# Patient Record
Sex: MALE | Race: WHITE | NOT HISPANIC OR LATINO | Employment: FULL TIME | URBAN - METROPOLITAN AREA
[De-identification: names, ages, dates, MRNs, and addresses within clinical notes are randomized per-mention and may not be internally consistent; named-entity substitution may affect disease eponyms.]

---

## 2018-03-21 ENCOUNTER — HOSPITAL ENCOUNTER (EMERGENCY)
Facility: HOSPITAL | Age: 38
Discharge: HOME | End: 2018-03-21
Attending: EMERGENCY MEDICINE

## 2018-03-21 VITALS
HEART RATE: 100 BPM | WEIGHT: 165 LBS | OXYGEN SATURATION: 100 % | RESPIRATION RATE: 18 BRPM | DIASTOLIC BLOOD PRESSURE: 88 MMHG | HEIGHT: 75 IN | SYSTOLIC BLOOD PRESSURE: 142 MMHG | TEMPERATURE: 98.3 F | BODY MASS INDEX: 20.51 KG/M2

## 2018-03-21 DIAGNOSIS — S61.411A LACERATION OF RIGHT PALM, INITIAL ENCOUNTER: Primary | ICD-10-CM

## 2018-03-21 PROCEDURE — 99282 EMERGENCY DEPT VISIT SF MDM: CPT

## 2018-03-21 RX ORDER — GINSENG 100 MG
1 CAPSULE ORAL ONCE
Status: COMPLETED | OUTPATIENT
Start: 2018-03-21 | End: 2018-03-21

## 2018-03-21 RX ORDER — LIDOCAINE HYDROCHLORIDE AND EPINEPHRINE 10; 10 MG/ML; UG/ML
1 INJECTION, SOLUTION INFILTRATION; PERINEURAL ONCE
Status: COMPLETED | OUTPATIENT
Start: 2018-03-21 | End: 2018-03-21

## 2018-03-21 RX ADMIN — BACITRACIN ZINC 1 SMALL APPLICATION: 500 OINTMENT TOPICAL at 22:45

## 2018-03-21 RX ADMIN — LIDOCAINE HYDROCHLORIDE,EPINEPHRINE BITARTRATE 1 ML: 10; .01 INJECTION, SOLUTION INFILTRATION; PERINEURAL at 22:45

## 2018-03-22 NOTE — DISCHARGE INSTRUCTIONS
Care For Your Stitches   WHAT YOU NEED TO KNOW:   Stitches, or sutures, are used to close cuts and wounds on the skin  Stitches need to be removed after your wound has healed  DISCHARGE INSTRUCTIONS:   Return to the emergency department if:   · Your stitches come apart  · Blood soaks through your bandages  · You suddenly cannot move your injured joint  · You have sudden numbness around your wound  · You see red streaks coming from your wound  Contact your healthcare provider if:   · You have a fever and chills  · Your wound is red, warm, swollen, or leaking pus  · There is a bad smell coming from your wound  · You have increased pain in the wound area  · You have questions or concerns about your condition or care  Care for your stitches:  Keep your stitches clean and dry  You may need to cover your stitches with a bandage for 24 to 48 hours, or as directed  Do not bump or hit the suture area, as this could open the wound  Do not trim or shorten the ends of your stitches  If they rub on your clothing, put a gauze bandage between the stitches and your clothes  Clean your wound:  Carefully wash your wound with soap and water  For mouth and lip wounds, rinse your mouth after meals and at bedtime  Ask your healthcare provider what to use to rinse your mouth  If you have a scalp wound, you may gently wash your hair every 2 days with mild shampoo  Do not use hair products, such as hair spray  Help your wound heal:   · Elevate  your wound above the level of your heart as often as you can  This will help decrease swelling and pain  Prop your wound on pillows or blankets to keep it elevated comfortably  · Limit activity  Do not stretch the skin around your wound  This will help prevent bleeding and swelling of the wound area  Follow up with your healthcare provider as directed: You may need to return to have your stitches removed   Write down your questions so you remember to ask them during your visits  © 2017 [employer] Information is for End User's use only and may not be sold, redistributed or otherwise used for commercial purposes  All illustrations and images included in CareNotes® are the copyrighted property of [employer]  or [address]  The above information is an  only  It is not intended as medical advice for individual conditions or treatments  Talk to your doctor, nurse or pharmacist before following any medical regimen to see if it is safe and effective for you

## 2018-03-22 NOTE — ED PROVIDER NOTES
History  Chief Complaint   Patient presents with    Hand Laceration     Patient was using a snowblower and it got jammed  He was using a poker to dislodge it and it kickbacked into his hand  This 65-year-old right-handed male lacerated his right palm approximately hour ago  He was attempting to clear the  blades using a wrench  The wrench was kicked back into his palm  He was wearing gloves  Patient declines x-ray  I discussed risk of retained foreign body or fracture  He again declined an x-ray  Prior to Admission Medications   Prescriptions Last Dose Informant Patient Reported? Taking?   amoxicillin (AMOXIL) 500 mg capsule   No No   Sig: Take 1 capsule (500 mg total) by mouth 3 (three) times a day  Facility-Administered Medications: None       History reviewed  No pertinent past medical history  Past Surgical History:   Procedure Laterality Date    APPENDECTOMY      DENTAL SURGERY         History reviewed  No pertinent family history  I have reviewed and agree with the history as documented  Social History   Substance Use Topics    Smoking status: Current Every Day Smoker     Packs/day: 1 00    Smokeless tobacco: Never Used    Alcohol use 1 2 oz/week     2 Cans of beer per week      Comment: 1-2 daily        Review of Systems   Constitutional: Negative  HENT: Negative  Eyes: Negative  Respiratory: Negative  Cardiovascular: Negative  Gastrointestinal: Negative  Endocrine: Negative  Genitourinary: Negative  Musculoskeletal: Negative  Skin: Negative  Allergic/Immunologic: Negative  Neurological: Negative  Hematological: Negative  Psychiatric/Behavioral: Negative  All other systems reviewed and are negative        Physical Exam  ED Triage Vitals [03/21/18 2231]   Temperature Pulse Respirations Blood Pressure SpO2   98 3 °F (36 8 °C) 100 18 142/88 100 %      Temp Source Heart Rate Source Patient Position - Orthostatic VS BP Location FiO2 (%)   Temporal Monitor Sitting Left arm --      Pain Score       8           Orthostatic Vital Signs  Vitals:    03/21/18 2231   BP: 142/88   Pulse: 100   Patient Position - Orthostatic VS: Sitting       Physical Exam   Constitutional: He is oriented to person, place, and time  He appears well-developed and well-nourished  HENT:   Head: Normocephalic and atraumatic  Right Ear: External ear normal    Left Ear: External ear normal    Mouth/Throat: Oropharynx is clear and moist    Eyes: Conjunctivae and EOM are normal  Pupils are equal, round, and reactive to light  Neck: Normal range of motion  Neck supple  No JVD present  Cardiovascular: Normal rate, regular rhythm, normal heart sounds and intact distal pulses  No murmur heard  Pulmonary/Chest: Effort normal and breath sounds normal    Abdominal: Soft  Bowel sounds are normal  He exhibits no mass  There is no rebound and no guarding  Musculoskeletal: Normal range of motion  He exhibits no edema or tenderness  Lymphadenopathy:     He has no cervical adenopathy  Neurological: He is alert and oriented to person, place, and time  He has normal reflexes  No cranial nerve deficit  Coordination normal    Skin: Skin is warm and dry  Capillary refill takes less than 2 seconds  No rash noted  4 cm laceration of right palm  Bleeding controlled  No FB  Distal 2 point discrimination, tendon function and capillary refill are normal    Psychiatric: He has a normal mood and affect  His behavior is normal    Nursing note and vitals reviewed        ED Medications  Medications   lidocaine-epinephrine (XYLOCAINE/EPINEPHRINE) 1 %-1:100,000 injection 1 mL (1 mL Infiltration Given 3/21/18 2245)   bacitracin topical ointment 1 small application (1 small application Topical Given 3/21/18 2245)       Diagnostic Studies  Results Reviewed     None                 No orders to display              Procedures  Lac Repair  Date/Time: 3/21/2018 11:16 PM  Performed by: Jacky Dey  Authorized by: Jacky Dey   Consent: Verbal consent obtained  Risks and benefits: risks, benefits and alternatives were discussed  Consent given by: patient  Patient understanding: patient states understanding of the procedure being performed  Body area: upper extremity  Location details: right hand  Laceration length: 4 cm  Tendon involvement: none  Nerve involvement: none  Vascular damage: no  Anesthesia: local infiltration    Anesthesia:  Local Anesthetic: lidocaine 1% with epinephrine    Sedation:  Patient sedated: no      Procedure Details:  Preparation: Patient was prepped and draped in the usual sterile fashion  Irrigation solution: saline  Irrigation method: jet lavage  Amount of cleaning: extensive  Debridement: none  Degree of undermining: none  Skin closure: 4-0 nylon  Number of sutures: 6  Technique: vertical mattress and simple  Approximation: close  Approximation difficulty: simple  Dressing: antibiotic ointment and gauze roll  Patient tolerance: Patient tolerated the procedure well with no immediate complications             Phone Contacts  ED Phone Contact    ED Course  ED Course                                MDM  Number of Diagnoses or Management Options  Laceration of right palm, initial encounter: new and does not require workup    CritCare Time    Disposition  Final diagnoses:   Laceration of right palm, initial encounter     Time reflects when diagnosis was documented in both MDM as applicable and the Disposition within this note     Time User Action Codes Description Comment    3/21/2018 11:09 PM Kailee Chaves Add [L73 920L] Laceration of right palm, initial encounter       ED Disposition     ED Disposition Condition Comment    Discharge  Gabiter Nyhaaurora discharge to home/self care      Condition at discharge: Stable        Follow-up Information     Follow up With Specialties Details Why Contact Info    Hernando Cancer, DO Family Medicine In 2 days For wound re-check [address]  [address] [facility]  [id number] [place] [address] [address] [facility]          Discharge Medication List as of 3/21/2018 11:12 PM      CONTINUE these medications which have NOT CHANGED    Details   amoxicillin (AMOXIL) 500 mg capsule Take 1 capsule (500 mg total) by mouth 3 (three) times a day , Starting 2/22/2016, Until Discontinued, Print           No discharge procedures on file      ED Provider  Electronically Signed by           Kvng Fuller DO  03/21/18 6762

## 2023-02-16 ENCOUNTER — APPOINTMENT (EMERGENCY)
Dept: ULTRASOUND IMAGING | Facility: HOSPITAL | Age: 43
End: 2023-02-16

## 2023-02-16 ENCOUNTER — APPOINTMENT (EMERGENCY)
Dept: CT IMAGING | Facility: HOSPITAL | Age: 43
End: 2023-02-16
Attending: EMERGENCY MEDICINE

## 2023-02-16 ENCOUNTER — HOSPITAL ENCOUNTER (INPATIENT)
Facility: HOSPITAL | Age: 43
LOS: 1 days | Discharge: HOME | End: 2023-02-17
Attending: EMERGENCY MEDICINE | Admitting: INTERNAL MEDICINE

## 2023-02-16 DIAGNOSIS — K76.9 LIVER DISEASE: ICD-10-CM

## 2023-02-16 DIAGNOSIS — R10.84 GENERALIZED ABDOMINAL PAIN: Primary | ICD-10-CM

## 2023-02-16 DIAGNOSIS — R79.89 ABNORMAL LFTS: ICD-10-CM

## 2023-02-16 DIAGNOSIS — R74.01 TRANSAMINITIS: ICD-10-CM

## 2023-02-16 DIAGNOSIS — K62.89 RECTAL MASS: ICD-10-CM

## 2023-02-16 PROBLEM — Z59.71 DOES NOT HAVE HEALTH INSURANCE: Status: ACTIVE | Noted: 2023-02-16

## 2023-02-16 PROBLEM — R93.5 ABNORMAL CT OF THE ABDOMEN: Status: ACTIVE | Noted: 2023-02-16

## 2023-02-16 PROBLEM — Z59.89 DOES NOT HAVE HEALTH INSURANCE: Status: ACTIVE | Noted: 2023-02-16

## 2023-02-16 PROBLEM — E87.1 HYPONATREMIA: Status: ACTIVE | Noted: 2023-02-16

## 2023-02-16 LAB
ALBUMIN SERPL BCP-MCNC: 3.8 G/DL (ref 3.5–5)
ALP SERPL-CCNC: 81 U/L (ref 34–104)
ALT SERPL W P-5'-P-CCNC: 66 U/L (ref 7–52)
ANION GAP SERPL CALCULATED.3IONS-SCNC: 13 MMOL/L (ref 4–13)
AST SERPL W P-5'-P-CCNC: 132 U/L (ref 13–39)
BASOPHILS # BLD AUTO: 0.07 THOUSANDS/ÂΜL (ref 0–0.1)
BASOPHILS NFR BLD AUTO: 1 % (ref 0–1)
BILIRUB SERPL-MCNC: 2.44 MG/DL (ref 0.2–1)
BUN SERPL-MCNC: 7 MG/DL (ref 5–25)
CALCIUM SERPL-MCNC: 8.3 MG/DL (ref 8.4–10.2)
CEA SERPL-MCNC: 39.2 NG/ML (ref 0–3)
CHLORIDE SERPL-SCNC: 102 MMOL/L (ref 96–108)
CO2 SERPL-SCNC: 18 MMOL/L (ref 21–32)
CREAT SERPL-MCNC: 0.79 MG/DL (ref 0.6–1.3)
EOSINOPHIL # BLD AUTO: 0.41 THOUSAND/ÂΜL (ref 0–0.61)
EOSINOPHIL NFR BLD AUTO: 6 % (ref 0–6)
ERYTHROCYTE [DISTWIDTH] IN BLOOD BY AUTOMATED COUNT: 13.2 % (ref 11.6–15.1)
GFR SERPL CREATININE-BSD FRML MDRD: 109 ML/MIN/1.73SQ M
GLUCOSE SERPL-MCNC: 101 MG/DL (ref 65–140)
HAV IGM SER QL: NORMAL
HBV CORE IGM SER QL: NORMAL
HBV SURFACE AG SER QL: NORMAL
HCT VFR BLD AUTO: 39.8 % (ref 36.5–49.3)
HCV AB SER QL: NORMAL
HGB BLD-MCNC: 13.1 G/DL (ref 12–17)
IMM GRANULOCYTES # BLD AUTO: 0.02 THOUSAND/UL (ref 0–0.2)
IMM GRANULOCYTES NFR BLD AUTO: 0 % (ref 0–2)
INR PPP: 1.32 (ref 0.84–1.19)
LIPASE SERPL-CCNC: 53 U/L (ref 11–82)
LYMPHOCYTES # BLD AUTO: 2.17 THOUSANDS/ÂΜL (ref 0.6–4.47)
LYMPHOCYTES NFR BLD AUTO: 30 % (ref 14–44)
MCH RBC QN AUTO: 30.3 PG (ref 26.8–34.3)
MCHC RBC AUTO-ENTMCNC: 32.9 G/DL (ref 31.4–37.4)
MCV RBC AUTO: 92 FL (ref 82–98)
MONOCYTES # BLD AUTO: 0.91 THOUSAND/ÂΜL (ref 0.17–1.22)
MONOCYTES NFR BLD AUTO: 12 % (ref 4–12)
NEUTROPHILS # BLD AUTO: 3.78 THOUSANDS/ÂΜL (ref 1.85–7.62)
NEUTS SEG NFR BLD AUTO: 51 % (ref 43–75)
NRBC BLD AUTO-RTO: 0 /100 WBCS
PLATELET # BLD AUTO: 179 THOUSANDS/UL (ref 149–390)
PMV BLD AUTO: 10.1 FL (ref 8.9–12.7)
POTASSIUM SERPL-SCNC: 3.5 MMOL/L (ref 3.5–5.3)
PROT SERPL-MCNC: 7.8 G/DL (ref 6.4–8.4)
PROTHROMBIN TIME: 17.2 SECONDS (ref 11.6–14.5)
RBC # BLD AUTO: 4.32 MILLION/UL (ref 3.88–5.62)
SODIUM SERPL-SCNC: 133 MMOL/L (ref 135–147)
WBC # BLD AUTO: 7.36 THOUSAND/UL (ref 4.31–10.16)

## 2023-02-16 RX ORDER — HEPARIN SODIUM 5000 [USP'U]/ML
5000 INJECTION, SOLUTION INTRAVENOUS; SUBCUTANEOUS EVERY 8 HOURS SCHEDULED
Status: DISCONTINUED | OUTPATIENT
Start: 2023-02-16 | End: 2023-02-17 | Stop reason: HOSPADM

## 2023-02-16 RX ORDER — ONDANSETRON 2 MG/ML
4 INJECTION INTRAMUSCULAR; INTRAVENOUS EVERY 6 HOURS PRN
Status: DISCONTINUED | OUTPATIENT
Start: 2023-02-16 | End: 2023-02-17 | Stop reason: HOSPADM

## 2023-02-16 RX ORDER — ONDANSETRON 2 MG/ML
4 INJECTION INTRAMUSCULAR; INTRAVENOUS ONCE
Status: DISCONTINUED | OUTPATIENT
Start: 2023-02-16 | End: 2023-02-17

## 2023-02-16 RX ORDER — SODIUM CHLORIDE 9 MG/ML
75 INJECTION, SOLUTION INTRAVENOUS CONTINUOUS
Status: DISCONTINUED | OUTPATIENT
Start: 2023-02-16 | End: 2023-02-17

## 2023-02-16 RX ADMIN — SODIUM CHLORIDE 1000 ML: 0.9 INJECTION, SOLUTION INTRAVENOUS at 07:23

## 2023-02-16 RX ADMIN — BISACODYL 10 MG: 5 TABLET, COATED ORAL at 16:58

## 2023-02-16 RX ADMIN — POLYETHYLENE GLYCOL 3350, SODIUM SULFATE ANHYDROUS, SODIUM BICARBONATE, SODIUM CHLORIDE, POTASSIUM CHLORIDE 4000 ML: 236; 22.74; 6.74; 5.86; 2.97 POWDER, FOR SOLUTION ORAL at 16:05

## 2023-02-16 RX ADMIN — SODIUM CHLORIDE 75 ML/HR: 0.9 INJECTION, SOLUTION INTRAVENOUS at 17:53

## 2023-02-16 RX ADMIN — IOHEXOL 100 ML: 350 INJECTION, SOLUTION INTRAVENOUS at 08:20

## 2023-02-16 RX ADMIN — BISACODYL 10 MG: 5 TABLET, COATED ORAL at 21:00

## 2023-02-16 RX ADMIN — HEPARIN SODIUM 5000 UNITS: 5000 INJECTION INTRAVENOUS; SUBCUTANEOUS at 17:48

## 2023-02-16 NOTE — ED PROVIDER NOTES
History  Chief Complaint   Patient presents with   • Abdominal Pain     To ED with c/o nausea, diarrhea and abd pain x 3 days  Patient states that he has had blood in stool as well for months on and off  Denies any fever or chills  Patient is a 36year old male who presents with abdominal pain  Patient reports over the last few months he has had issues with straining and sometime pain with defecation after which he notices blood on the tissue paper and sometimes in the toilet  In the last 3 days, he developed abdominal pain that he describes as generalized, constant, moderate, crampy and sharp associated with nausea and now some loose stools  None       Past Medical History:   Diagnosis Date   • Rectal mass        Past Surgical History:   Procedure Laterality Date   • APPENDECTOMY     • DENTAL SURGERY         Family History   Problem Relation Age of Onset   • Colon cancer Neg Hx    • Colon polyps Neg Hx      I have reviewed and agree with the history as documented  E-Cigarette/Vaping   • E-Cigarette Use Never User      E-Cigarette/Vaping Substances   • Nicotine No    • Flavoring No      Social History     Tobacco Use   • Smoking status: Former     Packs/day: 1 00     Types: Cigarettes   • Smokeless tobacco: Never   Vaping Use   • Vaping Use: Never used   Substance Use Topics   • Alcohol use: Yes     Alcohol/week: 2 0 standard drinks     Types: 2 Cans of beer per week     Comment: 1-2 daily   • Drug use: No       Review of Systems   Constitutional: Negative for chills and fever  Gastrointestinal: Positive for abdominal pain, blood in stool, constipation, diarrhea, nausea, rectal pain and vomiting  Negative for abdominal distention  Genitourinary: Negative for dysuria, flank pain, frequency and hematuria  Physical Exam  Physical Exam  Vitals and nursing note reviewed  Constitutional:       General: He is not in acute distress  Appearance: Normal appearance  He is well-developed   He is not ill-appearing, toxic-appearing or diaphoretic  HENT:      Head: Normocephalic and atraumatic  Mouth/Throat:      Mouth: Mucous membranes are moist    Eyes:      Conjunctiva/sclera: Conjunctivae normal       Pupils: Pupils are equal, round, and reactive to light  Cardiovascular:      Rate and Rhythm: Normal rate and regular rhythm  Pulses: Normal pulses  Pulmonary:      Effort: Pulmonary effort is normal  No respiratory distress  Breath sounds: Normal breath sounds  No stridor  No wheezing, rhonchi or rales  Chest:      Chest wall: No tenderness  Abdominal:      General: Abdomen is protuberant  Bowel sounds are normal  There is no distension  Palpations: Abdomen is soft  Tenderness: There is generalized abdominal tenderness  There is no right CVA tenderness, left CVA tenderness, guarding or rebound  Negative signs include Styles's sign, Rovsing's sign, McBurney's sign and psoas sign  Musculoskeletal:      Cervical back: Neck supple  Skin:     General: Skin is warm and dry  Neurological:      General: No focal deficit present  Mental Status: He is alert and oriented to person, place, and time  Mental status is at baseline     Psychiatric:         Mood and Affect: Mood normal          Behavior: Behavior normal          Vital Signs  ED Triage Vitals [02/16/23 0713]   Temperature Pulse Respirations Blood Pressure SpO2   98 2 °F (36 8 °C) (!) 106 20 138/82 98 %      Temp Source Heart Rate Source Patient Position - Orthostatic VS BP Location FiO2 (%)   Oral Monitor Sitting Right arm --      Pain Score       6           Vitals:    02/16/23 0830 02/16/23 0930 02/16/23 1030 02/16/23 1115   BP: 116/64 116/58 118/55 117/73   Pulse: 90 89 86 89   Patient Position - Orthostatic VS:  Sitting Sitting Lying         Visual Acuity  Visual Acuity    Flowsheet Row Most Recent Value   L Pupil Size (mm) 3   R Pupil Size (mm) 3          ED Medications  Medications   ondansetron (ZOFRAN) injection 4 mg (0 mg Intravenous Hold 2/16/23 0722)   polyethylene glycol (GOLYTELY) bowel prep 4,000 mL (has no administration in time range)   bisacodyl (DULCOLAX) EC tablet 10 mg (has no administration in time range)   sodium chloride 0 9 % bolus 1,000 mL (0 mL Intravenous Stopped 2/16/23 1010)   iohexol (OMNIPAQUE) 350 MG/ML injection (SINGLE-DOSE) 100 mL (100 mL Intravenous Given 2/16/23 0820)       Diagnostic Studies  Results Reviewed     Procedure Component Value Units Date/Time    ASUNCION Screen w/ Reflex to Titer/Pattern [[id number]]     Lab Status: No result Specimen: Blood     Antimitochondrial antibody [[id number]]     Lab Status: No result Specimen: Blood     Anti-smooth muscle antibody, IgG [[id number]]     Lab Status: No result Specimen: Blood     CEA [[id number]]     Lab Status: No result Specimen: Blood     Iron Saturation % [[id number]]     Lab Status: No result Specimen: Blood     Ferritin [[id number]]     Lab Status: No result Specimen: Blood     Protime-INR [[id number]]  (Abnormal) Collected: 02/16/23 1123    Lab Status: Final result Specimen: Blood from Arm, Right Updated: 02/16/23 1150     Protime 17 2 seconds      INR 1 32    Hepatitis panel, acute [[id number]] Collected: 02/16/23 1123    Lab Status:  In process Specimen: Blood from Arm, Right Updated: 02/16/23 1132    Comprehensive metabolic panel [[id number]]  (Abnormal) Collected: 02/16/23 0722    Lab Status: Final result Specimen: Blood from Arm, Right Updated: 02/16/23 0745     Sodium 133 mmol/L      Potassium 3 5 mmol/L      Chloride 102 mmol/L      CO2 18 mmol/L      ANION GAP 13 mmol/L      BUN 7 mg/dL      Creatinine 0 79 mg/dL      Glucose 101 mg/dL      Calcium 8 3 mg/dL       U/L      ALT 66 U/L      Alkaline Phosphatase 81 U/L      Total Protein 7 8 g/dL      Albumin 3 8 g/dL      Total Bilirubin 2 44 mg/dL      eGFR 109 ml/min/1 73sq m     Narrative:      [place] guidelines for Chronic Kidney Disease (CKD):   • Stage 1 with normal or high GFR (GFR > 90 mL/min/1 73 square meters)  •  Stage 2 Mild CKD (GFR = 60-89 mL/min/1 73 square meters)  •  Stage 3A Moderate CKD (GFR = 45-59 mL/min/1 73 square meters)  •  Stage 3B Moderate CKD (GFR = 30-44 mL/min/1 73 square meters)  •  Stage 4 Severe CKD (GFR = 15-29 mL/min/1 73 square meters)  •  Stage 5 End Stage CKD (GFR <15 mL/min/1 73 square meters)  Note: GFR calculation is accurate only with a steady state creatinine    Lipase [[id number]]  (Normal) Collected: 02/16/23 0722    Lab Status: Final result Specimen: Blood from Arm, Right Updated: 02/16/23 0745     Lipase 53 u/L     CBC and differential [[id number]] Collected: 02/16/23 0722    Lab Status: Final result Specimen: Blood from Arm, Right Updated: 02/16/23 0729     WBC 7 36 Thousand/uL      RBC 4 32 Million/uL      Hemoglobin 13 1 g/dL      Hematocrit 39 8 %      MCV 92 fL      MCH 30 3 pg      MCHC 32 9 g/dL      RDW 13 2 %      MPV 10 1 fL      Platelets 402 Thousands/uL      nRBC 0 /100 WBCs      Neutrophils Relative 51 %      Immat GRANS % 0 %      Lymphocytes Relative 30 %      Monocytes Relative 12 %      Eosinophils Relative 6 %      Basophils Relative 1 %      Neutrophils Absolute 3 78 Thousands/µL      Immature Grans Absolute 0 02 Thousand/uL      Lymphocytes Absolute 2 17 Thousands/µL      Monocytes Absolute 0 91 Thousand/µL      Eosinophils Absolute 0 41 Thousand/µL      Basophils Absolute 0 07 Thousands/µL                  US right upper quadrant   Final Result by Vin Johnson MD (02/16 1314)      1  Markedly abnormal liver appearing enlarged and demonstrating severe hepatic steatosis  Please refer to prior CT report for additional findings and recommendations   2  Distended gallbladder, without evidence of gallbladder wall thickening, significant biliary ductal dilatation or point tenderness    No gallstones are seen            Workstation performed: [id number]         CT abdomen pelvis with contrast   Final Result by Adrienne Hackett DO (02/16 1100)      Asymmetric rectal wall thickening, perirectal edema, prominent in size perirectal and low retroperitoneal lymph nodes  This is most concerning for neoplasm  Findings of hepatic cirrhosis and portal hypertension  Multiple somewhat geographic areas of decreased attenuation in the liver suspected to represent areas of focal fatty infiltration  More discrete rounded areas with contour irregularity involving    the left lobe and caudate lobe may represent regenerative or dysplastic nodules  Recommend further evaluation with MRI with and without intravenous contrast       Gallbladder is markedly distended without radiopaque calculi or findings for cholecystitis  Additional findings as above  This study demonstrates a significant  finding and was documented as such in [facility] for liaison and referring practitioner notification  Workstation performed: [id number]                    Procedures  Procedures         ED Course  ED Course as of 02/16/23 1455   Thu Feb 16, 2023   0801 AST(!): 132   0801 ALT(!): 66   0801 TOTAL BILIRUBIN(!): 2 44   1120 Discussed case with Dr Denzel wells, GI  She added an inr & acute hep panel; clear liquids and admit for colonoscopy tomorrow   [facility] on [facility] once completed  Patient accepted for admission to medicine service with GI consulted  SBIRT 22yo+    Flowsheet Row Most Recent Value   SBIRT (25 yo +)    In order to provide better care to our patients, we are screening all of our patients for alcohol and drug use  Would it be okay to ask you these screening questions? Yes Filed at: 02/16/2023 0900   Initial Alcohol Screen: US AUDIT-C     1  How often do you have a drink containing alcohol? 3 Filed at: 02/16/2023 0900   2  How many drinks containing alcohol do you have on a typical day you are drinking? 2 Filed at: 02/16/2023 0900   3a  Male UNDER 65:  How often do you have five or more drinks on one occasion? 0 Filed at: 02/16/2023 0900   3b  FEMALE Any Age, or MALE 65+: How often do you have 4 or more drinks on one occassion? 0 Filed at: 02/16/2023 0900   Audit-C Score 5 Filed at: 02/16/2023 0900   DARIEL: How many times in the past year have you    Used an illegal drug or used a prescription medication for non-medical reasons? Never Filed at: 02/16/2023 0900                    Medical Decision Making  Assessment and Plan:   Differential includes diverticulitis versus colitis versus enteritis versus cholecystitis versus GERD/gastritis  Will check labs to evaluate for kidney and liver function as well as anemia and leukocytosis, CT scan of the abdomen pelvis  Treat symptomatically  Reassess  Examine for hemorrhoids  Amount and/or Complexity of Data Reviewed  Labs: ordered  Decision-making details documented in ED Course  Radiology: ordered  Risk  Prescription drug management  Disposition  Final diagnoses:   Generalized abdominal pain   Abnormal LFTs     Time reflects when diagnosis was documented in both MDM as applicable and the Disposition within this note     Time User Action Codes Description Comment    2/16/2023 11:09 AM Lorna Massey Add [R10 84] Generalized abdominal pain     2/16/2023 11:30 AM Johanna Rasmussen Add [K62 89] Rectal mass     2/16/2023 11:32 AM Lorna Massey Add [R79 89] Abnormal LFTs       ED Disposition     ED Disposition   Admit    Condition   Stable    Date/Time   Thu Feb 16, 2023 11:21 AM    Comment   Case was discussed with hospitalist and the patient's admission status was agreed to be Admission Status: inpatient status to the service of Dr Edy Jackson   Follow-up Information    None         Patient's Medications   Discharge Prescriptions    No medications on file       No discharge procedures on file      PDMP Review     None          ED Provider  Electronically Signed by           Shiva Liang DO  02/16/23 1456

## 2023-02-16 NOTE — PROGRESS NOTES
Pt transported to MS2 via wheelchair with PCA  Sister at bedside  Belongings gathered  Report called to Joe Chaudhary RN

## 2023-02-16 NOTE — ASSESSMENT & PLAN NOTE
Lab Results   Component Value Date     (H) 02/16/2023    ALT 66 (H) 02/16/2023    TBILI 2 44 (H) 02/16/2023     · CT scan: Findings of hepatic cirrhosis and portal hypertension  Multiple somewhat geographic areas of decreased attenuation in the liver suspected to represent areas of focal fatty infiltration  More discrete rounded areas with contour irregularity involving the left lobe and caudate lobe may represent regenerative or dysplastic nodules  Gallbladder markedly distended  · RUQ US: Markedly abnormal liver appearing enlarged and demonstrating severe hepatic steatosis  Distended GB  · Seen in consultation by GI  · Plan for MRI with liver protocol after colonoscopy to further evaluate liver lesion  · Await hepatitis panel, autoimmune serologies and iron panels    · Will need eventual EGD to assess for esophageal varices

## 2023-02-16 NOTE — PLAN OF CARE
Problem: Nutrition/Hydration-ADULT  Goal: Nutrient/Hydration intake appropriate for improving, restoring or maintaining nutritional needs  Description: Monitor and assess patient's nutrition/hydration status for malnutrition  Collaborate with interdisciplinary team and initiate plan and interventions as ordered  Monitor patient's weight and dietary intake as ordered or per policy  Utilize nutrition screening tool and intervene as necessary  Determine patient's food preferences and provide high-protein, high-caloric foods as appropriate       INTERVENTIONS:  - Monitor oral intake, urinary output, labs, and treatment plans  - Assess nutrition and hydration status and recommend course of action  - Evaluate amount of meals eaten  - Allow adequate time for meals  - Recommend/ encourage appropriate diets, oral nutritional supplements, and vitamin/mineral supplements  - Order, calculate, and assess calorie counts as needed  - Assess need for intravenous fluids  - Provide specific nutrition/hydration education as appropriate  - Include patient/family/caregiver in decisions related to nutrition  Outcome: Progressing     Problem: GASTROINTESTINAL - ADULT  Goal: Maintains or returns to baseline bowel function  Description: INTERVENTIONS:  - Assess bowel function  - Encourage oral fluids to ensure adequate hydration  - Administer IV fluids if ordered to ensure adequate hydration  - Administer ordered medications as needed  - Encourage mobilization and activity  - Consider nutritional services referral to assist patient with adequate nutrition and appropriate food choices  Outcome: Progressing  Goal: Maintains adequate nutritional intake  Description: INTERVENTIONS:  - Monitor percentage of each meal consumed  - Identify factors contributing to decreased intake, treat as appropriate  - Assist with meals as needed  - Monitor I&O, weight, and lab values if indicated  - Obtain nutrition services referral as needed  Outcome: Progressing     Problem: METABOLIC, FLUID AND ELECTROLYTES - ADULT  Goal: Electrolytes maintained within normal limits  Description: INTERVENTIONS:  - Monitor labs and assess patient for signs and symptoms of electrolyte imbalances  - Administer electrolyte replacement as ordered  - Monitor response to electrolyte replacements, including repeat lab results as appropriate  - Instruct patient on fluid and nutrition as appropriate  Outcome: Progressing     Problem: PAIN - ADULT  Goal: Verbalizes/displays adequate comfort level or baseline comfort level  Description: Interventions:  - Encourage patient to monitor pain and request assistance  - Assess pain using appropriate pain scale  - Administer analgesics based on type and severity of pain and evaluate response  - Implement non-pharmacological measures as appropriate and evaluate response  - Consider cultural and social influences on pain and pain management  - Notify physician/advanced practitioner if interventions unsuccessful or patient reports new pain  Outcome: Progressing     Problem: DISCHARGE PLANNING  Goal: Discharge to home or other facility with appropriate resources  Description: INTERVENTIONS:  - Identify barriers to discharge w/patient and caregiver  - Arrange for needed discharge resources and transportation as appropriate  - Identify discharge learning needs (meds, wound care, etc )  - Arrange for interpretive services to assist at discharge as needed  - Refer to Case Management Department for coordinating discharge planning if the patient needs post-hospital services based on physician/advanced practitioner order or complex needs related to functional status, cognitive ability, or social support system  Outcome: Progressing     Problem: Knowledge Deficit  Goal: Patient/family/caregiver demonstrates understanding of disease process, treatment plan, medications, and discharge instructions  Description: Complete learning assessment and assess knowledge base   Interventions:  - Provide teaching at level of understanding  - Provide teaching via preferred learning methods  Outcome: Progressing

## 2023-02-16 NOTE — ASSESSMENT & PLAN NOTE
· CT: Asymmetric rectal wall thickening, perirectal edema, prominent in size perirectal and low retroperitoneal lymph nodes  This is most concerning for neoplasm  · Patient with 6 months history of constipation, tenesmus and hematochezia    · Planning for colonoscopy tomorrow 2/17/2023  · Plan for clear liquids today and n p o  at midnight  · Obtain CEA

## 2023-02-16 NOTE — H&P
[facility] 1980[facility] 36 [facility]  [facility] MRN: [id number]  Unit/Bed#: -[place] Encounter: [id number]  Primary Care Provider: Corrina Pfeiffer DO   Date and time admitted to hospital: 2/16/2023  7:10 AM    * Rectal wall thickening  Assessment & Plan  · CT: Asymmetric rectal wall thickening, perirectal edema, prominent in size perirectal and low retroperitoneal lymph nodes  This is most concerning for neoplasm  · Patient with 6 months history of constipation, tenesmus and hematochezia  · Planning for colonoscopy tomorrow 2/17/2023  · Plan for clear liquids today and n p o  at midnight  · Obtain CEA    Transaminitis/Hepatitic Steatosis  Assessment & Plan  Lab Results   Component Value Date     (H) 02/16/2023    ALT 66 (H) 02/16/2023    TBILI 2 44 (H) 02/16/2023     · CT scan: Findings of hepatic cirrhosis and portal hypertension  Multiple somewhat geographic areas of decreased attenuation in the liver suspected to represent areas of focal fatty infiltration  More discrete rounded areas with contour irregularity involving the left lobe and caudate lobe may represent regenerative or dysplastic nodules  Gallbladder markedly distended  · RUQ US: Markedly abnormal liver appearing enlarged and demonstrating severe hepatic steatosis  Distended GB  · Seen in consultation by GI  · Plan for MRI with liver protocol after colonoscopy to further evaluate liver lesion  · Await hepatitis panel, autoimmune serologies and iron panels  · Will need eventual EGD to assess for esophageal varices    Hyponatremia  Assessment & Plan  · Mild and likely related to mild dehydration  · 133 on admit  · Repeat in AM  · S/P 1 L NSS in ER  · Will be on bowel prep overnight    Does not have health insurance  Assessment & Plan  · Will discuss with case mgmt     VTE Pharmacologic Prophylaxis: VTE Score: 5 High Risk (Score >/= 5) - Pharmacological DVT Prophylaxis Ordered: heparin   Sequential Compression Devices Ordered  Code Status: FULL CODE  Discussion with family: Updated  (sister) at bedside  Anticipated Length of Stay: Patient will be admitted on an inpatient basis with an anticipated length of stay of greater than 2 midnights secondary to rectal wall thickening needing colonoscopy, liver lesions needing MRI  Total Time Spent on Date of Encounter in care of patient: 75 minutes This time was spent on one or more of the following: performing physical exam; counseling and coordination of care; obtaining or reviewing history; documenting in the medical record; reviewing/ordering tests, medications or procedures; communicating with other healthcare professionals and discussing with patient's family/caregivers  Chief Complaint: abdominal pain    History of Present Illness:  Gabriel Kiser is a 36 y o  male with a PMH of recurrent dental infections who presents with abdominal pain  The patient reports that approximately 11 months ago he had ongoing dental infections and he thought that his abdominal discomfort and change in his bowel habits were related to him swallowing infected fluid from his teeth  He reports that this is when he noticed some constipation, abdominal discomfort and intermittent bleeding when he would wipe  He reports that he read online that this could be related to a lack of fiber and therefore began taking fiber vitamins and changing his dietary habits  He also used hemorrhoid wipes  He reports that over the last few days his abdomen became painful  It was generalized discomfort  He therefore came to the emergency department for further evaluation at which point the CAT scan was noted to show asymmetric rectal wall thickening and perirectal edema concerning for neoplasm as well as findings of hepatic cirrhosis versus severe hepatic steatosis and portal hypertension      He reports that he was previously a heavy alcohol user however decrease his amount of alcohol approximately 7 years ago when he had his child  He reports previously he would drink up to 1 case of beer per day however now he will drink 1-2 beers per day  He previously smoked 1 pack/day for 20 years however quit approximately 3 years ago  His mother had lung cancer and his father had head and neck cancer  No family history of colon cancer  Review of Systems:  Review of Systems   Constitutional: Negative for chills, diaphoresis, fatigue, fever and unexpected weight change (no recent weight loss)  HENT: Negative for sore throat  Respiratory: Negative for cough, chest tightness and shortness of breath  Cardiovascular: Negative for chest pain, palpitations and leg swelling  Gastrointestinal: Positive for abdominal distention, abdominal pain, anal bleeding, blood in stool, constipation and nausea  Negative for diarrhea and vomiting  Genitourinary: Negative for dysuria  Musculoskeletal: Negative for myalgias  Neurological: Negative for dizziness, syncope, weakness, numbness and headaches  Psychiatric/Behavioral: Negative for confusion  All other systems reviewed and are negative  Past Medical and Surgical History:   Past Medical History:   Diagnosis Date   • Dental infection        Past Surgical History:   Procedure Laterality Date   • APPENDECTOMY     • DENTAL SURGERY         Meds/Allergies:  Prior to Admission medications    Medication Sig Start Date End Date Taking? Authorizing Provider   amoxicillin (AMOXIL) 500 mg capsule Take 1 capsule (500 mg total) by mouth 3 (three) times a day  2/22/16 2/16/23  Antonio Butler, DO     I have reviewed home medications with patient personally      Allergies: No Known Allergies    Social History:  Marital Status: Single   Occupation: contractor  Patient Pre-hospital Living Situation: Home  Patient Pre-hospital Level of Mobility: walks  Patient Pre-hospital Diet Restrictions: none  Substance Use History:   Social History     Substance and Sexual Activity   Alcohol Use Yes   • Alcohol/week: 2 0 standard drinks   • Types: 2 Cans of beer per week    Comment: 1-2 daily  previously up to 1 case/day (reduced alcohol intake 7 years ago)     Social History     Tobacco Use   Smoking Status Former   • Packs/day: 1 00   • Years: 20 00   • Pack years: 20 00   • Types: Cigarettes   • Quit date: 2020   • Years since quitting: 3 1   Smokeless Tobacco Never     Social History     Substance and Sexual Activity   Drug Use No       Family History:  Family History   Problem Relation Age of Onset   • Lung cancer Mother    • Cancer Father         head and neck cancer   • Colon cancer Neg Hx    • Colon polyps Neg Hx        Physical Exam:     Vitals:   Blood Pressure: 117/73 (02/16/23 1115)  Pulse: 89 (02/16/23 1115)  Temperature: 98 2 °F (36 8 °C) (02/16/23 0713)  Temp Source: Oral (02/16/23 0713)  Respirations: 18 (02/16/23 1115)  Weight - Scale: 100 kg (220 lb 7 4 oz) (02/16/23 0713)  SpO2: 97 % (02/16/23 1115)    Physical Exam  Vitals and nursing note reviewed  Constitutional:       General: He is not in acute distress  Appearance: Normal appearance  He is obese  He is not ill-appearing or diaphoretic  HENT:      Head: Normocephalic and atraumatic  Mouth/Throat:      Mouth: Mucous membranes are dry  Cardiovascular:      Rate and Rhythm: Normal rate and regular rhythm  Pulmonary:      Effort: Pulmonary effort is normal       Breath sounds: Normal breath sounds  No stridor  No wheezing, rhonchi or rales  Abdominal:      General: Bowel sounds are normal  There is distension  Palpations: Abdomen is soft  There is no mass  Tenderness: There is no abdominal tenderness  There is no guarding  Musculoskeletal:      Right lower leg: No edema  Left lower leg: No edema  Skin:     General: Skin is warm and dry  Neurological:      Mental Status: He is alert and oriented to person, place, and time     Psychiatric:         Mood and Affect: Mood normal          Behavior: Behavior normal           Additional Data:     Lab Results:  Results from last 7 days   Lab Units 02/16/23  0722   WBC Thousand/uL 7 36   HEMOGLOBIN g/dL 13 1   HEMATOCRIT % 39 8   PLATELETS Thousands/uL 179   NEUTROS PCT % 51   LYMPHS PCT % 30   MONOS PCT % 12   EOS PCT % 6     Results from last 7 days   Lab Units 02/16/23  0722   SODIUM mmol/L 133*   POTASSIUM mmol/L 3 5   CHLORIDE mmol/L 102   CO2 mmol/L 18*   BUN mg/dL 7   CREATININE mg/dL 0 79   ANION GAP mmol/L 13   CALCIUM mg/dL 8 3*   ALBUMIN g/dL 3 8   TOTAL BILIRUBIN mg/dL 2 44*   ALK PHOS U/L 81   ALT U/L 66*   AST U/L 132*   GLUCOSE RANDOM mg/dL 101     Results from last 7 days   Lab Units 02/16/23  1123   INR  1 32*                   Lines/Drains:  Invasive Devices     Peripheral Intravenous Line  Duration           Peripheral IV 02/16/23 Right Antecubital <1 day                    Imaging: Reviewed radiology reports from this admission including: abdominal/pelvic CT and ultrasound(s)  US right upper quadrant   Final Result by Cody Desai MD (02/16 1314)      1  Markedly abnormal liver appearing enlarged and demonstrating severe hepatic steatosis  Please refer to prior CT report for additional findings and recommendations   2  Distended gallbladder, without evidence of gallbladder wall thickening, significant biliary ductal dilatation or point tenderness  No gallstones are seen            Workstation performed: [id number]         CT abdomen pelvis with contrast   Final Result by Cathie Maya DO (02/16 1100)      Asymmetric rectal wall thickening, perirectal edema, prominent in size perirectal and low retroperitoneal lymph nodes  This is most concerning for neoplasm  Findings of hepatic cirrhosis and portal hypertension  Multiple somewhat geographic areas of decreased attenuation in the liver suspected to represent areas of focal fatty infiltration    More discrete rounded areas with contour irregularity involving    the left lobe and caudate lobe may represent regenerative or dysplastic nodules  Recommend further evaluation with MRI with and without intravenous contrast       Gallbladder is markedly distended without radiopaque calculi or findings for cholecystitis  Additional findings as above  This study demonstrates a significant  finding and was documented as such in [facility] for liaison and referring practitioner notification  Workstation performed: [id number]             EKG and Other Studies Reviewed on Admission:   · EKG: No EKG obtained  ** Please Note: This note has been constructed using a voice recognition system   **

## 2023-02-16 NOTE — CASE MANAGEMENT
Case Management Assessment & Discharge Planning Note    Patient name Vicki Salguero  Location ED 06/ED 06 MRN [id number]  : 1980 Date 2023       Current Admission Date: 2023  Current Admission Diagnosis:Rectal wall thickening   Patient Active Problem List    Diagnosis Date Noted   • Rectal wall thickening 2023   • Transaminitis/Hepatitic Steatosis 2023   • Hyponatremia 2023      LOS (days): 0  Geometric Mean LOS (GMLOS) (days):   Days to GMLOS:     OBJECTIVE:              Current admission status: Inpatient       Preferred Pharmacy:   [address] [place] [address] [address][id number] [facility] [address] [place] [address],[address] [place] [address],[address] [place] [address] [address] [place] [id number]  Phone: [phone] Fax: [phone]    Primary Care Provider: Ree Segura DO    Primary Insurance:   Secondary Insurance:     ASSESSMENT:  Louie Talbot Proxies    There are no active Health Care Proxies on file  Advance Directives  Does patient have a [address] [place] [address] Avenue?: No  Was patient offered paperwork?: Yes (Declined)  Does patient currently have a Health Care decision maker?: Yes, please see Health Care Proxy section  Does patient have Advance Directives?: No  Was patient offered paperwork?: Yes (Declined)  Primary Contact: Debbie Lainez         Readmission [facility]  30 Day Readmission: No    Patient Information  Admitted from[de-identified] Home  Mental Status: Alert  During Assessment patient was accompanied by: Sister  Assessment information provided by[de-identified] Patient  Primary Caregiver: Self  Support Systems: Self, Family members  [place] of Residence:  [facility] do you live in?: [address] [place] [address] entry access options   Select all that apply : Stairs  Number of steps to enter home : One Flight  Do the steps have railings?: Yes  Type of Current Residence: Apartment  Floor Level: 2  Upon entering residence, is there a bedroom on the main floor (no further steps)?: Yes  Upon entering residence, is there a bathroom on the main floor (no further steps)?: Yes  In the last 12 months, was there a time when you were not able to pay the mortgage or rent on time?: No  In the last 12 months, how many places have you lived?: 1  In the last 12 months, was there a time when you did not have a steady place to sleep or slept in a shelter (including now)?: No  Homeless/housing insecurity resource given?: N/A  Living Arrangements: Lives Alone  Is patient a ?: No    Activities of Daily Living Prior to Admission  Functional Status: Independent  Completes ADLs independently?: Yes  Ambulates independently?: Yes  Does patient use assisted devices?: No  Does patient currently own DME?: No  Does patient have a history of Outpatient Therapy (PT/OT)?: Yes  Does the patient have a history of Short-Term Rehab?: No  Does patient have a history of HHC?: No  Does patient currently have [employer]?: No         Patient Information Continued  Income Source: Employed  Does patient have prescription coverage?: No  Within the past 12 months, you worried that your food would run out before you got the money to buy more : Never true  Within the past 12 months, the food you bought just didn't last and you didn't have money to get more : Never true  Food insecurity resource given?: N/A  Does patient receive dialysis treatments?: No  Does patient have a history of substance abuse?: No  Does patient have a history of Mental Health Diagnosis?: No         Means of Transportation  Means of Transport to Appts[de-identified] Drives Self  In the past 12 months, has lack of transportation kept you from medical appointments or from getting medications?: No  In the past 12 months, has lack of transportation kept you from meetings, work, or from getting things needed for daily living?: No  Was application for public transport provided?: N/A        DISCHARGE DETAILS:    Discharge planning discussed with[de-identified] Patient and sister present  Freedom of Choice: Yes  Comments - Freedom of Choice: Cal Fernandez planning and role of Cm  CM contacted family/caregiver?: No- see comments (pt indpt but sister present in room)  Were Treatment Team discharge recommendations reviewed with patient/caregiver?: Yes  Did patient/caregiver verbalize understanding of patient care needs?: Yes       Contacts  Reason/Outcome: Discharge [address]         Is the patient interested in [facility] at discharge?: No    DME Referral Provided  Referral made for DME?: No    Other Referral/Resources/Interventions Provided:  Interventions: None Indicated  Referral Comments: No needs anticipated  Pt is indpt at baseline  Will have colonoscopy tomorrow and likely DC home            Discharge Destination Plan[de-identified] Home  Transport at Discharge : Family                                      Additional Comments: Cm met with pt in ED  Pts sister also present during interview  Pt reports that he lives in a 2nd floor apartment with a flight of stairs to enter  Pt reports that his son lives with him part time as he shares him with his Brandin Chaney (sons mother)  Pt has no DME needs  He has no Hx of HHC/STR/MH/DA  Pt has particiapted in OP PT before  He works fulltime  Pt reports he does not have insurance  PATHS referral placed  Pt uses [facility]  He was seeing a  PCP before but states he has not seen one in 7 years  Pt states the provider listed for him had moved  Cm provided pt with the [facility] office number  He plans to call and get re established with a PCP  GI following   Pt for colonoscopy tomorrow

## 2023-02-16 NOTE — ASSESSMENT & PLAN NOTE
· Mild and likely related to mild dehydration  · 133 on admit  · Repeat in AM  · S/P 1 L NSS in ER  · Will be on bowel prep overnight

## 2023-02-16 NOTE — CONSULTS
Consultation - GI   Gabriel Kiser 36 y o  male MRN: [id number]  Unit/Bed#: ED 06 Encounter: [id number]      Assessment/Plan   1  Abdominal pain  2  Change in bowel habits  3  Rectal bleeding  4  Abnormal CT scan  79-year-old male presented to ED with 3-day history of intermittent abdominal pain with associated nausea and decreased appetite  6-month history of constipation, tenesmus, hematochezia and rectal bleeding  Occasional rectal pain with defecation  CT scan in ED reveals rectal wall thickening and perirectal edema, lymphadenopathy suspicious for mass  -Plan to proceed with colonoscopy tomorrow 2/17  -Okay for clear liquids today then n p o   -Check CEA    5  Elevated LFTs  Blood work in ED revealed elevated transaminases  Total bilirubin 2 44  INR increased at 1 3  CT scan of the abdomen showed liver nodularity consistent with cirrhosis, portal hypertension, multiple liver lesions  Patient reports prior heavy alcohol use, stopped 7 years ago  Continues to drink 2 drinks daily x5 days/week  No history of liver disease, hepatitis or IV drug use  -Abdominal ultrasound pending  -Plan for MRI with liver protocol after colonoscopy to evaluate liver lesions  -Check hepatitis panel, autoimmune serology, iron panel  -Eventual  EGD to evaluate for esophageal varices    Inpatient consult to gastroenterology  Consult performed by: ISAAC Borja  Consult ordered by: Jazz Willett DO          Physician Requesting Consult: Stephane Mcdermott MD  Reason for Consult / Principal Problem: Abdominal pain, possible rectal mass    HPI: Gabriel Kiser is a 36y o  year old male who presented to ED with 3-day history of diffuse intermittent abdominal pain, nausea and decreased appetite  He has been experiencing constipation for at least 6 months-reports small hard bowel movement every 2 to 3 days  Occasional rectal pain with defecation  Yesterday experienced explosive diarrhea with multiple episodes  Other recent symptoms include feeling of incomplete evacuation, tenesmus , 6-month history of intermittent hematochezia and rectal bleeding, and abdominal distention/bloating for 6 to 8 months  Occasional fecal urgency and incontinence  Denies recent abdominal pain or cramping until 3 days ago  Denies dysphagia, reflux/GERD symptoms  Appetite previously good and he denies unintentional weight loss    CT scan in ED showed rectal wall thickening, perirectal edema and retroperitoneal lymph adenopathy  Also noted to have findings of hepatic cirrhosis and portal hypertension, multiple hepatic nodules, distended gallbladder  Labs reviewed- hemoglobin normal at 13 1, WBC 7 3, normal platelet count  LFTs elevated-, ALT 66, ALP 81, total bilirubin 2 44  INR elevated at 1 32  Normal lipase    Former tobacco use  Admits to previous heavy alcohol use, stopped 7 years ago  Admits to 2 drinks 5 days/week  Denies IV drug use, no marijuana use  No prior screening colonoscopy        Review of Systems   Constitutional: Positive for appetite change  Negative for unexpected weight change  HENT: Negative for trouble swallowing  Respiratory: Negative  Cardiovascular: Negative  Gastrointestinal: Positive for abdominal distention, abdominal pain, anal bleeding, blood in stool, constipation and nausea  Genitourinary: Negative  Musculoskeletal: Negative  Skin: Negative  Neurological: Negative  Hematological: Negative  Psychiatric/Behavioral: Negative            Historical Information   Past Medical History:   Diagnosis Date   • Rectal mass      Past Surgical History:   Procedure Laterality Date   • APPENDECTOMY     • DENTAL SURGERY       Social History   Social History     Substance and Sexual Activity   Alcohol Use Yes   • Alcohol/week: 2 0 standard drinks   • Types: 2 Cans of beer per week    Comment: 1-2 daily     Social History     Substance and Sexual Activity   Drug Use No     Social History Tobacco Use   Smoking Status Former   • Packs/day: 1 00   • Types: Cigarettes   Smokeless Tobacco Never     Family History   Problem Relation Age of Onset   • Colon cancer Neg Hx    • Colon polyps Neg Hx        Meds/Allergies   Current Facility-Administered Medications   Medication Dose Route Frequency   • bisacodyl (DULCOLAX) EC tablet 10 mg  10 mg Oral BID   • ondansetron (ZOFRAN) injection 4 mg  4 mg Intravenous Once   • polyethylene glycol (GOLYTELY) bowel prep 4,000 mL  4,000 mL Oral Once     (Not in a hospital admission)      No Known Allergies        Physical Exam   Constitutional: Is oriented to person, place, and time  Appears well-developed and well-nourished  HENT: WNL  Head: Normocephalic and atraumatic  Eyes: Pupils are equal, round, and reactive to light  Neck: Normal range of motion  Neck supple  Cardiovascular: Normal rate and regular rhythm  Pulmonary/Chest: Effort normal and breath sounds normal    Abdominal: Soft  Bowel sounds are normal    Musculoskeletal: Normal range of motion  Extremities:  No edema  Neurological: Is alert and oriented to person, place, and time  Skin: Skin is warm and dry  Psychiatric: Has a normal mood and affect         Lab Results:   Admission on 02/16/2023   Component Date Value   • WBC 02/16/2023 7 36    • RBC 02/16/2023 4 32    • Hemoglobin 02/16/2023 13 1    • Hematocrit 02/16/2023 39 8    • MCV 02/16/2023 92    • MCH 02/16/2023 30 3    • MCHC 02/16/2023 32 9    • RDW 02/16/2023 13 2    • MPV 02/16/2023 10 1    • Platelets 10/32/3398 179    • nRBC 02/16/2023 0    • Neutrophils Relative 02/16/2023 51    • Immat GRANS % 02/16/2023 0    • Lymphocytes Relative 02/16/2023 30    • Monocytes Relative 02/16/2023 12    • Eosinophils Relative 02/16/2023 6    • Basophils Relative 02/16/2023 1    • Neutrophils Absolute 02/16/2023 3 78    • Immature Grans Absolute 02/16/2023 0 02    • Lymphocytes Absolute 02/16/2023 2 17    • Monocytes Absolute 02/16/2023 0 91 • Eosinophils Absolute 02/16/2023 0 41    • Basophils Absolute 02/16/2023 0 07    • Sodium 02/16/2023 133 (L)    • Potassium 02/16/2023 3 5    • Chloride 02/16/2023 102    • CO2 02/16/2023 18 (L)    • ANION GAP 02/16/2023 13    • BUN 02/16/2023 7    • Creatinine 02/16/2023 0 79    • Glucose 02/16/2023 101    • Calcium 02/16/2023 8 3 (L)    • AST 02/16/2023 132 (H)    • ALT 02/16/2023 66 (H)    • Alkaline Phosphatase 02/16/2023 81    • Total Protein 02/16/2023 7 8    • Albumin 02/16/2023 3 8    • Total Bilirubin 02/16/2023 2 44 (H)    • eGFR 02/16/2023 109    • Lipase 02/16/2023 53    • Protime 02/16/2023 17 2 (H)    • INR 02/16/2023 1 32 (H)      Imaging Studies: I have personally reviewed pertinent reports  EKG, Pathology, and Other Studies: I have personally reviewed pertinent reports  Counseling / Coordination of Care  Total floor / unit time spent today 45 minutes

## 2023-02-17 ENCOUNTER — APPOINTMENT (INPATIENT)
Dept: CT IMAGING | Facility: HOSPITAL | Age: 43
End: 2023-02-17

## 2023-02-17 ENCOUNTER — TELEPHONE (OUTPATIENT)
Dept: HEMATOLOGY ONCOLOGY | Facility: CLINIC | Age: 43
End: 2023-02-17

## 2023-02-17 ENCOUNTER — PATIENT OUTREACH (OUTPATIENT)
Dept: HEMATOLOGY ONCOLOGY | Facility: CLINIC | Age: 43
End: 2023-02-17

## 2023-02-17 ENCOUNTER — ANESTHESIA EVENT (INPATIENT)
Dept: GASTROENTEROLOGY | Facility: HOSPITAL | Age: 43
End: 2023-02-17

## 2023-02-17 ENCOUNTER — APPOINTMENT (INPATIENT)
Dept: GASTROENTEROLOGY | Facility: HOSPITAL | Age: 43
End: 2023-02-17
Attending: INTERNAL MEDICINE

## 2023-02-17 ENCOUNTER — ANESTHESIA (INPATIENT)
Dept: GASTROENTEROLOGY | Facility: HOSPITAL | Age: 43
End: 2023-02-17

## 2023-02-17 VITALS
BODY MASS INDEX: 27.56 KG/M2 | DIASTOLIC BLOOD PRESSURE: 78 MMHG | TEMPERATURE: 98.6 F | SYSTOLIC BLOOD PRESSURE: 125 MMHG | WEIGHT: 220.46 LBS | HEART RATE: 80 BPM | RESPIRATION RATE: 20 BRPM | OXYGEN SATURATION: 98 %

## 2023-02-17 DIAGNOSIS — R93.5 ABNORMAL CT OF THE ABDOMEN: Primary | ICD-10-CM

## 2023-02-17 PROBLEM — E87.1 HYPONATREMIA: Status: RESOLVED | Noted: 2023-02-16 | Resolved: 2023-02-17

## 2023-02-17 LAB
ACTIN IGG SERPL-ACNC: 16 UNITS (ref 0–19)
AFP-TM SERPL-MCNC: 8.4 NG/ML (ref 0.5–8)
ALBUMIN SERPL BCP-MCNC: 3.4 G/DL (ref 3.5–5)
ALP SERPL-CCNC: 70 U/L (ref 34–104)
ALT SERPL W P-5'-P-CCNC: 53 U/L (ref 7–52)
ANA SER QL IA: NEGATIVE
ANION GAP SERPL CALCULATED.3IONS-SCNC: 9 MMOL/L (ref 4–13)
AST SERPL W P-5'-P-CCNC: 100 U/L (ref 13–39)
BASOPHILS # BLD AUTO: 0.04 THOUSANDS/ÂΜL (ref 0–0.1)
BASOPHILS NFR BLD AUTO: 1 % (ref 0–1)
BILIRUB SERPL-MCNC: 2.57 MG/DL (ref 0.2–1)
BUN SERPL-MCNC: 10 MG/DL (ref 5–25)
CALCIUM ALBUM COR SERPL-MCNC: 8.5 MG/DL (ref 8.3–10.1)
CALCIUM SERPL-MCNC: 8 MG/DL (ref 8.4–10.2)
CHLORIDE SERPL-SCNC: 107 MMOL/L (ref 96–108)
CO2 SERPL-SCNC: 19 MMOL/L (ref 21–32)
CREAT SERPL-MCNC: 0.65 MG/DL (ref 0.6–1.3)
EOSINOPHIL # BLD AUTO: 0.26 THOUSAND/ÂΜL (ref 0–0.61)
EOSINOPHIL NFR BLD AUTO: 4 % (ref 0–6)
ERYTHROCYTE [DISTWIDTH] IN BLOOD BY AUTOMATED COUNT: 13.2 % (ref 11.6–15.1)
FERRITIN SERPL-MCNC: 81 NG/ML (ref 8–388)
GFR SERPL CREATININE-BSD FRML MDRD: 119 ML/MIN/1.73SQ M
GLUCOSE SERPL-MCNC: 107 MG/DL (ref 65–140)
GLUCOSE SERPL-MCNC: 95 MG/DL (ref 65–140)
HCT VFR BLD AUTO: 36.2 % (ref 36.5–49.3)
HGB BLD-MCNC: 11.8 G/DL (ref 12–17)
IMM GRANULOCYTES # BLD AUTO: 0.02 THOUSAND/UL (ref 0–0.2)
IMM GRANULOCYTES NFR BLD AUTO: 0 % (ref 0–2)
IRON SATN MFR SERPL: 39 % (ref 20–50)
IRON SERPL-MCNC: 138 UG/DL (ref 65–175)
LYMPHOCYTES # BLD AUTO: 1.66 THOUSANDS/ÂΜL (ref 0.6–4.47)
LYMPHOCYTES NFR BLD AUTO: 26 % (ref 14–44)
MAGNESIUM SERPL-MCNC: 1.9 MG/DL (ref 1.9–2.7)
MCH RBC QN AUTO: 30.4 PG (ref 26.8–34.3)
MCHC RBC AUTO-ENTMCNC: 32.6 G/DL (ref 31.4–37.4)
MCV RBC AUTO: 93 FL (ref 82–98)
MITOCHONDRIA M2 IGG SER-ACNC: <20 UNITS (ref 0–20)
MONOCYTES # BLD AUTO: 0.87 THOUSAND/ÂΜL (ref 0.17–1.22)
MONOCYTES NFR BLD AUTO: 13 % (ref 4–12)
NEUTROPHILS # BLD AUTO: 3.62 THOUSANDS/ÂΜL (ref 1.85–7.62)
NEUTS SEG NFR BLD AUTO: 56 % (ref 43–75)
NRBC BLD AUTO-RTO: 0 /100 WBCS
PLATELET # BLD AUTO: 160 THOUSANDS/UL (ref 149–390)
PMV BLD AUTO: 10.5 FL (ref 8.9–12.7)
POTASSIUM SERPL-SCNC: 3.5 MMOL/L (ref 3.5–5.3)
PROT SERPL-MCNC: 6.9 G/DL (ref 6.4–8.4)
RBC # BLD AUTO: 3.88 MILLION/UL (ref 3.88–5.62)
SODIUM SERPL-SCNC: 135 MMOL/L (ref 135–147)
TIBC SERPL-MCNC: 356 UG/DL (ref 250–450)
WBC # BLD AUTO: 6.47 THOUSAND/UL (ref 4.31–10.16)

## 2023-02-17 PROCEDURE — 0DBP8ZX EXCISION OF RECTUM, VIA NATURAL OR ARTIFICIAL OPENING ENDOSCOPIC, DIAGNOSTIC: ICD-10-PCS | Performed by: INTERNAL MEDICINE

## 2023-02-17 PROCEDURE — 0DBM8ZX EXCISION OF DESCENDING COLON, VIA NATURAL OR ARTIFICIAL OPENING ENDOSCOPIC, DIAGNOSTIC: ICD-10-PCS | Performed by: INTERNAL MEDICINE

## 2023-02-17 RX ORDER — SODIUM CHLORIDE, SODIUM LACTATE, POTASSIUM CHLORIDE, CALCIUM CHLORIDE 600; 310; 30; 20 MG/100ML; MG/100ML; MG/100ML; MG/100ML
INJECTION, SOLUTION INTRAVENOUS CONTINUOUS PRN
Status: DISCONTINUED | OUTPATIENT
Start: 2023-02-17 | End: 2023-02-17

## 2023-02-17 RX ORDER — LIDOCAINE HYDROCHLORIDE 20 MG/ML
INJECTION, SOLUTION EPIDURAL; INFILTRATION; INTRACAUDAL; PERINEURAL AS NEEDED
Status: DISCONTINUED | OUTPATIENT
Start: 2023-02-17 | End: 2023-02-17

## 2023-02-17 RX ORDER — PROPOFOL 10 MG/ML
INJECTION, EMULSION INTRAVENOUS AS NEEDED
Status: DISCONTINUED | OUTPATIENT
Start: 2023-02-17 | End: 2023-02-17

## 2023-02-17 RX ADMIN — LIDOCAINE HYDROCHLORIDE 100 MG: 20 INJECTION, SOLUTION EPIDURAL; INFILTRATION; INTRACAUDAL; PERINEURAL at 10:14

## 2023-02-17 RX ADMIN — IOHEXOL 100 ML: 350 INJECTION, SOLUTION INTRAVENOUS at 14:16

## 2023-02-17 RX ADMIN — PROPOFOL 50 MG: 10 INJECTION, EMULSION INTRAVENOUS at 10:19

## 2023-02-17 RX ADMIN — PROPOFOL 50 MG: 10 INJECTION, EMULSION INTRAVENOUS at 10:20

## 2023-02-17 RX ADMIN — ONDANSETRON HYDROCHLORIDE 4 MG: 2 INJECTION, SOLUTION INTRAMUSCULAR; INTRAVENOUS at 00:28

## 2023-02-17 RX ADMIN — PROPOFOL 50 MG: 10 INJECTION, EMULSION INTRAVENOUS at 10:24

## 2023-02-17 RX ADMIN — PROPOFOL 50 MG: 10 INJECTION, EMULSION INTRAVENOUS at 10:15

## 2023-02-17 RX ADMIN — SODIUM CHLORIDE, SODIUM LACTATE, POTASSIUM CHLORIDE, AND CALCIUM CHLORIDE: .6; .31; .03; .02 INJECTION, SOLUTION INTRAVENOUS at 10:14

## 2023-02-17 RX ADMIN — PROPOFOL 50 MG: 10 INJECTION, EMULSION INTRAVENOUS at 10:16

## 2023-02-17 RX ADMIN — SODIUM CHLORIDE 75 ML/HR: 0.9 INJECTION, SOLUTION INTRAVENOUS at 05:46

## 2023-02-17 RX ADMIN — PROPOFOL 50 MG: 10 INJECTION, EMULSION INTRAVENOUS at 10:18

## 2023-02-17 RX ADMIN — PROPOFOL 50 MG: 10 INJECTION, EMULSION INTRAVENOUS at 10:17

## 2023-02-17 RX ADMIN — PROPOFOL 100 MG: 10 INJECTION, EMULSION INTRAVENOUS at 10:14

## 2023-02-17 RX ADMIN — PROPOFOL 50 MG: 10 INJECTION, EMULSION INTRAVENOUS at 10:22

## 2023-02-17 NOTE — ASSESSMENT & PLAN NOTE
· CT: Asymmetric rectal wall thickening, perirectal edema, prominent in size perirectal and low retroperitoneal lymph nodes  This is most concerning for neoplasm  · Proceeded with colonoscopy on 2/17/2023: Malignant friable rectal mass, palpable on rectal exam   Circumferential but able to be traversed  Extends from 2 to 9 cm  · Follow up on pathology as outpatient  · Outpatient follow up with oncology and colorectal  · Will need MRI abdomen to ensure liver changes are not metastatic in nature but unlikely    · CT chest being obtained day of discharge  · Oncology aware of patient - setting up follow up  · CEA 39 2

## 2023-02-17 NOTE — PLAN OF CARE
Problem: GASTROINTESTINAL - ADULT  Goal: Maintains or returns to baseline bowel function  Description: INTERVENTIONS:  - Assess bowel function  - Encourage oral fluids to ensure adequate hydration  - Administer IV fluids if ordered to ensure adequate hydration  - Administer ordered medications as needed  - Encourage mobilization and activity  - Consider nutritional services referral to assist patient with adequate nutrition and appropriate food choices  Outcome: Progressing  Goal: Maintains adequate nutritional intake  Description: INTERVENTIONS:  - Monitor percentage of each meal consumed  - Identify factors contributing to decreased intake, treat as appropriate  - Assist with meals as needed  - Monitor I&O, weight, and lab values if indicated  - Obtain nutrition services referral as needed  Outcome: Progressing     Problem: PAIN - ADULT  Goal: Verbalizes/displays adequate comfort level or baseline comfort level  Description: Interventions:  - Encourage patient to monitor pain and request assistance  - Assess pain using appropriate pain scale  - Administer analgesics based on type and severity of pain and evaluate response  - Implement non-pharmacological measures as appropriate and evaluate response  - Consider cultural and social influences on pain and pain management  - Notify physician/advanced practitioner if interventions unsuccessful or patient reports new pain  Outcome: Progressing     Problem: Nutrition/Hydration-ADULT  Goal: Nutrient/Hydration intake appropriate for improving, restoring or maintaining nutritional needs  Description: Monitor and assess patient's nutrition/hydration status for malnutrition  Collaborate with interdisciplinary team and initiate plan and interventions as ordered  Monitor patient's weight and dietary intake as ordered or per policy  Utilize nutrition screening tool and intervene as necessary   Determine patient's food preferences and provide high-protein, high-caloric foods as appropriate       INTERVENTIONS:  - Monitor oral intake, urinary output, labs, and treatment plans  - Assess nutrition and hydration status and recommend course of action  - Evaluate amount of meals eaten  - Assist patient with eating if necessary   - Allow adequate time for meals  - Recommend/ encourage appropriate diets, oral nutritional supplements, and vitamin/mineral supplements  - Order, calculate, and assess calorie counts as needed  - Recommend, monitor, and adjust tube feedings and TPN/PPN based on assessed needs  - Assess need for intravenous fluids  - Provide specific nutrition/hydration education as appropriate  - Include patient/family/caregiver in decisions related to nutrition  Outcome: Progressing

## 2023-02-17 NOTE — DISCHARGE SUMMARY
[place]  Discharge- Clinton Arias 1980, 36 y o  male MRN: [id number]  Unit/Bed#: -[place] Encounter: [id number]  Primary Care Provider: Regina Gonzales DO   Date and time admitted to hospital: 2/16/2023  7:10 AM    * Rectal wall thickening  Assessment & Plan  · CT: Asymmetric rectal wall thickening, perirectal edema, prominent in size perirectal and low retroperitoneal lymph nodes  This is most concerning for neoplasm  · Proceeded with colonoscopy on 2/17/2023: Malignant friable rectal mass, palpable on rectal exam   Circumferential but able to be traversed  Extends from 2 to 9 cm  · Follow up on pathology as outpatient  · Outpatient follow up with oncology and colorectal  · Will need MRI abdomen to ensure liver changes are not metastatic in nature  · CEA 39 2    Transaminitis/Hepatitic Steatosis  Assessment & Plan  Lab Results   Component Value Date     (H) 02/17/2023     (H) 02/16/2023    ALT 53 (H) 02/17/2023    ALT 66 (H) 02/16/2023    TBILI 2 57 (H) 02/17/2023    TBILI 2 44 (H) 02/16/2023     · CT scan: Findings of hepatic cirrhosis and portal hypertension  Multiple somewhat geographic areas of decreased attenuation in the liver suspected to represent areas of focal fatty infiltration  More discrete rounded areas with contour irregularity involving the left lobe and caudate lobe may represent regenerative or dysplastic nodules  Gallbladder markedly distended  · RUQ US: Markedly abnormal liver appearing enlarged and demonstrating severe hepatic steatosis  Distended GB  · Seen in consultation by GI    · Recommend MRI with liver protocol to further evaluate liver lesions to exclude metastatic disease  · Acute hepatitis panel, ASUNCION, iron panel negative  · Follow up on autoimmune workup currently pending  · Will need eventual EGD to assess for esophageal varices    Hyponatremia-resolved as of 2/17/2023  Assessment & Plan  · Mild and likely related to mild dehydration  · 133 on admit  · resolved    Does not have health insurance  Assessment & Plan  · Case mgmt involved  · PATHS information provided      Medical Problems     Resolved Problems  Date Reviewed: 2/17/2023          Resolved    Hyponatremia 2/17/2023     Resolved by  Prabha Ruiz PA-C        Discharging Physician / Practitioner: Prabha Ruiz PA-C  PCP: Dakota Richards DO  Admission Date:   Admission Orders (From admission, onward)     Ordered        02/16/23 1219  1 [facility],[address] [facility]                      Discharge Date: 02/17/23    Consultations During Hospital Stay:  · GI    Procedures Performed:   · Colonoscopy: Malignant friable rectal mass, palpable on rectal exam   Circumferential but able to be traversed  Extends from 2 to 9 cm  Multiple biopsies obtained  Polyp removed from the descending colon with a cold snare    Significant Findings / Test Results:   · CT AP: Asymmetric rectal wall thickening, perirectal edema, prominent in size perirectal and low retroperitoneal lymph nodes  This is most concerning for neoplasm  Findings of hepatic cirrhosis and portal hypertension  Multiple somewhat geographic areas of decreased attenuation in the liver suspected to represent areas of focal fatty infiltration  More discrete rounded areas with contour irregularity involving the left lobe and caudate lobe may represent regenerative or dysplastic nodules  Recommend further evaluation with MRI with and without intravenous contrast  Gallbladder is markedly distended without radiopaque calculi or findings for cholecystitis  · RUQ US: Markedly abnormal liver appearing enlarged and demonstrating severe hepatic steatosis  Distended gallbladder, without evidence of gallbladder wall thickening, significant biliary ductal dilatation or point tenderness  No gallstones are seen  · CT chest: No lung metastases  Mild emphysema  Esophageal varices  Abnormal liver  Splenomegaly      Incidental Findings:  · Hepatic cirrhosis and decreased attenuation areas - needing MRI  · Esophageal varcies - needs EGD/GI follow up  · Splenomegaly -outpt follow up  · I reviewed the above mentioned incidental findings with the patient and/or family and they expressed understanding  Test Results Pending at Discharge (will require follow up): · Anti-smooth muscle antibody  · antimitochrondrial antibody   · AFP  · Pathology from biopsy     Outpatient Tests Requested:  · MRI abdomen with and without IV contrast for liver protocol to evaluate lesions (?mets vs  Cirrhotic changes)    Complications:  none    Reason for Admission: abdominal pain, rectal thickening    Hospital Course:   Laron Golden is a 36 y o  male patient who originally presented to the hospital on 2/16/2023 due to abdominal pain  Patient has past medical history of recurrent dental infections and history of alcohol abuse  He cut back his alcohol intake approximately 7 years ago when he had his child  He still drinks approximately 1-2 beers per day  Previously he was drinking 1 case of beer per day  He was also smoking a pack a day for 20 years however quit approximately 3 years ago  Patient presented with abdominal pain however reported that approximately 6 to 11 months ago his symptoms had actually started and he thought it was related to swallowing infected fluid from his mouth  He began noticing a change in bowel habits, constipation, abdominal discomfort and intermittent bleeding when he would wipe  He read online that he needed to eat more fiber and therefore had started supplementing this  He was not having any improvement in symptoms and then 3 days prior to admission he developed abdominal discomfort    He therefore came to the emergency department and was found to have an abnormal CAT scan concerning for rectal wall thickening suspicious for malignancy as well as liver changes consistent with potential cirrhosis versus nodular areas for which metastasis cannot be excluded  Patient was seen by GI and underwent colonoscopy on 2/17/2023  This was noted to show a large malignant rectal mass  Biopsies are currently pending at time of discharge  Patient was recommended for MRI of the abdomen with and without contrast to further evaluate liver and to ensure that these are not metastasis  He also needs to follow-up as an outpatient with oncology which is being arranged  Unfortunately, the patient does not have insurance  Case management was assisting patient with assistance in this  Oncology discussed with financial oncology team and was able to arrange follow up for him next week  Please see above list of diagnoses and related plan for additional information  Condition at Discharge: stable    Discharge Day Visit / Exam:   Subjective:  Seen prior to colonoscopy - cleared out well overnight  Had 1 episode vomiting otherwise tolerated well  Vitals: Blood Pressure: 106/59 (02/17/23 1033)  Pulse: 79 (02/17/23 1033)  Temperature: 98 7 °F (37 1 °C) (02/17/23 0957)  Temp Source: Oral (02/17/23 0957)  Respirations: 22 (02/17/23 1033)  Weight - Scale: 100 kg (220 lb 7 4 oz) (02/16/23 0713)  SpO2: 92 % (02/17/23 1033)  Exam:   Physical Exam  Vitals and nursing note reviewed  Constitutional:       General: He is not in acute distress  Appearance: Normal appearance  He is not diaphoretic  HENT:      Head: Normocephalic and atraumatic  Mouth/Throat:      Mouth: Mucous membranes are moist    Cardiovascular:      Rate and Rhythm: Normal rate and regular rhythm  Pulmonary:      Effort: Pulmonary effort is normal       Breath sounds: Normal breath sounds  No stridor  No wheezing, rhonchi or rales  Abdominal:      General: Bowel sounds are normal  There is distension  Palpations: Abdomen is soft  There is no mass  Tenderness: There is no abdominal tenderness  There is no guarding     Musculoskeletal:      Right lower leg: No edema  Left lower leg: No edema  Skin:     General: Skin is warm and dry  Comments: Dry flaking skin around beard   Neurological:      Mental Status: He is alert  Psychiatric:         Mood and Affect: Mood normal          Behavior: Behavior normal           Discussion with Family: Updated  (sister) at bedside  Discharge instructions/Information to patient and family:   See after visit summary for information provided to patient and family  Provisions for Follow-Up Care:  See after visit summary for information related to follow-up care and any pertinent home health orders  Disposition:   Home    Planned Readmission: no     Discharge Statement:  I spent 45 minutes discharging the patient  This time was spent on the day of discharge  I had direct contact with the patient on the day of discharge  Greater than 50% of the total time was spent examining patient, answering all patient questions, arranging and discussing plan of care with patient as well as directly providing post-discharge instructions  Additional time then spent on discharge activities  Discharge Medications:  See after visit summary for reconciled discharge medications provided to patient and/or family        **Please Note: This note may have been constructed using a voice recognition system**

## 2023-02-17 NOTE — ASSESSMENT & PLAN NOTE
Lab Results   Component Value Date     (H) 02/17/2023     (H) 02/16/2023    ALT 53 (H) 02/17/2023    ALT 66 (H) 02/16/2023    TBILI 2 57 (H) 02/17/2023    TBILI 2 44 (H) 02/16/2023     · CT scan: Findings of hepatic cirrhosis and portal hypertension  Multiple somewhat geographic areas of decreased attenuation in the liver suspected to represent areas of focal fatty infiltration  More discrete rounded areas with contour irregularity involving the left lobe and caudate lobe may represent regenerative or dysplastic nodules  Gallbladder markedly distended  · RUQ US: Markedly abnormal liver appearing enlarged and demonstrating severe hepatic steatosis  Distended GB  · Seen in consultation by GI    · Recommend MRI with liver protocol to further evaluate liver lesions to exclude metastatic disease  · Acute hepatitis panel, ASUNCION, iron panel negative  · Follow up on autoimmune workup currently pending  · Will need eventual EGD to assess for esophageal varices

## 2023-02-17 NOTE — TELEPHONE ENCOUNTER
Soft Intake Form   Patient Details   Aidan Petersen     1980     Reason For Appointment   Who is Calling? Gabriel El   If not patient, Name? NA   DID YOU CONFIRM INSURANCE WITH PATIENT? OKd per finance, routed to finance   Who is the Referring Doctor? Dr Alberto Reese   What is the diagnosis? Rectal mass, likely rectal CA   Has this diagnosis been confirmed by a biopsy/surgery? If yes, what is the date it was done? Colonoscopy with bx taken on 2/17   Biopsy done at [facility]? If not, where was it done? Yes   Was imaging done, and was it done at [facility]? If not, where was it done? Yes-[facility]   Have you been seen by another Oncologist?  If so, who and where (name of facility, city and state) No   For 2nd Opinions Only: Are you currently undergoing treatment, or are you scheduled to start treatment? If yes, name of facility, city and state  NA   For "History Of" only: Have you completed treatment? NA   Have you had Genetic Testing done in the past?  If so, advise to bring test results to their visit No   Record Gathering Information   Did you advise to have records faxed to [phone]? NA   Did you advise to have disks sent to the proper address with imaging? ("History of" Patients)  5 years of imaging for breast patients-Mammos, US etc NA   Scheduling Information   Did you send new patient paperwork? Email or mail? NA   What is the best call back number? (If the [facility] is calling, please use their number) NA   Miscellaneous Information      The patient has been scheduled for a HFU appointment with Dr Tal Balbuena on 2/22 at 1 in the [facility]

## 2023-02-17 NOTE — OP NOTE
Colonoscopy IMPRESSION:  Malignant friable rectal mass, palpable on rectal exam   Circumferential but able to be traversed  Extends from 2 to 9 cm    Multiple biopsies obtained    Polyp removed from the descending colon with a cold snare    RECOMMENDATION:   Repeat colonoscopy in 1 year    · Personal history of colon cancer     Endoscopist will call with biopsy results within 2 weeks  Will arrange outpatient follow-up for cirrhosis, as well as outpatient consultation with medical and surgical oncology  Discussed with internal medicine PA

## 2023-02-17 NOTE — ANESTHESIA PREPROCEDURE EVALUATION
Procedure:  COLONOSCOPY    Relevant Problems   Other   (+) Transaminitis/Hepatitic Steatosis        Physical Exam    Airway    Mallampati score: I  TM Distance: >3 FB  Neck ROM: full     Dental   upper dentures and lower dentures,     Cardiovascular      Pulmonary      Other Findings        Anesthesia Plan  ASA Score- 2     Anesthesia Type- IV sedation with anesthesia with ASA Monitors  Additional Monitors:   Airway Plan:           Plan Factors-    Chart reviewed  Patient summary reviewed  Patient is not a current smoker  Induction- intravenous  Postoperative Plan-     Informed Consent- Anesthetic plan and risks discussed with patient  I personally reviewed this patient with the CRNA  Discussed and agreed on the Anesthesia Plan with the CRNA  Nirmal Wen

## 2023-02-17 NOTE — PLAN OF CARE
Problem: Nutrition/Hydration-ADULT  Goal: Nutrient/Hydration intake appropriate for improving, restoring or maintaining nutritional needs  Description: Monitor and assess patient's nutrition/hydration status for malnutrition  Collaborate with interdisciplinary team and initiate plan and interventions as ordered  Monitor patient's weight and dietary intake as ordered or per policy  Utilize nutrition screening tool and intervene as necessary  Determine patient's food preferences and provide high-protein, high-caloric foods as appropriate       INTERVENTIONS:  - Monitor oral intake, urinary output, labs, and treatment plans  - Assess nutrition and hydration status and recommend course of action  - Evaluate amount of meals eaten  - Assist patient with eating if necessary   - Allow adequate time for meals  - Recommend/ encourage appropriate diets, oral nutritional supplements, and vitamin/mineral supplements  - Order, calculate, and assess calorie counts as needed  - Recommend, monitor, and adjust tube feedings and TPN/PPN based on assessed needs  - Assess need for intravenous fluids  - Provide specific nutrition/hydration education as appropriate  - Include patient/family/caregiver in decisions related to nutrition  Outcome: Progressing     Problem: GASTROINTESTINAL - ADULT  Goal: Maintains or returns to baseline bowel function  Description: INTERVENTIONS:  - Assess bowel function  - Encourage oral fluids to ensure adequate hydration  - Administer IV fluids if ordered to ensure adequate hydration  - Administer ordered medications as needed  - Encourage mobilization and activity  - Consider nutritional services referral to assist patient with adequate nutrition and appropriate food choices  Outcome: Progressing  Goal: Maintains adequate nutritional intake  Description: INTERVENTIONS:  - Monitor percentage of each meal consumed  - Identify factors contributing to decreased intake, treat as appropriate  - Assist with meals as needed  - Monitor I&O, weight, and lab values if indicated  - Obtain nutrition services referral as needed  Outcome: Progressing     Problem: METABOLIC, FLUID AND ELECTROLYTES - ADULT  Goal: Electrolytes maintained within normal limits  Description: INTERVENTIONS:  - Monitor labs and assess patient for signs and symptoms of electrolyte imbalances  - Administer electrolyte replacement as ordered  - Monitor response to electrolyte replacements, including repeat lab results as appropriate  - Instruct patient on fluid and nutrition as appropriate  Outcome: Progressing     Problem: PAIN - ADULT  Goal: Verbalizes/displays adequate comfort level or baseline comfort level  Description: Interventions:  - Encourage patient to monitor pain and request assistance  - Assess pain using appropriate pain scale  - Administer analgesics based on type and severity of pain and evaluate response  - Implement non-pharmacological measures as appropriate and evaluate response  - Consider cultural and social influences on pain and pain management  - Notify physician/advanced practitioner if interventions unsuccessful or patient reports new pain  Outcome: Progressing     Problem: DISCHARGE PLANNING  Goal: Discharge to home or other facility with appropriate resources  Description: INTERVENTIONS:  - Identify barriers to discharge w/patient and caregiver  - Arrange for needed discharge resources and transportation as appropriate  - Identify discharge learning needs (meds, wound care, etc )  - Arrange for interpretive services to assist at discharge as needed  - Refer to Case Management Department for coordinating discharge planning if the patient needs post-hospital services based on physician/advanced practitioner order or complex needs related to functional status, cognitive ability, or social support system  Outcome: Progressing     Problem: Knowledge Deficit  Goal: Patient/family/caregiver demonstrates understanding of disease process, treatment plan, medications, and discharge instructions  Description: Complete learning assessment and assess knowledge base    Interventions:  - Provide teaching at level of understanding  - Provide teaching via preferred learning methods  Outcome: Progressing

## 2023-02-17 NOTE — NURSING NOTE
RN spoke with Moises Rueda PA-C will hold 6 am SQ Heparin due to colonoscopy this am   ALEKSANDRA stated to hold also

## 2023-02-17 NOTE — NURSING NOTE
Patient's IV was removed  Reviewed discharge instructions  Left ambulatory with significant other, who was providing transport home

## 2023-02-17 NOTE — ANESTHESIA POSTPROCEDURE EVALUATION
Post-Op Assessment Note    CV Status:  Stable  Pain Score: 0    Pain management: adequate     Mental Status:  Alert and awake   Hydration Status:  Euvolemic   PONV Controlled:  Controlled   Airway Patency:  Patent      Post Op Vitals Reviewed: Yes      Staff: Anesthesiologist, CRNA         There were no known notable events for this encounter      /59 (02/17/23 1033)    Temp      Pulse 79 (02/17/23 1033)   Resp 22 (02/17/23 1033)    SpO2 92 % (02/17/23 1033)

## 2023-02-17 NOTE — TELEPHONE ENCOUNTER
I phoned the patient at both of his "Home" phone numbers and left VM messages indicating that I was calling from [place] [address] Hematology/oncology regarding his follow up appointment from his recent hospital admission  I provided the appointment details (2/22 at [phone] in the [facility] office with Dr Gaye Henderson), the office address, the [facility] number, and my direct phone number  I then phoned his contact, Dany Arguelles, and was able to speak with her regarding Alexandro's appointment as noted above  Jermain Gonzaelz took down all of the above information and indicated that she would make sure that Mallika Cary had the information

## 2023-02-17 NOTE — UTILIZATION REVIEW
Initial Clinical Review    Admission: Date/Time/Statement:   Admission Orders (From admission, onward)     Ordered        02/16/23 1219  1 [facility] [place],[address] [place]  Once                      Orders Placed This Encounter   Procedures   • INPATIENT ADMISSION     Standing Status:   Standing     Number of Occurrences:   1     Order Specific Question:   Level of Care     Answer:   Med Surg [16]     Order Specific Question:   Estimated length of stay     Answer:   More than 2 Midnights     Order Specific Question:   Certification     Answer:   I certify that inpatient services are medically necessary for this patient for a duration of greater than two midnights  See H&P and MD Progress Notes for additional information about the patient's course of treatment  ED Arrival Information     Expected   -    Arrival   2/16/2023 06:46    Acuity   Urgent            Means of arrival   Walk-In    Escorted by   Self    Service   Hospitalist    Admission type   Emergency            Arrival complaint   abd pain and diarrhea           Chief Complaint   Patient presents with   • Abdominal Pain     To ED with c/o nausea, diarrhea and abd pain x 3 days  Patient states that he has had blood in stool as well for months on and off  Denies any fever or chills  Initial Presentation: 36 y o  male with PMH of recurrent dental infections who presents to the ED from home for evaluation of abdominal pain  The patient reports that approximately 11 months ago he had  abdominal discomfort and noticed constipation, abdominal discomfort and intermittent bleeding when he would wipe  He reports that over the last few days his abdomen became painful  ED CT revealed asymmetric rectal wall thickening and perirectal edema concerning for neoplasm as well as findings of hepatic cirrhosis versus severe hepatic steatosis and portal hypertension    Patient  reports that he was previously a heavy alcohol user, however,  decrease his alcohol intake approximately 7 years ago  He reports previously he would drink up to 1 case of beer per day however now he will drink 1-2 beers per day  He previously smoked 1 pack/day for 20 years, but quit approximately 3 years ago  His mother had lung cancer and his father had head and neck cancer  No family history of colon cancer  PE:  AOx3, abdominal distension, no abdominal tenderness  2/16 Inpatient admission for evaluation and treatment of rectal wall thickening, transaminitis/hepatitic steatosis, hyponatremia:  Clear liquids, NPO at MN, plan for colonoscopy tomorrow, consult Gastroenterology  Repeat sodium level in a m       2/16 Gastroenterology consult:  3-day history of intermittent abdominal pain with associated nausea and decreased appetite, six month history of constipation, tenesmus, hematochezia and rectal bleeding  Occasional rectal pain with defecation  CT scan in ED reveals rectal wall thickening and perirectal edema, lymphadenopathy suspicious for mass, Plan to proceed with colonoscopy tomorrow 2/17  Okay for clear liquids today then NPO  Check CEA  Elevated LFTs  Abdominal ultrasound pending  Plan for MRI with liver protocol after colonoscopy to evaluate liver lesions  Check hepatitis panel, autoimmune serology, iron panel  Eventual  EGD to evaluate for esophageal varices          ED Triage Vitals [02/16/23 0713]   Temperature Pulse Respirations Blood Pressure SpO2   98 2 °F (36 8 °C) (!) 106 20 138/82 98 %      Temp Source Heart Rate Source Patient Position - Orthostatic VS BP Location FiO2 (%)   Oral Monitor Sitting Right arm --      Pain Score       6          Wt Readings from Last 1 Encounters:   02/16/23 100 kg (220 lb 7 4 oz)     Additional Vital Signs:     Date/Time Temp Pulse Resp BP MAP (mmHg) SpO2 O2 Device   02/17/23 1100 -- 85 22 140/80 -- 97 % None (Room air)   02/17/23 1033 -- 79 22 106/59 -- 92 % None (Room air)   02/17/23 0957 98 7 °F (37 1 °C) 84 22 146/79 -- 97 % None (Room air) 02/17/23 07:28:07 98 2 °F (36 8 °C) 82 18 140/82 101 97 % None (Room air)   02/17/23 00:32:48 98 2 °F (36 8 °C) 108 Abnormal  19 143/79 100 97 % None (Room air)   02/16/23 2041 98 8 °F (37 1 °C) 86 20 125/85 98 96 % None (Room air)   02/16/23 1115 -- 89 18 117/73 89 97 % None (Room air)   02/16/23 1030 -- 86 18 118/55 -- 96 % None (Room air)   02/16/23 0930 -- 89 18 116/58 78 95 % None (Room air)   02/16/23 0830 -- 90 18 116/64 82 98 % --   02/16/23 0800 -- 97 18 149/86 109 97 % --         Pertinent Labs/Diagnostic Test Results:     2/17 Colonoscopy:    FINDINGS:  • Single malignant-appearing mass (traversable) measuring 7 cm in the rectum 2 cm from the anal verge observed during digital rectal exam, covering the whole circumference; bleeding occurred before intervention; performed cold forceps biopsy  • 5 mm sessile polyp in the descending colon; removed en bloc by cold snare and retrieved specimen     RECOMMENDATION:      Endoscopist will call with biopsy results within 2 weeks  Will arrange outpatient follow-up for cirrhosis, as well as outpatient consultation with medical and surgical oncology        US right upper quadrant   Final Result by Forrest Arriola MD (02/16 1314)      1  Markedly abnormal liver appearing enlarged and demonstrating severe hepatic steatosis  Please refer to prior CT report for additional findings and recommendations   2  Distended gallbladder, without evidence of gallbladder wall thickening, significant biliary ductal dilatation or point tenderness  No gallstones are seen            CT abdomen pelvis with contrast   Final Result by Sherman Bumpers, DO (02/16 1100)      Asymmetric rectal wall thickening, perirectal edema, prominent in size perirectal and low retroperitoneal lymph nodes  This is most concerning for neoplasm  Findings of hepatic cirrhosis and portal hypertension    Multiple somewhat geographic areas of decreased attenuation in the liver suspected to represent areas of focal fatty infiltration  More discrete rounded areas with contour irregularity involving    the left lobe and caudate lobe may represent regenerative or dysplastic nodules  Recommend further evaluation with MRI with and without intravenous contrast       Gallbladder is markedly distended without radiopaque calculi or findings for cholecystitis  This study demonstrates a significant  finding and was documented as such in [facility] for liaison and referring practitioner notification         Workstation performed: [id number]                 Results from last 7 days   Lab Units 02/17/23  0556 02/16/23  0722   WBC Thousand/uL 6 47 7 36   HEMOGLOBIN g/dL 11 8* 13 1   HEMATOCRIT % 36 2* 39 8   PLATELETS Thousands/uL 160 179   NEUTROS ABS Thousands/µL 3 62 3 78         Results from last 7 days   Lab Units 02/17/23  0556 02/16/23  0722   SODIUM mmol/L 135 133*   POTASSIUM mmol/L 3 5 3 5   CHLORIDE mmol/L 107 102   CO2 mmol/L 19* 18*   ANION GAP mmol/L 9 13   BUN mg/dL 10 7   CREATININE mg/dL 0 65 0 79   EGFR ml/min/1 73sq m 119 109   CALCIUM mg/dL 8 0* 8 3*   MAGNESIUM mg/dL 1 9  --      Results from last 7 days   Lab Units 02/17/23  0556 02/16/23  0722   AST U/L 100* 132*   ALT U/L 53* 66*   ALK PHOS U/L 70 81   TOTAL PROTEIN g/dL 6 9 7 8   ALBUMIN g/dL 3 4* 3 8   TOTAL BILIRUBIN mg/dL 2 57* 2 44*     Results from last 7 days   Lab Units 02/17/23  1126   POC GLUCOSE mg/dl 107     Results from last 7 days   Lab Units 02/17/23  0556 02/16/23  0722   GLUCOSE RANDOM mg/dL 95 101         Results from last 7 days   Lab Units 02/16/23  1123   PROTIME seconds 17 2*   INR  1 32*       Results from last 7 days   Lab Units 02/16/23  1501   FERRITIN ng/mL 81     Results from last 7 days   Lab Units 02/16/23  1123   HEP B S AG  Non-reactive   HEP C AB  Non-reactive   HEP B C IGM  Non-reactive     Results from last 7 days   Lab Units 02/16/23  0722   LIPASE u/L 53         Results from last 7 days   Lab Units 02/16/23  1501 CEA ng/mL 39 2*             ED Treatment:   Medication Administration from 02/16/2023 0646 to 02/16/2023 1518       Date/Time Order Dose Route Action     02/16/2023 1010 EST sodium chloride 0 9 % bolus 1,000 mL 0 mL Intravenous Stopped     02/16/2023 0723 EST sodium chloride 0 9 % bolus 1,000 mL 1,000 mL Intravenous New Bag     02/16/2023 0722 EST ondansetron (ZOFRAN) injection 4 mg 0 mg Intravenous Hold     02/16/2023 0820 EST iohexol (OMNIPAQUE) 350 MG/ML injection (SINGLE-DOSE) 100 mL 100 mL Intravenous Given        Past Medical History:   Diagnosis Date   • Dental infection        Admitting Diagnosis: Abdominal pain [R10 9]  Generalized abdominal pain [R10 84]  Rectal mass [K62 89]  Abnormal LFTs [R79 89]  Age/Sex: 36 y o  male       Admission Orders: Clear liquids, SCD  Scheduled Medications:      heparin (porcine), 5,000 Units, Subcutaneous, Q8H PATRICIA  ondansetron, 4 mg, Intravenous, Once      Continuous IV Infusions:    sodium chloride 0 9 % infusion  Rate: 75 mL/hr Dose: 75 mL/hr  Freq: Continuous Route: IV  Indications of Use: IV Hydration  Last Dose: 75 mL/hr (02/17/23 0546)  Start: 02/16/23 1715 End: 02/17/23 1140         PRN Meds:  ondansetron, 4 mg, Intravenous, Q6H PRN x 1 dose 2/17 @ 0028        IP CONSULT TO GASTROENTEROLOGY      Network Utilization Review Department  ATTENTION: Please call with any questions or concerns to [phone] and carefully listen to the prompts so that you are directed to the right person  All voicemails are confidential   [facility] Find all requests for admission clinical reviews, approved or denied determinations and any other requests to dedicated fax number below belonging to the campus where the patient is receiving treatment   List of dedicated fax numbers for the Facilities:  [address] DENIALS (Administrative/Medical Necessity) [phone]   [address] [place] [address] (Maternity/NICU/Pediatrics) [phone]   [address] [place] [address] [address] [place] [phone]   [place] [address] [phone]   [address] [place] [address] [facility] [address] [facility] 134 225 [facility] [phone]

## 2023-02-20 ENCOUNTER — PATIENT OUTREACH (OUTPATIENT)
Dept: HEMATOLOGY ONCOLOGY | Facility: CLINIC | Age: 43
End: 2023-02-20

## 2023-02-20 DIAGNOSIS — R74.01 TRANSAMINITIS: ICD-10-CM

## 2023-02-20 DIAGNOSIS — K62.89 RECTAL MASS: Primary | ICD-10-CM

## 2023-02-20 DIAGNOSIS — K74.60 CIRRHOSIS (HCC): ICD-10-CM

## 2023-02-20 NOTE — PROGRESS NOTES
Spoke with pt, introduced myself and went over MRI scheduled for 3/2/2023 at [facility]  Pt understood and was thankful for the call   Will follow up with pt once plan is in place

## 2023-02-21 ENCOUNTER — PATIENT OUTREACH (OUTPATIENT)
Dept: HEMATOLOGY ONCOLOGY | Facility: CLINIC | Age: 43
End: 2023-02-21

## 2023-02-21 ENCOUNTER — TELEPHONE (OUTPATIENT)
Dept: HEMATOLOGY ONCOLOGY | Facility: CLINIC | Age: 43
End: 2023-02-21

## 2023-02-21 NOTE — PROGRESS NOTES
MSW received a referral for this pt from the in pt CM  MSW made outreach this day to the pt to introduce self, the role of the OSW and complete assessments  Pt's voicemail was received this day  MSW left a detailed message, along with a contact number and the pt was encouraged to return the call

## 2023-02-22 ENCOUNTER — DOCUMENTATION (OUTPATIENT)
Dept: HEMATOLOGY ONCOLOGY | Facility: CLINIC | Age: 43
End: 2023-02-22

## 2023-02-22 ENCOUNTER — CONSULT (OUTPATIENT)
Dept: HEMATOLOGY ONCOLOGY | Facility: HOSPITAL | Age: 43
End: 2023-02-22

## 2023-02-22 VITALS
WEIGHT: 220 LBS | HEIGHT: 75 IN | OXYGEN SATURATION: 98 % | BODY MASS INDEX: 27.35 KG/M2 | RESPIRATION RATE: 14 BRPM | TEMPERATURE: 47.7 F | DIASTOLIC BLOOD PRESSURE: 88 MMHG | SYSTOLIC BLOOD PRESSURE: 144 MMHG | HEART RATE: 98 BPM

## 2023-02-22 DIAGNOSIS — K70.30 ALCOHOLIC CIRRHOSIS OF LIVER WITHOUT ASCITES (HCC): Primary | ICD-10-CM

## 2023-02-22 DIAGNOSIS — K62.89 RECTAL MASS: Primary | ICD-10-CM

## 2023-02-22 DIAGNOSIS — K62.89 RECTAL MASS: ICD-10-CM

## 2023-02-22 NOTE — PROGRESS NOTES
In-basket message received from Dr Britney Frank to add pt to 3/9/2023 tumor board  Chart reviewed and tumor board prep completed

## 2023-02-22 NOTE — RESULT ENCOUNTER NOTE
Discussed with patient, 1 year colonoscopy recall, he has oncology appointment today and MRI in 1 week

## 2023-02-23 ENCOUNTER — TELEPHONE (OUTPATIENT)
Dept: HEMATOLOGY ONCOLOGY | Facility: HOSPITAL | Age: 43
End: 2023-02-23

## 2023-02-23 ENCOUNTER — TELEPHONE (OUTPATIENT)
Dept: GENETICS | Facility: CLINIC | Age: 43
End: 2023-02-23

## 2023-02-23 ENCOUNTER — TELEPHONE (OUTPATIENT)
Dept: GYNECOLOGIC ONCOLOGY | Facility: CLINIC | Age: 43
End: 2023-02-23

## 2023-02-23 ENCOUNTER — HOSPITAL ENCOUNTER (OUTPATIENT)
Dept: MRI IMAGING | Facility: HOSPITAL | Age: 43
End: 2023-02-23
Attending: INTERNAL MEDICINE

## 2023-02-23 DIAGNOSIS — K62.89 RECTAL MASS: ICD-10-CM

## 2023-02-23 RX ADMIN — GADOBUTROL 10 ML: 604.72 INJECTION INTRAVENOUS at 16:27

## 2023-02-23 NOTE — TELEPHONE ENCOUNTER
MRI tech called and requested if there was any other way to talk to Taylor Smith about the patient's MRI  Did not see Taylor Smith on the schedule  Told her that she can contact [address] [place] [address] via tiger text  She stated that she would try

## 2023-02-23 NOTE — TELEPHONE ENCOUNTER
Sent an urgent e-mail to oncology finance to see if the patient was going to be getting assistance to get Medicaid  Per Dyllan Denis , the process was already started in-patient and they will follow-up to ensure the patient is compliant  Once insurance is finalized, CARIS testing will be sent

## 2023-02-23 NOTE — TELEPHONE ENCOUNTER
I called Naseem Barragan to schedule a new patient appointment with the Cancer Risk and Genetics Program       Outcome:  Genetics appointment scheduled for March 1 at 10:30 am    Personal/Family History Related to Appointment:  No hx of cancer  Fhx of lung cancer (mom), metastatic cancer - dad sit unknown  Non-Spiritism       History of Genetic Testing:  Patient reports no personal or family history of genetic testing    Genetics Family History Questionnaire:  I confirmed the patient's e-mail on file as the best e-mail to send an invite link for our genetics family history intake

## 2023-02-23 NOTE — TELEPHONE ENCOUNTER
Called patient and spoke with patient  Patient confirmed the Hepatology appointment date time and location

## 2023-02-24 ENCOUNTER — TELEPHONE (OUTPATIENT)
Dept: INTERVENTIONAL RADIOLOGY/VASCULAR | Facility: HOSPITAL | Age: 43
End: 2023-02-24

## 2023-02-24 RX ORDER — SODIUM CHLORIDE 9 MG/ML
75 INJECTION, SOLUTION INTRAVENOUS CONTINUOUS
Status: CANCELLED | OUTPATIENT
Start: 2023-02-24

## 2023-02-24 RX ORDER — CEFAZOLIN SODIUM 2 G/50ML
2000 SOLUTION INTRAVENOUS ONCE
Status: CANCELLED | OUTPATIENT
Start: 2023-02-24

## 2023-02-27 ENCOUNTER — APPOINTMENT (OUTPATIENT)
Dept: LAB | Facility: HOSPITAL | Age: 43
End: 2023-02-27
Attending: INTERNAL MEDICINE

## 2023-02-27 ENCOUNTER — TELEPHONE (OUTPATIENT)
Dept: HEMATOLOGY ONCOLOGY | Facility: HOSPITAL | Age: 43
End: 2023-02-27

## 2023-02-27 ENCOUNTER — TELEPHONE (OUTPATIENT)
Dept: INTERVENTIONAL RADIOLOGY/VASCULAR | Facility: HOSPITAL | Age: 43
End: 2023-02-27

## 2023-02-27 DIAGNOSIS — C20 RECTAL CANCER (HCC): Primary | ICD-10-CM

## 2023-02-27 DIAGNOSIS — C20 RECTAL CANCER (HCC): ICD-10-CM

## 2023-02-27 DIAGNOSIS — K70.30 ALCOHOLIC CIRRHOSIS OF LIVER WITHOUT ASCITES (HCC): ICD-10-CM

## 2023-02-27 LAB
ALBUMIN SERPL BCP-MCNC: 3.8 G/DL (ref 3.5–5)
ALP SERPL-CCNC: 52 U/L (ref 34–104)
ALT SERPL W P-5'-P-CCNC: 29 U/L (ref 7–52)
ANION GAP SERPL CALCULATED.3IONS-SCNC: 8 MMOL/L (ref 4–13)
APTT PPP: 32 SECONDS (ref 23–37)
AST SERPL W P-5'-P-CCNC: 56 U/L (ref 13–39)
BASOPHILS # BLD AUTO: 0.06 THOUSANDS/ÂΜL (ref 0–0.1)
BASOPHILS NFR BLD AUTO: 1 % (ref 0–1)
BILIRUB SERPL-MCNC: 1.44 MG/DL (ref 0.2–1)
BUN SERPL-MCNC: 7 MG/DL (ref 5–25)
CALCIUM SERPL-MCNC: 9.4 MG/DL (ref 8.4–10.2)
CHLORIDE SERPL-SCNC: 108 MMOL/L (ref 96–108)
CO2 SERPL-SCNC: 22 MMOL/L (ref 21–32)
CREAT SERPL-MCNC: 0.78 MG/DL (ref 0.6–1.3)
EOSINOPHIL # BLD AUTO: 0.28 THOUSAND/ÂΜL (ref 0–0.61)
EOSINOPHIL NFR BLD AUTO: 5 % (ref 0–6)
ERYTHROCYTE [DISTWIDTH] IN BLOOD BY AUTOMATED COUNT: 13.8 % (ref 11.6–15.1)
GFR SERPL CREATININE-BSD FRML MDRD: 110 ML/MIN/1.73SQ M
GLUCOSE SERPL-MCNC: 117 MG/DL (ref 65–140)
HCT VFR BLD AUTO: 38.4 % (ref 36.5–49.3)
HGB BLD-MCNC: 12.5 G/DL (ref 12–17)
IMM GRANULOCYTES # BLD AUTO: 0.01 THOUSAND/UL (ref 0–0.2)
IMM GRANULOCYTES NFR BLD AUTO: 0 % (ref 0–2)
INR PPP: 1.12 (ref 0.84–1.19)
LYMPHOCYTES # BLD AUTO: 1.58 THOUSANDS/ÂΜL (ref 0.6–4.47)
LYMPHOCYTES NFR BLD AUTO: 27 % (ref 14–44)
MCH RBC QN AUTO: 30.5 PG (ref 26.8–34.3)
MCHC RBC AUTO-ENTMCNC: 32.6 G/DL (ref 31.4–37.4)
MCV RBC AUTO: 94 FL (ref 82–98)
MONOCYTES # BLD AUTO: 0.81 THOUSAND/ÂΜL (ref 0.17–1.22)
MONOCYTES NFR BLD AUTO: 14 % (ref 4–12)
NEUTROPHILS # BLD AUTO: 3.07 THOUSANDS/ÂΜL (ref 1.85–7.62)
NEUTS SEG NFR BLD AUTO: 53 % (ref 43–75)
NRBC BLD AUTO-RTO: 0 /100 WBCS
PLATELET # BLD AUTO: 206 THOUSANDS/UL (ref 149–390)
PMV BLD AUTO: 10.6 FL (ref 8.9–12.7)
POTASSIUM SERPL-SCNC: 4.1 MMOL/L (ref 3.5–5.3)
PROT SERPL-MCNC: 7.8 G/DL (ref 6.4–8.4)
PROTHROMBIN TIME: 15.2 SECONDS (ref 11.6–14.5)
RBC # BLD AUTO: 4.1 MILLION/UL (ref 3.88–5.62)
SODIUM SERPL-SCNC: 138 MMOL/L (ref 135–147)
WBC # BLD AUTO: 5.81 THOUSAND/UL (ref 4.31–10.16)

## 2023-02-27 RX ORDER — DEXTROSE MONOHYDRATE 50 MG/ML
20 INJECTION, SOLUTION INTRAVENOUS ONCE
Status: CANCELLED | OUTPATIENT
Start: 2023-03-09

## 2023-02-27 RX ORDER — FLUOROURACIL 50 MG/ML
400 INJECTION, SOLUTION INTRAVENOUS ONCE
Status: CANCELLED | OUTPATIENT
Start: 2023-03-09

## 2023-02-27 RX ORDER — SODIUM CHLORIDE 9 MG/ML
20 INJECTION, SOLUTION INTRAVENOUS ONCE AS NEEDED
Status: CANCELLED | OUTPATIENT
Start: 2023-03-09

## 2023-02-27 NOTE — TELEPHONE ENCOUNTER
Please schedule patient for treatment  Port is being placed tomorrow 2/28 and is available any day in the mornings  Please schedule labs to be drawn through port also  Provider wants patient to start As soon as possible  [facility] is second choice  Thanks!

## 2023-02-27 NOTE — TELEPHONE ENCOUNTER
While we try to accommodate patient requests, our priority is to schedule treatment according to Doctor's orders and site availability  Does the Provider use the intake sheet or checkout note? What would be a preferred day of the week that would work best for your infusion appointment? Any day  Do you prefer mornings or afternoons for your appointments? morning  Are there any days or dates that do not work for your schedule, including any upcoming vacations? N/a  We are going to try our best to schedule you at the infusion center closest to your home  In the event that we are unable to what would be your next preferred infusion site or sites? [facility]    1  [facility]  2  [facility]    Do you have transportation to take you to all of your appointments? yes  Would you like the infusion center to draw labs from your port? (disregard if patient doesn't have a port or need labs for infusion appointment) port in tomorrow

## 2023-02-27 NOTE — TELEPHONE ENCOUNTER
Please see note from infusion  Is 3/13 appt with Dr Cayden Holt correct because it looks like he was already seen for consult  Please advise  Thank you!

## 2023-02-27 NOTE — PROGRESS NOTES
Colon and Rectal Surgery   Laura Loya 36 y o  male MRN [id number]  Encounter: [id number]  03/01/23 2:39 PM            Assessment: Laura Loya is a 36 y o  male who has rectal cancer  Plan:   Rectal cancer [facility])  A lengthy discussion was undertaken with the patient  Lori Ramsey, the nurse navigator with oncology, was present for the visit  She provided input regarding support for the patient during his care  An outline of planned therapy was reviewed with the patient  I reviewed the colonoscopy and pathology report  The tumor was found in the mid rectum but extended to just above the anus  Pathology revealed invasive adenocarcinoma  I viewed the CT and MRI  These show T3 N2 involvement with the possibility of iliac chain lymph node involvement along the right iliacs  The resection margin is positive on the MRI  There is evidence of steatohepatitis cysts on CT scan  No ascites is present but there are changes consistent with portal hypertension  This may impact his chemotherapy and or surgical candidacy  He understands this  He states that he was a drinker in his 25s but typically was someone who had a couple of beers a day after work, other than that  He is a smoker  He has minimal elevation of the liver function tests  It is noted that the patient has not had any significant constitutional symptoms  He has had bleeding with bowel movements but this is a small amount  His symptoms are more related to his bulkiness of the rectal tumor  Examination today reveals a rectal mass at at least 5 to 6 cm from the anal verge  There was a long segment of normal rectum distal to the base of the tumor  The tumor is exophytic and bulges distally within the lumen but the luminal attachment to the sidewalls of the rectum is only at the mid rectum rather than the distal rectum      The above findings indicate best therapy as consolidative chemotherapy with follow-up chemoradiation neoadjuvant therapy  This will be followed by a 2 to 3-month interval and then surgery with low anterior resection and diverting temporary ileostomy if the patient is a candidate for that operation  We will reevaluate him before surgery to see if he qualifies  I explained the need for temporary ileostomy and the possibility of a permanent colostomy  We provided support and encouragement for him  He understands this will be a long course of therapy but our goal is cure at this time  We will reevaluate the iliac chain lymph nodes after chemoradiation  He is to follow-up with the oncologist and then the radiation oncologist   I will see him after therapy is completed or near completion  Brissa Rossi will assist with planning  Subjective     HPI    Debbie Price is a 36 y o  male who is referred today by ALEKSANDRA Currie for evaluation of rectal mass  The patient notes rectal bleeding that started around a year ago, happening on a daily basis with bowel movements in the toilet bowl, as well as intermittently non generalized dull abdominal pain; he notes tenesmus and 4-6 soft and formed bowels per day    The patient had his port implanted yesterday, as per patient chemotherapy planned for 3/7/2023  The patient was admitted on 2/16/2023 due to abdominal pain  Main diagnosis; abdominal wall thickening  His most recent colonoscopy on 2/17/2023 with Dr Ghada Phan showed: Indication: Rectal mass  Impression: 1)  Malignant friable rectal mass, palpable on rectal exam   Circumferential but able to be traversed  Extends from 2 to 9 cm  Multiple biopsies obtained  2) Polyp removed from the descending colon with a cold snare  1 year colonoscopy recall  Final Diagnosis   A  Large Intestine, Descending Colon, polyp:    - Tubular adenoma  - Negative for high grade dysplasia       B  Rectum, mass:   -  Adenocarcinoma   See comment      Comment: Immunohistochemical stains performed with adequate controls demonstrates that the tumor is positive for CDX2, SATB2, CK20 (patchy), and negative for CK7, Synaptophysin, Chromogranin A, and p40  The immunophenotype supports the diagnosis  Ancillary testing for mismatch repair (MMR) protein deficiency by immunohistochemical panel (MLH1, PMS2, MSH2, MSH6) is undertaken (Block B1); the results will be issued in a supplemental report, to follow shortly  MRI pelvis rectal cancer staging on 2/23/2023 showed: IMPRESSION:  Unenhanced MRI of the Pelvis (Rectal Protocol)   -Overall MRI stage: T3c, N2, Mid rectal cancer    MRF: involved (tumor margin within 1 mm of MRF)  Sphincter involvement: No   Suspicious extra mesorectal lymph nodes:Yes, right external iliac chain  EMVI: Likely present   -For the purpose of radiation therapy planning, series 16 images 10-30 would be most useful  MRI abdomen on 2/23/2023 showed: 1) Cirrhotic liver morphology with evidence of portal hypertension manifested by splenomegaly, gastroesophageal varices, recanalization of the periumbilical vein, and trace perihepatic ascites  2)  Diffuse moderate hepatic steatosis with irregular areas of more severe steatosis  The areas of more severe steatosis correspond to the areas of decreased attenuation on the recent CT, and correspond to the areas of decreased T1 signal on this exam   3) No definite focal hepatic lesions to suggest either a primary liver neoplasm or metastatic disease  4) Likely reactive changes in the gallbladder  If there is clinical concern for acute cholecystitis, this would be better confirmed with HIDA scan      Genetics appointment scheduled for 3/1/2023      Lab Results   Component Value Date    CEA 39 2 (H) 02/16/2023     Lab Results   Component Value Date    WBC 5 81 02/27/2023    HGB 12 5 02/27/2023    HCT 38 4 02/27/2023    MCV 94 02/27/2023     02/27/2023     Lab Results   Component Value Date    SODIUM 138 02/27/2023    K 4 1 02/27/2023    CL 108 02/27/2023    CO2 22 02/27/2023    AGAP 8 02/27/2023    BUN 7 02/27/2023    CREATININE 0 78 02/27/2023    GLUC 117 02/27/2023    CALCIUM 9 4 02/27/2023    AST 56 (H) 02/27/2023    ALT 29 02/27/2023    ALKPHOS 52 02/27/2023    TP 7 8 02/27/2023    TBILI 1 44 (H) 02/27/2023    EGFR 110 02/27/2023     Historical Information   Past Medical History:   Diagnosis Date   • Dental infection      Past Surgical History:   Procedure Laterality Date   • APPENDECTOMY     • DENTAL SURGERY     • IR PORT PLACEMENT  2/28/2023       Meds/Allergies     No current outpatient medications on file  No current facility-administered medications for this visit  No Known Allergies    Social History   Social History     Substance and Sexual Activity   Drug Use No     Social History     Tobacco Use   Smoking Status Former   • Packs/day: 1 00   • Years: 20 00   • Pack years: 20 00   • Types: Cigarettes   • Quit date: 2020   • Years since quitting: 3 1   Smokeless Tobacco Never         Family History   Problem Relation Age of Onset   • Lung cancer Mother    • Cancer Father         head and neck cancer   • Colon cancer Neg Hx    • Colon polyps Neg Hx          Review of Systems    Objective   Current Vitals:  Vitals:    03/01/23 1343   Weight: 99 8 kg (220 lb)   Height: 6' 3" (1 905 m)         Physical Exam  Constitutional:       Appearance: Normal appearance  Eyes:      Conjunctiva/sclera: Conjunctivae normal    Cardiovascular:      Rate and Rhythm: Normal rate and regular rhythm  Pulmonary:      Effort: Pulmonary effort is normal       Breath sounds: Normal breath sounds  Abdominal:      General: Abdomen is flat  Palpations: Abdomen is soft  There is no mass  Tenderness: There is no guarding  Hernia: No hernia is present  Genitourinary:     Comments: The tumor is very large and bulky and penetrates into the distal rectum    However, its base where it is attached to the rectal wall is 5 to 6 cm from the anal verge, at least   This leaves a landing zone for coloanal anastomosis with preservation of the very distal rectum (coloanal anastomosis)  Neurological:      General: No focal deficit present  Mental Status: He is alert and oriented to person, place, and time  Psychiatric:         Behavior: Behavior normal          Thought Content:  Thought content normal          Judgment: Judgment normal

## 2023-02-28 ENCOUNTER — HOSPITAL ENCOUNTER (OUTPATIENT)
Dept: INTERVENTIONAL RADIOLOGY/VASCULAR | Facility: HOSPITAL | Age: 43
Discharge: HOME | End: 2023-02-28
Attending: INTERNAL MEDICINE

## 2023-02-28 VITALS
DIASTOLIC BLOOD PRESSURE: 70 MMHG | OXYGEN SATURATION: 96 % | BODY MASS INDEX: 27.35 KG/M2 | WEIGHT: 220 LBS | RESPIRATION RATE: 16 BRPM | HEART RATE: 80 BPM | TEMPERATURE: 98.3 F | SYSTOLIC BLOOD PRESSURE: 116 MMHG | HEIGHT: 75 IN

## 2023-02-28 DIAGNOSIS — K62.89 RECTAL MASS: ICD-10-CM

## 2023-02-28 RX ORDER — SODIUM CHLORIDE 9 MG/ML
75 INJECTION, SOLUTION INTRAVENOUS CONTINUOUS
Status: DISCONTINUED | OUTPATIENT
Start: 2023-02-28 | End: 2023-03-01 | Stop reason: HOSPADM

## 2023-02-28 RX ORDER — MIDAZOLAM HYDROCHLORIDE 2 MG/2ML
INJECTION, SOLUTION INTRAMUSCULAR; INTRAVENOUS AS NEEDED
Status: COMPLETED | OUTPATIENT
Start: 2023-02-28 | End: 2023-02-28

## 2023-02-28 RX ORDER — CEFAZOLIN SODIUM 2 G/50ML
2000 SOLUTION INTRAVENOUS ONCE
Status: DISCONTINUED | OUTPATIENT
Start: 2023-02-28 | End: 2023-03-01 | Stop reason: HOSPADM

## 2023-02-28 RX ORDER — CEFAZOLIN SODIUM 2 G/50ML
SOLUTION INTRAVENOUS
Status: COMPLETED | OUTPATIENT
Start: 2023-02-28 | End: 2023-02-28

## 2023-02-28 RX ORDER — FENTANYL CITRATE 50 UG/ML
INJECTION, SOLUTION INTRAMUSCULAR; INTRAVENOUS AS NEEDED
Status: COMPLETED | OUTPATIENT
Start: 2023-02-28 | End: 2023-02-28

## 2023-02-28 RX ADMIN — FENTANYL CITRATE 25 MCG: 50 INJECTION, SOLUTION INTRAMUSCULAR; INTRAVENOUS at 11:51

## 2023-02-28 RX ADMIN — SODIUM CHLORIDE 75 ML/HR: 0.9 INJECTION, SOLUTION INTRAVENOUS at 10:03

## 2023-02-28 RX ADMIN — FENTANYL CITRATE 25 MCG: 50 INJECTION, SOLUTION INTRAMUSCULAR; INTRAVENOUS at 12:01

## 2023-02-28 RX ADMIN — FENTANYL CITRATE 25 MCG: 50 INJECTION, SOLUTION INTRAMUSCULAR; INTRAVENOUS at 11:48

## 2023-02-28 RX ADMIN — MIDAZOLAM 0.5 MG: 1 INJECTION INTRAMUSCULAR; INTRAVENOUS at 11:42

## 2023-02-28 RX ADMIN — Medication 10 ML: at 11:56

## 2023-02-28 RX ADMIN — MIDAZOLAM 0.5 MG: 1 INJECTION INTRAMUSCULAR; INTRAVENOUS at 11:55

## 2023-02-28 RX ADMIN — MIDAZOLAM 0.5 MG: 1 INJECTION INTRAMUSCULAR; INTRAVENOUS at 11:48

## 2023-02-28 RX ADMIN — MIDAZOLAM 0.5 MG: 1 INJECTION INTRAMUSCULAR; INTRAVENOUS at 11:51

## 2023-02-28 RX ADMIN — MIDAZOLAM 0.5 MG: 1 INJECTION INTRAMUSCULAR; INTRAVENOUS at 12:01

## 2023-02-28 RX ADMIN — CEFAZOLIN SODIUM 2000 MG: 2 SOLUTION INTRAVENOUS at 11:25

## 2023-02-28 RX ADMIN — MIDAZOLAM 0.5 MG: 1 INJECTION INTRAMUSCULAR; INTRAVENOUS at 11:50

## 2023-02-28 RX ADMIN — FENTANYL CITRATE 25 MCG: 50 INJECTION, SOLUTION INTRAMUSCULAR; INTRAVENOUS at 11:42

## 2023-02-28 RX ADMIN — FENTANYL CITRATE 25 MCG: 50 INJECTION, SOLUTION INTRAMUSCULAR; INTRAVENOUS at 11:55

## 2023-02-28 RX ADMIN — FENTANYL CITRATE 25 MCG: 50 INJECTION, SOLUTION INTRAMUSCULAR; INTRAVENOUS at 11:50

## 2023-02-28 NOTE — BRIEF OP NOTE (RAD/CATH)
IR PORT PLACEMENT  Procedure Note    PATIENT NAME: Keith Peña  : 1980  MRN: [id number]     Pre-op Diagnosis:   1  Rectal mass      Post-op Diagnosis:   1  Rectal mass        Surgeon:   Amor Jay DO  Assistants:     No qualified resident was available      Estimated Blood Loss: None  Findings: R IJ port placed    Specimens: none    Complications:  none    Anesthesia: conscious sedation and local    Amor Jay DO     Date: 2023  Time: 12:21 PM

## 2023-02-28 NOTE — DISCHARGE INSTRUCTIONS
Implanted Venous Access Port     WHAT YOU NEED TO KNOW:   An implanted venous access port is a device used to give treatments and take blood  It may also be called a central venous access device (CVAD)  The port is a small container that is placed under your skin, usually in your upper chest  The port is attached to a catheter that enters a large vein  DISCHARGE INSTRUCTIONS:   Resume your normal diet  Small sips of flat soda will help with mild nausea  Prevent an infection:   Wash your hands often  Use soap and water  Clean your hands before and after you care for your port  Remind everyone who cares for your port to wash their hands  Check your skin for infection every day  Look for redness, swelling, or fluid oozing from the port site  Care for your port:   1  You may shower beginning 48 hours after procedure  2   Leave glue in place  3  It is normal for some bruising to occur  4  Use Tylenol for pain  5  Limit use of arm on the side that your port was placed  Lift nothing heavier than 5 pounds for 1 week, and then gradually increase activity as tolerated  6  DO NOT apply ointment, lotion or cream to port site until incision is healed  Allow glue to fall off  DO NOT attempt to peel glue from skin even it it begins to flake  7  After the port incision is healed you may swim, bathe  Notify the Interventional Radiologist if you have any of the followin  Fever above 101 F    2  Increased redness or swelling after 1st day  3  Increased pain after 1st day  4  Any sign of infection (drainage from port site, skin separation, hot to touch)  5  Persistent nausea or vomiting  Contact Interventional Radiology at [phone] [facility] PATIENTS: Contact Interventional Radiology at [phone]) ([address] [facility] [address]: Contact Interventional Radiology at [phone])   Procedural Sedation   WHAT YOU NEED TO KNOW:   Procedural sedation is medicine used during procedures to help you feel relaxed and calm  You will remember little to none of the procedure  After sedation you may feel tired, weak, or unsteady on your feet  You may also have trouble concentrating or short-term memory loss  These symptoms should go away in 24 hours or less  DISCHARGE INSTRUCTIONS:     Call 911 or have someone else call for any of the following: You have sudden trouble breathing  You cannot be woken  Contact Interventional Radiology at [phone]   [facility] PATIENTS: Contact Interventional Radiology at [phone]) [facility] PATIENTS: Contact Interventional Radiology at [phone]) if any of the following occur: You have a severe headache or dizziness  Your heart is beating faster than usual     You have a fever or chills  Your skin is itchy, swollen, or you have a rash  You have nausea or are vomiting for more than 8 hours after the procedure  You have questions or concerns about your condition or care  Self-care:   Have someone stay with you for 24 hours  This person can drive you to errands and help you do things around the house  This person can also watch for problems  Rest and do quiet activities for 24 hours  Do not exercise, ride a bike, or play sports  Stand up slowly to prevent dizziness and falls  Take short walks around the house with another person  Slowly return to your usual activities the next day  Do not drive or use dangerous machines or tools for 24 hours  You may injure yourself or others  Examples include a lawnmower, saw, or drill  Do not return to work for 24 hours if you use dangerous machines or tools for work  Do not make important decisions for 24 hours  For example, do not sign important papers or invest money  Drink liquids as directed  Liquids help flush the sedation medicine out of your body  Ask how much liquid to drink each day and which liquids are best for you  Eat small, frequent meals to prevent nausea and vomiting   Start with clear liquids such as juice or broth  If you do not vomit after clear liquids, you can eat your usual foods  Do not drink alcohol or take medicines that make you drowsy  This includes medicines that help you sleep and anxiety medicines  Ask your healthcare provider if it is safe for you to take pain medicine  Follow up with your healthcare provider as directed: Write down your questions so you remember to ask them during your visits  Procedural Sedation   WHAT YOU NEED TO KNOW:   Procedural sedation is medicine used during procedures to help you feel relaxed and calm  You will remember little to none of the procedure  After sedation you may feel tired, weak, or unsteady on your feet  You may also have trouble concentrating or short-term memory loss  These symptoms should go away in 24 hours or less  DISCHARGE INSTRUCTIONS:     Call 911 or have someone else call for any of the following: You have sudden trouble breathing  You cannot be woken  Contact Interventional Radiology at [phone]   [facility] PATIENTS: Contact Interventional Radiology at [phone]) [facility] PATIENTS: Contact Interventional Radiology at [phone]) if any of the following occur: You have a severe headache or dizziness  Your heart is beating faster than usual     You have a fever or chills  Your skin is itchy, swollen, or you have a rash  You have nausea or are vomiting for more than 8 hours after the procedure  You have questions or concerns about your condition or care  Self-care:   Have someone stay with you for 24 hours  This person can drive you to errands and help you do things around the house  This person can also watch for problems  Rest and do quiet activities for 24 hours  Do not exercise, ride a bike, or play sports  Stand up slowly to prevent dizziness and falls  Take short walks around the house with another person  Slowly return to your usual activities the next day        Do not drive or use dangerous machines or tools for 24 hours  You may injure yourself or others  Examples include a lawnmower, saw, or drill  Do not return to work for 24 hours if you use dangerous machines or tools for work  Do not make important decisions for 24 hours  For example, do not sign important papers or invest money  Drink liquids as directed  Liquids help flush the sedation medicine out of your body  Ask how much liquid to drink each day and which liquids are best for you  Eat small, frequent meals to prevent nausea and vomiting  Start with clear liquids such as juice or broth  If you do not vomit after clear liquids, you can eat your usual foods  Do not drink alcohol or take medicines that make you drowsy  This includes medicines that help you sleep and anxiety medicines  Ask your healthcare provider if it is safe for you to take pain medicine  Follow up with your healthcare provider as directed: Write down your questions so you remember to ask them during your visits

## 2023-02-28 NOTE — H&P
Interventional Radiology  History and Physical 2/28/2023     Dianne Bañuelos   1980   [id number]    Assessment/Plan:  Rectal CA, here for port    Problem List Items Addressed This Visit    None  Visit Diagnoses     Rectal mass        Relevant Orders    IR port placement             Subjective:     Patient ID: Dianne Bañuelos is a 36 y o  male  History of Present Illness  Rectal CA, here for port    Review of Systems   All other systems reviewed and are negative  Past Medical History:   Diagnosis Date   • Dental infection         Past Surgical History:   Procedure Laterality Date   • APPENDECTOMY     • DENTAL SURGERY          Social History     Tobacco Use   Smoking Status Former   • Packs/day: 1 00   • Years: 20 00   • Pack years: 20 00   • Types: Cigarettes   • Quit date: 2020   • Years since quitting: 3 1   Smokeless Tobacco Never        Social History     Substance and Sexual Activity   Alcohol Use Yes   • Alcohol/week: 2 0 standard drinks   • Types: 2 Cans of beer per week    Comment: 1-2 daily  previously up to 1 case/day (reduced alcohol intake 7 years ago)        Social History     Substance and Sexual Activity   Drug Use No        No Known Allergies    No current outpatient medications on file       Current Facility-Administered Medications   Medication Dose Route Frequency Provider Last Rate Last Admin   • ceFAZolin (ANCEF) IVPB (premix in dextrose) 2,000 mg 50 mL  2,000 mg Intravenous Once [place] Shield, DO       • ceFAZolin (ANCEF) IVPB (premix in dextrose)   Intravenous Continuous PRN [place] Shield,  mL/hr at 02/28/23 1125 2,000 mg at 02/28/23 1125   • lidocaine-epinephrine 1%-[id number]:417139 buffered 20 mL  20 mL Infiltration Once [place] Shield, DO       • sodium chloride 0 9 % infusion  75 mL/hr Intravenous Continuous [place] Shield, DO 75 mL/hr at 02/28/23 1003 75 mL/hr at 02/28/23 1003          Objective:    Vitals:    02/28/23 1243 02/28/23 0605 BP:  119/84   Pulse:  86   Resp:  18   Temp:  98 2 °F (36 8 °C)   TempSrc:  Temporal   SpO2:  98%   Weight: 99 8 kg (220 lb)    Height: 6' 3" (1 905 m)         Physical Exam  HENT:      Head: Normocephalic  Eyes:      Pupils: Pupils are equal, round, and reactive to light  Cardiovascular:      Rate and Rhythm: Normal rate  Pulmonary:      Effort: Pulmonary effort is normal    Abdominal:      General: Abdomen is flat  Musculoskeletal:         General: Normal range of motion  Cervical back: Normal range of motion  Skin:     General: Skin is warm  Neurological:      Mental Status: He is alert and oriented to person, place, and time  Psychiatric:         Mood and Affect: Mood normal            No results found for: BNP   Lab Results   Component Value Date    WBC 5 81 02/27/2023    HGB 12 5 02/27/2023    HCT 38 4 02/27/2023    MCV 94 02/27/2023     02/27/2023     Lab Results   Component Value Date    INR 1 12 02/27/2023    INR 1 32 (H) 02/16/2023    PROTIME 15 2 (H) 02/27/2023    PROTIME 17 2 (H) 02/16/2023     Lab Results   Component Value Date    PTT 32 02/27/2023         I have personally reviewed pertinent imaging and laboratory results  Code Status: Prior  Advance Directive and Living Will:      Power of :    POLST:      This text is generated with voice recognition software  There may be translation, syntax,  or grammatical errors  If you have any questions, please contact the dictating provider

## 2023-03-01 ENCOUNTER — PATIENT OUTREACH (OUTPATIENT)
Dept: HEMATOLOGY ONCOLOGY | Facility: CLINIC | Age: 43
End: 2023-03-01

## 2023-03-01 ENCOUNTER — OFFICE VISIT (OUTPATIENT)
Age: 43
End: 2023-03-01

## 2023-03-01 ENCOUNTER — TELEPHONE (OUTPATIENT)
Dept: HEMATOLOGY ONCOLOGY | Facility: HOSPITAL | Age: 43
End: 2023-03-01

## 2023-03-01 ENCOUNTER — DOCUMENTATION (OUTPATIENT)
Dept: GENETICS | Facility: CLINIC | Age: 43
End: 2023-03-01

## 2023-03-01 ENCOUNTER — CLINICAL SUPPORT (OUTPATIENT)
Dept: GENETICS | Facility: CLINIC | Age: 43
End: 2023-03-01

## 2023-03-01 VITALS — WEIGHT: 220 LBS | BODY MASS INDEX: 27.35 KG/M2 | HEIGHT: 75 IN

## 2023-03-01 DIAGNOSIS — C20 RECTAL CANCER (HCC): Primary | ICD-10-CM

## 2023-03-01 DIAGNOSIS — K62.89 RECTAL MASS: ICD-10-CM

## 2023-03-01 NOTE — ASSESSMENT & PLAN NOTE
A lengthy discussion was undertaken with the patient  Rosina Lugo, the nurse navigator with oncology, was present for the visit  She provided input regarding support for the patient during his care  An outline of planned therapy was reviewed with the patient  I reviewed the colonoscopy and pathology report  The tumor was found in the mid rectum but extended to just above the anus  Pathology revealed invasive adenocarcinoma  I viewed the CT and MRI  These show T3 N2 involvement with the possibility of iliac chain lymph node involvement along the right iliacs  The resection margin is positive on the MRI  There is evidence of steatohepatitis cysts on CT scan  No ascites is present but there are changes consistent with portal hypertension  This may impact his chemotherapy and or surgical candidacy  He understands this  He states that he was a drinker in his 25s but typically was someone who had a couple of beers a day after work, other than that  He is a smoker  He has minimal elevation of the liver function tests  It is noted that the patient has not had any significant constitutional symptoms  He has had bleeding with bowel movements but this is a small amount  His symptoms are more related to his bulkiness of the rectal tumor  Examination today reveals a rectal mass at at least 5 to 6 cm from the anal verge  There was a long segment of normal rectum distal to the base of the tumor  The tumor is exophytic and bulges distally within the lumen but the luminal attachment to the sidewalls of the rectum is only at the mid rectum rather than the distal rectum  The above findings indicate best therapy as consolidative chemotherapy with follow-up chemoradiation neoadjuvant therapy  This will be followed by a 2 to 3-month interval and then surgery with low anterior resection and diverting temporary ileostomy if the patient is a candidate for that operation    We will reevaluate him before surgery to see if he qualifies  I explained the need for temporary ileostomy and the possibility of a permanent colostomy  We provided support and encouragement for him  He understands this will be a long course of therapy but our goal is cure at this time  We will reevaluate the iliac chain lymph nodes after chemoradiation  He is to follow-up with the oncologist and then the radiation oncologist   I will see him after therapy is completed or near completion  Mary Cesar will assist with planning

## 2023-03-01 NOTE — PROGRESS NOTES
Initial NN phone outreach, introduced myself, explained my role, d/w pt all appts he has had for CT, MRI, w med onc, port and his blood work completed  Told the pt that I will be at the office today when he comes in to meet w Dr Isaac Belcher and would provide him w mine and [employer] business card when he arrives  We completed the general assessment, referral placed for onc SW, pt has port in and just had blood taken for his genetic testing, pt is 37 yo  Pt reports being having trouble sleeping and feels tired, was having trouble sleeping prior to diagnosis, pt reports he quit drinking about 3 wks ago, pt stated depending on the day is how much he would drink, score for pall referral was a 4 but pt declined for referral at this time and wants to wait after starting therapy  I let him know that this was ok to do we could evaluate as he rcvs his tx  Pt had his blood work completed for genetic testing today  Pt agreed for me to send him info on the [facility], he also agreed I  at his consult while he spoke to Dr Isaac Belcher later today  Email sent to the pt w info on the [facility]  Met w pt in person during consult w Dr Isaac Belcher, answered all pts questions, he wanted clarification for his infusion appt next wk, I told the pt I would f/u w Dr Erica Byers RN to confirm and get back to him  Provided him with mine and [employer], I let him know that she will be contacting him  He thanked me  [facility] to [facility] to clarify infusion sched  Pt is sched on 3/9 and she said she would be calling him too  I called the pt to let him know about his first infusion appt on 3/9 but also told him that he should expect a call from [facility] w further details about his infusion appts  He thanked me

## 2023-03-01 NOTE — LETTER
March 1, 2023     Ronal Gordon RN    Patient: Jey Lynn   YOB: 1980   Date of Visit: 3/1/2023       Dear Dr Wilbert Jimenez: Thank you for referring Sheridan Smoker to me for evaluation  Below are my notes for this consultation  If you have questions, please do not hesitate to call me  I look forward to following your patient along with you  Sincerely,        Suzette Perez MD        CC: DO NAVJOT Tobar  GI ONCOLOGY TUMOR BOARD  W  Dottie Elmore MD  3/1/2023  2:39 PM  Sign when Signing Visit  Colon and Rectal Surgery   Jey Lynn 36 y o  male MRN [id number]  Encounter: [id number]  03/01/23 2:39 PM            Assessment: Jey Lynn is a 36 y o  male who has rectal cancer  Plan:   Rectal cancer [facility])  A lengthy discussion was undertaken with the patient  Ronal Gordon, the nurse navigator with oncology, was present for the visit  She provided input regarding support for the patient during his care  An outline of planned therapy was reviewed with the patient  I reviewed the colonoscopy and pathology report  The tumor was found in the mid rectum but extended to just above the anus  Pathology revealed invasive adenocarcinoma  I viewed the CT and MRI  These show T3 N2 involvement with the possibility of iliac chain lymph node involvement along the right iliacs  The resection margin is positive on the MRI  There is evidence of steatohepatitis cysts on CT scan  No ascites is present but there are changes consistent with portal hypertension  This may impact his chemotherapy and or surgical candidacy  He understands this  He states that he was a drinker in his 25s but typically was someone who had a couple of beers a day after work, other than that  He is a smoker  He has minimal elevation of the liver function tests  It is noted that the patient has not had any significant constitutional symptoms    He has had bleeding with bowel movements but this is a small amount  His symptoms are more related to his bulkiness of the rectal tumor  Examination today reveals a rectal mass at at least 5 to 6 cm from the anal verge  There was a long segment of normal rectum distal to the base of the tumor  The tumor is exophytic and bulges distally within the lumen but the luminal attachment to the sidewalls of the rectum is only at the mid rectum rather than the distal rectum  The above findings indicate best therapy as consolidative chemotherapy with follow-up chemoradiation neoadjuvant therapy  This will be followed by a 2 to 3-month interval and then surgery with low anterior resection and diverting temporary ileostomy if the patient is a candidate for that operation  We will reevaluate him before surgery to see if he qualifies  I explained the need for temporary ileostomy and the possibility of a permanent colostomy  We provided support and encouragement for him  He understands this will be a long course of therapy but our goal is cure at this time  We will reevaluate the iliac chain lymph nodes after chemoradiation  He is to follow-up with the oncologist and then the radiation oncologist   I will see him after therapy is completed or near completion  Ev Hare will assist with planning  Subjective      HPI    [place] Ki is a 36 y o  male who is referred today by ALEKSANDRA Field for evaluation of rectal mass  The patient notes rectal bleeding that started around a year ago, happening on a daily basis with bowel movements in the toilet bowl, as well as intermittently non generalized dull abdominal pain; he notes tenesmus and 4-6 soft and formed bowels per day    The patient had his port implanted yesterday, as per patient chemotherapy planned for 3/7/2023  The patient was admitted on 2/16/2023 due to abdominal pain  Main diagnosis; abdominal wall thickening      His most recent colonoscopy on 2/17/2023 with Dr Christensen Has showed: Indication: Rectal mass  Impression: 1)  Malignant friable rectal mass, palpable on rectal exam   Circumferential but able to be traversed  Extends from 2 to 9 cm  Multiple biopsies obtained  2) Polyp removed from the descending colon with a cold snare  1 year colonoscopy recall  Final Diagnosis   A  Large Intestine, Descending Colon, polyp:    - Tubular adenoma  - Negative for high grade dysplasia       B  Rectum, mass:   -  Adenocarcinoma  See comment      Comment: Immunohistochemical stains performed with adequate controls demonstrates that the tumor is positive for CDX2, SATB2, CK20 (patchy), and negative for CK7, Synaptophysin, Chromogranin A, and p40  The immunophenotype supports the diagnosis  Ancillary testing for mismatch repair (MMR) protein deficiency by immunohistochemical panel (MLH1, PMS2, MSH2, MSH6) is undertaken (Block B1); the results will be issued in a supplemental report, to follow shortly  MRI pelvis rectal cancer staging on 2/23/2023 showed: IMPRESSION:  Unenhanced MRI of the Pelvis (Rectal Protocol)   -Overall MRI stage: T3c, N2, Mid rectal cancer    MRF: involved (tumor margin within 1 mm of MRF)  Sphincter involvement: No   Suspicious extra mesorectal lymph nodes:Yes, right external iliac chain  EMVI: Likely present   -For the purpose of radiation therapy planning, series 16 images 10-30 would be most useful  MRI abdomen on 2/23/2023 showed: 1) Cirrhotic liver morphology with evidence of portal hypertension manifested by splenomegaly, gastroesophageal varices, recanalization of the periumbilical vein, and trace perihepatic ascites  2)  Diffuse moderate hepatic steatosis with irregular areas of more severe steatosis    The areas of more severe steatosis correspond to the areas of decreased attenuation on the recent CT, and correspond to the areas of decreased T1 signal on this exam   3) No definite focal hepatic lesions to suggest either a primary liver neoplasm or metastatic disease  4) Likely reactive changes in the gallbladder  If there is clinical concern for acute cholecystitis, this would be better confirmed with HIDA scan  Genetics appointment scheduled for 3/1/2023      Lab Results   Component Value Date    CEA 39 2 (H) 02/16/2023     Lab Results   Component Value Date    WBC 5 81 02/27/2023    HGB 12 5 02/27/2023    HCT 38 4 02/27/2023    MCV 94 02/27/2023     02/27/2023     Lab Results   Component Value Date    SODIUM 138 02/27/2023    K 4 1 02/27/2023     02/27/2023    CO2 22 02/27/2023    AGAP 8 02/27/2023    BUN 7 02/27/2023    CREATININE 0 78 02/27/2023    GLUC 117 02/27/2023    CALCIUM 9 4 02/27/2023    AST 56 (H) 02/27/2023    ALT 29 02/27/2023    ALKPHOS 52 02/27/2023    TP 7 8 02/27/2023    TBILI 1 44 (H) 02/27/2023    EGFR 110 02/27/2023     Historical Information   Past Medical History:   Diagnosis Date   • Dental infection      Past Surgical History:   Procedure Laterality Date   • APPENDECTOMY     • DENTAL SURGERY     • IR PORT PLACEMENT  2/28/2023       Meds/Allergies      No current outpatient medications on file  No current facility-administered medications for this visit  No Known Allergies    Social History   Social History     Substance and Sexual Activity   Drug Use No     Social History     Tobacco Use   Smoking Status Former   • Packs/day: 1 00   • Years: 20 00   • Pack years: 20 00   • Types: Cigarettes   • Quit date: 2020   • Years since quitting: 3 1   Smokeless Tobacco Never         Family History   Problem Relation Age of Onset   • Lung cancer Mother    • Cancer Father         head and neck cancer   • Colon cancer Neg Hx    • Colon polyps Neg Hx          Review of Systems    Objective    Current Vitals:  Vitals:    03/01/23 1343   Weight: 99 8 kg (220 lb)   Height: 6' 3" (1 905 m)         Physical Exam  Constitutional:       Appearance: Normal appearance     Eyes:      Conjunctiva/sclera: Conjunctivae normal    Cardiovascular:      Rate and Rhythm: Normal rate and regular rhythm  Pulmonary:      Effort: Pulmonary effort is normal       Breath sounds: Normal breath sounds  Abdominal:      General: Abdomen is flat  Palpations: Abdomen is soft  There is no mass  Tenderness: There is no guarding  Hernia: No hernia is present  Genitourinary:     Comments: The tumor is very large and bulky and penetrates into the distal rectum  However, its base where it is attached to the rectal wall is 5 to 6 cm from the anal verge, at least   This leaves a landing zone for coloanal anastomosis with preservation of the very distal rectum (coloanal anastomosis)  Neurological:      General: No focal deficit present  Mental Status: He is alert and oriented to person, place, and time  Psychiatric:         Behavior: Behavior normal          Thought Content:  Thought content normal          Judgment: Judgment normal

## 2023-03-01 NOTE — LETTER
2023     Christine Buckley, [facility] [address] [facility] [address]  [address] [address]    Patient: Chiqui Farias  YOB: 1980  Date of Visit: 3/1/2023      Dear Dr Terry Rapp: Thank you for referring Ursula Maguire to me for evaluation  Below are my notes for this consultation  If you have questions, please do not hesitate to call me  I look forward to following your patient along with you  Sincerely,        Felix Cox        CC: No Recipients        Pre-Test Genetic Counseling Consult Note    Patient Name: Chiqui Farias   /Age: 1980/42 y o  Referring Provider: Christine Buckley DO    Date of Service: 3/1/2023  Genetic Counselor: Felix Cox MS, [facility]  Interpretation Services: None  [facility]      Jovita Hagan was referred to the [address] [place] [address] and Genetic Assessment Program due to his personal history of rectal cancer  he presents today to discuss the possibility of a hereditary cancer syndrome, options for genetic testing, and implications for him and his family  Cancer History and Treatment:   Personal History:   Rectal adenocarcinoma, T3c, N2 (palpable on rectal exam)     Circumferential but able to be traversed   Extends from 2 to 9 cm       RESULTS OF IMMUNOHISTOCHEMICAL ANALYSIS FOR MISMATCH REPAIR PROTEIN LOSS     INTERPRETATION: NO LOSS OF NUCLEAR EXPRESSION OF MMR PROTEINS: LOW PROBABILITY OF MSI-H      RESULTS:  Antibody            Clone                 Description                                  Results  MLH1                 [id number]          Mismatch repair protein               Intact nuclear expression  MSH2                [id number]        Mismatch repair protein               Intact nuclear expression  MSH6                44                      Mismatch repair protein               Intact nuclear expression  PMS2                 [id number]           Mismatch repair protein               Intact nuclear expression    Screening Hx:   Colon:  Colonoscopy Rodena Mighty 2023):   A  Large Intestine, Descending Colon, polyp:    - Tubular adenoma  - Negative for high grade dysplasia  B  Rectum, mass:   -  Adenocarcinoma  No previous colonoscopies     Skin:  No current screening    Prostate:  Prostate screening: none     Other screening: none     Medical and Surgical History  Pertinent surgical history:   Past Surgical History:   Procedure Laterality Date   • APPENDECTOMY     • DENTAL SURGERY     • IR PORT PLACEMENT  2/28/2023      Pertinent medical history:  Past Medical History:   Diagnosis Date   • Dental infection          Other History:  Height:   Ht Readings from Last 1 Encounters:   02/28/23 6' 3" (1 905 m)     Weight:   Wt Readings from Last 1 Encounters:   02/28/23 99 8 kg (220 lb)       Relevant Family History   Patient reports non-Ashkenazi Orthodoxy ancestry  Maternal Family History: Mother: lung cancer diagnosed at 54, smoker (d 58)  Limited information about family history and structure secondary to his mother's adoption     Paternal Family History:  Father: skin cancer, unknown type, diagnosed at 39; metastatic cancer of the head, face, and neck diagnosed at 64; ETOH/smoker (d 63)   Uncle: unknown cancer, no information (d 37)     Please refer to the scanned pedigree in the Media Tab for a complete family history     *All history is reported as provided by the patient; records are not available for review, except where indicated  Assessment:  We discussed sporadic, familial and hereditary cancer  We reviewed that although only 5-10% of cancers are considered hereditary, it is very rare for cancers such as lung cancer to have a hereditary etiology  We also discussed the many factors that influence our risk for cancer such as age, environmental exposures, lifestyle choices and family history  We reviewed the indications suggestive of a hereditary predisposition to cancer    We discussed that his cancer diagnosis at 36years old is suspicious for a hereditary cancer syndrome, particularly Orosco syndrome  We discussed the cancer risks and NCCN screening recommendations associated with Orosco syndrome  We reviewed the results of the immunohistochemistry staining (IHC) for expression of the mismatch repair genes  Results showed normal expression for the protein products of MLH1, MSH2, MSH6 and PMS2  Since approximately 90-95% of Orosco-syndrome associated tumors show abnormal IHC staining, this result significantly reduces the likelihood that Jose Alejandros colorectal cancer was due to a germline mutation in one of these mismatch repair genes however it does not completely rule it out  Given Alexandro's young age of diagnosis and limited information about his family history, genetic testing should still be considered  It is possible that Jose Alejandros colorectal tumor is due to a germline mutation in a non-Orosco syndrome-related gene that elevates an individual's risk for colorectal cancer  It is also possible that Rosalind Mora carries a Orosco syndrome-related germline mutation that does not impact protein production and thus wouldn't lead to abnormal IHC  Genetic testing is indicated for Rosalind Mora based on the following criteria: Meets NCCN V2 2022 Testing Criteria for the Evaluation of Orosco syndrome: individual with colorectal cancer diagnosed <50; limited information about family history and structure on the maternal side secondary to her adoption  The risks, benefits, and limitations of genetic testing were reviewed with the patient, as well as genetic discrimination laws, and possible test results (positive, negative, variants of uncertain significance) and their clinical implications  If positive for a mutation, options for managing cancer risk including increased surveillance, chemoprevention, and in some cases prophylactic surgery were discussed   Rosalind Mora was informed that if a hereditary cancer syndrome was identified in him, first degree relatives (parents, siblings, and children) have a chance of also inheriting the condition  Genetic testing would allow for predictive genetic testing in other relatives, who may also be at risk depending on their degree of relation  Billing:  Darcy Romero does not currently have insurance but the hospital is in the process of establishing a plan with Medicare  I offered to delay Alexandro's testing until his Medicare is in place  Darcy Romero considered it but ultimately decided that he would like to have the testing completed today before he starts treatment  He understands that the testing lab, [facility], will reach out to him because the cost will  be $250 out of pocket  Plan: Patient decided to proceed with testing and provided consent  Summary:     Sample Collection:  The patient's blood sample was drawn in the office on 3/1/23 by medical assistant Ed Valente    Genetic Testing Preformed: [employer]: Cancer® +RNAinsight® (61 genes): APC, ELIO, AXIN2, BAP1, BARD1, BMPR1A, BRCA1, BRCA2, BRIP1, CDH1, CDK4, CDKN1B, CDKN2A, CHEK2, CTNNA1, DICER1, EGLN1, EPCAM, FH, FLCN, GREM1, HOXB13, KIF1B, KIT, MAX, MEN1, MET, MITF, MLH1, MSH2, MSH3, MSH6, MUTYH, NBN, NF1, NTHL1, PALB2, PDGFRA, PMS2, POLD1, POLE, POT1, PTEN, RAD51C, RAD51D, RB1, RECQL, RET, SDHA, SDHAF2, SDHB, SDHC, SDHD, SMAD4, SMARCA4, STK11, HAHW618, TP53, TSC1, TSC2, VHL     Result Call Information:  I confirmed the patient's mobile number on file as the best number to call with results  I confirmed with the patient that we can leave a voicemail on his mobile number    Results take approximately 2-3 weeks to complete once test is started  We will contact Darcy Romero once results are available  Additional recommendations for surveillance/medical management will be made pending genetic test results

## 2023-03-01 NOTE — LETTER
March 1, 2023     Aida Samano RN    Patient: Clinton Arias   YOB: 1980   Date of Visit: 3/1/2023       Dear Dr Dottie Osorio: Thank you for referring Nathalie Ross to me for evaluation  Below are my notes for this consultation  If you have questions, please do not hesitate to call me  I look forward to following your patient along with you  Sincerely,        Sharon Lopez MD        CC: Myer Carrel, DO [facility]  GI ONCOLOGY TUMOR BOARD  W  Rudolph Yen MD  3/1/2023  2:34 PM  Incomplete  Colon and Rectal Surgery   Clinton Arias 36 y o  male MRN [id number]  Encounter: [id number]  03/01/23 2:34 PM            Assessment: Clinton Arias is a 36 y o  male who has rectal cancer  Plan:   Rectal cancer [facility])  A lengthy discussion was undertaken with the patient  Aida Samano, the nurse navigator with oncology, was present for the visit  She provided input regarding support for the patient during his care  An outline of planned therapy was reviewed with the patient  I reviewed the colonoscopy and pathology report  The tumor was found in the mid rectum but extended to just above the anus  Pathology revealed invasive adenocarcinoma  I viewed the CT and MRI  These show T3 N2 involvement with the possibility of iliac chain lymph node involvement along the right iliacs  The resection margin is positive on the MRI  There is evidence of steatohepatitis cysts on CT scan  No ascites is present but there are changes consistent with portal hypertension  This may impact his chemotherapy and or surgical candidacy  He understands this  He states that he was a drinker in his 25s but typically was someone who had a couple of beers a day after work, other than that  He is a smoker  He has minimal elevation of the liver function tests  It is noted that the patient has not had any significant constitutional symptoms    He has had bleeding with bowel movements but this is a small amount  His symptoms are more related to his bulkiness of the rectal tumor  Examination today reveals a rectal mass at at least 5 to 6 cm from the anal verge  There was a long segment of normal rectum distal to the base of the tumor  The tumor is exophytic and bulges distally within the lumen but the luminal attachment to the sidewalls of the rectum is only at the mid rectum rather than the distal rectum  The above findings indicate best therapy as consolidative chemotherapy with follow-up chemoradiation neoadjuvant therapy  This will be followed by a 2 to 3-month interval and then surgery with low anterior resection and diverting temporary ileostomy if the patient is a candidate for that operation  We will reevaluate him before surgery to see if he qualifies  I explained the need for temporary ileostomy and the possibility of a permanent colostomy  We provided support and encouragement for him  He understands this will be a long course of therapy but our goal is cure at this time  We will reevaluate the iliac chain lymph nodes after chemoradiation  He is to follow-up with the oncologist and then the radiation oncologist   I will see him after therapy is completed or near completion  Aida Samano will assist with planning  Subjective      HPI    Clinton Arias is a 36 y o  male who is referred today by ALEKSANDRA Mccarty for evaluation of rectal mass  The patient notes rectal bleeding that started around a year ago, happening on a daily basis with bowel movements in the toilet bowl, as well as intermittently non generalized dull abdominal pain; he notes tenesmus and 4-6 soft and formed bowels per day    The patient had his port implanted yesterday, as per patient chemotherapy planned for 3/7/2023  The patient was admitted on 2/16/2023 due to abdominal pain  Main diagnosis; abdominal wall thickening      His most recent colonoscopy on 2/17/2023 with Dr Tania Hernandez showed: Indication: Rectal mass  Impression: 1)  Malignant friable rectal mass, palpable on rectal exam   Circumferential but able to be traversed  Extends from 2 to 9 cm  Multiple biopsies obtained  2) Polyp removed from the descending colon with a cold snare  1 year colonoscopy recall  Final Diagnosis   A  Large Intestine, Descending Colon, polyp:    - Tubular adenoma  - Negative for high grade dysplasia       B  Rectum, mass:   -  Adenocarcinoma  See comment      Comment: Immunohistochemical stains performed with adequate controls demonstrates that the tumor is positive for CDX2, SATB2, CK20 (patchy), and negative for CK7, Synaptophysin, Chromogranin A, and p40  The immunophenotype supports the diagnosis  Ancillary testing for mismatch repair (MMR) protein deficiency by immunohistochemical panel (MLH1, PMS2, MSH2, MSH6) is undertaken (Block B1); the results will be issued in a supplemental report, to follow shortly  MRI pelvis rectal cancer staging on 2/23/2023 showed: IMPRESSION:  Unenhanced MRI of the Pelvis (Rectal Protocol)   -Overall MRI stage: T3c, N2, Mid rectal cancer    MRF: involved (tumor margin within 1 mm of MRF)  Sphincter involvement: No   Suspicious extra mesorectal lymph nodes:Yes, right external iliac chain  EMVI: Likely present   -For the purpose of radiation therapy planning, series 16 images 10-30 would be most useful  MRI abdomen on 2/23/2023 showed: 1) Cirrhotic liver morphology with evidence of portal hypertension manifested by splenomegaly, gastroesophageal varices, recanalization of the periumbilical vein, and trace perihepatic ascites  2)  Diffuse moderate hepatic steatosis with irregular areas of more severe steatosis    The areas of more severe steatosis correspond to the areas of decreased attenuation on the recent CT, and correspond to the areas of decreased T1 signal on this exam   3) No definite focal hepatic lesions to suggest either a primary liver neoplasm or metastatic disease  4) Likely reactive changes in the gallbladder  If there is clinical concern for acute cholecystitis, this would be better confirmed with HIDA scan  Genetics appointment scheduled for 3/1/2023      Lab Results   Component Value Date    CEA 39 2 (H) 02/16/2023     Lab Results   Component Value Date    WBC 5 81 02/27/2023    HGB 12 5 02/27/2023    HCT 38 4 02/27/2023    MCV 94 02/27/2023     02/27/2023     Lab Results   Component Value Date    SODIUM 138 02/27/2023    K 4 1 02/27/2023     02/27/2023    CO2 22 02/27/2023    AGAP 8 02/27/2023    BUN 7 02/27/2023    CREATININE 0 78 02/27/2023    GLUC 117 02/27/2023    CALCIUM 9 4 02/27/2023    AST 56 (H) 02/27/2023    ALT 29 02/27/2023    ALKPHOS 52 02/27/2023    TP 7 8 02/27/2023    TBILI 1 44 (H) 02/27/2023    EGFR 110 02/27/2023     Historical Information   Past Medical History:   Diagnosis Date   • Dental infection      Past Surgical History:   Procedure Laterality Date   • APPENDECTOMY     • DENTAL SURGERY     • IR PORT PLACEMENT  2/28/2023       Meds/Allergies      No current outpatient medications on file  No current facility-administered medications for this visit  No Known Allergies    Social History   Social History     Substance and Sexual Activity   Drug Use No     Social History     Tobacco Use   Smoking Status Former   • Packs/day: 1 00   • Years: 20 00   • Pack years: 20 00   • Types: Cigarettes   • Quit date: 2020   • Years since quitting: 3 1   Smokeless Tobacco Never         Family History   Problem Relation Age of Onset   • Lung cancer Mother    • Cancer Father         head and neck cancer   • Colon cancer Neg Hx    • Colon polyps Neg Hx          Review of Systems    Objective    Current Vitals:  Vitals:    03/01/23 1343   Weight: 99 8 kg (220 lb)   Height: 6' 3" (1 905 m)         Physical Exam  Constitutional:       Appearance: Normal appearance     Eyes:      Conjunctiva/sclera: Conjunctivae normal  Cardiovascular:      Rate and Rhythm: Normal rate and regular rhythm  Pulmonary:      Effort: Pulmonary effort is normal       Breath sounds: Normal breath sounds  Abdominal:      General: Abdomen is flat  Palpations: Abdomen is soft  There is no mass  Tenderness: There is no guarding  Hernia: No hernia is present  Genitourinary:     Comments: The tumor is very large and bulky and penetrates into the distal rectum  However, its base where it is attached to the rectal wall is 5 to 6 cm from the anal verge, at least   This leaves a landing zone for coloanal anastomosis with preservation of the very distal rectum (coloanal anastomosis)  Neurological:      General: No focal deficit present  Mental Status: He is alert and oriented to person, place, and time  Psychiatric:         Behavior: Behavior normal          Thought Content: Thought content normal          Judgment: Judgment normal                  Cassandra Noble MD  3/1/2023  2:08 PM  Incomplete  Colon and Rectal Surgery   Denys Jackson 36 y o  male MRN [id number]  Encounter: [id number]  02/27/23 8:58 AM            Assessment: Denys Jackson is a 36 y o  male who ***      Plan:   No problem-specific Assessment & Plan notes found for this encounter  Subjective      HPI    Denys Jackson is a 36 y o  male who is referred today by ALEKSANDAR Mulligan for evaluation of rectal mass  The patient notes rectal bleeding that started around a year ago, happening on a daily basis with bowel movements in the toilet bowl, as well as intermittently non generalized dull abdominal pain; he notes tenesmus and 4-6 soft and formed bowels per day    The patient had his port implanted yesterday, as per patient chemotherapy planned for 3/7/2023  The patient was admitted on 2/16/2023 due to abdominal pain  Main diagnosis; abdominal wall thickening  His most recent colonoscopy on 2/17/2023 with Dr Cam Grullon showed:  Indication: Rectal mass  Impression: 1)  Malignant friable rectal mass, palpable on rectal exam   Circumferential but able to be traversed  Extends from 2 to 9 cm  Multiple biopsies obtained  2) Polyp removed from the descending colon with a cold snare  1 year colonoscopy recall  Final Diagnosis   A  Large Intestine, Descending Colon, polyp:    - Tubular adenoma  - Negative for high grade dysplasia       B  Rectum, mass:   -  Adenocarcinoma  See comment      Comment: Immunohistochemical stains performed with adequate controls demonstrates that the tumor is positive for CDX2, SATB2, CK20 (patchy), and negative for CK7, Synaptophysin, Chromogranin A, and p40  The immunophenotype supports the diagnosis  Ancillary testing for mismatch repair (MMR) protein deficiency by immunohistochemical panel (MLH1, PMS2, MSH2, MSH6) is undertaken (Block B1); the results will be issued in a supplemental report, to follow shortly  MRI pelvis rectal cancer staging on 2/23/2023 showed: IMPRESSION:  Unenhanced MRI of the Pelvis (Rectal Protocol)   -Overall MRI stage: T3c, N2, Mid rectal cancer    MRF: involved (tumor margin within 1 mm of MRF)  Sphincter involvement: No   Suspicious extra mesorectal lymph nodes:Yes, right external iliac chain  EMVI: Likely present   -For the purpose of radiation therapy planning, series 16 images 10-30 would be most useful  MRI abdomen on 2/23/2023 showed: 1) Cirrhotic liver morphology with evidence of portal hypertension manifested by splenomegaly, gastroesophageal varices, recanalization of the periumbilical vein, and trace perihepatic ascites  2)  Diffuse moderate hepatic steatosis with irregular areas of more severe steatosis    The areas of more severe steatosis correspond to the areas of decreased attenuation on the recent CT, and correspond to the areas of decreased T1 signal on this exam   3) No definite focal hepatic lesions to suggest either a primary liver neoplasm or metastatic disease  4) Likely reactive changes in the gallbladder  If there is clinical concern for acute cholecystitis, this would be better confirmed with HIDA scan  Genetics appointment scheduled for 3/1/2023      Lab Results   Component Value Date    CEA 39 2 (H) 02/16/2023     Lab Results   Component Value Date    WBC 6 47 02/17/2023    HGB 11 8 (L) 02/17/2023    HCT 36 2 (L) 02/17/2023    MCV 93 02/17/2023     02/17/2023     Lab Results   Component Value Date    SODIUM 135 02/17/2023    K 3 5 02/17/2023     02/17/2023    CO2 19 (L) 02/17/2023    AGAP 9 02/17/2023    BUN 10 02/17/2023    CREATININE 0 65 02/17/2023    GLUC 95 02/17/2023    CALCIUM 8 0 (L) 02/17/2023     (H) 02/17/2023    ALT 53 (H) 02/17/2023    ALKPHOS 70 02/17/2023    TP 6 9 02/17/2023    TBILI 2 57 (H) 02/17/2023    EGFR 119 02/17/2023     Historical Information   Past Medical History:   Diagnosis Date   • Dental infection      Past Surgical History:   Procedure Laterality Date   • APPENDECTOMY     • DENTAL SURGERY         Meds/Allergies      No current outpatient medications on file  No Known Allergies    Social History   Social History     Substance and Sexual Activity   Drug Use No     Social History     Tobacco Use   Smoking Status Former   • Packs/day: 1 00   • Years: 20 00   • Pack years: 20 00   • Types: Cigarettes   • Quit date: 2020   • Years since quitting: 3 1   Smokeless Tobacco Never         Family History   Problem Relation Age of Onset   • Lung cancer Mother    • Cancer Father         head and neck cancer   • Colon cancer Neg Hx    • Colon polyps Neg Hx          Review of Systems    Objective    Current Vitals: There were no vitals filed for this visit        Physical Exam

## 2023-03-01 NOTE — TELEPHONE ENCOUNTER
Can patient's lab work on 3/20 be changed to later in the day due to patient's appt being in the afternoon

## 2023-03-01 NOTE — PROGRESS NOTES
Pre-Test Genetic Counseling Consult Note    Patient Name: Tete Chinchilla   /Age: 1980/42 y o  Referring Provider: Cindy Jones DO    Date of Service: 3/1/2023  Genetic Counselor: Yusuf Noble MS, [facility]  Interpretation Services: None  [facility]      Yesenia Choudhary was referred to the [address] [place] [address] and Genetic Assessment Program due to his personal history of rectal cancer  he presents today to discuss the possibility of a hereditary cancer syndrome, options for genetic testing, and implications for him and his family  Cancer History and Treatment:   Personal History:   Rectal adenocarcinoma, T3c, N2 (palpable on rectal exam)     Circumferential but able to be traversed   Extends from 2 to 9 cm  RESULTS OF IMMUNOHISTOCHEMICAL ANALYSIS FOR MISMATCH REPAIR PROTEIN LOSS     INTERPRETATION: NO LOSS OF NUCLEAR EXPRESSION OF MMR PROTEINS: LOW PROBABILITY OF MSI-H      RESULTS:  Antibody            Clone                 Description                                  Results  MLH1                 [id number]          Mismatch repair protein               Intact nuclear expression  MSH2                [id number]        Mismatch repair protein               Intact nuclear expression  MSH6                40                      Mismatch repair protein               Intact nuclear expression  PMS2                 [id number]           Mismatch repair protein               Intact nuclear expression    Screening Hx:   Colon:  Colonoscopy Shanelle Shove ):   A  Large Intestine, Descending Colon, polyp:    - Tubular adenoma  - Negative for high grade dysplasia     B  Rectum, mass:   -  Adenocarcinoma  No previous colonoscopies     Skin:  No current screening    Prostate:  Prostate screening: none     Other screening: none     Medical and Surgical History  Pertinent surgical history:   Past Surgical History:   Procedure Laterality Date   • APPENDECTOMY     • DENTAL SURGERY     • IR PORT PLACEMENT  2/28/2023      Pertinent medical history:  Past Medical History:   Diagnosis Date   • Dental infection          Other History:  Height:   Ht Readings from Last 1 Encounters:   02/28/23 6' 3" (1 905 m)     Weight:   Wt Readings from Last 1 Encounters:   02/28/23 99 8 kg (220 lb)       Relevant Family History   Patient reports non-Ashkenazi Islam ancestry  Maternal Family History: Mother: lung cancer diagnosed at 54, smoker (d 58)  Limited information about family history and structure secondary to his mother's adoption     Paternal Family History:  Father: skin cancer, unknown type, diagnosed at 39; metastatic cancer of the head, face, and neck diagnosed at 64; ETOH/smoker (d 63)   Uncle: unknown cancer, no information (d 37)     Please refer to the scanned pedigree in the Media Tab for a complete family history     *All history is reported as provided by the patient; records are not available for review, except where indicated  Assessment:  We discussed sporadic, familial and hereditary cancer  We reviewed that although only 5-10% of cancers are considered hereditary, it is very rare for cancers such as lung cancer to have a hereditary etiology  We also discussed the many factors that influence our risk for cancer such as age, environmental exposures, lifestyle choices and family history  We reviewed the indications suggestive of a hereditary predisposition to cancer  We discussed that his cancer diagnosis at 36years old is suspicious for a hereditary cancer syndrome, particularly Orosco syndrome  We discussed the cancer risks and NCCN screening recommendations associated with Orosco syndrome  We reviewed the results of the immunohistochemistry staining (IHC) for expression of the mismatch repair genes  Results showed normal expression for the protein products of MLH1, MSH2, MSH6 and PMS2    Since approximately 90-95% of Orosco-syndrome associated tumors show abnormal IHC staining, this result significantly reduces the likelihood that Jose Alejandros colorectal cancer was due to a germline mutation in one of these mismatch repair genes however it does not completely rule it out  Given Alexandro's young age of diagnosis and limited information about his family history, genetic testing should still be considered  It is possible that Jose Alejandros colorectal tumor is due to a germline mutation in a non-Orosco syndrome-related gene that elevates an individual's risk for colorectal cancer  It is also possible that Elyssa Griffith carries a Orosco syndrome-related germline mutation that does not impact protein production and thus wouldn't lead to abnormal IHC  Genetic testing is indicated for Elyssa Griffith based on the following criteria: Meets NCCN V2 2022 Testing Criteria for the Evaluation of Orosco syndrome: individual with colorectal cancer diagnosed <50; limited information about family history and structure on the maternal side secondary to her adoption  The risks, benefits, and limitations of genetic testing were reviewed with the patient, as well as genetic discrimination laws, and possible test results (positive, negative, variants of uncertain significance) and their clinical implications  If positive for a mutation, options for managing cancer risk including increased surveillance, chemoprevention, and in some cases prophylactic surgery were discussed  Elyssa Griffith was informed that if a hereditary cancer syndrome was identified in him, first degree relatives (parents, siblings, and children) have a chance of also inheriting the condition  Genetic testing would allow for predictive genetic testing in other relatives, who may also be at risk depending on their degree of relation  Billing:  Elyssa Griffith does not currently have insurance but the hospital is in the process of establishing a plan with Medicare  I offered to delay Alexandro's testing until his Medicare is in place   Elyssa Griffith considered it but ultimately decided that he would like to have the testing completed today before he starts treatment  He understands that the testing lab, [facility], will reach out to him because the cost will  be $250 out of pocket  Plan: Patient decided to proceed with testing and provided consent  Summary:     Sample Collection:  The patient's blood sample was drawn in the office on 3/1/23 by medical assistant Vandana Wills    Genetic Testing Preformed: [employer]: Cancer® +RNAinsight® (61 genes): APC, ELIO, AXIN2, BAP1, BARD1, BMPR1A, BRCA1, BRCA2, BRIP1, CDH1, CDK4, CDKN1B, CDKN2A, CHEK2, CTNNA1, DICER1, EGLN1, EPCAM, FH, FLCN, GREM1, HOXB13, KIF1B, KIT, MAX, MEN1, MET, MITF, MLH1, MSH2, MSH3, MSH6, MUTYH, NBN, NF1, NTHL1, PALB2, PDGFRA, PMS2, POLD1, POLE, POT1, PTEN, RAD51C, RAD51D, RB1, RECQL, RET, SDHA, SDHAF2, SDHB, SDHC, SDHD, SMAD4, SMARCA4, STK11, VJZQ532, TP53, TSC1, TSC2, VHL     Result Call Information:  I confirmed the patient's mobile number on file as the best number to call with results  I confirmed with the patient that we can leave a voicemail on his mobile number    Results take approximately 2-3 weeks to complete once test is started  We will contact Opal Montelongo once results are available  Additional recommendations for surveillance/medical management will be made pending genetic test results

## 2023-03-01 NOTE — LETTER
March 1, 2023     Naldo Barboza RN    Patient: Acacia Jackson   YOB: 1980   Date of Visit: 3/1/2023       Dear Dr Martell Pardo: Thank you for referring Dale Barnhart to me for evaluation  Below are my notes for this consultation  If you have questions, please do not hesitate to call me  I look forward to following your patient along with you  Sincerely,        Xena Garcia MD        CC: Humaira Klein, Morales Ardon MD  3/1/2023  2:34 PM  Incomplete  Colon and Rectal Surgery   Acacia Jackson 36 y o  male MRN [id number]  Encounter: [id number]  03/01/23 2:34 PM            Assessment: Acacia Jackson is a 36 y o  male who has rectal cancer  Plan:   Rectal cancer [facility])  A lengthy discussion was undertaken with the patient  Naldo Barboza, the nurse navigator with oncology, was present for the visit  She provided input regarding support for the patient during his care  An outline of planned therapy was reviewed with the patient  I reviewed the colonoscopy and pathology report  The tumor was found in the mid rectum but extended to just above the anus  Pathology revealed invasive adenocarcinoma  I viewed the CT and MRI  These show T3 N2 involvement with the possibility of iliac chain lymph node involvement along the right iliacs  The resection margin is positive on the MRI  There is evidence of steatohepatitis cysts on CT scan  No ascites is present but there are changes consistent with portal hypertension  This may impact his chemotherapy and or surgical candidacy  He understands this  He states that he was a drinker in his 25s but typically was someone who had a couple of beers a day after work, other than that  He is a smoker  He has minimal elevation of the liver function tests  It is noted that the patient has not had any significant constitutional symptoms    He has had bleeding with bowel movements but this is a small amount  His symptoms are more related to his bulkiness of the rectal tumor  Examination today reveals a rectal mass at at least 5 to 6 cm from the anal verge  There was a long segment of normal rectum distal to the base of the tumor  The tumor is exophytic and bulges distally within the lumen but the luminal attachment to the sidewalls of the rectum is only at the mid rectum rather than the distal rectum  The above findings indicate best therapy as consolidative chemotherapy with follow-up chemoradiation neoadjuvant therapy  This will be followed by a 2 to 3-month interval and then surgery with low anterior resection and diverting temporary ileostomy if the patient is a candidate for that operation  We will reevaluate him before surgery to see if he qualifies  I explained the need for temporary ileostomy and the possibility of a permanent colostomy  We provided support and encouragement for him  He understands this will be a long course of therapy but our goal is cure at this time  We will reevaluate the iliac chain lymph nodes after chemoradiation  He is to follow-up with the oncologist and then the radiation oncologist   I will see him after therapy is completed or near completion  Raymundo Al will assist with planning  Subjective      HPI    Zeny Samson is a 36 y o  male who is referred today by ALEKSANDRA Anthony for evaluation of rectal mass  The patient notes rectal bleeding that started around a year ago, happening on a daily basis with bowel movements in the toilet bowl, as well as intermittently non generalized dull abdominal pain; he notes tenesmus and 4-6 soft and formed bowels per day    The patient had his port implanted yesterday, as per patient chemotherapy planned for 3/7/2023  The patient was admitted on 2/16/2023 due to abdominal pain  Main diagnosis; abdominal wall thickening      His most recent colonoscopy on 2/17/2023 with Dr Mohamud Flair showed: Indication: Rectal mass  Impression: 1)  Malignant friable rectal mass, palpable on rectal exam   Circumferential but able to be traversed  Extends from 2 to 9 cm  Multiple biopsies obtained  2) Polyp removed from the descending colon with a cold snare  1 year colonoscopy recall  Final Diagnosis   A  Large Intestine, Descending Colon, polyp:    - Tubular adenoma  - Negative for high grade dysplasia       B  Rectum, mass:   -  Adenocarcinoma  See comment      Comment: Immunohistochemical stains performed with adequate controls demonstrates that the tumor is positive for CDX2, SATB2, CK20 (patchy), and negative for CK7, Synaptophysin, Chromogranin A, and p40  The immunophenotype supports the diagnosis  Ancillary testing for mismatch repair (MMR) protein deficiency by immunohistochemical panel (MLH1, PMS2, MSH2, MSH6) is undertaken (Block B1); the results will be issued in a supplemental report, to follow shortly  MRI pelvis rectal cancer staging on 2/23/2023 showed: IMPRESSION:  Unenhanced MRI of the Pelvis (Rectal Protocol)   -Overall MRI stage: T3c, N2, Mid rectal cancer    MRF: involved (tumor margin within 1 mm of MRF)  Sphincter involvement: No   Suspicious extra mesorectal lymph nodes:Yes, right external iliac chain  EMVI: Likely present   -For the purpose of radiation therapy planning, series 16 images 10-30 would be most useful  MRI abdomen on 2/23/2023 showed: 1) Cirrhotic liver morphology with evidence of portal hypertension manifested by splenomegaly, gastroesophageal varices, recanalization of the periumbilical vein, and trace perihepatic ascites  2)  Diffuse moderate hepatic steatosis with irregular areas of more severe steatosis    The areas of more severe steatosis correspond to the areas of decreased attenuation on the recent CT, and correspond to the areas of decreased T1 signal on this exam   3) No definite focal hepatic lesions to suggest either a primary liver neoplasm or metastatic disease  4) Likely reactive changes in the gallbladder  If there is clinical concern for acute cholecystitis, this would be better confirmed with HIDA scan  Genetics appointment scheduled for 3/1/2023      Lab Results   Component Value Date    CEA 39 2 (H) 02/16/2023     Lab Results   Component Value Date    WBC 5 81 02/27/2023    HGB 12 5 02/27/2023    HCT 38 4 02/27/2023    MCV 94 02/27/2023     02/27/2023     Lab Results   Component Value Date    SODIUM 138 02/27/2023    K 4 1 02/27/2023     02/27/2023    CO2 22 02/27/2023    AGAP 8 02/27/2023    BUN 7 02/27/2023    CREATININE 0 78 02/27/2023    GLUC 117 02/27/2023    CALCIUM 9 4 02/27/2023    AST 56 (H) 02/27/2023    ALT 29 02/27/2023    ALKPHOS 52 02/27/2023    TP 7 8 02/27/2023    TBILI 1 44 (H) 02/27/2023    EGFR 110 02/27/2023     Historical Information   Past Medical History:   Diagnosis Date   • Dental infection      Past Surgical History:   Procedure Laterality Date   • APPENDECTOMY     • DENTAL SURGERY     • IR PORT PLACEMENT  2/28/2023       Meds/Allergies      No current outpatient medications on file  No current facility-administered medications for this visit  No Known Allergies    Social History   Social History     Substance and Sexual Activity   Drug Use No     Social History     Tobacco Use   Smoking Status Former   • Packs/day: 1 00   • Years: 20 00   • Pack years: 20 00   • Types: Cigarettes   • Quit date: 2020   • Years since quitting: 3 1   Smokeless Tobacco Never         Family History   Problem Relation Age of Onset   • Lung cancer Mother    • Cancer Father         head and neck cancer   • Colon cancer Neg Hx    • Colon polyps Neg Hx          Review of Systems    Objective    Current Vitals:  Vitals:    03/01/23 1343   Weight: 99 8 kg (220 lb)   Height: 6' 3" (1 905 m)         Physical Exam  Constitutional:       Appearance: Normal appearance     Eyes:      Conjunctiva/sclera: Conjunctivae normal  Cardiovascular:      Rate and Rhythm: Normal rate and regular rhythm  Pulmonary:      Effort: Pulmonary effort is normal       Breath sounds: Normal breath sounds  Abdominal:      General: Abdomen is flat  Palpations: Abdomen is soft  There is no mass  Tenderness: There is no guarding  Hernia: No hernia is present  Genitourinary:     Comments: The tumor is very large and bulky and penetrates into the distal rectum  However, its base where it is attached to the rectal wall is 5 to 6 cm from the anal verge, at least   This leaves a landing zone for coloanal anastomosis with preservation of the very distal rectum (coloanal anastomosis)  Neurological:      General: No focal deficit present  Mental Status: He is alert and oriented to person, place, and time  Psychiatric:         Behavior: Behavior normal          Thought Content: Thought content normal          Judgment: Judgment normal                  [place] MD Shakeel  3/1/2023  2:08 PM  Incomplete  Colon and Rectal Surgery   Meme Talavera 36 y o  male MRN [id number]  Encounter: [id number]  02/27/23 8:58 AM            Assessment: Meme Talavera is a 36 y o  male who ***      Plan:   No problem-specific Assessment & Plan notes found for this encounter  Subjective      HPI    Meme Talavera is a 36 y o  male who is referred today by ALEKSANDRA Chapin for evaluation of rectal mass  The patient notes rectal bleeding that started around a year ago, happening on a daily basis with bowel movements in the toilet bowl, as well as intermittently non generalized dull abdominal pain; he notes tenesmus and 4-6 soft and formed bowels per day    The patient had his port implanted yesterday, as per patient chemotherapy planned for 3/7/2023  The patient was admitted on 2/16/2023 due to abdominal pain  Main diagnosis; abdominal wall thickening  His most recent colonoscopy on 2/17/2023 with Dr Juan Francisco Bullard showed:  Indication: Rectal mass  Impression: 1)  Malignant friable rectal mass, palpable on rectal exam   Circumferential but able to be traversed  Extends from 2 to 9 cm  Multiple biopsies obtained  2) Polyp removed from the descending colon with a cold snare  1 year colonoscopy recall  Final Diagnosis   A  Large Intestine, Descending Colon, polyp:    - Tubular adenoma  - Negative for high grade dysplasia       B  Rectum, mass:   -  Adenocarcinoma  See comment      Comment: Immunohistochemical stains performed with adequate controls demonstrates that the tumor is positive for CDX2, SATB2, CK20 (patchy), and negative for CK7, Synaptophysin, Chromogranin A, and p40  The immunophenotype supports the diagnosis  Ancillary testing for mismatch repair (MMR) protein deficiency by immunohistochemical panel (MLH1, PMS2, MSH2, MSH6) is undertaken (Block B1); the results will be issued in a supplemental report, to follow shortly  MRI pelvis rectal cancer staging on 2/23/2023 showed: IMPRESSION:  Unenhanced MRI of the Pelvis (Rectal Protocol)   -Overall MRI stage: T3c, N2, Mid rectal cancer    MRF: involved (tumor margin within 1 mm of MRF)  Sphincter involvement: No   Suspicious extra mesorectal lymph nodes:Yes, right external iliac chain  EMVI: Likely present   -For the purpose of radiation therapy planning, series 16 images 10-30 would be most useful  MRI abdomen on 2/23/2023 showed: 1) Cirrhotic liver morphology with evidence of portal hypertension manifested by splenomegaly, gastroesophageal varices, recanalization of the periumbilical vein, and trace perihepatic ascites  2)  Diffuse moderate hepatic steatosis with irregular areas of more severe steatosis    The areas of more severe steatosis correspond to the areas of decreased attenuation on the recent CT, and correspond to the areas of decreased T1 signal on this exam   3) No definite focal hepatic lesions to suggest either a primary liver neoplasm or metastatic disease  4) Likely reactive changes in the gallbladder  If there is clinical concern for acute cholecystitis, this would be better confirmed with HIDA scan  Genetics appointment scheduled for 3/1/2023      Lab Results   Component Value Date    CEA 39 2 (H) 02/16/2023     Lab Results   Component Value Date    WBC 6 47 02/17/2023    HGB 11 8 (L) 02/17/2023    HCT 36 2 (L) 02/17/2023    MCV 93 02/17/2023     02/17/2023     Lab Results   Component Value Date    SODIUM 135 02/17/2023    K 3 5 02/17/2023     02/17/2023    CO2 19 (L) 02/17/2023    AGAP 9 02/17/2023    BUN 10 02/17/2023    CREATININE 0 65 02/17/2023    GLUC 95 02/17/2023    CALCIUM 8 0 (L) 02/17/2023     (H) 02/17/2023    ALT 53 (H) 02/17/2023    ALKPHOS 70 02/17/2023    TP 6 9 02/17/2023    TBILI 2 57 (H) 02/17/2023    EGFR 119 02/17/2023     Historical Information   Past Medical History:   Diagnosis Date   • Dental infection      Past Surgical History:   Procedure Laterality Date   • APPENDECTOMY     • DENTAL SURGERY         Meds/Allergies      No current outpatient medications on file  No Known Allergies    Social History   Social History     Substance and Sexual Activity   Drug Use No     Social History     Tobacco Use   Smoking Status Former   • Packs/day: 1 00   • Years: 20 00   • Pack years: 20 00   • Types: Cigarettes   • Quit date: 2020   • Years since quitting: 3 1   Smokeless Tobacco Never         Family History   Problem Relation Age of Onset   • Lung cancer Mother    • Cancer Father         head and neck cancer   • Colon cancer Neg Hx    • Colon polyps Neg Hx          Review of Systems    Objective    Current Vitals: There were no vitals filed for this visit        Physical Exam

## 2023-03-01 NOTE — TELEPHONE ENCOUNTER
Spoke with patient and went over all follow-up appointments, treatments and lab appts  I told the patient that Dr Mukesh Gracia will be calling him to go over the consent form and get verbal consent from him  Patient verbalized understanding and was appreciative of the call

## 2023-03-02 ENCOUNTER — TELEPHONE (OUTPATIENT)
Dept: GASTROENTEROLOGY | Facility: CLINIC | Age: 43
End: 2023-03-02

## 2023-03-02 ENCOUNTER — HOSPITAL ENCOUNTER (OUTPATIENT)
Dept: INFUSION CENTER | Facility: HOSPITAL | Age: 43
End: 2023-03-02

## 2023-03-02 ENCOUNTER — OFFICE VISIT (OUTPATIENT)
Dept: GASTROENTEROLOGY | Facility: CLINIC | Age: 43
End: 2023-03-02

## 2023-03-02 VITALS
DIASTOLIC BLOOD PRESSURE: 60 MMHG | TEMPERATURE: 98.7 F | HEART RATE: 71 BPM | SYSTOLIC BLOOD PRESSURE: 100 MMHG | BODY MASS INDEX: 27.23 KG/M2 | WEIGHT: 219 LBS | HEIGHT: 75 IN

## 2023-03-02 DIAGNOSIS — R74.01 TRANSAMINITIS: ICD-10-CM

## 2023-03-02 DIAGNOSIS — K70.30 ALCOHOLIC CIRRHOSIS OF LIVER WITHOUT ASCITES (HCC): ICD-10-CM

## 2023-03-02 DIAGNOSIS — C20 RECTAL CANCER (HCC): Primary | ICD-10-CM

## 2023-03-02 NOTE — TELEPHONE ENCOUNTER
Patient is aware appointment rescheduled today at [facility] office with Yeyo Swann

## 2023-03-02 NOTE — PROGRESS NOTES
[facility] - Outpatient Follow-up Note  Debbie Price 36 y o  male MRN: [id number]  Encounter: [id number]          ASSESSMENT AND PLAN:      54-year-old male with a recent diagnosis of rectal cancer (T3N2), with plans to start chemotherapy as a possible bridge to his surgical candidacy  He was found to have abdominal imaging consistent with cirrhosis  MRI on 2/23/2023 showed a cirrhotic liver morphology with evidence of portal hypertension manifested by splenomegaly, gastroesophageal varices, recanalization of the empiric umbilical vein and trace paraparetic ascites  There was no focal definitive hepatic lesions noted  He has no physical evidence of cirrhosis or portal hypertension  Fortunately his blood work shows normal albumin and platelet levels  I suspect he had degree of alcoholic hepatitis as evidenced by labs during his admission to the hospital   Fortunately his most recent blood work from this past Monday shows a decrease in AST to 56 and ALT returned to normal   His bilirubin is down to 1 44  I have requested he have updated blood work on Monday to recalculate his MELD score for better risk assessment  I believe he is a stable candidate for chemotherapy but I would like to perform an upper endoscopy to screen and treat esophagogastric varices prior to him undergoing surgery  I offered to add him onto the end of my schedule tomorrow, however due to his lack of establish insurance, he will hold off for now  I will follow along with you and monitor his liver function while he undergoes chemotherapy  FOLLOW-UP:  Return in about 3 months (around 6/2/2023)  VISIT DIAGNOSES AND ORDERS:      1  Rectal cancer ([address] )    2  Transaminitis/Hepatitic Steatosis    3   Alcoholic cirrhosis of liver without ascites ([address] )      Orders Placed This Encounter   Procedures   • Alpha 1 Antitrypsin Phenotype   • Ceruloplasmin   • IgG, IgA, IgM   • CBC and differential   • Protime-INR   • Comprehensive metabolic panel   • EGD     ______________________________________________________________________    SUBJECTIVE:           Mr Aidan Petersen is a 36 y o  male with recent diagnosis of rectal cancer with plans to start chemotherapy as possible bridge to surgery, who comes in today to establish care with hepatology after abdominal imaging showed evidence of cirrhosis with portal hypertension  Patient states he has no known history of liver disease  That being said, he states he has not been following with the primary care physician routinely and has not had regular blood work  He states he used to drink heavily in his 25s but cut back after his son was born  He admits to 12 drinks per week  When he was admitted to the hospital for rectal bleeding, blood work showed elevated LFTs possibly consistent with mild alcoholic hepatitis  I questioned him about his alcohol intake in relation to the abnormal LFTs, and he states while I was drinking more at that time, including hard liquor  He denies a history of lower extremity edema, abdominal distention with fluid, jaundice, easy bruising or excessive bleeding, overt confusion, dark urine  Other than the rectal bleeding and intermittent diarrhea, he denies a history of upper GI symptoms including heartburn, reflux, dysphagia, odynophagia, dyspepsia  He denies a known family history of liver disease  REVIEW OF SYSTEMS     Review of Systems   All other systems reviewed and are negative  Historical Information   Patient Active Problem List   Diagnosis   • Rectal wall thickening   • Transaminitis/Hepatitic Steatosis   • Does not have health insurance   • Rectal cancer [facility])     Social History     Substance and Sexual Activity   Alcohol Use Yes   • Alcohol/week: 2 0 standard drinks   • Types: 2 Cans of beer per week    Comment: 1-2 daily   previously up to 1 case/day (reduced alcohol intake 7 years ago)     Social History     Substance and Sexual Activity   Drug Use No     Social History     Tobacco Use   Smoking Status Former   • Packs/day: 1 00   • Years: 20 00   • Pack years: 20 00   • Types: Cigarettes   • Quit date: 2020   • Years since quitting: 3 1   Smokeless Tobacco Never       Meds/Allergies       Current Outpatient Medications:   •  [START ON 3/9/2023] fluorouracil 5,495 mg in CADD/Elastomeric Infusion Device    No Known Allergies        Objective     Blood pressure 100/60, pulse 71, temperature 98 7 °F (37 1 °C), height 6' 3" (1 905 m), weight 99 3 kg (219 lb)  Body mass index is 27 37 kg/m²  PHYSICAL EXAM:      Physical Exam  Vitals reviewed  Constitutional:       General: He is not in acute distress  Appearance: Normal appearance  He is not ill-appearing  HENT:      Head: Normocephalic and atraumatic  Nose: Nose normal       Mouth/Throat:      Pharynx: Oropharynx is clear  No posterior oropharyngeal erythema  Eyes:      General: No scleral icterus  Extraocular Movements: Extraocular movements intact  Cardiovascular:      Rate and Rhythm: Normal rate and regular rhythm  Heart sounds: No murmur heard  Pulmonary:      Effort: Pulmonary effort is normal  No respiratory distress  Breath sounds: Normal breath sounds  No rales  Abdominal:      General: There is distension (mild, but no evidence of ascites)  Palpations: Abdomen is soft  There is no shifting dullness, fluid wave, hepatomegaly or splenomegaly  Tenderness: There is no abdominal tenderness  There is no guarding  Musculoskeletal:         General: No swelling  Normal range of motion  Cervical back: Normal range of motion and neck supple  Skin:     General: Skin is warm  Coloration: Skin is not jaundiced  Neurological:      General: No focal deficit present  Mental Status: He is oriented to person, place, and time     Psychiatric:         Mood and Affect: Mood normal          Lab Results: Lab Results   Component Value Date    K 4 1 02/27/2023    CO2 22 02/27/2023     02/27/2023    BUN 7 02/27/2023    CREATININE 0 78 02/27/2023     Lab Results   Component Value Date    WBC 5 81 02/27/2023    HGB 12 5 02/27/2023    HCT 38 4 02/27/2023    MCV 94 02/27/2023     02/27/2023     Lab Results   Component Value Date    TP 7 8 02/27/2023    AST 56 (H) 02/27/2023    ALT 29 02/27/2023    INR 1 12 02/27/2023      Lab Results   Component Value Date    IRON 138 02/16/2023    FERRITIN 81 02/16/2023     MELD-Na score: 9 at 2/27/2023  3:07 PM  MELD score: 9 at 2/27/2023  3:07 PM  Calculated from:  Serum Creatinine: 0 78 mg/dL (Using min of 1 mg/dL) at 2/27/2023  3:07 PM  Serum Sodium: 138 mmol/L (Using max of 137 mmol/L) at 2/27/2023  3:07 PM  Total Bilirubin: 1 44 mg/dL at 2/27/2023  3:07 PM  INR(ratio): 1 12 at 2/27/2023  3:07 PM  Age: 36 years      Radiology Results:   Colonoscopy    Result Date: 2/17/2023  Narrative: Table formatting from the original result was not included  [facility] [address] [id number] [phone] DATE OF SERVICE: 2/17/23 PHYSICIAN(S): Attending: Yani Dobbs DO Fellow: No Staff Documented INDICATION: Rectal mass POST-OP DIAGNOSIS: See the impression below  HISTORY: Prior colonoscopy: No prior colonoscopy  BOWEL PREPARATION: Enema; Miralax/Dulcolax PREPROCEDURE: Informed consent was obtained for the procedure, including sedation  Risks including but not limited to bleeding, infection, perforation, adverse drug reaction and aspiration were explained in detail  Also explained about less than 100% sensitivity with the exam and other alternatives  The patient was placed in the left lateral decubitus position  Procedure: Colonoscopy DETAILS OF PROCEDURE: Patient was taken to the procedure room where a time out was performed to confirm correct patient and correct procedure   The patient underwent monitored anesthesia care, which was administered by an anesthesia professional  The patient's blood pressure, heart rate, level of consciousness, oxygen and respirations were monitored throughout the procedure  A digital rectal exam was performed  The scope was introduced through the anus and advanced to the cecum  Retroflexion was performed in the rectum  The quality of bowel preparation was evaluated using the [facility] Bowel Preparation Scale with scores of: right colon = 3, transverse colon = 2, left colon = 3  The total BBPS score was 8  Bowel prep was adequate  The patient experienced no blood loss  The procedure was not difficult  The patient tolerated the procedure well  There were no apparent complications  ANESTHESIA INFORMATION: ASA: II Anesthesia Type: IV Sedation with Anesthesia MEDICATIONS: No administrations occurring from 1007 to 1034 on 02/17/23 FINDINGS: Single malignant-appearing mass (traversable) measuring 7 cm in the rectum 2 cm from the anal verge observed during digital rectal exam, covering the whole circumference; bleeding occurred before intervention; performed cold forceps biopsy 5 mm sessile polyp in the descending colon; removed en bloc by cold snare and retrieved specimen EVENTS: Procedure Events Event Event Time ENDO CECUM REACHED 2/17/2023 10:20 AM ENDO SCOPE OUT TIME 2/17/2023 10:30 AM SPECIMENS: ID Type Source Tests Collected by Time Destination 1 : polyp Tissue Large Intestine, Left/Descending Colon TISSUE EXAM Tomy Russo DO 2/17/2023 10:24 AM  2 : mass Tissue Rectum TISSUE EXAM Tomy Russo DO 2/17/2023 10:27 AM  EQUIPMENT: Colonoscope -[id number]     Impression: Malignant friable rectal mass, palpable on rectal exam   Circumferential but able to be traversed  Extends from 2 to 9 cm    Multiple biopsies obtained Polyp removed from the descending colon with a cold snare RECOMMENDATION:  Repeat colonoscopy in 1 year  Personal history of colon cancer  Endoscopist will call with biopsy results within 2 weeks Will arrange outpatient follow-up for cirrhosis, as well as outpatient consultation with medical and surgical oncology Discussed with internal medicine PA Neville Collet, DO     CT chest w contrast    Result Date: 2/17/2023  Narrative: CT CHEST WITH IV CONTRAST INDICATION:   Occult malignancy assess for malignancy  Per my review of the medical record, the patient underwent surgery today for malignant friable rectal mass  COMPARISON:  Abdomen CT from 2/16/2023  TECHNIQUE: Chest CT with intravenous contrast   Axial, sagittal, coronal 2D reformats and coronal MIPS from source data  Radiation dose length product (DLP):  451 79 mGy-cm   Radiation dose exposure minimized using iterative reconstruction and automated exposure control  IV Contrast:  100 mL of iohexol (OMNIPAQUE) FINDINGS: LUNGS:  No metastases  Mild paraseptal emphysema  AIRWAYS: No significant filling defects  PLEURA:  Unremarkable  HEART/GREAT VESSELS:  Normal for age  MEDIASTINUM AND ALLEN:  Esophageal varices  CHEST WALL AND LOWER NECK: Unremarkable  UPPER ABDOMEN: Abnormal liver  OSSEOUS STRUCTURES: Normal for age  Impression: No lung metastases  Mild emphysema  Esophageal varices  Abnormal liver  See abdomen CT  Splenomegaly  Workstation performed: [id number]     MRI abdomen w wo contrast    Result Date: 2/24/2023  Narrative: MRI - ABDOMEN - WITH AND WITHOUT CONTRAST INDICATION: 36 years / Male  K62 89: Other specified diseases of anus and rectum  As per review of electronic medical record,  patient with recently diagnosed rectal cancer and cirrhosis  COMPARISON: CT of the abdomen and pelvis from 2/16/2023  TECHNIQUE:  Multiplanar/multisequence MRI of the abdomen was performed before and after administration of contrast  IV Contrast:  10 mL of Gadobutrol injection (SINGLE-DOSE) FINDINGS: LOWER CHEST:   Unremarkable  LIVER: The liver demonstrates a macronodular contour and volume redistribution compatible with cirrhosis    There is diffuse hepatic steatosis, with some areas demonstrating moderate steatosis and others with severe steatosis  There are genius areas of decreased T1 signal throughout the liver parenchyma, which are somewhat nodular and partially exophytic in the left hepatic lobe, as on the recent CT  There is more pronounced steatosis corresponding to these areas  Evaluation for underlying lesion such as hepatocellular carcinoma is somewhat limited as a true arterial phase was not obtained, however no evidence of definite enhancing lesions or lesions demonstrating washout  The hepatic veins and portal veins are patent  BILE DUCTS:  No intrahepatic or extrahepatic bile duct dilation  The common bile duct is at the upper limits of normal in diameter, measuring 6 mm and tapering gradually to smaller caliber closer to the ampulla of Vater  GALLBLADDER: No gallstones or sludge  The gallbladder is somewhat underdistended, limiting evaluation  The gallbladder wall is at the upper limits of normal in thickness, measuring 3 mm  There is small amount of fluid adjacent to the gallbladder, however the appearance is more likely reactive to underlying liver disease rather than acute cholecystitis  PANCREAS:  Unremarkable appearance of the pancreas  No dilation of the main pancreatic duct  ADRENAL GLANDS:  Within normal limits  SPLEEN:  The spleen is enlarged, measuring 14 3 cm in greatest cranial caudal dimension  There are no focal splenic lesions  KIDNEYS/PROXIMAL URETERS:  No hydroureteronephrosis  No suspicious renal mass  BOWEL:   No dilated loops of bowel  PERITONEUM/RETROPERITONEUM:  Trace perihepatic ascites  LYMPH NODES:  No abdominal lymphadenopathy  VASCULAR STRUCTURES:  Normal caliber abdominal aorta  Patent major branch vessels  There are gastroesophageal varices  There is recanalization of the periumbilical vein  ABDOMINAL WALL:  Unremarkable  OSSEOUS STRUCTURES:  No suspicious osseous lesion       Impression: Cirrhotic liver morphology with evidence of portal hypertension manifested by splenomegaly, gastroesophageal varices, recanalization of the periumbilical vein, and trace perihepatic ascites  Diffuse moderate hepatic steatosis with irregular areas of more severe steatosis  The areas of more severe steatosis correspond to the areas of decreased attenuation on the recent CT, and correspond to the areas of decreased T1 signal on this exam  No definite focal hepatic lesions to suggest either a primary liver neoplasm or metastatic disease  Likely reactive changes in the gallbladder  If there is clinical concern for acute cholecystitis, this would be better confirmed with HIDA scan  Workstation performed: [id number]     MRI pelvis rectal cancer staging wo contrast    Result Date: 2/24/2023  Narrative: MRI PELVIS - WITHOUT CONTRAST (RECTAL CANCER STAGING) INDICATION:   K62 89: Other specified diseases of anus and rectum  Based on colonoscopy from 2/17/2023, the tumor is a circumferential mass measuring approximately 7 cm, 2 cm from the anal verge  CEA of 39 2  Patient has not had preoperative chemoradiation treatment  COMPARISON: CT of the abdomen and pelvis from 2/16/2023 TECHNIQUE: Multiplanar/multisequence MRI of the pelvis without contrast was performed using rectal cancer imaging protocol  An additional small field of view, axial oblique T2-weighted sequence was performed through the tumor, perpendicular to the long axis of the rectum at that level  IV contrast was not given  FINDINGS: TUMOR LOCATION AND CHARACTERISTICS -  Tumor location: Mid rectal cancer (5 to 10 cm), 6 5 cm from the anal verge  -  Distance of the lowest extent of tumor from the top of the anal sphincter: 1 1 cm  -  Relationship to anterior peritoneal reflection: Below -  Morphology: Mostly polypoid and partially annular  -  Circumference (percent, relative to wall): A large portion of the lesion is polypoid an irregular filling of most of the lumen    The inferior most aspect of the mass extends from the 3 o'clock position to the 7 o'clock position  More superiorly in the mass is inseparable from the rectal wall for approximately 12 o'clock to 6 o'clock position  -  Tumor craniocaudal length: 5 7 cm  -  Mucinous: No T-STAGING: T3c (tumor penetrates >5-15 mm beyond muscularis propria) ANAL SPHINCTER INVOLVEMENT (FOR LOW RECTAL CA): N/A EXTRAMURAL VASCULAR INVASION: EMVI is likely present (series 16 image 17-15)  MESORECTAL FASCIA (MRF):         -Shortest distance of tumor to MRF: 3 mm, at the 12-1 o'clock position  Series 16 image 22         -Is there a separate tumor deposit, LN or EMVI threatening ( >=  1mm and  <= 2 mm) or invading (< 1 mm) the MRF? Yes, and enlarged, suspicious right perirectal lymph node at the 6 o'clock position is at least within 2 mm of the mesorectal fascia (series 16 image 14)  Another suspicious lymph node at the 8 o'clock position abuts the mesorectal fascia more superiorly (series 11 image 17 and series 16 image 6)  TUMOR DEPOSITS: No  LYMPH NODES:   There are 4 or greater suspicious locoregional nodes (N2)  Some suspicious mesorectal/superior rectal lymph nodes: -Series 16 image 14, Right anterior mesorectal, 10 mm  -Series 16 image 1, Superior rectal, 9 mm  -Series 16 image 8, left posterior mesorectal, 9 mm  -Series 16 image 6, right posterior mesorectal, 9 mm  -Series 15 image 12, superior rectal, 12 mm and 10 mm  -Series 15 image 15, superior rectal, 11 mm  Suspicious extra-mesorectal locoregional nodes: No   Suspicious non-locoregional nodes: Right external iliac lymph node measuring 1 cm in short axis (series 11 image 21)  REST OF THE PELVIS:       REPRODUCTIVE STRUCTURES: The prostate gland is not enlarged  BLADDER:  Within normal limits  OTHER BOWEL LOOPS: No bowel obstruction  PELVIC CAVITY:  There is edema in the presacral space  VASCULAR STRUCTURES:  Limited evaluation of the visualized vasculature on this non-contrast MRI is unremarkable  PELVIC WALL:  Unremarkable  OSSEOUS STRUCTURES: No definite osseous destruction  Impression: Unenhanced MRI of the Pelvis (Rectal Protocol) Overall MRI stage: T3c, N2, Mid rectal cancer  MRF: involved (tumor margin within 1 mm of MRF) Sphincter involvement: No  Suspicious extra mesorectal lymph nodes:Yes, right external iliac chain  EMVI: Likely present  For the purpose of radiation therapy planning, series 16 images 10-30 would be most useful  Workstation performed: [id number]     US right upper quadrant    Result Date: 2/16/2023  Narrative: RIGHT UPPER QUADRANT ULTRASOUND INDICATION:     abnormal LFTS, distended GB, r/o cholecystitis  COMPARISON:  CT performed earlier today TECHNIQUE:   Real-time ultrasound of the right upper quadrant was performed with a curvilinear transducer with both volumetric sweeps and still imaging techniques  FINDINGS: PANCREAS:  Obscured by bowel gas AORTA AND IVC:  Visualized portions are normal for patient age  LIVER: Size:  Moderately enlarged  The liver measures 22 1 cm in the midclavicular line  Contour:  Secondary to severe hepatic steatosis, the liver is better evaluated on the prior CT study  Parenchyma: There is marked diffuse increased echogenicity with smooth echotexture and significant beam attenuation with loss of periportal echogenicity  Most consistent with severe hepatic steatosis  The presence of focal hepatic abnormalities are better appreciated on the prior CT with prior recommendation for MRI Limited imaging of the main portal vein shows it to be patent and hepatopetal   BILIARY: The gallbladder is distended  No intrahepatic biliary dilatation  CBD measures 6 0 mm  No choledocholithiasis  Gallbladder wall thickness is approximately 2 to 3 mm  Negative Styles sign KIDNEY: Right kidney measures 11 3 x 6 6 x 6 0 cm  Volume 236 3 mL Kidney within normal limits  ASCITES:   None  Impression: 1   Markedly abnormal liver appearing enlarged and demonstrating severe hepatic steatosis  Please refer to prior CT report for additional findings and recommendations 2  Distended gallbladder, without evidence of gallbladder wall thickening, significant biliary ductal dilatation or point tenderness  No gallstones are seen Workstation performed: [id number]     CT abdomen pelvis with contrast    Result Date: 2/16/2023  Narrative: CT ABDOMEN AND PELVIS WITH IV CONTRAST INDICATION:   Abdominal pain, acute, nonlocalized abdominal pain, N/V  History of appendectomy  Blood in stool  COMPARISON:  None  TECHNIQUE:  CT examination of the abdomen and pelvis was performed  Axial, sagittal, and coronal 2D reformatted images were created from the source data and submitted for interpretation  Radiation dose length product (DLP) for this visit:  1056 9 mGy-cm   This examination, like all CT scans performed in the [facility], was performed utilizing techniques to minimize radiation dose exposure, including the use of iterative  reconstruction and automated exposure control  IV Contrast:  100 mL of iohexol (OMNIPAQUE) Enteric Contrast:  Enteric contrast was not administered  FINDINGS: ABDOMEN LOWER CHEST:  Multiple paraesophageal varices  LIVER/BILIARY TREE:  Liver is heterogeneous in attenuation with multiple focal areas of decreased attenuation  Nodular contours of the liver  There are more discrete somewhat rounded areas of decreased attenuation with capsular irregularity involving the left lobe of the liver series 2 image 29, and the caudate lobe 2/54 cm  Areas of narrowing of the hepatic veins and intrahepatic IVC  GALLBLADDER:  Distended measuring 13 3 cm  No radiopaque calculi or appreciable pericholecystic inflammation  SPLEEN:  Unremarkable  PANCREAS:  Unremarkable  ADRENAL GLANDS:  Unremarkable  KIDNEYS/URETERS:  Nonobstructing punctate left lower pole calculus    No hydronephrosis   STOMACH AND BOWEL:  There is colonic diverticulosis without evidence of acute diverticulitis  Asymmetric rectal wall thickening appears  There is perirectal edema and rounded approximate 7 mm perirectal lymph nodes 2/159, 2/163  APPENDIX:  There are expected postoperative changes of appendectomy  ABDOMINOPELVIC CAVITY:  No ascites  No pneumoperitoneum  11 mm and 10 mm retroperitoneal lymph nodes, 2/102, 96  Multiple prominent perirectal lymph nodes  VESSELS:  Paraesophageal, perigastric, and upper abdominal varices  Recanalized paraumbilical vein  Varices in the pelvis  PELVIS REPRODUCTIVE ORGANS:  Unremarkable for patient's age  URINARY BLADDER:  Unremarkable  Perivesicular varices  ABDOMINAL WALL/INGUINAL REGIONS:  Unremarkable  OSSEOUS STRUCTURES:  No acute fracture or destructive osseous lesion  Degenerative changes  Impression: Asymmetric rectal wall thickening, perirectal edema, prominent in size perirectal and low retroperitoneal lymph nodes  This is most concerning for neoplasm  Findings of hepatic cirrhosis and portal hypertension  Multiple somewhat geographic areas of decreased attenuation in the liver suspected to represent areas of focal fatty infiltration  More discrete rounded areas with contour irregularity involving the left lobe and caudate lobe may represent regenerative or dysplastic nodules  Recommend further evaluation with MRI with and without intravenous contrast  Gallbladder is markedly distended without radiopaque calculi or findings for cholecystitis  Additional findings as above  This study demonstrates a significant  finding and was documented as such in [facility] for liaison and referring practitioner notification  Workstation performed: [id number]     IR port placement    Result Date: 2/28/2023  Narrative: INDICATION: Rectal cancer  PROCEDURE: 1  Internal jugular approach port placement FINDINGS: 1  Single lumen port placed via right internal jugular vein with tip in the right atrium  Impression: Port placement  The catheter is ready for use  _______________________________________________________________ COMPARISON: None PROCEDURE DETAILS: Operators: Dr Aaron Teague Anesthesia: Moderate sedation was provided for 30 minutes  Hemodynamic parameters were continuously monitored by IR nursing  Local anesthesia  Medications: 1% lidocaine, fentanyl, Versed Contrast: 0 mL of Omnipaque 300 Fluoroscopy time: 1 0 minutes Images: 2 COMMENTS: The site was prepped and draped in the usual sterile fashion  All elements of maximal sterile barrier technique were followed (cap, mask, sterile gown, sterile gloves, large sterile full-body drape, hand hygiene, and 2% chlorhexidine for cutaneous antisepsis)  Sterile ultrasound technique with sterile gel and sterile probe cover was also utilized  A preprocedure timeout was performed per [facility] protocol  Patent right internal jugular vein was compressible and accessed using a micropuncture needle using real-time ultrasound guidance  Static ultrasound image was saved to PACS  Over a microwire, the needle was exchanged for a micropuncture sheath followed  by exchange for a J-wire advanced into the inferior vena cava  Next, a transverse incision was made over the upper chest wall and a subcutaneous pocket was created using blunt dissection  Following catheter tunneling, the micropuncture sheath was exchanged for a peel-away sheath over a wire allowing catheter advancement into the right atrium using fluoroscopic guidance  Peel-away sheath was then removed  Following catheter length confirmation and position with fluoroscopy, catheter was cut, connected to the port hub and accessed using a noncoring Arias needle for aspiration and flushing  The port was placed into the subcutaneous pocket  The pocket was closed in layers with 3-0 interrupted Vicryl sutures and subcuticular continuous sutures using a Stratafix absorbable suture  3-0 Vicryl suture of the venotomy was performed   Exofin glue was applied over the venotomy and chest incisions   Workstation performed: [id number]         Cherri Borja MD

## 2023-03-02 NOTE — TELEPHONE ENCOUNTER
----- Message from Yesenia Reid MD sent at 3/2/2023  6:30 AM EST -----  Regarding: Urgent appointment   Good morning, can you please call this patient and schedule urgent visit in request of Dr Alisa Pacheco  I know he’s already on the schedule for 2 weeks from now, but she prefers we see him prior to planned chemo date  I can see him today at 11:30, or you can add him to Dr Nila Rae or [facility] schedule tomorrow (if there is a spot) or you can add him to [facility] hepatology clinic schedule on Tuesday  Thank you       V

## 2023-03-02 NOTE — PROGRESS NOTES
Oncology Outpatient Consult Note  Gabriel Kiser 36 y o  male MRN: @ Encounter: [id number]        Date:  3/2/2023        CC:  New diagnosis rectal cancer      HPI:  Gabriel Kiser is seen for initial consultation 3/2/2023 regarding his newly diagnosed rectal cancer  He had been having abdominal bloating for the past year  He was also having a problem with an infected tooth and he thought that was what was causing the symptoms  After this was addressed, he didn't get better  Then began having abdominal pain and urgency of stool  This is what made him go to the hospital   CT showed a non-obstructing rectal mass and significant cirrhosis  He has not had anything to drink in a few weeks, but in the past he would drink significant amounts of beer every day  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  He smoked in the past  He has a 8 yo son  He is here today with his significant other, sister and brother in low  Labs show a CEA = 39, AFP = 8 4, bili = 2 5, AST = 100, ALT = 53  Cbc shows a mild anemia  He is eating and his last BM was 2 days ago  He denies any neuropathy  Famhx: Mother - lung cancer  Father - prostate cancer  Uncle - bladder cancer        ROS: As stated in history of present illness otherwise her 14 point review of systems today was negative      ECOG PS: 0    Cancer Staging:  Cancer Staging   Rectal cancer [facility])  Staging form: Colon and Rectum, AJCC 8th Edition  - Clinical: cT3, cN2, cM0 - Signed by Mena Day DO on 3/2/2023      Molecular Testing:     Oncology History Overview Note   2/2023 - [id number] rectal adenocarcinoma     Rectal cancer ([facility] [address] )   2/27/2023 Initial Diagnosis    Rectal cancer ([facility] [address] )     3/2/2023 -  Cancer Staged    Staging form: Colon and Rectum, AJCC 8th Edition  - Clinical: cT3, cN2, cM0 - Signed by Mena Day DO on 3/2/2023       3/9/2023 -  Chemotherapy    fluorouracil (ADRUCIL), 400 mg/m2 = 915 mg, Intravenous, Once, 0 of 12 cycles  leucovorin calcium IVPB, 400 mg/m2 = 916 mg, Intravenous, Once, 0 of 12 cycles  oxaliplatin (ELOXATIN) chemo infusion, 85 mg/m2 = 194 65 mg, Intravenous, Once, 0 of 12 cycles  fluorouracil (ADRUCIL) ambulatory infusion Soln, 1,200 mg/m2/day = 5,495 mg, Intravenous, Over 46 hours, 0 of 12 cycles             Test Results:    Imaging: Colonoscopy    Result Date: 2/17/2023  Narrative: Table formatting from the original result was not included  [address] [facility] [address] [id number] [phone] DATE OF SERVICE: 2/17/23 PHYSICIAN(S): Attending: Jorge Mckeon DO Fellow: No Staff Documented INDICATION: Rectal mass POST-OP DIAGNOSIS: See the impression below  HISTORY: Prior colonoscopy: No prior colonoscopy  BOWEL PREPARATION: Enema; Miralax/Dulcolax PREPROCEDURE: Informed consent was obtained for the procedure, including sedation  Risks including but not limited to bleeding, infection, perforation, adverse drug reaction and aspiration were explained in detail  Also explained about less than 100% sensitivity with the exam and other alternatives  The patient was placed in the left lateral decubitus position  Procedure: Colonoscopy DETAILS OF PROCEDURE: Patient was taken to the procedure room where a time out was performed to confirm correct patient and correct procedure  The patient underwent monitored anesthesia care, which was administered by an anesthesia professional  The patient's blood pressure, heart rate, level of consciousness, oxygen and respirations were monitored throughout the procedure  A digital rectal exam was performed  The scope was introduced through the anus and advanced to the cecum  Retroflexion was performed in the rectum  The quality of bowel preparation was evaluated using the [facility] Bowel Preparation Scale with scores of: right colon = 3, transverse colon = 2, left colon = 3  The total BBPS score was 8   Bowel prep was adequate  The patient experienced no blood loss  The procedure was not difficult  The patient tolerated the procedure well  There were no apparent complications  ANESTHESIA INFORMATION: ASA: II Anesthesia Type: IV Sedation with Anesthesia MEDICATIONS: No administrations occurring from 1007 to 1034 on 02/17/23 FINDINGS: Single malignant-appearing mass (traversable) measuring 7 cm in the rectum 2 cm from the anal verge observed during digital rectal exam, covering the whole circumference; bleeding occurred before intervention; performed cold forceps biopsy 5 mm sessile polyp in the descending colon; removed en bloc by cold snare and retrieved specimen EVENTS: Procedure Events Event Event Time ENDO CECUM REACHED 2/17/2023 10:20 AM ENDO SCOPE OUT TIME 2/17/2023 10:30 AM SPECIMENS: ID Type Source Tests Collected by Time Destination 1 : polyp Tissue Large Intestine, Left/Descending Colon TISSUE EXAM Amparo Holbrook DO 2/17/2023 10:24 AM  2 : mass Tissue Rectum TISSUE EXAM Amparo Holbrook DO 2/17/2023 10:27 AM  EQUIPMENT: Colonoscope -[id number]     Impression: Malignant friable rectal mass, palpable on rectal exam   Circumferential but able to be traversed  Extends from 2 to 9 cm  Multiple biopsies obtained Polyp removed from the descending colon with a cold snare RECOMMENDATION:  Repeat colonoscopy in 1 year  Personal history of colon cancer  Endoscopist will call with biopsy results within 2 weeks Will arrange outpatient follow-up for cirrhosis, as well as outpatient consultation with medical and surgical oncology Discussed with internal medicine ROSENDA Holbrook DO     CT chest w contrast    Result Date: 2/17/2023  Narrative: CT CHEST WITH IV CONTRAST INDICATION:   Occult malignancy assess for malignancy  Per my review of the medical record, the patient underwent surgery today for malignant friable rectal mass  COMPARISON:  Abdomen CT from 2/16/2023   TECHNIQUE: Chest CT with intravenous contrast   Axial, sagittal, coronal 2D reformats and coronal MIPS from source data  Radiation dose length product (DLP):  451 79 mGy-cm   Radiation dose exposure minimized using iterative reconstruction and automated exposure control  IV Contrast:  100 mL of iohexol (OMNIPAQUE) FINDINGS: LUNGS:  No metastases  Mild paraseptal emphysema  AIRWAYS: No significant filling defects  PLEURA:  Unremarkable  HEART/GREAT VESSELS:  Normal for age  MEDIASTINUM AND ALLEN:  Esophageal varices  CHEST WALL AND LOWER NECK: Unremarkable  UPPER ABDOMEN: Abnormal liver  OSSEOUS STRUCTURES: Normal for age  Impression: No lung metastases  Mild emphysema  Esophageal varices  Abnormal liver  See abdomen CT  Splenomegaly  Workstation performed: [id number]     MRI abdomen w wo contrast    Result Date: 2/24/2023  Narrative: MRI - ABDOMEN - WITH AND WITHOUT CONTRAST INDICATION: 36 years / Male  K62 89: Other specified diseases of anus and rectum  As per review of electronic medical record,  patient with recently diagnosed rectal cancer and cirrhosis  COMPARISON: CT of the abdomen and pelvis from 2/16/2023  TECHNIQUE:  Multiplanar/multisequence MRI of the abdomen was performed before and after administration of contrast  IV Contrast:  10 mL of Gadobutrol injection (SINGLE-DOSE) FINDINGS: LOWER CHEST:   Unremarkable  LIVER: The liver demonstrates a macronodular contour and volume redistribution compatible with cirrhosis  There is diffuse hepatic steatosis, with some areas demonstrating moderate steatosis and others with severe steatosis  There are genius areas of decreased T1 signal throughout the liver parenchyma, which are somewhat nodular and partially exophytic in the left hepatic lobe, as on the recent CT  There is more pronounced steatosis corresponding to these areas   Evaluation for underlying lesion such as hepatocellular carcinoma is somewhat limited as a true arterial phase was not obtained, however no evidence of definite enhancing lesions or lesions demonstrating washout  The hepatic veins and portal veins are patent  BILE DUCTS:  No intrahepatic or extrahepatic bile duct dilation  The common bile duct is at the upper limits of normal in diameter, measuring 6 mm and tapering gradually to smaller caliber closer to the ampulla of Vater  GALLBLADDER: No gallstones or sludge  The gallbladder is somewhat underdistended, limiting evaluation  The gallbladder wall is at the upper limits of normal in thickness, measuring 3 mm  There is small amount of fluid adjacent to the gallbladder, however the appearance is more likely reactive to underlying liver disease rather than acute cholecystitis  PANCREAS:  Unremarkable appearance of the pancreas  No dilation of the main pancreatic duct  ADRENAL GLANDS:  Within normal limits  SPLEEN:  The spleen is enlarged, measuring 14 3 cm in greatest cranial caudal dimension  There are no focal splenic lesions  KIDNEYS/PROXIMAL URETERS:  No hydroureteronephrosis  No suspicious renal mass  BOWEL:   No dilated loops of bowel  PERITONEUM/RETROPERITONEUM:  Trace perihepatic ascites  LYMPH NODES:  No abdominal lymphadenopathy  VASCULAR STRUCTURES:  Normal caliber abdominal aorta  Patent major branch vessels  There are gastroesophageal varices  There is recanalization of the periumbilical vein  ABDOMINAL WALL:  Unremarkable  OSSEOUS STRUCTURES:  No suspicious osseous lesion  Impression: Cirrhotic liver morphology with evidence of portal hypertension manifested by splenomegaly, gastroesophageal varices, recanalization of the periumbilical vein, and trace perihepatic ascites  Diffuse moderate hepatic steatosis with irregular areas of more severe steatosis  The areas of more severe steatosis correspond to the areas of decreased attenuation on the recent CT, and correspond to the areas of decreased T1 signal on this exam  No definite focal hepatic lesions to suggest either a primary liver neoplasm or metastatic disease  Likely reactive changes in the gallbladder  If there is clinical concern for acute cholecystitis, this would be better confirmed with HIDA scan  Workstation performed: [id number]     MRI pelvis rectal cancer staging wo contrast    Result Date: 2/24/2023  Narrative: MRI PELVIS - WITHOUT CONTRAST (RECTAL CANCER STAGING) INDICATION:   K62 89: Other specified diseases of anus and rectum  Based on colonoscopy from 2/17/2023, the tumor is a circumferential mass measuring approximately 7 cm, 2 cm from the anal verge  CEA of 39 2  Patient has not had preoperative chemoradiation treatment  COMPARISON: CT of the abdomen and pelvis from 2/16/2023 TECHNIQUE: Multiplanar/multisequence MRI of the pelvis without contrast was performed using rectal cancer imaging protocol  An additional small field of view, axial oblique T2-weighted sequence was performed through the tumor, perpendicular to the long axis of the rectum at that level  IV contrast was not given  FINDINGS: TUMOR LOCATION AND CHARACTERISTICS -  Tumor location: Mid rectal cancer (5 to 10 cm), 6 5 cm from the anal verge  -  Distance of the lowest extent of tumor from the top of the anal sphincter: 1 1 cm  -  Relationship to anterior peritoneal reflection: Below -  Morphology: Mostly polypoid and partially annular  -  Circumference (percent, relative to wall): A large portion of the lesion is polypoid an irregular filling of most of the lumen  The inferior most aspect of the mass extends from the 3 o'clock position to the 7 o'clock position  More superiorly in the mass is inseparable from the rectal wall for approximately 12 o'clock to 6 o'clock position  -  Tumor craniocaudal length: 5 7 cm  -  Mucinous: No T-STAGING: T3c (tumor penetrates >5-15 mm beyond muscularis propria) ANAL SPHINCTER INVOLVEMENT (FOR LOW RECTAL CA): N/A EXTRAMURAL VASCULAR INVASION: EMVI is likely present (series 16 image 17-15)   MESORECTAL FASCIA (MRF):         -Shortest distance of tumor to MRF: 3 mm, at the 12-1 o'clock position  Series 16 image 22         -Is there a separate tumor deposit, LN or EMVI threatening ( >=  1mm and  <= 2 mm) or invading (< 1 mm) the MRF? Yes, and enlarged, suspicious right perirectal lymph node at the 6 o'clock position is at least within 2 mm of the mesorectal fascia (series 16 image 14)  Another suspicious lymph node at the 8 o'clock position abuts the mesorectal fascia more superiorly (series 11 image 17 and series 16 image 6)  TUMOR DEPOSITS: No  LYMPH NODES:   There are 4 or greater suspicious locoregional nodes (N2)  Some suspicious mesorectal/superior rectal lymph nodes: -Series 16 image 14, Right anterior mesorectal, 10 mm  -Series 16 image 1, Superior rectal, 9 mm  -Series 16 image 8, left posterior mesorectal, 9 mm  -Series 16 image 6, right posterior mesorectal, 9 mm  -Series 15 image 12, superior rectal, 12 mm and 10 mm  -Series 15 image 15, superior rectal, 11 mm  Suspicious extra-mesorectal locoregional nodes: No   Suspicious non-locoregional nodes: Right external iliac lymph node measuring 1 cm in short axis (series 11 image 21)  REST OF THE PELVIS:       REPRODUCTIVE STRUCTURES: The prostate gland is not enlarged  BLADDER:  Within normal limits  OTHER BOWEL LOOPS: No bowel obstruction  PELVIC CAVITY:  There is edema in the presacral space  VASCULAR STRUCTURES:  Limited evaluation of the visualized vasculature on this non-contrast MRI is unremarkable  PELVIC WALL:  Unremarkable  OSSEOUS STRUCTURES: No definite osseous destruction  Impression: Unenhanced MRI of the Pelvis (Rectal Protocol) Overall MRI stage: T3c, N2, Mid rectal cancer  MRF: involved (tumor margin within 1 mm of MRF) Sphincter involvement: No  Suspicious extra mesorectal lymph nodes:Yes, right external iliac chain  EMVI: Likely present  For the purpose of radiation therapy planning, series 16 images 10-30 would be most useful   Workstation performed: [id number]     US right upper quadrant    Result Date: 2/16/2023  Narrative: RIGHT UPPER QUADRANT ULTRASOUND INDICATION:     abnormal LFTS, distended GB, r/o cholecystitis  COMPARISON:  CT performed earlier today TECHNIQUE:   Real-time ultrasound of the right upper quadrant was performed with a curvilinear transducer with both volumetric sweeps and still imaging techniques  FINDINGS: PANCREAS:  Obscured by bowel gas AORTA AND IVC:  Visualized portions are normal for patient age  LIVER: Size:  Moderately enlarged  The liver measures 22 1 cm in the midclavicular line  Contour:  Secondary to severe hepatic steatosis, the liver is better evaluated on the prior CT study  Parenchyma: There is marked diffuse increased echogenicity with smooth echotexture and significant beam attenuation with loss of periportal echogenicity  Most consistent with severe hepatic steatosis  The presence of focal hepatic abnormalities are better appreciated on the prior CT with prior recommendation for MRI Limited imaging of the main portal vein shows it to be patent and hepatopetal   BILIARY: The gallbladder is distended  No intrahepatic biliary dilatation  CBD measures 6 0 mm  No choledocholithiasis  Gallbladder wall thickness is approximately 2 to 3 mm  Negative Styles sign KIDNEY: Right kidney measures 11 3 x 6 6 x 6 0 cm  Volume 236 3 mL Kidney within normal limits  ASCITES:   None  Impression: 1  Markedly abnormal liver appearing enlarged and demonstrating severe hepatic steatosis  Please refer to prior CT report for additional findings and recommendations 2  Distended gallbladder, without evidence of gallbladder wall thickening, significant biliary ductal dilatation or point tenderness  No gallstones are seen Workstation performed: [id number]     CT abdomen pelvis with contrast    Result Date: 2/16/2023  Narrative: CT ABDOMEN AND PELVIS WITH IV CONTRAST INDICATION:   Abdominal pain, acute, nonlocalized abdominal pain, N/V  History of appendectomy  Blood in stool  COMPARISON:  None  TECHNIQUE:  CT examination of the abdomen and pelvis was performed  Axial, sagittal, and coronal 2D reformatted images were created from the source data and submitted for interpretation  Radiation dose length product (DLP) for this visit:  1056 9 mGy-cm   This examination, like all CT scans performed in the [facility], was performed utilizing techniques to minimize radiation dose exposure, including the use of iterative  reconstruction and automated exposure control  IV Contrast:  100 mL of iohexol (OMNIPAQUE) Enteric Contrast:  Enteric contrast was not administered  FINDINGS: ABDOMEN LOWER CHEST:  Multiple paraesophageal varices  LIVER/BILIARY TREE:  Liver is heterogeneous in attenuation with multiple focal areas of decreased attenuation  Nodular contours of the liver  There are more discrete somewhat rounded areas of decreased attenuation with capsular irregularity involving the left lobe of the liver series 2 image 29, and the caudate lobe 2/54 cm  Areas of narrowing of the hepatic veins and intrahepatic IVC  GALLBLADDER:  Distended measuring 13 3 cm  No radiopaque calculi or appreciable pericholecystic inflammation  SPLEEN:  Unremarkable  PANCREAS:  Unremarkable  ADRENAL GLANDS:  Unremarkable  KIDNEYS/URETERS:  Nonobstructing punctate left lower pole calculus    No hydronephrosis  STOMACH AND BOWEL:  There is colonic diverticulosis without evidence of acute diverticulitis  Asymmetric rectal wall thickening appears  There is perirectal edema and rounded approximate 7 mm perirectal lymph nodes 2/159, 2/163  APPENDIX:  There are expected postoperative changes of appendectomy  ABDOMINOPELVIC CAVITY:  No ascites  No pneumoperitoneum  11 mm and 10 mm retroperitoneal lymph nodes, 2/102, 96  Multiple prominent perirectal lymph nodes  VESSELS:  Paraesophageal, perigastric, and upper abdominal varices  Recanalized paraumbilical vein  Varices in the pelvis  PELVIS REPRODUCTIVE ORGANS:  Unremarkable for patient's age  URINARY BLADDER:  Unremarkable  Perivesicular varices  ABDOMINAL WALL/INGUINAL REGIONS:  Unremarkable  OSSEOUS STRUCTURES:  No acute fracture or destructive osseous lesion  Degenerative changes  Impression: Asymmetric rectal wall thickening, perirectal edema, prominent in size perirectal and low retroperitoneal lymph nodes  This is most concerning for neoplasm  Findings of hepatic cirrhosis and portal hypertension  Multiple somewhat geographic areas of decreased attenuation in the liver suspected to represent areas of focal fatty infiltration  More discrete rounded areas with contour irregularity involving the left lobe and caudate lobe may represent regenerative or dysplastic nodules  Recommend further evaluation with MRI with and without intravenous contrast  Gallbladder is markedly distended without radiopaque calculi or findings for cholecystitis  Additional findings as above  This study demonstrates a significant  finding and was documented as such in [facility] for liaison and referring practitioner notification  Workstation performed: [id number]     IR port placement    Result Date: 2/28/2023  Narrative: INDICATION: Rectal cancer  PROCEDURE: 1  Internal jugular approach port placement FINDINGS: 1  Single lumen port placed via right internal jugular vein with tip in the right atrium  Impression: Port placement  The catheter is ready for use  _______________________________________________________________ COMPARISON: None PROCEDURE DETAILS: Operators: Dr Debbie Cleveland Anesthesia: Moderate sedation was provided for 30 minutes  Hemodynamic parameters were continuously monitored by IR nursing  Local anesthesia  Medications: 1% lidocaine, fentanyl, Versed Contrast: 0 mL of Omnipaque 300 Fluoroscopy time: 1 0 minutes Images: 2 COMMENTS: The site was prepped and draped in the usual sterile fashion    All elements of maximal sterile barrier technique were followed (cap, mask, sterile gown, sterile gloves, large sterile full-body drape, hand hygiene, and 2% chlorhexidine for cutaneous antisepsis)  Sterile ultrasound technique with sterile gel and sterile probe cover was also utilized  A preprocedure timeout was performed per [facility] protocol  Patent right internal jugular vein was compressible and accessed using a micropuncture needle using real-time ultrasound guidance  Static ultrasound image was saved to PACS  Over a microwire, the needle was exchanged for a micropuncture sheath followed  by exchange for a J-wire advanced into the inferior vena cava  Next, a transverse incision was made over the upper chest wall and a subcutaneous pocket was created using blunt dissection  Following catheter tunneling, the micropuncture sheath was exchanged for a peel-away sheath over a wire allowing catheter advancement into the right atrium using fluoroscopic guidance  Peel-away sheath was then removed  Following catheter length confirmation and position with fluoroscopy, catheter was cut, connected to the port hub and accessed using a noncoring Arias needle for aspiration and flushing  The port was placed into the subcutaneous pocket  The pocket was closed in layers with 3-0 interrupted Vicryl sutures and subcuticular continuous sutures using a Stratafix absorbable suture  3-0 Vicryl suture of the venotomy was performed  Exofin glue was applied over the venotomy and chest incisions   Workstation performed: [id number]       Labs:   Lab Results   Component Value Date    WBC 5 81 02/27/2023    HGB 12 5 02/27/2023    HCT 38 4 02/27/2023    MCV 94 02/27/2023     02/27/2023     Lab Results   Component Value Date    K 4 1 02/27/2023     02/27/2023    CO2 22 02/27/2023    BUN 7 02/27/2023    CREATININE 0 78 02/27/2023    CALCIUM 9 4 02/27/2023    CORRECTEDCA 8 5 02/17/2023    AST 56 (H) 02/27/2023    ALT 29 02/27/2023    ALKPHOS 52 02/27/2023    EGFR 110 02/27/2023         No results found for: SPEP, UPEP    No results found for: PSA    Lab Results   Component Value Date    CEA 39 2 (H) 02/16/2023       No results found for: AFP    Lab Results   Component Value Date    IRON 138 02/16/2023    TIBC 356 02/16/2023    FERRITIN 81 02/16/2023       No results found for: [id number]            Active Problems:   Patient Active Problem List   Diagnosis   • Rectal wall thickening   • Transaminitis/Hepatitic Steatosis   • Does not have health insurance   • Rectal cancer [facility])       Past Medical History:   Past Medical History:   Diagnosis Date   • Dental infection    • Fatty liver        Surgical History:   Past Surgical History:   Procedure Laterality Date   • APPENDECTOMY     • COLONOSCOPY     • DENTAL SURGERY     • IR PORT PLACEMENT  02/28/2023       Family History:    Family History   Problem Relation Age of Onset   • Lung cancer Mother    • Cancer Father         head and neck cancer   • Colon cancer Neg Hx    • Colon polyps Neg Hx        Cancer-related family history includes Cancer in his father; Lung cancer in his mother  There is no history of Colon cancer  Social History:   Social History     Socioeconomic History   • Marital status: Single     Spouse name: Not on file   • Number of children: Not on file   • Years of education: Not on file   • Highest education level: Not on file   Occupational History   • Not on file   Tobacco Use   • Smoking status: Former     Packs/day: 1 00     Years: 20 00     Pack years: 20 00     Types: Cigarettes     Quit date: 2020     Years since quitting: 3 1   • Smokeless tobacco: Never   Vaping Use   • Vaping Use: Never used   Substance and Sexual Activity   • Alcohol use: Yes     Alcohol/week: 2 0 standard drinks     Types: 2 Cans of beer per week     Comment: 1-2 daily   previously up to 1 case/day (reduced alcohol intake 7 years ago)   • Drug use: No   • Sexual activity: Not on file   Other Topics Concern   • Not on file   Social History Narrative   • Not on file     Social Determinants of Health     Financial Resource Strain: Not on file   Food Insecurity: No Food Insecurity   • Worried About Running Out of Food in the Last Year: Never true   • Ran Out of Food in the Last Year: Never true   Transportation Needs: No Transportation Needs   • Lack of Transportation (Medical): No   • Lack of Transportation (Non-Medical): No   Physical Activity: Not on file   Stress: Not on file   Social Connections: Not on file   Intimate Partner Violence: Not on file   Housing Stability: Low Risk    • Unable to Pay for Housing in the Last Year: No   • Number of Places Lived in the Last Year: 1   • Unstable Housing in the Last Year: No       Current Medications:   Current Outpatient Medications   Medication Sig Dispense Refill   • [START ON 3/9/2023] fluorouracil 5,495 mg in CADD/Elastomeric Infusion Device Infuse 5,495 mg (1,200 mg/m2/day x 2 29 m2) into a catheter in a vein via infusion device over 46 hours for 2 days  Infusion planned for March 9, 2023  1 each 0     No current facility-administered medications for this visit  Allergies: No Known Allergies      Physical Exam:    Body surface area is 2 29 meters squared  Wt Readings from Last 3 Encounters:   03/02/23 99 3 kg (219 lb)   03/01/23 99 8 kg (220 lb)   02/28/23 99 8 kg (220 lb)        Temp Readings from Last 3 Encounters:   03/02/23 98 7 °F (37 1 °C)   02/28/23 98 3 °F (36 8 °C) (Temporal)   02/22/23 (!) 47 7 °F (8 7 °C) (Temporal)        BP Readings from Last 3 Encounters:   03/02/23 100/60   02/28/23 116/70   02/22/23 144/88         Pulse Readings from Last 3 Encounters:   03/02/23 71   02/28/23 80   02/22/23 98     @LASTSAO2(3)@    Physical Exam     Constitutional   General appearance: No acute distress, well appearing and well nourished  Eyes   Conjunctiva and lids: No swelling, erythema or discharge  Pupils and irises: Equal, round and reactive to light      Ears, Nose, Mouth, and Throat   External inspection of ears and nose: Normal     Nasal mucosa, septum, and turbinates: Normal without edema or erythema  Oropharynx: Normal with no erythema, edema, exudate or lesions  Pulmonary   Respiratory effort: No increased work of breathing or signs of respiratory distress  Auscultation of lungs: Clear to auscultation  Cardiovascular   Palpation of heart: Normal PMI, no thrills  Auscultation of heart: Normal rate and rhythm, normal S1 and S2, without murmurs  Examination of extremities for edema and/or varicosities: Normal     Carotid pulses: Normal     Abdomen   Abdomen: Non-tender, no masses  Liver and spleen: No hepatomegaly or splenomegaly  Lymphatic   Palpation of lymph nodes in neck: No lymphadenopathy  Musculoskeletal   Gait and station: Normal     Digits and nails: Normal without clubbing or cyanosis  Inspection/palpation of joints, bones, and muscles: Normal     Skin   Skin and subcutaneous tissue: Normal without rashes or lesions  Neurologic   Cranial nerves: Cranial nerves 2-12 intact  Sensation: No sensory loss  Psychiatric   Orientation to person, place, and time: Normal     Mood and affect: Normal           Assessment/ Plan:  37 yo male with a [id number] rectal adenocarcinoma  He has an MRI of the abd/pel scheduled for 3/2/23 for staging  His liver is grossly abnormal on CT from cirrhosis but I am not entirely sure he doesn't have metastatic disease  I am going to get those studies moved up  He has been referred for genetic testing  I also referred him to hepatology  I would like to get them on board before I start chemotherapy  He knows he cannot drink any more and that his liver function is borderline for chemotherapy, but can still be done  If he is not metastatic, we will likely give total neoadjuvant chemotherapy with FOLFOX x 4 mos followed by 6 week of 5-FU/RT    We will present his case at our GI TB on 3/9/23 and he will also be meeting with CRS  I will touch base with him via telephone after I have the MRIs  I give him info on FOLFOX and I also consented him for med MJ  He has been certified on the website conf # [id number]  He is also already schedule for a port  I spent 60 minutes in chart review, face to face counseling, coordination of care, and documentation

## 2023-03-02 NOTE — TELEPHONE ENCOUNTER
----- Message from Mitesh Chen sent at 3/2/2023 12:34 PM EST -----  Regarding: EGD Schedule  Dr Abbie Schaeffer would like an EGD scheduled for the patient but he is self pay right now and the patient said he will call back once his insurance is in this month  He wasn't sure when it was going to be effective from and didn't want to schedule today and then end up getting a big bill for it

## 2023-03-02 NOTE — PATIENT INSTRUCTIONS
As discussed today:    Medications:  Continue taking your current medications without changes  Laboratory Testing (Listed below):  Please have blood work drawn on Monday, March 6th so I can update risk assessment  Lets try to schedule an upper endoscopy for variceal screening tomorrow at [facility]   Avoid alcohol and tobacco products  Also avoid raw seafood/oysters and avoid swimming/wading in unchlorinated nunez, particularly from the [facility], due to increased risk of infection from a bacteria called Vibrio Vulnificus  Avoid medications called NSAID's (Motrin/Ibuprofen, Naproxen/Naprosyn/Aleve) if possible, due to increase risk of kidney dysfunction, and if you use medications containing acetaminophen (Tylenol, percocet, Endocet, and many cough/cold medications), the dose should not exceed 2 grams/day  Seek immediate medical attention if you develop fevers over 101 F, have evidence of GI bleeding (black or bloody stools, bloody or coffee ground emesis) or confusion  Call our office if you develop worsening symptoms related to lower extremity edema, ascites, jaundice or excessive bruising/bleeding   2

## 2023-03-03 ENCOUNTER — TELEPHONE (OUTPATIENT)
Dept: HEMATOLOGY ONCOLOGY | Facility: CLINIC | Age: 43
End: 2023-03-03

## 2023-03-03 NOTE — TELEPHONE ENCOUNTER
Please cancel patient's port labs on Tuesday  He is going to the outpatient lab on Monday instead  Thanks!

## 2023-03-03 NOTE — TELEPHONE ENCOUNTER
Rec'd call from patient asking if he needs labs completed on Tuesday, if he will be going to the outpatient lab  I told him that he can go to the outpatient lab on Monday and I will cancel his appointment on Tuesday at [facility]  Patient verbalized understanding and is in agreement with the plan

## 2023-03-06 ENCOUNTER — APPOINTMENT (OUTPATIENT)
Dept: LAB | Facility: HOSPITAL | Age: 43
End: 2023-03-06
Attending: INTERNAL MEDICINE

## 2023-03-06 ENCOUNTER — DOCUMENTATION (OUTPATIENT)
Dept: HEMATOLOGY ONCOLOGY | Facility: CLINIC | Age: 43
End: 2023-03-06

## 2023-03-06 ENCOUNTER — PATIENT OUTREACH (OUTPATIENT)
Dept: HEMATOLOGY ONCOLOGY | Facility: CLINIC | Age: 43
End: 2023-03-06

## 2023-03-06 DIAGNOSIS — K70.30 ALCOHOLIC CIRRHOSIS OF LIVER WITHOUT ASCITES (HCC): ICD-10-CM

## 2023-03-06 DIAGNOSIS — R74.01 TRANSAMINITIS: ICD-10-CM

## 2023-03-06 DIAGNOSIS — C20 RECTAL CANCER (HCC): ICD-10-CM

## 2023-03-06 LAB
ALBUMIN SERPL BCP-MCNC: 3.8 G/DL (ref 3.5–5)
ALP SERPL-CCNC: 42 U/L (ref 34–104)
ALT SERPL W P-5'-P-CCNC: 31 U/L (ref 7–52)
ANION GAP SERPL CALCULATED.3IONS-SCNC: 7 MMOL/L (ref 4–13)
AST SERPL W P-5'-P-CCNC: 60 U/L (ref 13–39)
BASOPHILS # BLD AUTO: 0.07 THOUSANDS/ÂΜL (ref 0–0.1)
BASOPHILS NFR BLD AUTO: 1 % (ref 0–1)
BILIRUB SERPL-MCNC: 0.98 MG/DL (ref 0.2–1)
BUN SERPL-MCNC: 9 MG/DL (ref 5–25)
CALCIUM SERPL-MCNC: 9.3 MG/DL (ref 8.4–10.2)
CHLORIDE SERPL-SCNC: 109 MMOL/L (ref 96–108)
CO2 SERPL-SCNC: 22 MMOL/L (ref 21–32)
CREAT SERPL-MCNC: 0.78 MG/DL (ref 0.6–1.3)
EOSINOPHIL # BLD AUTO: 0.31 THOUSAND/ÂΜL (ref 0–0.61)
EOSINOPHIL NFR BLD AUTO: 6 % (ref 0–6)
ERYTHROCYTE [DISTWIDTH] IN BLOOD BY AUTOMATED COUNT: 13.6 % (ref 11.6–15.1)
GFR SERPL CREATININE-BSD FRML MDRD: 110 ML/MIN/1.73SQ M
GLUCOSE P FAST SERPL-MCNC: 121 MG/DL (ref 65–99)
HCT VFR BLD AUTO: 39.8 % (ref 36.5–49.3)
HGB BLD-MCNC: 12.5 G/DL (ref 12–17)
IGA SERPL-MCNC: 440 MG/DL (ref 70–400)
IGG SERPL-MCNC: 1510 MG/DL (ref 700–1600)
IGM SERPL-MCNC: 116 MG/DL (ref 40–230)
IMM GRANULOCYTES # BLD AUTO: 0.02 THOUSAND/UL (ref 0–0.2)
IMM GRANULOCYTES NFR BLD AUTO: 0 % (ref 0–2)
INR PPP: 1.12 (ref 0.84–1.19)
LYMPHOCYTES # BLD AUTO: 1.42 THOUSANDS/ÂΜL (ref 0.6–4.47)
LYMPHOCYTES NFR BLD AUTO: 25 % (ref 14–44)
MCH RBC QN AUTO: 29.7 PG (ref 26.8–34.3)
MCHC RBC AUTO-ENTMCNC: 31.4 G/DL (ref 31.4–37.4)
MCV RBC AUTO: 95 FL (ref 82–98)
MONOCYTES # BLD AUTO: 0.67 THOUSAND/ÂΜL (ref 0.17–1.22)
MONOCYTES NFR BLD AUTO: 12 % (ref 4–12)
NEUTROPHILS # BLD AUTO: 3.16 THOUSANDS/ÂΜL (ref 1.85–7.62)
NEUTS SEG NFR BLD AUTO: 56 % (ref 43–75)
NRBC BLD AUTO-RTO: 0 /100 WBCS
PLATELET # BLD AUTO: 234 THOUSANDS/UL (ref 149–390)
PMV BLD AUTO: 10.1 FL (ref 8.9–12.7)
POTASSIUM SERPL-SCNC: 4.1 MMOL/L (ref 3.5–5.3)
PROT SERPL-MCNC: 7.7 G/DL (ref 6.4–8.4)
PROTHROMBIN TIME: 15.2 SECONDS (ref 11.6–14.5)
RBC # BLD AUTO: 4.21 MILLION/UL (ref 3.88–5.62)
SODIUM SERPL-SCNC: 138 MMOL/L (ref 135–147)
WBC # BLD AUTO: 5.65 THOUSAND/UL (ref 4.31–10.16)

## 2023-03-06 NOTE — PROGRESS NOTES
Oncology Finance Advocacy Intake and Intervention  Oncology Finance Counselor/Advocate placed call to patient  This writer informed patient that this writer is here to assist patient with billing questions, financial assistance, payment/payment plans, quotes, copayment assistance, insurance optimization, and insurance navigation  This writer conducted a thorough benefit review of copayment, deductible, and out of pocket cost  This information is documented below and has been reviewed with patient  Copayment: N/A  Deductible: N/A  Out of Pocket Cost: N/A  Insurance optimization (Limited benefit vs self-pay):  Patient assistance status:  Free Drug Applications:  Interventions:  Received email from  [facility] that pt has no insurance  Called pt & he sd that he just recvd the medical assistace application the other day & he is going to fill it out & send it in  I asked him after he submits it could he call me back to let me know so I can follow up w/ma  He sd that he would & he has my contact info  Emailed the team      Information above was review thoroughly with patient and patient was advise of possible assistance programs/interventions  If any question arise patient can contact this writer at below information  This information was given to patient at time of contact  Angela Ledbetter  Phone:[phone]  Email: [email or web address]

## 2023-03-06 NOTE — PROGRESS NOTES
Called pt to check in prior to his treatment starting, he is aware of his scheduled infusion appointments and knows when to go for his labs  Pt asked if he needs a  to his infusions, I explained everyone feels different and that's up to his discretion  He is aware of his upcoming consult with Dr Deepika Arguello, date and address was confirmed    Pt was thankful for the call and has my contact information if he needs anything

## 2023-03-08 ENCOUNTER — PATIENT OUTREACH (OUTPATIENT)
Dept: HEMATOLOGY ONCOLOGY | Facility: CLINIC | Age: 43
End: 2023-03-08

## 2023-03-08 NOTE — PROGRESS NOTES
Biopsychosocial and Barriers Assessment    Cancer Diagnosis:Rectal Cancer  Home/Cell Phone: [phone]  Emergency Contact: Artem Nest  Marital Status: Single  Interpretation concerns, speaks another language, preferred language: English  Cultural concerns: None reports  Ability to read or write: Literate    Caregiver/Support: sister, son's Mom, son's Maternal grandparents  Children: 9year old son  Child/Elder care: son    Housing: apartment  Home Setup: all one floor  Lives With: alone and splits  Daily Living Activities: Pt is able to care for himself  Durable Medical Equipment: None  Ambulation: Fully Ambulatory    Preferred Pharmacy: [facility] [place]  [facility] [employer] [employer] co-pays with insurance:  Pt has no insurance as of yet and now has [employer] co-pays with medication coverage: At this time, pt has no coverage  No medication coverage: no    Primary Care Provider:  Leatha Santos  Hx of Home Health Care: No               Hx of Short term rehab: No  Mental Health Hx: No  Substance Abuse Hx: None reported  Employment: Pt is unemployed   Status/Location: No  Ability to pay bills: limited  POA/LW/AD: No  Transportation Plan/Concerns: Pt is able to drive      What do you know about your Cancer Diagnosis    What has your doctor told you about your cancer diagnosis: Pt states that he has been diagnosed with colon cancer  What has your doctor told you about your cancer treatment: PT will have chemo, radiation and then surgery    What specific concerns do you have about your diagnosis and treatment:     Have you been made aware of any hair loss associated with treatment: N/A     Additional Comments:    MSW made outreach to the pt to offer support, introduce the role of the OSW and complete assessments  Contact information was provided  The pt was driving to get his son at school and had a limited time to talk  The Distress Thermometer will be completed at the next contact    The pt has shared custody with his son and he has him every other week  At this time, he is hopeful to maintain this schedule but reports he has a close relationship with his son's mother and her family and they can help as needed  The pt is not working at this time and living on his savings  The cancer funds were discussed and the [facility] cristiano program   Pt has applied for Medicaid and is awaiting a result  Emotional support was offered and MSW will continue to follow

## 2023-03-09 ENCOUNTER — TELEPHONE (OUTPATIENT)
Dept: HEMATOLOGY ONCOLOGY | Facility: CLINIC | Age: 43
End: 2023-03-09

## 2023-03-09 ENCOUNTER — HOSPITAL ENCOUNTER (OUTPATIENT)
Dept: INFUSION CENTER | Facility: HOSPITAL | Age: 43
Discharge: HOME | End: 2023-03-09
Attending: INTERNAL MEDICINE

## 2023-03-09 VITALS
RESPIRATION RATE: 14 BRPM | HEIGHT: 75 IN | BODY MASS INDEX: 27.06 KG/M2 | SYSTOLIC BLOOD PRESSURE: 115 MMHG | OXYGEN SATURATION: 96 % | TEMPERATURE: 98.7 F | WEIGHT: 217.59 LBS | DIASTOLIC BLOOD PRESSURE: 61 MMHG | HEART RATE: 87 BPM

## 2023-03-09 DIAGNOSIS — C20 RECTAL CANCER (HCC): Primary | ICD-10-CM

## 2023-03-09 RX ORDER — DEXTROSE MONOHYDRATE 50 MG/ML
20 INJECTION, SOLUTION INTRAVENOUS ONCE
Status: COMPLETED | OUTPATIENT
Start: 2023-03-09 | End: 2023-03-09

## 2023-03-09 RX ORDER — SODIUM CHLORIDE 9 MG/ML
20 INJECTION, SOLUTION INTRAVENOUS ONCE AS NEEDED
Status: DISCONTINUED | OUTPATIENT
Start: 2023-03-09 | End: 2023-03-12 | Stop reason: HOSPADM

## 2023-03-09 RX ORDER — FLUOROURACIL 50 MG/ML
400 INJECTION, SOLUTION INTRAVENOUS ONCE
Status: COMPLETED | OUTPATIENT
Start: 2023-03-09 | End: 2023-03-09

## 2023-03-09 RX ADMIN — OXALIPLATIN 200 MG: 5 INJECTION, SOLUTION INTRAVENOUS at 13:02

## 2023-03-09 RX ADMIN — DEXAMETHASONE SODIUM PHOSPHATE: 10 INJECTION, SOLUTION INTRAMUSCULAR; INTRAVENOUS at 11:58

## 2023-03-09 RX ADMIN — LEUCOVORIN CALCIUM 900 MG: 500 INJECTION, POWDER, LYOPHILIZED, FOR SOLUTION INTRAMUSCULAR; INTRAVENOUS at 13:02

## 2023-03-09 RX ADMIN — DEXTROSE MONOHYDRATE 20 ML/HR: 50 INJECTION, SOLUTION INTRAVENOUS at 12:32

## 2023-03-09 RX ADMIN — SODIUM CHLORIDE 20 ML/HR: 9 INJECTION, SOLUTION INTRAVENOUS at 11:54

## 2023-03-09 RX ADMIN — FLUOROURACIL 915 MG: 50 INJECTION, SOLUTION INTRAVENOUS at 15:13

## 2023-03-09 NOTE — TELEPHONE ENCOUNTER
I called the patient to give him follow up from our tumor board discussion  We reviewed all images and there is no evidence of metastatic disease  He is scheduled to start FOLFOX today and we plan for total neoadjuvant treatment  His liver tests are improving  I will see him on 3/20/23 for evaluation prior to cycle 2

## 2023-03-09 NOTE — PROGRESS NOTES
Rec'd pt in good spirits for Day 1Cycle 1  Pt is here with sister  New medication education reviewed and all questions answered to Pt's satisfaction  Port easily accessed for the first time, no problems encountered  Premeds tolerated well  Awaiting chemo

## 2023-03-09 NOTE — PROGRESS NOTES
Infusions tolerated well  Elastomeric pump applied per protocol  Pt aware of next appt time for disconnect in [facility] at 2 pm  Discharged to home with steady gait accompanied by sister

## 2023-03-10 LAB
A1AT PHENOTYP SERPL IFE: NORMAL
A1AT SERPL-MCNC: 178 MG/DL (ref 101–187)

## 2023-03-11 ENCOUNTER — HOSPITAL ENCOUNTER (OUTPATIENT)
Dept: INFUSION CENTER | Facility: HOSPITAL | Age: 43
Discharge: HOME | End: 2023-03-11
Attending: INTERNAL MEDICINE

## 2023-03-11 DIAGNOSIS — C20 RECTAL CANCER (HCC): Primary | ICD-10-CM

## 2023-03-12 ENCOUNTER — DOCUMENTATION (OUTPATIENT)
Dept: HEMATOLOGY ONCOLOGY | Facility: CLINIC | Age: 43
End: 2023-03-12

## 2023-03-12 DIAGNOSIS — C20 RECTAL CANCER (HCC): Primary | ICD-10-CM

## 2023-03-12 NOTE — PROGRESS NOTES
CANCER TREATMENT EVALUATION AND RECOMMENDATION SUMMARY    RECTAL CANCER MULTIDISCIPLINARY CASE REVIEW    NAME OF SURGEON: Dr Heriberto Damon    PATIENT NAME/: Staci Telles  1980    DATE: 3/9/2023          TUMOR LOCATION IN THE RECTUM (LOWER, MIDDLE, OR UPPER THIRD) -   Middle rectum    INDICATION OF SPHINCTER INVOLVEMENT -   MRF: involved (tumor margin within 1 mm of MRF)  Sphincter involvement: No   Suspicious extra mesorectal lymph nodes:Yes, right external iliac chain  EMVI: Likely present  CLINICAL (PRETREATMENT) AMERICAN [employer] COMMITTEE ON CANCER (AJCC) STAGE -   T3c, N2      CT SCAN STAGING -   2023- CT abdomen and pelvis-  Asymmetric rectal wall thickening, perirectal edema, prominent in size perirectal and low retroperitoneal lymph nodes  This is most concerning for neoplasm        Findings of hepatic cirrhosis and portal hypertension  Multiple somewhat geographic areas of decreased attenuation in the liver suspected to represent areas of focal fatty infiltration  More discrete rounded areas with contour irregularity involving   the left lobe and caudate lobe may represent regenerative or dysplastic nodules  Recommend further evaluation with MRI with and without intravenous contrast      Gallbladder is markedly distended without radiopaque calculi or findings for cholecystitis  2023- CT chest-  No lung metastases  Mild emphysema  Esophageal varices  Abnormal liver  See abdomen CT  Splenomegaly        PRETREATMENT CIRCUMFERENTIAL RESECTION MARGIN STATUS (INVOLVED, THREATENED, OR NOT THREATENED) -   Involved      CARCINOEMBRYONIC ANTIGEN LEVEL (CEA) -   2023- 393 2      NEOADJUVANT THERAPY RECOMMENDATION -   YES      TYPE AND DURATION OF NEOADJUVANT THERAPY RECOMMENDED -   *Chemotherapy (FOLFOX) x4 months  *Concurrent chemo (5FU)/ RT x6 weeks    ANTICIPATED DATE AND TYPE OF SURGICAL PROCEDURE -   2023  Anticipated surgery is Low Anterior Resection (LAR)        CLINICAL RESEARCH STUDY ELIGIBILITY AND/OR ENROLLMENT -   N/A

## 2023-03-12 NOTE — PROGRESS NOTES
RECTAL/GI MULTIDISCIPLINARY CASE REVIEW  NEW RECTAL CANCER DIAGNOSIS    DATE: 3/9/2023      PRESENTING DOCTOR: Dr Newby Comment      DIAGNOSIS: Rectal cancer      Randy Esquivel was presented at the Rectal/GI Multidisciplinary Conference today  BIOPSY DATE/RESULT:  2/17/2023-  A  Large Intestine, Descending Colon, polyp:    - Tubular adenoma  - Negative for high grade dysplasia       B  Rectum, mass:   -  Adenocarcinoma  IMAGING DONE:  2/16/2023- CT abdomen and pelvis  2/17/2023- CT chest  2/23/2023- MRI pelvis rectal cancer staging  2/23/2023- MRI abdomen    WAS IMAGING REVIEWED TODAY:  Yes    CEA RESULT/DATE/REVIEWED:  2/16/2023- 39 2- Reviewed    EXPECTED DATE OF FIRST TREATMENT:  3/9/2023    COLONOSCOPY REVIEWED:  Yes    IF METASTATIC, WERE BIOPSIES OF METASTATIC SITES AVAILABLE FOR REVIEW:  N/A- Steatosis found upon MRI reading  PRE TREATMENT CLINICAL STAGE:  T3c, N2 mid rectal cancer    PHYSICIAN RECOMMENDED PLAN:    -Total neoadjuvant treatment-  -Chemotherapy (FOLFOX) x4 months  -Concurrent chemo (5FU)/ RT x6 weeks  -Re-imaging with CT CAP and MRI pelvis for rectal cancer staging   -Visit with surgeon to discuss/evaluate for surgery  Anticipated surgery is low anterior resection (LAR)  Team agreed to plan  The final treatment plan will be left to the discretion of the patient and the treating physician  DISCLAIMERS:  TO THE TREATING PHYSICIAN:  This conference is a meeting of clinicians from various specialty areas who evaluate and discuss patients for whom a multidisciplinary treatment approach is being considered  Please note that the above opinion was a consensus of the conference attendees and is intended only to assist in quality care of the discussed patient  The responsibility for follow up on the input given during the conference, along with any final decisions regarding plan of care, is that of the patient and the patient's provider   Accordingly, appointments have only been recommended based on this information and have NOT been scheduled unless otherwise noted  TO THE PATIENT:  This summary is a brief record of major aspects of your cancer treatment  You may choose to share a copy with any of your doctors or nurses  However, this is not a detailed or comprehensive record of your care        NCCN guidelines were readily available for review at this discussion

## 2023-03-16 ENCOUNTER — TELEPHONE (OUTPATIENT)
Dept: GENETICS | Facility: CLINIC | Age: 43
End: 2023-03-16

## 2023-03-17 DIAGNOSIS — C20 RECTAL CANCER (HCC): Primary | ICD-10-CM

## 2023-03-17 RX ORDER — DEXTROSE MONOHYDRATE 50 MG/ML
20 INJECTION, SOLUTION INTRAVENOUS ONCE
Status: CANCELLED | OUTPATIENT
Start: 2023-03-22

## 2023-03-17 RX ORDER — FLUOROURACIL 50 MG/ML
400 INJECTION, SOLUTION INTRAVENOUS ONCE
Status: CANCELLED | OUTPATIENT
Start: 2023-03-22

## 2023-03-17 RX ORDER — SODIUM CHLORIDE 9 MG/ML
20 INJECTION, SOLUTION INTRAVENOUS ONCE AS NEEDED
Status: CANCELLED | OUTPATIENT
Start: 2023-03-22

## 2023-03-20 ENCOUNTER — TELEPHONE (OUTPATIENT)
Age: 43
End: 2023-03-20

## 2023-03-20 ENCOUNTER — HOSPITAL ENCOUNTER (OUTPATIENT)
Dept: INFUSION CENTER | Facility: HOSPITAL | Age: 43
Discharge: HOME | End: 2023-03-20

## 2023-03-20 ENCOUNTER — OFFICE VISIT (OUTPATIENT)
Age: 43
End: 2023-03-20

## 2023-03-20 VITALS
WEIGHT: 215 LBS | HEIGHT: 75 IN | OXYGEN SATURATION: 98 % | HEART RATE: 74 BPM | BODY MASS INDEX: 26.73 KG/M2 | RESPIRATION RATE: 14 BRPM | TEMPERATURE: 98 F | DIASTOLIC BLOOD PRESSURE: 80 MMHG | SYSTOLIC BLOOD PRESSURE: 130 MMHG

## 2023-03-20 DIAGNOSIS — C20 RECTAL CANCER (HCC): Primary | ICD-10-CM

## 2023-03-20 LAB
ALBUMIN SERPL BCP-MCNC: 3.7 G/DL (ref 3.5–5)
ALP SERPL-CCNC: 45 U/L (ref 34–104)
ALT SERPL W P-5'-P-CCNC: 24 U/L (ref 7–52)
ANION GAP SERPL CALCULATED.3IONS-SCNC: 8 MMOL/L (ref 4–13)
AST SERPL W P-5'-P-CCNC: 33 U/L (ref 13–39)
BASOPHILS # BLD AUTO: 0.02 THOUSANDS/ÂΜL (ref 0–0.1)
BASOPHILS NFR BLD AUTO: 1 % (ref 0–1)
BILIRUB SERPL-MCNC: 1.03 MG/DL (ref 0.2–1)
BUN SERPL-MCNC: 8 MG/DL (ref 5–25)
CALCIUM SERPL-MCNC: 8.8 MG/DL (ref 8.4–10.2)
CHLORIDE SERPL-SCNC: 108 MMOL/L (ref 96–108)
CO2 SERPL-SCNC: 21 MMOL/L (ref 21–32)
CREAT SERPL-MCNC: 0.73 MG/DL (ref 0.6–1.3)
EOSINOPHIL # BLD AUTO: 0.28 THOUSAND/ÂΜL (ref 0–0.61)
EOSINOPHIL NFR BLD AUTO: 8 % (ref 0–6)
ERYTHROCYTE [DISTWIDTH] IN BLOOD BY AUTOMATED COUNT: 13.4 % (ref 11.6–15.1)
GFR SERPL CREATININE-BSD FRML MDRD: 113 ML/MIN/1.73SQ M
GLUCOSE SERPL-MCNC: 125 MG/DL (ref 65–140)
HCT VFR BLD AUTO: 35.1 % (ref 36.5–49.3)
HGB BLD-MCNC: 11.6 G/DL (ref 12–17)
IMM GRANULOCYTES # BLD AUTO: 0.01 THOUSAND/UL (ref 0–0.2)
IMM GRANULOCYTES NFR BLD AUTO: 0 % (ref 0–2)
LYMPHOCYTES # BLD AUTO: 1.1 THOUSANDS/ÂΜL (ref 0.6–4.47)
LYMPHOCYTES NFR BLD AUTO: 32 % (ref 14–44)
MCH RBC QN AUTO: 29.7 PG (ref 26.8–34.3)
MCHC RBC AUTO-ENTMCNC: 33 G/DL (ref 31.4–37.4)
MCV RBC AUTO: 90 FL (ref 82–98)
MONOCYTES # BLD AUTO: 0.46 THOUSAND/ÂΜL (ref 0.17–1.22)
MONOCYTES NFR BLD AUTO: 14 % (ref 4–12)
NEUTROPHILS # BLD AUTO: 1.55 THOUSANDS/ÂΜL (ref 1.85–7.62)
NEUTS SEG NFR BLD AUTO: 45 % (ref 43–75)
NRBC BLD AUTO-RTO: 0 /100 WBCS
PLATELET # BLD AUTO: 131 THOUSANDS/UL (ref 149–390)
PMV BLD AUTO: 9.4 FL (ref 8.9–12.7)
POTASSIUM SERPL-SCNC: 3.4 MMOL/L (ref 3.5–5.3)
PROT SERPL-MCNC: 6.9 G/DL (ref 6.4–8.4)
RBC # BLD AUTO: 3.91 MILLION/UL (ref 3.88–5.62)
SODIUM SERPL-SCNC: 137 MMOL/L (ref 135–147)
WBC # BLD AUTO: 3.42 THOUSAND/UL (ref 4.31–10.16)

## 2023-03-20 RX ORDER — ONDANSETRON HYDROCHLORIDE 8 MG/1
8 TABLET, FILM COATED ORAL EVERY 8 HOURS PRN
Qty: 30 TABLET | Refills: 2 | Status: SHIPPED | OUTPATIENT
Start: 2023-03-20

## 2023-03-20 RX ORDER — DEXTROSE MONOHYDRATE 50 MG/ML
20 INJECTION, SOLUTION INTRAVENOUS ONCE
OUTPATIENT
Start: 2023-04-05

## 2023-03-20 RX ORDER — FLUOROURACIL 50 MG/ML
400 INJECTION, SOLUTION INTRAVENOUS ONCE
OUTPATIENT
Start: 2023-04-05

## 2023-03-20 RX ORDER — SODIUM CHLORIDE 9 MG/ML
20 INJECTION, SOLUTION INTRAVENOUS ONCE AS NEEDED
OUTPATIENT
Start: 2023-04-05

## 2023-03-20 NOTE — TELEPHONE ENCOUNTER
Please schedule out lab draws & treatments  Labs on Mondays and treatments on Wednesdays  Thank you!

## 2023-03-20 NOTE — TELEPHONE ENCOUNTER
Please schedule more treatment and blood draw appointments for the patient  Same schedule Monday blood draws, Wednesday treatment

## 2023-03-20 NOTE — PROGRESS NOTES
Pt's port accessed using sterile technique  Labs drawn without difficulty  Port de-accessed  Band-aid applied  Pt ambulated with a steady gait off unit   Refused AVS

## 2023-03-21 NOTE — TELEPHONE ENCOUNTER
Spoke with patient, confirmed tomorrows appointment and he will get a print out at infusion with all dates & times

## 2023-03-21 NOTE — PROGRESS NOTES
HEMATOLOGY / [address] FOLLOW UP NOTE    Primary Care Provider: Shy Wolf DO  Referring Provider:    MRN: [id number]  : 1980    Reason for Encounter:       Oncology History Overview Note   2023 - [id number] rectal adenocarcinoma     [facility]   2023 Initial Diagnosis    Rectal cancer ([facility] )     3/2/2023 -  Cancer Staged    Staging form: Colon and Rectum, AJCC 8th Edition  - Clinical: cT3, cN2, cM0 - Signed by Tyrone Alejandro DO on 3/2/2023       3/9/2023 -  Chemotherapy    fluorouracil (ADRUCIL), 400 mg/m2 = 915 mg, Intravenous, Once, 1 of 12 cycles  Administration: 915 mg (3/9/2023)  leucovorin calcium IVPB, 916 mg, Intravenous, Once, 1 of 12 cycles  Administration: 900 mg (3/9/2023)  oxaliplatin (ELOXATIN) chemo infusion, 194 65 mg, Intravenous, Once, 1 of 12 cycles  Administration: 200 mg (3/9/2023)  fluorouracil (ADRUCIL) ambulatory infusion Soln, 1,200 mg/m2/day = 5,495 mg, Intravenous, Over 46 hours, 1 of 12 cycles         Interval History:         REVIEW OF SYSTEMS:  Please note that a 14-point review of systems was performed to include Constitutional, HEENT, Respiratory, CVS, GI, , Musculoskeletal, Integumentary, Neurologic, Rheumatologic, Endocrinologic, Psychiatric, Lymphatic, and Hematologic/Oncologic systems were reviewed and are negative unless otherwise stated in HPI  Positive and negative findings pertinent to this evaluation are incorporated into the history of present illness  ECOG PS: 0    PROBLEM LIST:  Patient Active Problem List   Diagnosis   • Rectal wall thickening   • Transaminitis/Hepatitic Steatosis   • Does not have health insurance   • Rectal cancer ([facility] )       Assessment / Plan:        I spent 30 minutes on chart review, face to face counseling time, coordination of care and documentation  Past Medical History:   has a past medical history of Dental infection and Fatty liver      PAST SURGICAL HISTORY:   has a past surgical history that includes Appendectomy; Dental surgery; IR port placement (02/28/2023); and Colonoscopy  CURRENT MEDICATIONS  Current Outpatient Medications   Medication Sig Dispense Refill   • [START ON 3/22/2023] fluorouracil 5,495 mg in CADD/Elastomeric Infusion Device Infuse 5,495 mg (1,200 mg/m2/day x 2 29 m2) into a catheter in a vein via infusion device over 46 hours for 2 days  Infusion planned for March 22, 2023  1 each 0   • ondansetron (ZOFRAN) 8 mg tablet Take 1 tablet (8 mg total) by mouth every 8 (eight) hours as needed for nausea or vomiting 30 tablet 2     No current facility-administered medications for this visit  Facility-Administered Medications Ordered in Other Visits   Medication Dose Route Frequency Provider Last Rate Last Admin   • alteplase (CATHFLO) injection 2 mg  2 mg Intracatheter Q1MIN PRN Aubrie Vega DO         [unfilled]    SOCIAL HISTORY:   reports that he quit smoking about 3 years ago  His smoking use included cigarettes  He has a 20 00 pack-year smoking history  He has never used smokeless tobacco  He reports current alcohol use of about 2 0 standard drinks per week  He reports that he does not use drugs  FAMILY HISTORY:  family history includes Cancer in his father; Lung cancer in his mother  ALLERGIES:  has No Known Allergies  Physical Exam:  Vital Signs:   Visit Vitals  /80 (BP Location: Left arm, Patient Position: Sitting, Cuff Size: Adult)   Pulse 74   Temp 98 °F (36 7 °C) (Temporal)   Resp 14   Ht 6' 3" (1 905 m)   Wt 97 5 kg (215 lb)   SpO2 98%   BMI 26 87 kg/m²   Smoking Status Former   BSA 2 26 m²     Body mass index is 26 87 kg/m²  Body surface area is 2 26 meters squared  GEN: Alert, awake oriented x3, in no acute distress  HEENT- No pallor, icterus, cyanosis, no oral mucosal lesions,   LAD - no palpable cervical, clavicle, axillary, inguinal LAD  Heart- normal S1 S2, regular rate and rhythm, No murmur, rubs     Lungs- clear breathing sound bilateral    Abdomen- soft, Non tender, bowel sounds present  Extremities- No cyanosis, clubbing, edema  Neuro- No focal neurological deficit    Labs:  Lab Results   Component Value Date    WBC 3 42 (L) 03/20/2023    HGB 11 6 (L) 03/20/2023    HCT 35 1 (L) 03/20/2023    MCV 90 03/20/2023     (L) 03/20/2023     Lab Results   Component Value Date    SODIUM 137 03/20/2023    K 3 4 (L) 03/20/2023     03/20/2023    CO2 21 03/20/2023    AGAP 8 03/20/2023    BUN 8 03/20/2023    CREATININE 0 73 03/20/2023    GLUC 125 03/20/2023    GLUF 121 (H) 03/06/2023    CALCIUM 8 8 03/20/2023    AST 33 03/20/2023    ALT 24 03/20/2023    ALKPHOS 45 03/20/2023    TP 6 9 03/20/2023    TBILI 1 03 (H) 03/20/2023    EGFR 113 03/20/2023

## 2023-03-22 ENCOUNTER — HOSPITAL ENCOUNTER (OUTPATIENT)
Dept: INFUSION CENTER | Facility: HOSPITAL | Age: 43
Discharge: HOME | End: 2023-03-22
Attending: INTERNAL MEDICINE

## 2023-03-22 VITALS
DIASTOLIC BLOOD PRESSURE: 68 MMHG | OXYGEN SATURATION: 98 % | BODY MASS INDEX: 26.53 KG/M2 | TEMPERATURE: 97.6 F | HEART RATE: 82 BPM | RESPIRATION RATE: 16 BRPM | WEIGHT: 213.41 LBS | HEIGHT: 75 IN | SYSTOLIC BLOOD PRESSURE: 106 MMHG

## 2023-03-22 DIAGNOSIS — C20 RECTAL CANCER (HCC): Primary | ICD-10-CM

## 2023-03-22 RX ORDER — FLUOROURACIL 50 MG/ML
400 INJECTION, SOLUTION INTRAVENOUS ONCE
Status: COMPLETED | OUTPATIENT
Start: 2023-03-22 | End: 2023-03-22

## 2023-03-22 RX ORDER — DEXTROSE MONOHYDRATE 50 MG/ML
20 INJECTION, SOLUTION INTRAVENOUS ONCE
Status: COMPLETED | OUTPATIENT
Start: 2023-03-22 | End: 2023-03-22

## 2023-03-22 RX ORDER — SODIUM CHLORIDE 9 MG/ML
20 INJECTION, SOLUTION INTRAVENOUS ONCE AS NEEDED
Status: DISCONTINUED | OUTPATIENT
Start: 2023-03-22 | End: 2023-03-25 | Stop reason: HOSPADM

## 2023-03-22 RX ADMIN — FLUOROURACIL 915 MG: 50 INJECTION, SOLUTION INTRAVENOUS at 11:49

## 2023-03-22 RX ADMIN — LEUCOVORIN CALCIUM 900 MG: 500 INJECTION, POWDER, LYOPHILIZED, FOR SOLUTION INTRAMUSCULAR; INTRAVENOUS at 09:39

## 2023-03-22 RX ADMIN — SODIUM CHLORIDE 20 ML/HR: 0.9 INJECTION, SOLUTION INTRAVENOUS at 08:52

## 2023-03-22 RX ADMIN — DEXTROSE 20 ML/HR: 5 SOLUTION INTRAVENOUS at 09:33

## 2023-03-22 RX ADMIN — DEXAMETHASONE SODIUM PHOSPHATE: 10 INJECTION, SOLUTION INTRAMUSCULAR; INTRAVENOUS at 08:52

## 2023-03-22 RX ADMIN — OXALIPLATIN 200 MG: 5 INJECTION, SOLUTION INTRAVENOUS at 09:39

## 2023-03-22 NOTE — PLAN OF CARE
Problem: DISCHARGE PLANNING  Goal: Discharge to home or other facility with appropriate resources  Description: INTERVENTIONS:  - Identify barriers to discharge w/patient and caregiver  - Arrange for needed discharge resources and transportation as appropriate  - Identify discharge learning needs (meds, wound care, etc )  - Arrange for interpretive services to assist at discharge as needed  - Refer to Case Management Department for coordinating discharge planning if the patient needs post-hospital services based on physician/advanced practitioner order or complex needs related to functional status, cognitive ability, or social support system  Outcome: Progressing     Problem: Knowledge Deficit  Goal: Patient/family/caregiver demonstrates understanding of disease process, treatment plan, medications, and discharge instructions  Description: Complete learning assessment and assess knowledge base    Interventions:  - Provide teaching at level of understanding  - Provide teaching via preferred learning methods  Outcome: Progressing     Problem: DISCHARGE PLANNING  Goal: Discharge to home or other facility with appropriate resources  Description: INTERVENTIONS:  - Identify barriers to discharge w/patient and caregiver  - Arrange for needed discharge resources and transportation as appropriate  - Identify discharge learning needs (meds, wound care, etc )  - Arrange for interpretive services to assist at discharge as needed  - Refer to Case Management Department for coordinating discharge planning if the patient needs post-hospital services based on physician/advanced practitioner order or complex needs related to functional status, cognitive ability, or social support system  Outcome: Progressing

## 2023-03-22 NOTE — PROGRESS NOTES
Pt tolerated tx well without complication  Port remains accessed with chg dressing intact  Elastomeric pump connected with all clamps taped open and flow dial taped color side down to pt  Pt aware of next appt  Left ambulatory with steady gait for d/c

## 2023-03-24 ENCOUNTER — HOSPITAL ENCOUNTER (OUTPATIENT)
Dept: INFUSION CENTER | Facility: HOSPITAL | Age: 43
Discharge: HOME | End: 2023-03-24
Attending: INTERNAL MEDICINE

## 2023-03-24 DIAGNOSIS — C20 RECTAL CANCER (HCC): Primary | ICD-10-CM

## 2023-03-24 NOTE — PROGRESS NOTES
REc'd pt for CADD pump disconnect  Port deaccessed per protocol without problems  Pt then discharged to home with steady gait, AVS declined but is aware of next appt

## 2023-03-27 ENCOUNTER — PATIENT OUTREACH (OUTPATIENT)
Dept: HEMATOLOGY ONCOLOGY | Facility: CLINIC | Age: 43
End: 2023-03-27

## 2023-03-27 NOTE — PROGRESS NOTES
Called pt to check in since treatment has started  Pt states so far hes feeling good  No nausea, and is eating and drinking  He stated some trouble sleeping, but that has since resolved  Overall, he is feeling good and doing well  Pt is aware of all upcoming appointments    Pt was thankful for the unruly, and has my contact information if he needs anything

## 2023-03-29 ENCOUNTER — TELEPHONE (OUTPATIENT)
Dept: SURGICAL ONCOLOGY | Facility: CLINIC | Age: 43
End: 2023-03-29

## 2023-03-29 NOTE — TELEPHONE ENCOUNTER
Called PATHS and spoke with Ashley Dee to check the patient's status   Per Storm Benedict the application was submitted 03/02/23 then spoke to the patient again on 03/28/23 and more information was needed pertaining to the son who is a member of the household

## 2023-03-30 DIAGNOSIS — C20 RECTAL CANCER (HCC): Primary | ICD-10-CM

## 2023-03-31 NOTE — PROGRESS NOTES
HEMATOLOGY / [address] FOLLOW UP NOTE    Primary Care Provider: Sowmya Nieto DO  Referring Provider:    MRN: [id number]  : 1980    Reason for Encounter: Follow-up for rectal cancer       Oncology History Overview Note   2023 - [id number] rectal adenocarcinoma     Rectal cancer ([facility] [address] )   2023 Initial Diagnosis    Rectal cancer ([facility] [address] )     3/2/2023 -  Cancer Staged    Staging form: Colon and Rectum, AJCC 8th Edition  - Clinical: cT3, cN2, cM0 - Signed by Long Birmingham DO on 3/2/2023       3/9/2023 -  Chemotherapy    fluorouracil (ADRUCIL), 400 mg/m2 = 915 mg, Intravenous, Once, 2 of 12 cycles  Administration: 915 mg (3/9/2023), 915 mg (3/22/2023)  leucovorin calcium IVPB, 916 mg, Intravenous, Once, 2 of 12 cycles  Administration: 900 mg (3/9/2023), 900 mg (3/22/2023)  oxaliplatin (ELOXATIN) chemo infusion, 194 65 mg, Intravenous, Once, 2 of 12 cycles  Administration: 200 mg (3/9/2023), 200 mg (3/22/2023)  fluorouracil (ADRUCIL) ambulatory infusion Soln, 1,200 mg/m2/day = 5,495 mg, Intravenous, Over 46 hours, 2 of 12 cycles         Interval History: Patient is receiving total neoadjuvant therapy for rectal cancer  Currently on treatment with modified FOLFOX 6  He presents today prior to cycle #3  Overall, he states he is doing fairly well  Biggest side effect has been diarrhea  He has not really been taking any Imodium although reports he did take a dose this morning  Denies any abdominal pain  No nausea/vomiting  He has had some mild cold sensitivity this resolves  Denies any lingering neuropathy  He will have blood work completed today    REVIEW OF SYSTEMS:  Please note that a 14-point review of systems was performed to include Constitutional, HEENT, Respiratory, CVS, GI, , Musculoskeletal, Integumentary, Neurologic, Rheumatologic, Endocrinologic, Psychiatric, Lymphatic, and Hematologic/Oncologic systems were reviewed and are negative unless otherwise stated in HPI   Positive and negative findings pertinent to this evaluation are incorporated into the history of present illness  ECOG PS: 0    PROBLEM LIST:  Patient Active Problem List   Diagnosis   • Rectal wall thickening   • Transaminitis/Hepatitic Steatosis   • Does not have health insurance   • Rectal cancer ([facility] [address] )       Assessment / Plan:  1  Rectal cancer ([facility] [address] )    Overall, patient is doing well and tolerating treatment with modified FOLFOX 6  I did review plan of care which includes 8 cycles of chemotherapy to be followed by concurrent chemoradiation  I explained the rationale of not checking imaging until after completion of total neoadjuvant treatment upfront  We did review management strategies for his diarrhea and he is encouraged to take Imodium as needed  He is also instructed to notify us if Imodium is not helpful for which we can prescribe Lomotil  He will have blood work completed today and proceed with treatment this week without any change to his regimen  He is already scheduled for follow-up visit in 2 weeks with Dr Titi Lama  Patient verbalized understanding and was in agreement with this plan of care  He knows to call at anytime with questions or concerns      I spent 30 minutes on chart review, face to face counseling time, coordination of care and documentation  Past Medical History:   has a past medical history of Dental infection and Fatty liver  PAST SURGICAL HISTORY:   has a past surgical history that includes Appendectomy; Dental surgery; IR port placement (02/28/2023); and Colonoscopy      CURRENT MEDICATIONS  Current Outpatient Medications   Medication Sig Dispense Refill   • diphenoxylate-atropine (LOMOTIL) 2 5-0 025 mg per tablet Take 1 tablet by mouth 4 (four) times a day as needed for diarrhea As needed     • [START ON 4/5/2023] fluorouracil 5,495 mg in CADD/Elastomeric Infusion Device Infuse 5,495 mg (1,200 mg/m2/day x 2 29 m2) into a catheter in a vein via infusion device over 46 hours for 2 days "Infusion planned for April 5, 2023  1 each 0   • ondansetron (ZOFRAN) 8 mg tablet Take 1 tablet (8 mg total) by mouth every 8 (eight) hours as needed for nausea or vomiting 30 tablet 2     No current facility-administered medications for this visit  [unfilled]    SOCIAL HISTORY:   reports that he quit smoking about 3 years ago  His smoking use included cigarettes  He has a 20 00 pack-year smoking history  He has never used smokeless tobacco  He reports current alcohol use of about 2 0 standard drinks per week  He reports that he does not use drugs  FAMILY HISTORY:  family history includes Cancer in his father; Lung cancer in his mother  ALLERGIES:  has No Known Allergies  Physical Exam:  Vital Signs:   Visit Vitals  /64 (BP Location: Left arm, Patient Position: Sitting, Cuff Size: Adult)   Pulse 68   Temp 98 °F (36 7 °C) (Temporal)   Resp 14   Ht 6' 3\" (1 905 m)   Wt 96 6 kg (213 lb)   SpO2 98%   BMI 26 62 kg/m²   Smoking Status Former   BSA 2 25 m²     Body mass index is 26 62 kg/m²  Body surface area is 2 25 meters squared      Physical Exam    Labs:  Lab Results   Component Value Date    WBC 3 42 (L) 03/20/2023    HGB 11 6 (L) 03/20/2023    HCT 35 1 (L) 03/20/2023    MCV 90 03/20/2023     (L) 03/20/2023     Lab Results   Component Value Date    SODIUM 137 03/20/2023    K 3 4 (L) 03/20/2023     03/20/2023    CO2 21 03/20/2023    AGAP 8 03/20/2023    BUN 8 03/20/2023    CREATININE 0 73 03/20/2023    GLUC 125 03/20/2023    GLUF 121 (H) 03/06/2023    CALCIUM 8 8 03/20/2023    AST 33 03/20/2023    ALT 24 03/20/2023    ALKPHOS 45 03/20/2023    TP 6 9 03/20/2023    TBILI 1 03 (H) 03/20/2023    EGFR 113 03/20/2023     "

## 2023-04-03 ENCOUNTER — OFFICE VISIT (OUTPATIENT)
Age: 43
End: 2023-04-03

## 2023-04-03 ENCOUNTER — HOSPITAL ENCOUNTER (OUTPATIENT)
Dept: INFUSION CENTER | Facility: HOSPITAL | Age: 43
Discharge: HOME | End: 2023-04-03

## 2023-04-03 VITALS
WEIGHT: 213 LBS | SYSTOLIC BLOOD PRESSURE: 124 MMHG | RESPIRATION RATE: 14 BRPM | OXYGEN SATURATION: 98 % | HEIGHT: 75 IN | HEART RATE: 68 BPM | BODY MASS INDEX: 26.49 KG/M2 | DIASTOLIC BLOOD PRESSURE: 64 MMHG | TEMPERATURE: 98 F

## 2023-04-03 DIAGNOSIS — C20 RECTAL CANCER (HCC): Primary | ICD-10-CM

## 2023-04-03 LAB
ALBUMIN SERPL BCP-MCNC: 3.6 G/DL (ref 3.5–5)
ALP SERPL-CCNC: 47 U/L (ref 34–104)
ALT SERPL W P-5'-P-CCNC: 18 U/L (ref 7–52)
ANION GAP SERPL CALCULATED.3IONS-SCNC: 7 MMOL/L (ref 4–13)
AST SERPL W P-5'-P-CCNC: 29 U/L (ref 13–39)
BASOPHILS # BLD AUTO: 0.02 THOUSANDS/ÂΜL (ref 0–0.1)
BASOPHILS NFR BLD AUTO: 1 % (ref 0–1)
BILIRUB SERPL-MCNC: 0.99 MG/DL (ref 0.2–1)
BUN SERPL-MCNC: 7 MG/DL (ref 5–25)
CALCIUM SERPL-MCNC: 9.2 MG/DL (ref 8.4–10.2)
CHLORIDE SERPL-SCNC: 109 MMOL/L (ref 96–108)
CO2 SERPL-SCNC: 22 MMOL/L (ref 21–32)
CREAT SERPL-MCNC: 0.72 MG/DL (ref 0.6–1.3)
EOSINOPHIL # BLD AUTO: 0.08 THOUSAND/ÂΜL (ref 0–0.61)
EOSINOPHIL NFR BLD AUTO: 4 % (ref 0–6)
ERYTHROCYTE [DISTWIDTH] IN BLOOD BY AUTOMATED COUNT: 14 % (ref 11.6–15.1)
GFR SERPL CREATININE-BSD FRML MDRD: 114 ML/MIN/1.73SQ M
GLUCOSE SERPL-MCNC: 116 MG/DL (ref 65–140)
HCT VFR BLD AUTO: 32.7 % (ref 36.5–49.3)
HGB BLD-MCNC: 11 G/DL (ref 12–17)
IMM GRANULOCYTES # BLD AUTO: 0 THOUSAND/UL (ref 0–0.2)
IMM GRANULOCYTES NFR BLD AUTO: 0 % (ref 0–2)
LYMPHOCYTES # BLD AUTO: 0.98 THOUSANDS/ÂΜL (ref 0.6–4.47)
LYMPHOCYTES NFR BLD AUTO: 46 % (ref 14–44)
MCH RBC QN AUTO: 29.4 PG (ref 26.8–34.3)
MCHC RBC AUTO-ENTMCNC: 33.6 G/DL (ref 31.4–37.4)
MCV RBC AUTO: 87 FL (ref 82–98)
MONOCYTES # BLD AUTO: 0.37 THOUSAND/ÂΜL (ref 0.17–1.22)
MONOCYTES NFR BLD AUTO: 18 % (ref 4–12)
NEUTROPHILS # BLD AUTO: 0.66 THOUSANDS/ÂΜL (ref 1.85–7.62)
NEUTS SEG NFR BLD AUTO: 31 % (ref 43–75)
NRBC BLD AUTO-RTO: 0 /100 WBCS
PLATELET # BLD AUTO: 88 THOUSANDS/UL (ref 149–390)
PMV BLD AUTO: 9.6 FL (ref 8.9–12.7)
POTASSIUM SERPL-SCNC: 3.5 MMOL/L (ref 3.5–5.3)
PROT SERPL-MCNC: 6.7 G/DL (ref 6.4–8.4)
RBC # BLD AUTO: 3.74 MILLION/UL (ref 3.88–5.62)
SODIUM SERPL-SCNC: 138 MMOL/L (ref 135–147)
WBC # BLD AUTO: 2.11 THOUSAND/UL (ref 4.31–10.16)

## 2023-04-03 RX ORDER — DIPHENOXYLATE HYDROCHLORIDE AND ATROPINE SULFATE 2.5; .025 MG/1; MG/1
1 TABLET ORAL 4 TIMES DAILY PRN
COMMUNITY

## 2023-04-03 NOTE — PROGRESS NOTES
Pt here for port labs  Accessed without difficulty  Labs drawn and sent  Pt tolerated procedure well  Pt left unit ambulatory with steady gait   Pt refused AVS

## 2023-04-04 ENCOUNTER — TELEPHONE (OUTPATIENT)
Dept: HEMATOLOGY ONCOLOGY | Facility: CLINIC | Age: 43
End: 2023-04-04

## 2023-04-04 NOTE — TELEPHONE ENCOUNTER
Left detailed VM for patient to let him know that his treatment is going to be canceled for tomorrow because of his  [address]  I left my direct teams number for him to callback

## 2023-04-05 ENCOUNTER — TELEPHONE (OUTPATIENT)
Dept: SURGICAL ONCOLOGY | Facility: CLINIC | Age: 43
End: 2023-04-05

## 2023-04-05 ENCOUNTER — HOSPITAL ENCOUNTER (OUTPATIENT)
Dept: INFUSION CENTER | Facility: HOSPITAL | Age: 43
Discharge: HOME | End: 2023-04-05
Attending: INTERNAL MEDICINE

## 2023-04-05 NOTE — TELEPHONE ENCOUNTER
Placed call to PATHS for status and spoke with Sheila Khan [phone]  She stated that the patient still has not provided the information on the son who is a member of the household  Also checked PROMISE and they are NOT active

## 2023-04-12 DIAGNOSIS — C20 RECTAL CANCER (HCC): Primary | ICD-10-CM

## 2023-04-19 ENCOUNTER — HOSPITAL ENCOUNTER (OUTPATIENT)
Dept: INFUSION CENTER | Facility: HOSPITAL | Age: 43
Discharge: HOME | End: 2023-04-19
Attending: INTERNAL MEDICINE

## 2023-04-19 VITALS
OXYGEN SATURATION: 100 % | TEMPERATURE: 96.5 F | BODY MASS INDEX: 26.04 KG/M2 | DIASTOLIC BLOOD PRESSURE: 55 MMHG | SYSTOLIC BLOOD PRESSURE: 108 MMHG | WEIGHT: 209.44 LBS | HEIGHT: 75 IN | RESPIRATION RATE: 18 BRPM | HEART RATE: 68 BPM

## 2023-04-19 DIAGNOSIS — C20 RECTAL CANCER (HCC): Primary | ICD-10-CM

## 2023-04-19 RX ORDER — FLUOROURACIL 50 MG/ML
400 INJECTION, SOLUTION INTRAVENOUS ONCE
Status: COMPLETED | OUTPATIENT
Start: 2023-04-19 | End: 2023-04-19

## 2023-04-19 RX ORDER — SODIUM CHLORIDE 9 MG/ML
20 INJECTION, SOLUTION INTRAVENOUS ONCE AS NEEDED
Status: DISCONTINUED | OUTPATIENT
Start: 2023-04-19 | End: 2023-04-22 | Stop reason: HOSPADM

## 2023-04-19 RX ORDER — DEXTROSE MONOHYDRATE 50 MG/ML
20 INJECTION, SOLUTION INTRAVENOUS ONCE
Status: COMPLETED | OUTPATIENT
Start: 2023-04-19 | End: 2023-04-19

## 2023-04-19 RX ADMIN — DEXTROSE 20 ML/HR: 5 SOLUTION INTRAVENOUS at 10:56

## 2023-04-19 RX ADMIN — DEXAMETHASONE SODIUM PHOSPHATE: 10 INJECTION, SOLUTION INTRAMUSCULAR; INTRAVENOUS at 10:28

## 2023-04-19 RX ADMIN — LEUCOVORIN CALCIUM 900 MG: 500 INJECTION, POWDER, LYOPHILIZED, FOR SOLUTION INTRAMUSCULAR; INTRAVENOUS at 11:05

## 2023-04-19 RX ADMIN — OXALIPLATIN 200 MG: 5 INJECTION, SOLUTION INTRAVENOUS at 11:05

## 2023-04-19 RX ADMIN — FLUOROURACIL 915 MG: 50 INJECTION, SOLUTION INTRAVENOUS at 13:30

## 2023-04-19 RX ADMIN — SODIUM CHLORIDE 20 ML/HR: 9 INJECTION, SOLUTION INTRAVENOUS at 10:27

## 2023-04-19 NOTE — PROGRESS NOTES
Pt tolerated chemotherapy infusion without any adverse reactions  5FU/elastomeric pump connected, all ports and connectors taped and secured  Clamp opened by second RN  Aware of disconnect time  Pt ambulated out of unit with a steady gait   Declined AVS

## 2023-04-21 ENCOUNTER — HOSPITAL ENCOUNTER (OUTPATIENT)
Dept: INFUSION CENTER | Facility: HOSPITAL | Age: 43
Discharge: HOME | End: 2023-04-21
Attending: INTERNAL MEDICINE

## 2023-04-21 DIAGNOSIS — C20 RECTAL CANCER (HCC): Primary | ICD-10-CM

## 2023-04-21 RX ADMIN — PEGFILGRASTIM-BMEZ 6 MG: 6 INJECTION SUBCUTANEOUS at 13:12

## 2023-04-21 NOTE — PROGRESS NOTES
Pt arrived amb for Elastomere d/c  Appears empty, Elastomere removed, port flushed, and de-accessed  Dsd applied  Neulasta that was ordered needed preauth  Pt has new insurance that requires Mariaelena Samaniego  Office and pharmacy made aware  Finance group did preauth  Ziextenzo given SQ YENI, dsd applied  Disch amb to home, steady gait

## 2023-04-24 ENCOUNTER — ANESTHESIA (OUTPATIENT)
Dept: ANESTHESIOLOGY | Facility: HOSPITAL | Age: 43
End: 2023-04-24

## 2023-04-24 ENCOUNTER — ANESTHESIA EVENT (OUTPATIENT)
Dept: ANESTHESIOLOGY | Facility: HOSPITAL | Age: 43
End: 2023-04-24

## 2023-04-24 RX ORDER — SODIUM CHLORIDE 9 MG/ML
125 INJECTION, SOLUTION INTRAVENOUS CONTINUOUS
Status: CANCELLED | OUTPATIENT
Start: 2023-04-24

## 2023-04-24 RX ORDER — LIDOCAINE HYDROCHLORIDE 10 MG/ML
0.5 INJECTION, SOLUTION EPIDURAL; INFILTRATION; INTRACAUDAL; PERINEURAL ONCE AS NEEDED
Status: CANCELLED | OUTPATIENT
Start: 2023-04-24

## 2023-04-25 ENCOUNTER — ANESTHESIA EVENT (OUTPATIENT)
Dept: GASTROENTEROLOGY | Facility: HOSPITAL | Age: 43
End: 2023-04-25

## 2023-04-25 ENCOUNTER — HOSPITAL ENCOUNTER (OUTPATIENT)
Dept: GASTROENTEROLOGY | Facility: HOSPITAL | Age: 43
Setting detail: OUTPATIENT SURGERY
Discharge: HOME | End: 2023-04-25
Attending: INTERNAL MEDICINE

## 2023-04-25 ENCOUNTER — ANESTHESIA (OUTPATIENT)
Dept: GASTROENTEROLOGY | Facility: HOSPITAL | Age: 43
End: 2023-04-25

## 2023-04-25 VITALS
WEIGHT: 212 LBS | OXYGEN SATURATION: 99 % | RESPIRATION RATE: 18 BRPM | TEMPERATURE: 98 F | HEART RATE: 82 BPM | DIASTOLIC BLOOD PRESSURE: 89 MMHG | HEIGHT: 75 IN | BODY MASS INDEX: 26.36 KG/M2 | SYSTOLIC BLOOD PRESSURE: 155 MMHG

## 2023-04-25 DIAGNOSIS — K70.30 ALCOHOLIC CIRRHOSIS OF LIVER WITHOUT ASCITES (HCC): ICD-10-CM

## 2023-04-25 DIAGNOSIS — I85.10 SECONDARY ESOPHAGEAL VARICES WITHOUT BLEEDING (HCC): Primary | ICD-10-CM

## 2023-04-25 RX ORDER — ONDANSETRON 2 MG/ML
4 INJECTION INTRAMUSCULAR; INTRAVENOUS ONCE
Status: COMPLETED | OUTPATIENT
Start: 2023-04-25 | End: 2023-04-25

## 2023-04-25 RX ORDER — SODIUM CHLORIDE 9 MG/ML
125 INJECTION, SOLUTION INTRAVENOUS CONTINUOUS
Status: DISCONTINUED | OUTPATIENT
Start: 2023-04-25 | End: 2023-04-29 | Stop reason: HOSPADM

## 2023-04-25 RX ORDER — LIDOCAINE HYDROCHLORIDE 20 MG/ML
INJECTION, SOLUTION EPIDURAL; INFILTRATION; INTRACAUDAL; PERINEURAL AS NEEDED
Status: DISCONTINUED | OUTPATIENT
Start: 2023-04-25 | End: 2023-04-25

## 2023-04-25 RX ORDER — PROPOFOL 10 MG/ML
INJECTION, EMULSION INTRAVENOUS AS NEEDED
Status: DISCONTINUED | OUTPATIENT
Start: 2023-04-25 | End: 2023-04-25

## 2023-04-25 RX ORDER — LIDOCAINE HYDROCHLORIDE 10 MG/ML
0.5 INJECTION, SOLUTION EPIDURAL; INFILTRATION; INTRACAUDAL; PERINEURAL ONCE AS NEEDED
Status: DISCONTINUED | OUTPATIENT
Start: 2023-04-25 | End: 2023-04-29 | Stop reason: HOSPADM

## 2023-04-25 RX ORDER — SODIUM CHLORIDE, SODIUM LACTATE, POTASSIUM CHLORIDE, CALCIUM CHLORIDE 600; 310; 30; 20 MG/100ML; MG/100ML; MG/100ML; MG/100ML
INJECTION, SOLUTION INTRAVENOUS CONTINUOUS PRN
Status: DISCONTINUED | OUTPATIENT
Start: 2023-04-25 | End: 2023-04-25

## 2023-04-25 RX ADMIN — LIDOCAINE HYDROCHLORIDE 100 MG: 20 INJECTION, SOLUTION EPIDURAL; INFILTRATION; INTRACAUDAL; PERINEURAL at 11:07

## 2023-04-25 RX ADMIN — SODIUM CHLORIDE 125 ML/HR: 0.9 INJECTION, SOLUTION INTRAVENOUS at 10:46

## 2023-04-25 RX ADMIN — PROPOFOL 100 MG: 10 INJECTION, EMULSION INTRAVENOUS at 11:18

## 2023-04-25 RX ADMIN — SODIUM CHLORIDE, SODIUM LACTATE, POTASSIUM CHLORIDE, AND CALCIUM CHLORIDE: .6; .31; .03; .02 INJECTION, SOLUTION INTRAVENOUS at 10:54

## 2023-04-25 RX ADMIN — PROPOFOL 100 MG: 10 INJECTION, EMULSION INTRAVENOUS at 11:08

## 2023-04-25 RX ADMIN — PROPOFOL 100 MG: 10 INJECTION, EMULSION INTRAVENOUS at 11:11

## 2023-04-25 RX ADMIN — PROPOFOL 200 MG: 10 INJECTION, EMULSION INTRAVENOUS at 11:07

## 2023-04-25 RX ADMIN — PROPOFOL 100 MG: 10 INJECTION, EMULSION INTRAVENOUS at 11:14

## 2023-04-25 RX ADMIN — ONDANSETRON 4 MG: 2 INJECTION INTRAMUSCULAR; INTRAVENOUS at 12:54

## 2023-04-25 NOTE — ANESTHESIA PREPROCEDURE EVALUATION
Procedure:  PRE-OP ONLY    Relevant Problems   GI/HEPATIC   (+) Rectal cancer (HCC)             Anesthesia Plan  ASA Score- 2     Anesthesia Type- IV sedation with anesthesia with ASA Monitors  Additional Monitors:   Airway Plan:           Plan Factors-    Chart reviewed  Patient is not a current smoker  Induction- intravenous  Postoperative Plan-     Informed Consent- Anesthetic plan and risks discussed with patient  I personally reviewed this patient with the CRNA  Discussed and agreed on the Anesthesia Plan with the CRNA  Mendel Oman

## 2023-04-25 NOTE — ANESTHESIA POSTPROCEDURE EVALUATION
Post-Op Assessment Note    CV Status:  Stable  Pain Score: 0    Pain management: adequate     Mental Status:  Arousable and sleepy   Hydration Status:  Stable   PONV Controlled:  Controlled   Airway Patency:  Patent      Post Op Vitals Reviewed: Yes      Staff: CRNA         No notable events documented      BP   109/55   Temp 97 6   Pulse 80   Resp 14   SpO2 99

## 2023-04-25 NOTE — H&P
"History and Physical - [facility]  Ranjit Ferreira 36 y o  male MRN: [id number]                  HPI: Ranjit Ferreira is a 36y o  year old male who presents for variceal screening  REVIEW OF SYSTEMS: Per the HPI, and otherwise unremarkable  Historical Information   Past Medical History:   Diagnosis Date   • Dental infection    • Fatty liver      Past Surgical History:   Procedure Laterality Date   • APPENDECTOMY     • COLONOSCOPY     • DENTAL SURGERY     • IR PORT PLACEMENT  02/28/2023     Social History   Social History     Substance and Sexual Activity   Alcohol Use Yes   • Alcohol/week: 2 0 standard drinks   • Types: 2 Cans of beer per week    Comment: 1-2 daily   previously up to 1 case/day (reduced alcohol intake 7 years ago)     Social History     Substance and Sexual Activity   Drug Use No     Social History     Tobacco Use   Smoking Status Former   • Packs/day: 1 00   • Years: 20 00   • Pack years: 20 00   • Types: Cigarettes   • Quit date: 2020   • Years since quitting: 3 3   Smokeless Tobacco Never     Family History   Problem Relation Age of Onset   • Lung cancer Mother    • Cancer Father         head and neck cancer   • Colon cancer Neg Hx    • Colon polyps Neg Hx        Meds/Allergies       Current Outpatient Medications:   •  diphenoxylate-atropine (LOMOTIL) 2 5-0 025 mg per tablet  •  ondansetron (ZOFRAN) 8 mg tablet    Current Facility-Administered Medications:   •  lidocaine (PF) (XYLOCAINE-MPF) 1 % injection 0 5 mL, 0 5 mL, Infiltration, Once PRN  •  sodium chloride 0 9 % infusion, 125 mL/hr, Intravenous, Continuous    No Known Allergies    Objective     /59   Pulse 69   Temp 98 °F (36 7 °C) (Oral)   Resp 16   Ht 6' 3\" (1 905 m)   Wt 96 2 kg (212 lb)   SpO2 98%   BMI 26 50 kg/m²       PHYSICAL EXAM    Gen: NAD  Head: NCAT  CV: RRR  CHEST: Clear  ABD: soft, NT/ND  EXT: no edema      ASSESSMENT/PLAN:  This is a 36y o  year old male here for EGD, and he is " stable and optimized for his procedure

## 2023-04-25 NOTE — ANESTHESIA PREPROCEDURE EVALUATION
Procedure:  EGD    Relevant Problems   GI/HEPATIC   (+) Rectal cancer ([address] )   On ChemoRx, On going    Esophageal Varices  Physical Exam    Airway    Mallampati score: II  TM Distance: >3 FB  Neck ROM: full     Dental   lower dentures and upper dentures,     Cardiovascular      Pulmonary      Other Findings        Anesthesia Plan  ASA Score- 2     Anesthesia Type- IV sedation with anesthesia with ASA Monitors  Additional Monitors:   Airway Plan:           Plan Factors-Exercise tolerance (METS): >4 METS  Chart reviewed  Patient is not a current smoker  Induction- intravenous  Postoperative Plan-     Informed Consent- Anesthetic plan and risks discussed with patient  I personally reviewed this patient with the CRNA  Discussed and agreed on the Anesthesia Plan with the CRNA  Naomi Dominguez

## 2023-04-26 ENCOUNTER — TELEPHONE (OUTPATIENT)
Dept: HEMATOLOGY ONCOLOGY | Facility: CLINIC | Age: 43
End: 2023-04-26

## 2023-04-26 DIAGNOSIS — C20 RECTAL CANCER (HCC): Primary | ICD-10-CM

## 2023-04-26 RX ORDER — DEXTROSE MONOHYDRATE 50 MG/ML
20 INJECTION, SOLUTION INTRAVENOUS ONCE
Status: CANCELLED | OUTPATIENT
Start: 2023-05-04

## 2023-04-26 RX ORDER — SODIUM CHLORIDE 9 MG/ML
20 INJECTION, SOLUTION INTRAVENOUS ONCE AS NEEDED
Status: CANCELLED | OUTPATIENT
Start: 2023-05-04

## 2023-04-26 RX ORDER — FLUOROURACIL 50 MG/ML
400 INJECTION, SOLUTION INTRAVENOUS ONCE
Status: CANCELLED | OUTPATIENT
Start: 2023-05-04

## 2023-04-26 NOTE — TELEPHONE ENCOUNTER
Appointment Confirmation   Who are you speaking with? Patient    If it is not the patient, are they listed on an active communication consent form? Yes   Which provider is the appointment scheduled with? ISAAC Mccoy   When is the appointment scheduled? Please list date and time 05/03 at 10:00am     Confirming location    At which location is the appointment scheduled to take place? [place]   Did caller verbalize understanding of appointment details?  Yes

## 2023-04-28 ENCOUNTER — TELEPHONE (OUTPATIENT)
Dept: HEMATOLOGY ONCOLOGY | Facility: CLINIC | Age: 43
End: 2023-04-28

## 2023-04-28 NOTE — TELEPHONE ENCOUNTER
Appointment Confirmation   Who are you speaking with? Patient   If it is not the patient, are they listed on an active communication consent form? N/A   Which provider is the appointment scheduled with? ISAAC Humphries   When is the appointment scheduled? Please list date and time 5/3/23  Location Confirmed   At which location is the appointment scheduled to take place? [place]   Did caller verbalize understanding of appointment details?  Yes

## 2023-04-29 PROBLEM — T45.1X5A CHEMOTHERAPY-INDUCED NEUTROPENIA (HCC): Status: ACTIVE | Noted: 2023-04-29

## 2023-04-29 PROBLEM — D70.1 CHEMOTHERAPY-INDUCED NEUTROPENIA (HCC): Status: ACTIVE | Noted: 2023-04-29

## 2023-04-30 NOTE — ADDENDUM NOTE
Encounter addended by: Jenny Lucas DO on: 4/29/2023 8:51 PM   Actions taken: Problem List modified, Clinical Note Signed

## 2023-04-30 NOTE — PROGRESS NOTES
CLARIFY DX RESPONSE NOTE    A diagnosis of chemo induced neutropenia has been added to the problem list

## 2023-05-01 ENCOUNTER — HOSPITAL ENCOUNTER (OUTPATIENT)
Dept: INFUSION CENTER | Facility: HOSPITAL | Age: 43
Discharge: HOME | End: 2023-05-01

## 2023-05-01 DIAGNOSIS — C20 RECTAL CANCER (HCC): Primary | ICD-10-CM

## 2023-05-01 LAB
ALBUMIN SERPL BCP-MCNC: 3.5 G/DL (ref 3.5–5)
ALP SERPL-CCNC: 79 U/L (ref 34–104)
ALT SERPL W P-5'-P-CCNC: 17 U/L (ref 7–52)
ANION GAP SERPL CALCULATED.3IONS-SCNC: 8 MMOL/L (ref 4–13)
ANISOCYTOSIS BLD QL SMEAR: PRESENT
AST SERPL W P-5'-P-CCNC: 34 U/L (ref 13–39)
BASOPHILS # BLD MANUAL: 0.06 THOUSAND/UL (ref 0–0.1)
BASOPHILS NFR MAR MANUAL: 1 % (ref 0–1)
BILIRUB SERPL-MCNC: 1.08 MG/DL (ref 0.2–1)
BUN SERPL-MCNC: 5 MG/DL (ref 5–25)
CALCIUM SERPL-MCNC: 8.8 MG/DL (ref 8.4–10.2)
CHLORIDE SERPL-SCNC: 105 MMOL/L (ref 96–108)
CO2 SERPL-SCNC: 22 MMOL/L (ref 21–32)
CREAT SERPL-MCNC: 0.72 MG/DL (ref 0.6–1.3)
EOSINOPHIL # BLD MANUAL: 0.06 THOUSAND/UL (ref 0–0.4)
EOSINOPHIL NFR BLD MANUAL: 1 % (ref 0–6)
ERYTHROCYTE [DISTWIDTH] IN BLOOD BY AUTOMATED COUNT: 17.1 % (ref 11.6–15.1)
GFR SERPL CREATININE-BSD FRML MDRD: 114 ML/MIN/1.73SQ M
GLUCOSE SERPL-MCNC: 134 MG/DL (ref 65–140)
HCT VFR BLD AUTO: 33.9 % (ref 36.5–49.3)
HGB BLD-MCNC: 11.2 G/DL (ref 12–17)
LYMPHOCYTES # BLD AUTO: 2.11 THOUSAND/UL (ref 0.6–4.47)
LYMPHOCYTES # BLD AUTO: 38 % (ref 14–44)
MCH RBC QN AUTO: 29.5 PG (ref 26.8–34.3)
MCHC RBC AUTO-ENTMCNC: 33 G/DL (ref 31.4–37.4)
MCV RBC AUTO: 89 FL (ref 82–98)
METAMYELOCYTES NFR BLD MANUAL: 1 % (ref 0–1)
MONOCYTES # BLD AUTO: 0.56 THOUSAND/UL (ref 0–1.22)
MONOCYTES NFR BLD: 10 % (ref 4–12)
NEUTROPHILS # BLD MANUAL: 2.72 THOUSAND/UL (ref 1.85–7.62)
NEUTS BAND NFR BLD MANUAL: 2 % (ref 0–8)
NEUTS SEG NFR BLD AUTO: 47 % (ref 43–75)
PLATELET # BLD AUTO: 84 THOUSANDS/UL (ref 149–390)
PLATELET BLD QL SMEAR: ABNORMAL
PMV BLD AUTO: 9.5 FL (ref 8.9–12.7)
POTASSIUM SERPL-SCNC: 3 MMOL/L (ref 3.5–5.3)
PROT SERPL-MCNC: 6.6 G/DL (ref 6.4–8.4)
RBC # BLD AUTO: 3.8 MILLION/UL (ref 3.88–5.62)
SODIUM SERPL-SCNC: 135 MMOL/L (ref 135–147)
WBC # BLD AUTO: 5.56 THOUSAND/UL (ref 4.31–10.16)

## 2023-05-01 NOTE — PROGRESS NOTES
HEMATOLOGY / [address] FOLLOW UP NOTE    Primary Care Provider: Cuco Marsh DO  Referring Provider:    MRN: [id number]  : 1980    Reason for Encounter: Follow-up for rectal cancer       Oncology History Overview Note   2023 - [id number] rectal adenocarcinoma     Rectal cancer ([facility] [address] )   2023 Initial Diagnosis    Rectal cancer ([facility] [address] )     3/2/2023 -  Cancer Staged    Staging form: Colon and Rectum, AJCC 8th Edition  - Clinical: cT3, cN2, cM0 - Signed by Ana Jensen DO on 3/2/2023       3/9/2023 -  Chemotherapy    pegfilgrastim (NEULASTA), 6 mg, Subcutaneous, Once, 1 of 1 cycle  Pegfilgrastim-bmez (ZIEXTENZO), 6 mg, Subcutaneous, Once, 1 of 9 cycles  Administration: 6 mg (2023)  fluorouracil (ADRUCIL), 400 mg/m2 = 915 mg, Intravenous, Once, 3 of 11 cycles  Administration: 915 mg (3/9/2023), 915 mg (3/22/2023), 915 mg (2023)  leucovorin calcium IVPB, 916 mg, Intravenous, Once, 3 of 11 cycles  Administration: 900 mg (3/9/2023), 900 mg (3/22/2023), 900 mg (2023)  oxaliplatin (ELOXATIN) chemo infusion, 194 65 mg, Intravenous, Once, 3 of 11 cycles  Administration: 200 mg (3/9/2023), 200 mg (3/22/2023), 200 mg (2023)  fluorouracil (ADRUCIL) ambulatory infusion Soln, 1,200 mg/m2/day = 5,495 mg, Intravenous, Over 46 hours, 3 of 11 cycles         Interval History: Patient presents for follow-up prior to next treatment cycle due today  He has some chemotherapy-induced cytopenias, platelets 84  White count is normal   No evidence of recurrent neutropenia now that we have added Neulasta to his treatment plan  Potassium 3  He has been prescribed oral potassium replacement  Overall, he is doing well  Reports continues to tolerate treatment without any significant side effects  He did have a bout of nausea/vomiting this morning that he thinks was related to something he ate  Those symptoms have resolved  He does have some cold sensitivity for the first 2 days following treatment  This resolves  Denies any neuropathy  REVIEW OF SYSTEMS:  Please note that a 14-point review of systems was performed to include Constitutional, HEENT, Respiratory, CVS, GI, , Musculoskeletal, Integumentary, Neurologic, Rheumatologic, Endocrinologic, Psychiatric, Lymphatic, and Hematologic/Oncologic systems were reviewed and are negative unless otherwise stated in HPI  Positive and negative findings pertinent to this evaluation are incorporated into the history of present illness  ECOG PS: 0    PROBLEM LIST:  Patient Active Problem List   Diagnosis    Rectal wall thickening    Transaminitis/Hepatitic Steatosis    Does not have health insurance    Rectal cancer ([facility] [address] )    Chemotherapy-induced neutropenia (HCC)       Assessment / Plan:  1  Rectal cancer [facility])      Patient will proceed with treatment today as planned without any change to his treatment  I reviewed with him today that after he has completed 8 cycles of chemo with modified FOLFOX 6, he will proceed with concurrent chemoradiation  He is scheduled to meet with radiation oncology in the next few weeks  He will return for a follow-up visit in 2 weeks prior to his next treatment cycle  He knows to call anytime with questions or concerns    I spent 30 minutes on chart review, face to face counseling time, coordination of care and documentation  Past Medical History:   has a past medical history of Dental infection and Fatty liver  PAST SURGICAL HISTORY:   has a past surgical history that includes Appendectomy; Dental surgery; IR port placement (02/28/2023); and Colonoscopy      CURRENT MEDICATIONS  Current Outpatient Medications   Medication Sig Dispense Refill    diphenoxylate-atropine (LOMOTIL) 2 5-0 025 mg per tablet Take 1 tablet by mouth 4 (four) times a day as needed for diarrhea As needed      fluorouracil 5,495 mg in CADD/Elastomeric Infusion Device Infuse 5,495 mg (1,200 mg/m2/day x 2 29 m2) into a catheter in a vein via "infusion device over 46 hours for 2 days  Infusion planned for May 3, 2023  1 each 0    ondansetron (ZOFRAN) 8 mg tablet Take 1 tablet (8 mg total) by mouth every 8 (eight) hours as needed for nausea or vomiting 30 tablet 2    potassium chloride (K-DUR,KLOR-CON) 20 mEq tablet Take 1 tablet (20 mEq total) by mouth 2 (two) times a day for 7 days 14 tablet 0     No current facility-administered medications for this visit  Facility-Administered Medications Ordered in Other Visits   Medication Dose Route Frequency Provider Last Rate Last Admin    alteplase (CATHFLO) injection 2 mg  2 mg Intracatheter Q1MIN PRN Loan Duong DO         [unfilled]    SOCIAL HISTORY:   reports that he quit smoking about 3 years ago  His smoking use included cigarettes  He has a 20 00 pack-year smoking history  He has never used smokeless tobacco  He reports current alcohol use of about 2 0 standard drinks per week  He reports that he does not use drugs  FAMILY HISTORY:  family history includes Cancer in his father; Lung cancer in his mother  ALLERGIES:  has No Known Allergies  Physical Exam:  Vital Signs:   Visit Vitals  /62 (BP Location: Left arm)   Pulse 81   Temp 98 1 °F (36 7 °C) (Oral)   Resp 18   Ht 6' 3\" (1 905 m)   Wt 92 4 kg (203 lb 9 6 oz)   SpO2 98%   BMI 25 45 kg/m²   Smoking Status Former   BSA 2 21 m²     Body mass index is 25 45 kg/m²  Body surface area is 2 21 meters squared      Physical Exam    Labs:  Lab Results   Component Value Date    WBC 5 56 05/01/2023    HGB 11 2 (L) 05/01/2023    HCT 33 9 (L) 05/01/2023    MCV 89 05/01/2023    PLT 84 (L) 05/01/2023     Lab Results   Component Value Date    SODIUM 135 05/01/2023    K 3 0 (L) 05/01/2023     05/01/2023    CO2 22 05/01/2023    AGAP 8 05/01/2023    BUN 5 05/01/2023    CREATININE 0 72 05/01/2023    GLUC 134 05/01/2023    GLUF 121 (H) 03/06/2023    CALCIUM 8 8 05/01/2023    AST 34 05/01/2023    ALT 17 05/01/2023    ALKPHOS 79 05/01/2023    " TP 6 6 05/01/2023    TBILI 1 08 (H) 05/01/2023    EGFR 114 05/01/2023

## 2023-05-01 NOTE — PROGRESS NOTES
Pt arrived on unit for lab draw  Chest port accessed, labs drawn, port deaccessed  Pt exited unit with steady gait for dc home

## 2023-05-02 DIAGNOSIS — E87.6 LOW SERUM POTASSIUM: ICD-10-CM

## 2023-05-02 DIAGNOSIS — E87.6 LOW POTASSIUM SYNDROME: Primary | ICD-10-CM

## 2023-05-02 RX ORDER — POTASSIUM CHLORIDE 20 MEQ/1
20 TABLET, EXTENDED RELEASE ORAL 2 TIMES DAILY
Qty: 14 TABLET | Refills: 0 | Status: SHIPPED | OUTPATIENT
Start: 2023-05-02 | End: 2023-05-09

## 2023-05-02 NOTE — RESULT ENCOUNTER NOTE
Dr Reuben Howard, DO,  Mr Gorge Severs results were normal, other than mild, non-specific duodenal inflammation and he has been notified via [address]

## 2023-05-03 ENCOUNTER — PATIENT OUTREACH (OUTPATIENT)
Dept: HEMATOLOGY ONCOLOGY | Facility: CLINIC | Age: 43
End: 2023-05-03

## 2023-05-03 ENCOUNTER — OFFICE VISIT (OUTPATIENT)
Age: 43
End: 2023-05-03

## 2023-05-03 ENCOUNTER — HOSPITAL ENCOUNTER (OUTPATIENT)
Dept: INFUSION CENTER | Facility: HOSPITAL | Age: 43
Discharge: HOME | End: 2023-05-03
Attending: INTERNAL MEDICINE

## 2023-05-03 VITALS
HEART RATE: 81 BPM | BODY MASS INDEX: 25.31 KG/M2 | TEMPERATURE: 98.1 F | OXYGEN SATURATION: 98 % | WEIGHT: 203.6 LBS | HEIGHT: 75 IN | SYSTOLIC BLOOD PRESSURE: 110 MMHG | DIASTOLIC BLOOD PRESSURE: 62 MMHG | RESPIRATION RATE: 18 BRPM

## 2023-05-03 VITALS — HEIGHT: 75 IN | BODY MASS INDEX: 25.45 KG/M2

## 2023-05-03 DIAGNOSIS — C20 RECTAL CANCER (HCC): Primary | ICD-10-CM

## 2023-05-03 NOTE — PROGRESS NOTES
Patient arrived to infusion center with 6year old child  Patient re-educated on infusion center age restrictions of visitors  Patient gave verbal understanding and patient rescheduled for treatment tomorrow  Alice Ames RN notified

## 2023-05-03 NOTE — PROGRESS NOTES
Pt arrived on unit for tx; unfortunately pt had his child who is a minor with him  Unable to tx today  Pt rescheduled for tomorrow 5/4  Pt verbalized understanding

## 2023-05-03 NOTE — PROGRESS NOTES
Phone outreach to the pt, no answer, left VM, stated my name, title, and reason for call to check in on the pt to see how he has been doing while he has been on tx, provided my call back number

## 2023-05-04 ENCOUNTER — HOSPITAL ENCOUNTER (OUTPATIENT)
Dept: INFUSION CENTER | Facility: HOSPITAL | Age: 43
Discharge: HOME | End: 2023-05-04
Attending: INTERNAL MEDICINE

## 2023-05-04 VITALS
DIASTOLIC BLOOD PRESSURE: 69 MMHG | TEMPERATURE: 96.4 F | WEIGHT: 202.6 LBS | HEIGHT: 75 IN | HEART RATE: 74 BPM | OXYGEN SATURATION: 97 % | RESPIRATION RATE: 16 BRPM | SYSTOLIC BLOOD PRESSURE: 110 MMHG | BODY MASS INDEX: 25.19 KG/M2

## 2023-05-04 DIAGNOSIS — C20 RECTAL CANCER (HCC): Primary | ICD-10-CM

## 2023-05-04 RX ORDER — SODIUM CHLORIDE 9 MG/ML
20 INJECTION, SOLUTION INTRAVENOUS ONCE AS NEEDED
Status: DISCONTINUED | OUTPATIENT
Start: 2023-05-04 | End: 2023-05-07 | Stop reason: HOSPADM

## 2023-05-04 RX ORDER — DEXTROSE MONOHYDRATE 50 MG/ML
20 INJECTION, SOLUTION INTRAVENOUS ONCE
Status: COMPLETED | OUTPATIENT
Start: 2023-05-04 | End: 2023-05-04

## 2023-05-04 RX ORDER — FLUOROURACIL 50 MG/ML
400 INJECTION, SOLUTION INTRAVENOUS ONCE
Status: COMPLETED | OUTPATIENT
Start: 2023-05-04 | End: 2023-05-04

## 2023-05-04 RX ADMIN — DEXTROSE MONOHYDRATE 20 ML/HR: 50 INJECTION, SOLUTION INTRAVENOUS at 10:55

## 2023-05-04 RX ADMIN — OXALIPLATIN 200 MG: 5 INJECTION, SOLUTION INTRAVENOUS at 11:09

## 2023-05-04 RX ADMIN — LEUCOVORIN CALCIUM 900 MG: 500 INJECTION, POWDER, LYOPHILIZED, FOR SOLUTION INTRAMUSCULAR; INTRAVENOUS at 11:09

## 2023-05-04 RX ADMIN — FLUOROURACIL 915 MG: 50 INJECTION, SOLUTION INTRAVENOUS at 13:27

## 2023-05-04 RX ADMIN — SODIUM CHLORIDE 20 ML/HR: 9 INJECTION, SOLUTION INTRAVENOUS at 10:26

## 2023-05-04 RX ADMIN — DEXAMETHASONE SODIUM PHOSPHATE: 10 INJECTION, SOLUTION INTRAMUSCULAR; INTRAVENOUS at 10:26

## 2023-05-04 NOTE — PROGRESS NOTES
Pt here for chemo tolerated well no adverse reactions elstomeric pump connected and ports taped open declined AVS and is aware of Saturday's appointment at 1200 at [facility]  Pt left ambulatory with steady gait

## 2023-05-05 ENCOUNTER — HOSPITAL ENCOUNTER (OUTPATIENT)
Dept: INFUSION CENTER | Facility: HOSPITAL | Age: 43
End: 2023-05-05
Attending: INTERNAL MEDICINE

## 2023-05-06 ENCOUNTER — HOSPITAL ENCOUNTER (OUTPATIENT)
Dept: INFUSION CENTER | Facility: HOSPITAL | Age: 43
Discharge: HOME | End: 2023-05-06
Attending: INTERNAL MEDICINE

## 2023-05-06 DIAGNOSIS — C20 RECTAL CANCER (HCC): Primary | ICD-10-CM

## 2023-05-06 RX ADMIN — PEGFILGRASTIM-BMEZ 6 MG: 6 INJECTION SUBCUTANEOUS at 12:01

## 2023-05-08 ENCOUNTER — DOCUMENTATION (OUTPATIENT)
Dept: HEMATOLOGY ONCOLOGY | Facility: CLINIC | Age: 43
End: 2023-05-08

## 2023-05-08 NOTE — PROGRESS NOTES
Received email from waldemar hanson that the pt has inactive insurance  Checked promise & pt has active [employer] plan effective 05/01/23 under recipient id# [id number]  Emailed that info to [email or web address]

## 2023-05-10 RX ORDER — FLUOROURACIL 50 MG/ML
400 INJECTION, SOLUTION INTRAVENOUS ONCE
Status: CANCELLED | OUTPATIENT
Start: 2023-05-17

## 2023-05-10 RX ORDER — DEXTROSE MONOHYDRATE 50 MG/ML
20 INJECTION, SOLUTION INTRAVENOUS ONCE
Status: CANCELLED | OUTPATIENT
Start: 2023-05-17

## 2023-05-10 RX ORDER — SODIUM CHLORIDE 9 MG/ML
20 INJECTION, SOLUTION INTRAVENOUS ONCE AS NEEDED
Status: CANCELLED | OUTPATIENT
Start: 2023-05-17

## 2023-05-11 DIAGNOSIS — C20 RECTAL CANCER (HCC): Primary | ICD-10-CM

## 2023-05-12 ENCOUNTER — PATIENT OUTREACH (OUTPATIENT)
Dept: HEMATOLOGY ONCOLOGY | Facility: CLINIC | Age: 43
End: 2023-05-12

## 2023-05-14 NOTE — PROGRESS NOTES
HEMATOLOGY / [address] FOLLOW UP NOTE    Primary Care Provider: Rylie Cazares DO  Referring Provider:    MRN: [id number]  : 1980    Reason for Encounter: Follow-up rectal cancer       Oncology History Overview Note   2023 - [id number] rectal adenocarcinoma     Rectal cancer ([facility] [address] )   2023 Initial Diagnosis    Rectal cancer ([facility] [address] )     3/2/2023 -  Cancer Staged    Staging form: Colon and Rectum, AJCC 8th Edition  - Clinical: cT3, cN2, cM0 - Signed by Sona Silver DO on 3/2/2023       3/9/2023 -  Chemotherapy    palonosetron (ALOXI), 0 25 mg, Intravenous, Once, 0 of 2 cycles  pegfilgrastim (NEULASTA), 6 mg, Subcutaneous, Once, 1 of 1 cycle  Pegfilgrastim-bmez (ZIEXTENZO), 6 mg, Subcutaneous, Once, 3 of 8 cycles  Administration: 6 mg (2023), 6 mg (2023)  fluorouracil (ADRUCIL), 400 mg/m2 = 915 mg, Intravenous, Once, 5 of 10 cycles  Administration: 915 mg (3/9/2023), 915 mg (3/22/2023), 915 mg (2023), 915 mg (2023)  leucovorin calcium IVPB, 916 mg, Intravenous, Once, 5 of 10 cycles  Administration: 900 mg (3/9/2023), 900 mg (3/22/2023), 900 mg (2023), 900 mg (2023), 900 mg (2023)  oxaliplatin (ELOXATIN) chemo infusion, 194 65 mg, Intravenous, Once, 5 of 10 cycles  Administration: 200 mg (3/9/2023), 200 mg (3/22/2023), 200 mg (2023), 200 mg (2023), 200 mg (2023)  fluorouracil (ADRUCIL) ambulatory infusion Soln, 1,200 mg/m2/day = 5,495 mg, Intravenous, Over 46 hours, 5 of 10 cycles  aprepitant (CINVANTI) in  mL IVPB, 130 mg, Intravenous, Once, 0 of 2 cycles     2023 -  Chemotherapy    fluorouracil (ADRUCIL) ambulatory infusion Soln, 200 mg/m2/day = 2,190 mg (88 9 % of original dose 225 mg/m2/day), Intravenous, Over 120 hours, 0 of 5 cycles  Dose modification: 200 mg/m2/day (original dose 225 mg/m2/day, Cycle 1, Reason: Anticipated Tolerance)         Interval History: Patient returns for follow-up prior to cycle 5 of chemotherapy    His blood work remains in acceptable treatment range  Overall he reports he continues to tolerate treatment well  He does have intermittent episodes of diarrhea and nausea which are managed with Zofran and Imodium as needed  He reports after last treatment he had sudden bout of diarrhea on his way home from infusion center  I will order dose of Imodium to be given prior to discharge after treatment today  Otherwise, he states he is at baseline  Denies any lingering neuropathy  Still experiences mild cold sensitivity for the first couple days following treatment, this does resolve  Denies any rectal pain or rectal bleeding  He is meeting with radiation oncology next week         REVIEW OF SYSTEMS:  Please note that a 14-point review of systems was performed to include Constitutional, HEENT, Respiratory, CVS, GI, , Musculoskeletal, Integumentary, Neurologic, Rheumatologic, Endocrinologic, Psychiatric, Lymphatic, and Hematologic/Oncologic systems were reviewed and are negative unless otherwise stated in HPI  Positive and negative findings pertinent to this evaluation are incorporated into the history of present illness  ECOG PS: 0    PROBLEM LIST:  Patient Active Problem List   Diagnosis   • Rectal wall thickening   • Transaminitis/Hepatitic Steatosis   • Does not have health insurance   • Rectal cancer ([facility] [address] )   • Chemotherapy-induced neutropenia (HCC)       Assessment / Plan:  1  Rectal cancer [facility])      Patient will proceed with cycle 6 of modified FOLFOX 6 today  We reviewed that after 8 cycles of chemo with his current regimen, he would then proceed with concurrent chemoradiation with continuous infusion 5-FU via CADD pump  He is scheduled to meet with radiation oncology next week  I also reviewed that once he completes total neoadjuvant therapy, he will be set up for posttreatment imaging approximately 2 to 3 weeks after completion of chemoradiation    He will then see his colorectal surgeon, undergo MRI rectal cancer staging to help plan for surgical resection which usually occurs 10 to 12 weeks post treatment  Patient verbalized understanding  He will return for a follow-up visit in 2 weeks prior to his next treatment cycle  He knows to call at anytime with any additional questions or concerns    Prior to completing my note, while patient was in infusion this day he developed an episode of emesis while oxaliplatin was infusing  He did receive premed of Zofran and Decadron  After episode of emesis he felt better  For his last 2 cycles of FOLFOX, I will adjust his antiemetic regimen to include Emend Aloxi Dex  Patient did complete infusion today without any further nausea/vomiting  I spent 30 minutes on chart review, face to face counseling time, coordination of care and documentation  Past Medical History:   has a past medical history of Dental infection and Fatty liver  PAST SURGICAL HISTORY:   has a past surgical history that includes Appendectomy; Dental surgery; IR port placement (02/28/2023); and Colonoscopy  CURRENT MEDICATIONS  Current Outpatient Medications   Medication Sig Dispense Refill   • diphenoxylate-atropine (LOMOTIL) 2 5-0 025 mg per tablet Take 1 tablet by mouth 4 (four) times a day as needed for diarrhea As needed     • fluorouracil 5,495 mg in CADD/Elastomeric Infusion Device Infuse 5,495 mg (1,200 mg/m2/day x 2 29 m2) into a catheter in a vein via infusion device over 46 hours for 2 days  Infusion planned for May 17, 2023  1 each 0   • ondansetron (ZOFRAN) 8 mg tablet Take 1 tablet (8 mg total) by mouth every 8 (eight) hours as needed for nausea or vomiting 30 tablet 2   • potassium chloride (K-DUR,KLOR-CON) 20 mEq tablet Take 1 tablet (20 mEq total) by mouth 2 (two) times a day for 7 days 14 tablet 0     No current facility-administered medications for this visit       Facility-Administered Medications Ordered in Other Visits   Medication Dose Route Frequency Provider Last Rate Last Admin   • alteplase (CATHFLO) injection 2 mg  2 mg Intracatheter Q1MIN PRN Lennart Largo, DO       • alteplase (CATHFLO) injection 2 mg  2 mg Intracatheter Q1MIN PRN Lennart Largo, DO       • loperamide (IMODIUM A-D) tablet 2 mg  2 mg Oral 4x Daily PRN Lennart Largo, DO       • sodium chloride 0 9 % infusion  20 mL/hr Intravenous Once PRN Lenzohreht Largo, DO   Stopped at 05/17/23 1156     [unfilled]    SOCIAL HISTORY:   reports that he quit smoking about 3 years ago  His smoking use included cigarettes  He has a 20 00 pack-year smoking history  He has never used smokeless tobacco  He reports current alcohol use of about 2 0 standard drinks per week  He reports that he does not use drugs  FAMILY HISTORY:  family history includes Cancer in his father; Lung cancer in his mother  ALLERGIES:  has No Known Allergies  Physical Exam:  Vital Signs:   Visit Vitals  BP (P) 122/72 (BP Location: Right arm, Patient Position: Sitting, Cuff Size: Standard)   Pulse (P) 72   Temp (P) 98 6 °F (37 °C)   Resp (P) 18   Wt (P) 90 7 kg (200 lb)   SpO2 (P) 99%   BMI (P) 25 00 kg/m²   Smoking Status Former   BSA (P) 2 19 m²     Body mass index is 25 kg/m² (pended)  Body surface area is 2 19 meters squared (pended)  Physical Exam  Constitutional:       General: He is not in acute distress  Appearance: He is well-developed  He is not diaphoretic  HENT:      Head: Normocephalic and atraumatic  Mouth/Throat:      Pharynx: No oropharyngeal exudate  Eyes:      General: No scleral icterus  Pupils: Pupils are equal, round, and reactive to light  Cardiovascular:      Rate and Rhythm: Normal rate and regular rhythm  Heart sounds: No murmur heard  Pulmonary:      Effort: Pulmonary effort is normal  No respiratory distress  Breath sounds: Normal breath sounds  Abdominal:      General: Bowel sounds are normal  There is no distension  Palpations: Abdomen is soft  There is no mass        Tenderness: There is no abdominal tenderness  Musculoskeletal:         General: Normal range of motion  Cervical back: Normal range of motion and neck supple  Lymphadenopathy:      Cervical: No cervical adenopathy  Skin:     General: Skin is warm and dry  Neurological:      General: No focal deficit present  Mental Status: He is alert and oriented to person, place, and time  Psychiatric:         Mood and Affect: Mood normal          Behavior: Behavior normal          Thought Content:  Thought content normal          Judgment: Judgment normal          Labs:  Lab Results   Component Value Date    WBC 4 46 05/15/2023    HGB 10 6 (L) 05/15/2023    HCT 31 9 (L) 05/15/2023    MCV 89 05/15/2023     (L) 05/15/2023     Lab Results   Component Value Date    SODIUM 139 05/15/2023    K 3 4 (L) 05/15/2023     05/15/2023    CO2 24 05/15/2023    AGAP 8 05/15/2023    BUN 4 (L) 05/15/2023    CREATININE 0 73 05/15/2023    GLUC 122 05/15/2023    GLUF 121 (H) 03/06/2023    CALCIUM 8 9 05/15/2023    AST 42 (H) 05/15/2023    ALT 24 05/15/2023    ALKPHOS 97 05/15/2023    TP 6 1 (L) 05/15/2023    TBILI 1 44 (H) 05/15/2023    EGFR 113 05/15/2023

## 2023-05-15 ENCOUNTER — HOSPITAL ENCOUNTER (OUTPATIENT)
Dept: INFUSION CENTER | Facility: HOSPITAL | Age: 43
Discharge: HOME | End: 2023-05-15

## 2023-05-15 DIAGNOSIS — C20 RECTAL CANCER (HCC): Primary | ICD-10-CM

## 2023-05-15 LAB
ALBUMIN SERPL BCP-MCNC: 3.4 G/DL (ref 3.5–5)
ALP SERPL-CCNC: 97 U/L (ref 34–104)
ALT SERPL W P-5'-P-CCNC: 24 U/L (ref 7–52)
ANION GAP SERPL CALCULATED.3IONS-SCNC: 8 MMOL/L (ref 4–13)
ANISOCYTOSIS BLD QL SMEAR: PRESENT
AST SERPL W P-5'-P-CCNC: 42 U/L (ref 13–39)
BASOPHILS # BLD MANUAL: 0.13 THOUSAND/UL (ref 0–0.1)
BASOPHILS NFR MAR MANUAL: 3 % (ref 0–1)
BILIRUB SERPL-MCNC: 1.44 MG/DL (ref 0.2–1)
BUN SERPL-MCNC: 4 MG/DL (ref 5–25)
BURR CELLS BLD QL SMEAR: PRESENT
CALCIUM ALBUM COR SERPL-MCNC: 9.4 MG/DL (ref 8.3–10.1)
CALCIUM SERPL-MCNC: 8.9 MG/DL (ref 8.4–10.2)
CHLORIDE SERPL-SCNC: 107 MMOL/L (ref 96–108)
CO2 SERPL-SCNC: 24 MMOL/L (ref 21–32)
CREAT SERPL-MCNC: 0.73 MG/DL (ref 0.6–1.3)
EOSINOPHIL # BLD MANUAL: 0 THOUSAND/UL (ref 0–0.4)
EOSINOPHIL NFR BLD MANUAL: 0 % (ref 0–6)
ERYTHROCYTE [DISTWIDTH] IN BLOOD BY AUTOMATED COUNT: 18.7 % (ref 11.6–15.1)
GFR SERPL CREATININE-BSD FRML MDRD: 113 ML/MIN/1.73SQ M
GLUCOSE SERPL-MCNC: 122 MG/DL (ref 65–140)
HCT VFR BLD AUTO: 31.9 % (ref 36.5–49.3)
HGB BLD-MCNC: 10.6 G/DL (ref 12–17)
LYMPHOCYTES # BLD AUTO: 1.52 THOUSAND/UL (ref 0.6–4.47)
LYMPHOCYTES # BLD AUTO: 34 % (ref 14–44)
MCH RBC QN AUTO: 29.6 PG (ref 26.8–34.3)
MCHC RBC AUTO-ENTMCNC: 33.2 G/DL (ref 31.4–37.4)
MCV RBC AUTO: 89 FL (ref 82–98)
METAMYELOCYTES NFR BLD MANUAL: 2 % (ref 0–1)
MONOCYTES # BLD AUTO: 0.58 THOUSAND/UL (ref 0–1.22)
MONOCYTES NFR BLD: 13 % (ref 4–12)
MYELOCYTES NFR BLD MANUAL: 1 % (ref 0–1)
NEUTROPHILS # BLD MANUAL: 2.1 THOUSAND/UL (ref 1.85–7.62)
NEUTS BAND NFR BLD MANUAL: 1 % (ref 0–8)
NEUTS SEG NFR BLD AUTO: 46 % (ref 43–75)
OVALOCYTES BLD QL SMEAR: PRESENT
PLATELET # BLD AUTO: 113 THOUSANDS/UL (ref 149–390)
PLATELET BLD QL SMEAR: ABNORMAL
PMV BLD AUTO: 10.1 FL (ref 8.9–12.7)
POTASSIUM SERPL-SCNC: 3.4 MMOL/L (ref 3.5–5.3)
PROT SERPL-MCNC: 6.1 G/DL (ref 6.4–8.4)
RBC # BLD AUTO: 3.58 MILLION/UL (ref 3.88–5.62)
RBC MORPH BLD: PRESENT
SODIUM SERPL-SCNC: 139 MMOL/L (ref 135–147)
WBC # BLD AUTO: 4.46 THOUSAND/UL (ref 4.31–10.16)

## 2023-05-17 ENCOUNTER — TELEPHONE (OUTPATIENT)
Age: 43
End: 2023-05-17

## 2023-05-17 ENCOUNTER — HOSPITAL ENCOUNTER (OUTPATIENT)
Dept: INFUSION CENTER | Facility: HOSPITAL | Age: 43
Discharge: HOME | End: 2023-05-17
Attending: INTERNAL MEDICINE

## 2023-05-17 ENCOUNTER — OFFICE VISIT (OUTPATIENT)
Age: 43
End: 2023-05-17

## 2023-05-17 VITALS
OXYGEN SATURATION: 98 % | HEART RATE: 72 BPM | HEIGHT: 75 IN | SYSTOLIC BLOOD PRESSURE: 115 MMHG | TEMPERATURE: 96.4 F | BODY MASS INDEX: 24.86 KG/M2 | WEIGHT: 199.96 LBS | DIASTOLIC BLOOD PRESSURE: 60 MMHG | RESPIRATION RATE: 18 BRPM

## 2023-05-17 DIAGNOSIS — C20 RECTAL CANCER (HCC): Primary | ICD-10-CM

## 2023-05-17 RX ORDER — LOPERAMIDE HYDROCHLORIDE 2 MG/1
2 TABLET ORAL 4 TIMES DAILY PRN
Status: DISCONTINUED | OUTPATIENT
Start: 2023-05-17 | End: 2023-05-20 | Stop reason: HOSPADM

## 2023-05-17 RX ORDER — DEXTROSE MONOHYDRATE 50 MG/ML
20 INJECTION, SOLUTION INTRAVENOUS ONCE
Status: COMPLETED | OUTPATIENT
Start: 2023-05-17 | End: 2023-05-17

## 2023-05-17 RX ORDER — LOPERAMIDE HYDROCHLORIDE 2 MG/1
2 TABLET ORAL 4 TIMES DAILY PRN
Status: CANCELLED
Start: 2023-05-17

## 2023-05-17 RX ORDER — LOPERAMIDE HYDROCHLORIDE 2 MG/1
2 CAPSULE ORAL ONCE
Status: CANCELLED
Start: 2023-05-17 | End: 2023-05-17

## 2023-05-17 RX ORDER — FLUOROURACIL 50 MG/ML
400 INJECTION, SOLUTION INTRAVENOUS ONCE
Status: COMPLETED | OUTPATIENT
Start: 2023-05-17 | End: 2023-05-17

## 2023-05-17 RX ORDER — LOPERAMIDE HYDROCHLORIDE 2 MG/1
2 CAPSULE ORAL ONCE
Status: COMPLETED | OUTPATIENT
Start: 2023-05-17 | End: 2023-05-17

## 2023-05-17 RX ORDER — SODIUM CHLORIDE 9 MG/ML
20 INJECTION, SOLUTION INTRAVENOUS ONCE AS NEEDED
Status: DISCONTINUED | OUTPATIENT
Start: 2023-05-17 | End: 2023-05-20 | Stop reason: HOSPADM

## 2023-05-17 RX ADMIN — OXALIPLATIN 200 MG: 5 INJECTION, SOLUTION INTRAVENOUS at 12:00

## 2023-05-17 RX ADMIN — LOPERAMIDE HYDROCHLORIDE 2 MG: 2 CAPSULE ORAL at 13:32

## 2023-05-17 RX ADMIN — FLUOROURACIL 915 MG: 50 INJECTION, SOLUTION INTRAVENOUS at 14:12

## 2023-05-17 RX ADMIN — DEXAMETHASONE SODIUM PHOSPHATE: 10 INJECTION, SOLUTION INTRAMUSCULAR; INTRAVENOUS at 11:14

## 2023-05-17 RX ADMIN — LEUCOVORIN CALCIUM 900 MG: 500 INJECTION, POWDER, LYOPHILIZED, FOR SOLUTION INTRAMUSCULAR; INTRAVENOUS at 12:02

## 2023-05-17 RX ADMIN — SODIUM CHLORIDE 20 ML/HR: 9 INJECTION, SOLUTION INTRAVENOUS at 11:14

## 2023-05-17 RX ADMIN — DEXTROSE 20 ML/HR: 5 SOLUTION INTRAVENOUS at 12:00

## 2023-05-17 NOTE — PROGRESS NOTES
Pt was in the middle of his chemo infusion when he vomited  He felt better afterwards  Mariaelena Badillo notified and added emend and aloxi to his plan for his next treatment  Pt aware

## 2023-05-18 ENCOUNTER — PATIENT OUTREACH (OUTPATIENT)
Dept: HEMATOLOGY ONCOLOGY | Facility: CLINIC | Age: 43
End: 2023-05-18

## 2023-05-18 NOTE — PROGRESS NOTES
NN phone outreach, completed 1/2 way GI assessment w the pt, he overall states he is doing pretty good, he states only having c/o mild nausea, in the very beginning he had mild diarrhea but not at this time  He denies pain, he reports feeling even better at this time, he has dropped about 15 lbs but he states he could lose some of the weight, I mentioned the onc dieticians, pt states he is at a good wgt now  I instructed him to let us know if he is losing more wgt we could set him up w a dietician, goal is to maintain wgt and not drop too much, he understood  I rvwd w him details about his rad onc consult, tx w chemo and RT for about 5-6 wks, simulation, and following chemo/RT pt will have CT, MRI, and pre-op OV w Dr Darin Marshall, followed by sx  Pt understood all details  No complaints at this time from the pt, he has my contact info and knows to call if he has any questions or concerns

## 2023-05-19 ENCOUNTER — TELEPHONE (OUTPATIENT)
Age: 43
End: 2023-05-19

## 2023-05-19 ENCOUNTER — HOSPITAL ENCOUNTER (OUTPATIENT)
Dept: INFUSION CENTER | Facility: HOSPITAL | Age: 43
Discharge: HOME | End: 2023-05-19
Attending: INTERNAL MEDICINE

## 2023-05-19 DIAGNOSIS — C20 RECTAL CANCER (HCC): Primary | ICD-10-CM

## 2023-05-19 NOTE — PROGRESS NOTES
Pt arrived amb for Elastomere d/c  Appears empty over 46hrs has transpired  Pt states that he was awoken from a sound sleep @ 3am Thurs morning with pain and cramping level 10 waist down  Lasted for about 30 mins and went away  Pt states he has had hand cramping in the past but never like this  Port de-accessed, dsd applied  Pt has more appts  Disch amb to home, steady gait  Office notified of his pain and cramping    They will follow up

## 2023-05-19 NOTE — TELEPHONE ENCOUNTER
Patient is supposed to be scheduled for ziextenzo on day 4, which is tomorrow  Is there any way we can try to schedule this if he is agreeable to another location since it is on a Saturday? Thanks!

## 2023-05-22 ENCOUNTER — HOSPITAL ENCOUNTER (OUTPATIENT)
Dept: INFUSION CENTER | Facility: HOSPITAL | Age: 43
Discharge: HOME | End: 2023-05-22
Attending: INTERNAL MEDICINE

## 2023-05-22 DIAGNOSIS — C20 RECTAL CANCER (HCC): Primary | ICD-10-CM

## 2023-05-22 RX ADMIN — PEGFILGRASTIM-BMEZ 6 MG: 6 INJECTION SUBCUTANEOUS at 10:55

## 2023-05-22 NOTE — PROGRESS NOTES
Pt received Ziextenzo injection into r arm; band aid applied  Pt aware of next appt  Left ambulatory with steady gait for d/c

## 2023-05-24 ENCOUNTER — TELEPHONE (OUTPATIENT)
Dept: NUTRITION | Facility: CLINIC | Age: 43
End: 2023-05-24

## 2023-05-24 ENCOUNTER — CLINICAL SUPPORT (OUTPATIENT)
Facility: HOSPITAL | Age: 43
End: 2023-05-24
Attending: INTERNAL MEDICINE

## 2023-05-24 ENCOUNTER — RADIATION ONCOLOGY CONSULT (OUTPATIENT)
Facility: HOSPITAL | Age: 43
End: 2023-05-24
Attending: INTERNAL MEDICINE

## 2023-05-24 VITALS
DIASTOLIC BLOOD PRESSURE: 58 MMHG | HEART RATE: 80 BPM | OXYGEN SATURATION: 99 % | SYSTOLIC BLOOD PRESSURE: 108 MMHG | TEMPERATURE: 97.7 F | WEIGHT: 189.6 LBS | RESPIRATION RATE: 18 BRPM | BODY MASS INDEX: 23.57 KG/M2 | HEIGHT: 75 IN

## 2023-05-24 DIAGNOSIS — C20 RECTAL CANCER (HCC): Primary | ICD-10-CM

## 2023-05-24 DIAGNOSIS — C20 RECTAL CANCER (HCC): ICD-10-CM

## 2023-05-24 PROCEDURE — 77263 THER RADIOLOGY TX PLNG CPLX: CPT | Performed by: RADIOLOGY

## 2023-05-24 RX ORDER — DEXTROSE MONOHYDRATE 50 MG/ML
20 INJECTION, SOLUTION INTRAVENOUS ONCE
Status: CANCELLED | OUTPATIENT
Start: 2023-05-31

## 2023-05-24 RX ORDER — FLUOROURACIL 50 MG/ML
400 INJECTION, SOLUTION INTRAVENOUS ONCE
Status: CANCELLED | OUTPATIENT
Start: 2023-05-31

## 2023-05-24 RX ORDER — LOPERAMIDE HYDROCHLORIDE 2 MG/1
2 CAPSULE ORAL 4 TIMES DAILY PRN
Status: CANCELLED
Start: 2023-05-31

## 2023-05-24 RX ORDER — PALONOSETRON 0.05 MG/ML
0.25 INJECTION, SOLUTION INTRAVENOUS ONCE
Status: CANCELLED | OUTPATIENT
Start: 2023-05-31

## 2023-05-24 RX ORDER — SODIUM CHLORIDE 9 MG/ML
20 INJECTION, SOLUTION INTRAVENOUS ONCE AS NEEDED
Status: CANCELLED | OUTPATIENT
Start: 2023-05-31

## 2023-05-24 NOTE — PROGRESS NOTES
Pool Pretty 1980 is a 36 y o  male Referred to radiation oncology by Dr Nick Brown to discuss radiation therapy to treat adenocarcinoma of the rectum  Currently receiving chemotherapy  Initial history:  patient presented to Emergency department on 2/16/23 due to several months of enduring issues with defecation  He had to strain to have BM  He noticed blood on the toilet paper and in the toilet  These s/s became more persistent 3 days prior to arrival to emergency room with associated abdominal pain that was described as generalized, constant, campy  He was also experiencing nausea/vomiting, loose stools and intermittent constipation, rectal pain  2/16/23 CT A/P w/contrast:    Asymmetric rectal wall thickening, perirectal edema, prominent in size perirectal and low retroperitoneal lymph nodes  This is most concerning for neoplasm  Findings of hepatic cirrhosis and portal hypertension  Multiple somewhat geographic areas of decreased attenuation in the liver suspected to represent areas of focal fatty infiltration  More discrete rounded areas with contour irregularity involving   the left lobe and caudate lobe may represent regenerative or dysplastic nodules  Recommend further evaluation with MRI with and without intravenous contrast      Gallbladder is markedly distended without radiopaque calculi or findings for cholecystitis  2/16/23 US RUQ: 1  Markedly abnormal liver appearing enlarged and demonstrating severe hepatic steatosis  Please refer to prior CT report for additional findings and recommendations  2  Distended gallbladder, without evidence of gallbladder wall thickening, significant biliary ductal dilatation or point tenderness  No gallstones are seen    2/17/23 Colonoscopy: Colonoscopy IMPRESSION:  Malignant friable rectal mass, palpable on rectal exam   Circumferential but able to be traversed  Extends from 2 to 9 cm  Multiple biopsies obtained   Polyp removed from the descending colon with a cold snare  2/17/23 Tissue Exam:   Addendum   RESULTS OF IMMUNOHISTOCHEMICAL ANALYSIS FOR MISMATCH REPAIR PROTEIN LOSS     INTERPRETATION: NO LOSS OF NUCLEAR EXPRESSION OF MMR PROTEINS: LOW PROBABILITY OF MSI-H      RESULTS:  Antibody            Clone                 Description                                  Results  MLH1                 [id number]          Mismatch repair protein               Intact nuclear expression  MSH2                [id number]        Mismatch repair protein               Intact nuclear expression  MSH6                40                      Mismatch repair protein               Intact nuclear expression  PMS2                 [id number]           Mismatch repair protein               Intact nuclear expression     Comment:   A negative control and a positive control for each antibody have been reviewed and accepted  [facility] Specimen ID: [id number]     Evaluator: Paras Abebe MD     These tests were developed and their performance characteristics determined by [facility]  They may not be cleared or approved by the [place]  Food and Drug Administration  The FDA determined that such clearance or approval is not necessary  These tests are used for clinical purposes  They should not be regarded as investigational or for research  This laboratory has been approved by [place] [id number], designated as a high-complexity laboratory and is qualified to perform these tests  Comments:   Patients whose tumors demonstrate lack of expression of one or more DNA mismatch repair proteins might be at risk for Orosco Syndrome  This cancer susceptibility syndrome greatly increases the risk of synchronous and/or metachronous cancers in the affected patients and their family members  Normal expression of all proteins does not completely rule out familial cancer predisposition       The [employer] Program Task Forces recommends that all patients with lack of expression of one or more DNA mismatch repair proteins and those with concerning personal or family history should undergo thorough evaluation, counseling and possibly genetic testing  Addendum electronically signed by Gisele Conway MD on 3/1/2023 at 4019   Final Diagnosis   A  Large Intestine, Descending Colon, polyp:    - Tubular adenoma  - Negative for high grade dysplasia  B  Rectum, mass:   -  Adenocarcinoma  See comment  Comment: Immunohistochemical stains performed with adequate controls demonstrates that the tumor is positive for CDX2, SATB2, CK20 (patchy), and negative for CK7, Synaptophysin, Chromogranin A, and p40  The immunophenotype supports the diagnosis  Ancillary testing for mismatch repair (MMR) protein deficiency by immunohistochemical panel (MLH1, PMS2, MSH2, MSH6) is undertaken (Block B1); the results will be issued in a supplemental report, to follow shortly  2/17/23 CT Chest w/con:  No lung metastases  Mild emphysema  Esophageal varices  Abnormal liver  See abdomen CT  Splenomegaly  2/22/23 Medical Oncology Office Consult Note Dr Waters Dress: New Diagnosis: Rectal Cancer  Abdominal bloating x past year and also infected tooth and he thought that was causing his abdominal issues  Once his dental issue cleared his abdominal issues did not resolve  Abdominal pain and stool urgency was what had him present to the ED  CT Workup revealed non obstructing rectal mass and significant cirrhosis  No alcohol in past few weeks  History of daily significant beer intake  Recent labs show a CEA = 39, AFP = 8 4, bili = 2 5, AST = 100, ALT = 53  Cbc shows a mild anemia  Cancer staging: 3/2/2023 - [id number] rectal adenocarcinoma  Genetic testing pending  Referred to Hepatology  Waiting to see if liver is metastatic  Holding on Chemo until further results  He cannot drink alcohol anymore as his liver is borderline for chemo     If he is not metastatic will likely give total neoadjuvant chemotherapy with Folfox x 4 months followed by 6 weeks of 5-FU/RT      2/23/23 MRI Abdomen w/wo contrast: Cirrhotic liver morphology with evidence of portal hypertension manifested by splenomegaly, gastroesophageal varices, recanalization of the periumbilical vein, and trace perihepatic ascites  Diffuse moderate hepatic steatosis with irregular areas of more severe steatosis  The areas of more severe steatosis correspond to the areas of decreased attenuation on the recent CT, and correspond to the areas of decreased T1 signal on this exam    No definite focal hepatic lesions to suggest either a primary liver neoplasm or metastatic disease  Likely reactive changes in the gallbladder  If there is clinical concern for acute cholecystitis, this would be better confirmed with HIDA scan      2/23/23 MRI Pelvis rectal cancer staging w/o contrast:   Overall MRI stage: T3c, N2, Mid rectal cancer    MRF: involved (tumor margin within 1 mm of MRF)  Sphincter involvement: No   Suspicious extra mesorectal lymph nodes:Yes, right external iliac chain  EMVI: Likely present  For the purpose of radiation therapy planning, series 16 images 10-30 would be most useful      2/28/23 IR Port Placed: right chest     3/1/23 Genetic assessment d/t personal history of rectal cancer:  Jamee Gandara:    [facility]  RESULTS OF IMMUNOHISTOCHEMICAL ANALYSIS FOR MISMATCH REPAIR PROTEIN LOSS   INTERPRETATION: NO LOSS OF NUCLEAR EXPRESSION OF MMR PROTEINS: LOW PROBABILITY OF MSI-H    RESULTS:  Antibody            Clone                 Description                                  Results  MLH1                 [id number]          Mismatch repair protein               Intact nuclear expression  MSH2                [id number]        Mismatch repair protein               Intact nuclear expression  MSH6                44                      Mismatch repair protein               Intact nuclear expression  PMS2 [id number]           Mismatch repair protein               Intact nuclear expression   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, Heterozygous, 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  Additional Result: Variant of Uncertain Significance:  TSC1 c 1721C>G (p T574S), Heterozygous, Uncertain Significance  Assessment:  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, specifically c 2436-1G>A and is  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  It is not  known how many people are MSH3-associated polyposis carriers in the general population, but  the percentage is thought to be low (<1%)  Being a carrier means that they do NOT have  features of MSH3-associated polyposis but can potentially have children who are affected  Currently there no cancer risks known to be associated with carrying one pathogenic variant in  the MSH3 gene  Information on MSH3-associated polyposis:  Individuals who inherit two MSH3 mutations, one from each parent, have a condition called  MSH3-associated polyposis  MSH3-associated polyposis is characterized by the development  of numerous colorectal and intestinal polyps, as well as colorectal and stomach cancers  The  specific lifetime risks of these cancers are currently unknown but are thought to be increased  over the general population  There may also be an increased risk of developing brain tumors,  but information regarding this association is limited  To summarize [facility] carries one pathogenic variant in the [facility] gene    3/1/23 Colon and Rectal Surgery Office Note: Dr CLEMENTE Gibson: colonoscopy and pathology reviewed  The tumor is in the mid rectum and extended to just above the anus  Pathology invasive adenocarcinoma  CT reveals involvement along right iliac lymph nodes  Per Dr Lindy Carrizales: Examination today reveals a rectal mass at at least 5 to 6 cm from the anal verge  There was a long segment of normal rectum distal to the base of the tumor    The tumor is exophytic and bulges distally within the lumen but the luminal attachment to the sidewalls of the rectum is only at the mid rectum rather than the distal rectum  Recommendation: chemotherapy with f/u chemoradiation neoadjuvant therapy  Followed with 2-3 month interval then surgical intervention with low anterior resection and diverting temporary ileostomy if patient is a candidate for that operation  He will be re-evaluated prior to surgery  He may need permanent colostomy  Goal is cure at this time  We will see him in f/u when chemo/RT completed  3/2/23 GI office note:  Dr Brenton Briones:  rectal cancer T3N2 plan for chemo/RT as a bridge to surgery  Abdominal imaging c/w cirrhosis  No physical  evidence of Cirrhosis or portal hypertension  Suspect degree of Alcoholic Hepatitis  Repeat labs ordered to reassess MELD Score  Stable candidate for chemotherapy but would perform EGD to evaluate esophageal varices and treat varices prior to surgery  Plan labs, EGD, f/u in 3 months      4/25/23 EGD:results were normal, other than mild, non-specific duodenal inflammation  4/25/23 Tissue Exam:      Final Diagnosis   A  Duodenum, r/o duodenitis, biopsy:    -Benign small intestinal mucosa with retained villous architecture and no evidence of increased intraepithelial lymphocytes  -Mild non-specific chronic inflammation of lamina propria seen   -No evidence of dysplasia or malignancy  B  Stomach, r/o gastritis and h pylori:   Benign gastric-oxyntoantral type mucosa with mild chronic inflammation and reactive surface foveolar epithelial changes consistent with reactive/ chemical gastritis   -No evidence of ulceration   -No evidence of dysplasia or malignancy   -No H Pylori organisms identified on  H&E stained slide         C  Esophagus, r/o barretts and irregular z line, Biopsy:   -Benign gastroesophageal junction mucosa with  moderate  chronic inflammation and reactive surface epithelial changes   -No evidence of eosinophilic esophagitis   -No evidence of Goblet cell metaplasia   -No evidence of glandular dysplasia or malignancy      3/20/23 Med/Onc F/U office note,  Dr Britany Louis: tolerated 1st cycle of Folfox  Nausea/Diarrhea 1 day post chemo  Persistent rectal bleeding, but has slowed down  Plan to proceed with cycle 2 of Folfox without dose reduction  F/U with NP in 2 weeks  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  Need to have 2 copies of gene to manifest the disease state, no need for further genetic testing  23 Medical Oncology office F/U note: Cycle 5 Chemo: Rectal pain and bleeding resolve  Tolerating infusions, labs acceptable for treatment  Persistent intermittent bouts of nausea and diarrhea  Plan for pre treatment Imodium and Zofran and instruction for home use  Pre meds with be adjusted as patient had an emesis with Oxaliplatin infusion treatment today  For last 2 treatments he will receive Zofran and Decadron  Up comin23 Infusion   23 Infusion  23 Med/Onc Dr Britany Louis  23 GI Dr Bee Li          Oncology History   Rectal cancer [facility])   2023 Biopsy    Final Diagnosis   A  Large Intestine, Descending Colon, polyp:    - Tubular adenoma  - Negative for high grade dysplasia  B  Rectum, mass:   -  Adenocarcinoma  See comment  Comment: Immunohistochemical stains performed with adequate controls demonstrates that the tumor is positive for CDX2, SATB2, CK20 (patchy), and negative for CK7, Synaptophysin, Chromogranin A, and p40  The immunophenotype supports the diagnosis   Ancillary testing for mismatch repair (MMR) protein deficiency by immunohistochemical panel (MLH1, PMS2, MSH2, MSH6) is undertaken (Block B1); the results will be issued in a supplemental report, to follow shortly     Addendum   RESULTS OF IMMUNOHISTOCHEMICAL ANALYSIS FOR MISMATCH REPAIR PROTEIN LOSS     INTERPRETATION: NO LOSS OF NUCLEAR EXPRESSION OF MMR PROTEINS: LOW PROBABILITY OF MSI-H      RESULTS:  Antibody            Clone                 Description                                  Results  MLH1                 [id number]          Mismatch repair protein               Intact nuclear expression  MSH2                [id number]        Mismatch repair protein               Intact nuclear expression  MSH6                40                      Mismatch repair protein               Intact nuclear expression  PMS2                 [id number]           Mismatch repair protein               Intact nuclear expression     Comment:   A negative control and a positive control for each antibody have been reviewed and accepted        2/27/2023 Initial Diagnosis    Rectal cancer ([facility] [address] )     3/2/2023 -  Cancer Staged    Staging form: Colon and Rectum, AJCC 8th Edition  - Clinical: cT3, cN2, cM0 - Signed by Aravind Toussaint DO on 3/2/2023       3/9/2023 -  Chemotherapy    alteplase (CATHFLO), 2 mg, Intracatheter, Every 1 Minute as needed, 5 of 10 cycles  palonosetron (ALOXI), 0 25 mg, Intravenous, Once, 0 of 2 cycles  pegfilgrastim (NEULASTA), 6 mg, Subcutaneous, Once, 1 of 1 cycle  Pegfilgrastim-bmez (ZIEXTENZO), 6 mg, Subcutaneous, Once, 3 of 8 cycles  Administration: 6 mg (4/21/2023), 6 mg (5/6/2023), 6 mg (5/22/2023)  fluorouracil (ADRUCIL), 400 mg/m2 = 915 mg, Intravenous, Once, 5 of 10 cycles  Administration: 915 mg (3/9/2023), 915 mg (3/22/2023), 915 mg (4/19/2023), 915 mg (5/4/2023), 915 mg (5/17/2023)  leucovorin calcium IVPB, 916 mg, Intravenous, Once, 5 of 10 cycles  Administration: 900 mg (3/9/2023), 900 mg (3/22/2023), 900 mg (4/19/2023), 900 mg (5/4/2023), 900 mg (5/17/2023)  oxaliplatin (ELOXATIN) chemo infusion, 194 65 mg, Intravenous, Once, 5 of 10 cycles  Administration: 200 mg (3/9/2023), 200 mg (3/22/2023), 200 mg (4/19/2023), 200 mg (5/4/2023), 200 mg (5/17/2023)  fluorouracil (ADRUCIL) ambulatory infusion Soln, 1,200 mg/m2/day = 5,495 mg, Intravenous, Over 46 hours, 5 of 10 "cycles  aprepitant (CINVANTI) in  mL IVPB, 130 mg, Intravenous, Once, 0 of 2 cycles     6/19/2023 -  Chemotherapy    fluorouracil (ADRUCIL) ambulatory infusion Soln, 200 mg/m2/day = 2,190 mg (88 9 % of original dose 225 mg/m2/day), Intravenous, Over 120 hours, 0 of 5 cycles  Dose modification: 200 mg/m2/day (original dose 225 mg/m2/day, Cycle 1, Reason: Anticipated Tolerance)         Review of Systems:  Review of Systems   Constitutional: Positive for activity change, fatigue and unexpected weight change  HENT: Positive for postnasal drip  Negative for congestion, mouth sores and sinus pain  Top and bottom dentures    Eyes: Negative for itching  Respiratory: Positive for cough (from post nasal drip )  Cardiovascular: Negative  Gastrointestinal: Positive for diarrhea (intermittent controlled with immodium ), nausea and vomiting  Negative for abdominal distention, abdominal pain, blood in stool and rectal pain  Endocrine: Positive for cold intolerance (after chemo )  Genitourinary: Negative  Musculoskeletal: Negative  Skin: Negative  Allergic/Immunologic: Positive for environmental allergies and food allergies  Neurological: Positive for light-headedness (associated with decreased po intake )  Negative for tremors and weakness  Hematological: Negative  Psychiatric/Behavioral: Positive for agitation, decreased concentration, dysphoric mood and sleep disturbance (sleep interuption and difficulty getting to sleep )  Clinical Trial: no    Pain assessment: \"no pain, like when a dog eat grass, I am bringing of snot and froth\"     Prior Radiation: no    Teaching: Home 9 and Nutrition Booklet given  [employer] education sheets on Fatigue, Skin care, Low fiber diet for diarrhea, Colon, Rectal, and Anal cancer, Rectal cancer,  Diarrhea, Nausea and vomiting, loss of appetite,  Community Support and [employer] also provided        MST: completed, " with referral to nutrition service d/t 20 lb wt loss over 4 months   Implantable Devices:  Port Right chest     Hip Replacement: no     Health Maintenance   Topic Date Due   • COVID-19 Vaccine (1) Never done   • Pneumococcal Vaccine: Pediatrics (0 to 5 Years) and At-Risk Patients (6 to 59 Years) (1 - PCV) Never done   • Depression Screening  Never done   • HIV Screening  Never done   • Annual Physical  Never done   • Hepatitis A Vaccine (1 of 2 - Risk 2-dose series) Never done   • DTaP,Tdap,and Td Vaccines (1 - Tdap) Never done   • Influenza Vaccine (Season Ended) 09/01/2023   • Colorectal Cancer Screening  02/17/2024   • BMI: Adult  05/17/2024   • Hepatitis B Vaccine (1 of 3 - Risk 3-dose series) 06/04/2039   • Hepatitis C Screening  Completed   • HIB Vaccine  Aged Out   • IPV Vaccine  Aged Out   • Meningococcal ACWY Vaccine  Aged Out   • HPV Vaccine  Aged Out       Past Medical History:   Diagnosis Date   • Dental infection    • Fatty liver        Past Surgical History:   Procedure Laterality Date   • APPENDECTOMY     • COLONOSCOPY     • DENTAL SURGERY     • IR PORT PLACEMENT  02/28/2023       Family History   Problem Relation Age of Onset   • Lung cancer Mother    • Cancer Father         head and neck cancer   • Colon cancer Neg Hx    • Colon polyps Neg Hx        Social History     Tobacco Use   • Smoking status: Former     Packs/day: 1 00     Years: 20 00     Total pack years: 20 00     Types: Cigarettes     Quit date: 2020     Years since quitting: 3 3   • Smokeless tobacco: Never   Vaping Use   • Vaping Use: Never used   Substance Use Topics   • Alcohol use: Yes     Alcohol/week: 2 0 standard drinks of alcohol     Types: 2 Cans of beer per week     Comment: 1-2 daily   previously up to 1 case/day (reduced alcohol intake 7 years ago)   • Drug use: No          Current Outpatient Medications:   •  diphenoxylate-atropine (LOMOTIL) 2 5-0 025 mg per tablet, Take 1 tablet by mouth 4 (four) times a day as needed for diarrhea As needed, Disp: , Rfl:   •  ondansetron (ZOFRAN) 8 mg tablet, Take 1 tablet (8 mg total) by mouth every 8 (eight) hours as needed for nausea or vomiting, Disp: 30 tablet, Rfl: 2  •  potassium chloride (K-DUR,KLOR-CON) 20 mEq tablet, Take 1 tablet (20 mEq total) by mouth 2 (two) times a day for 7 days, Disp: 14 tablet, Rfl: 0    No Known Allergies     There were no vitals filed for this visit

## 2023-05-24 NOTE — TELEPHONE ENCOUNTER
Received notification by [facility] RN, Delmar Hernández , on 5/24/23 that pt has triggered for oncology nutrition care (reason for referral: Malnutrition Screening Tool (MST) Triggers: scored a 3 indicating 14-23# (6 4-10 5 kg) recent wt loss and is eating poorly due to a decreased appetite  (Date of MST: 5/24/23) and rectal cancer)  Unruly Mendosa today to establish care  No answer  Left voice message with the reason for today's call and this RD’s contact information asking that OG call back as able/desired

## 2023-05-24 NOTE — PROGRESS NOTES
Consultation - Radiation Oncology      UEP:[id number] : 1980  Encounter: [id number]  Patient Information: [address]  Chief Complaint   Patient presents with   • Consult     Cancer Staging   Rectal cancer [facility])  Staging form: Colon and Rectum, AJCC 8th Edition  - Clinical: cT3, cN2, cM0 - Signed by Jaycee Olmstead DO on 3/2/2023  Stage IIIC [id number] rectal adenocarcinoma undergoing total neoadjuvant therapy prior to consideration for TME         History of Present Illness   Benito Rayo is a 36 y o  male who presents in  with abdominal pain, prompting CT abdomen/pelvis with contrast on 23  This demonstrated asymmetric rectal wall thickening, perirectal edema, prominent in size perirectal and low retroperitoneal lymph nodes concerning for neoplasm  There was also radiographic evidence of hepatic cirrhosis and portal hypertension  Colonoscopy on 23 demonstrated a single malignant appearing mass (traversable) measuring 7 cm in the rectum 2 cm from the anal verge, covering the whole circumference and bleeding occurred before intervention  There was an additional 5 mm sessile polyp in the descending colon  Pathology was consistent with adenocarcinoma (rectal mass) with intact nuclear expression of MMR proteins  The descending colon polyp was consistent with a tubular adenoma  CT chest on 23 did not reveal evidence of metastatic disease  MRI pelvis on 23 demonstrated a mid rectal cancer (5 to 10 cm), 6 5 cm from the anal verge  This was 1 1 cm from the lowest extent of the tumor to the top of the anal sphincter and below the peritoneal reflection  This was polypoid, the most inferior aspect extended from 3 o'clock to 7 o'clock  More superiorly, the mass was inseparable from the rectal wall from 12 o'clock to 6 o'clock  The total craniocaudal length was 5 7 cm  This was T3c by MRI  There was no anal sphincter involvement    There was EMVI appreciated  Shortest distant from the tumor to the mesorectal fascia was 3 mm  There were enlarged, suspicious right perirectal lymph nodes within 2 mm of the MRF and one lymph node in the 8 o'clock position abutted the mesorectal fascia more superiorly  There were 4 or greater suspicious locoregional nodes (see report, 7 described)  There was one right external iliac lymph node measuring 1 cm  MRI abdomen on 2/23/23 demonstrated a cirrhotic liver without definitive evidence of metastatic disease  EGD was performed on 4/25/23 with banding of varices  Samples from the duodenum, stomach and esophagus were negative for malignancy  Other remarkable labs include CEA 39, AFP 8 4  He has been evaluated by colorectal surgery and medical oncology and has started total neoadjuvant treatment  The plan is to complete four months of FOLFOX and he presents for consideration of chemoradiation  Upon interview, he endorses the above history  He has ongoing fatigue and reports an ongoing sinus infection  He has experienced mild weight loss and diminished appetite  Otherwise, he is without additional acute concerns  Historical Information   Oncology History   Rectal cancer ([facility] [address] )   2/17/2023 Biopsy    Final Diagnosis   A  Large Intestine, Descending Colon, polyp:    - Tubular adenoma  - Negative for high grade dysplasia  B  Rectum, mass:   -  Adenocarcinoma  See comment  Comment: Immunohistochemical stains performed with adequate controls demonstrates that the tumor is positive for CDX2, SATB2, CK20 (patchy), and negative for CK7, Synaptophysin, Chromogranin A, and p40  The immunophenotype supports the diagnosis   Ancillary testing for mismatch repair (MMR) protein deficiency by immunohistochemical panel (MLH1, PMS2, MSH2, MSH6) is undertaken (Block B1); the results will be issued in a supplemental report, to follow shortly     Addendum   RESULTS OF IMMUNOHISTOCHEMICAL ANALYSIS FOR MISMATCH REPAIR PROTEIN LOSS     INTERPRETATION: NO LOSS OF NUCLEAR EXPRESSION OF MMR PROTEINS: LOW PROBABILITY OF MSI-H      RESULTS:  Antibody            Clone                 Description                                  Results  MLH1                 [id number]          Mismatch repair protein               Intact nuclear expression  MSH2                [id number]        Mismatch repair protein               Intact nuclear expression  MSH6                40                      Mismatch repair protein               Intact nuclear expression  PMS2                 [id number]           Mismatch repair protein               Intact nuclear expression     Comment:   A negative control and a positive control for each antibody have been reviewed and accepted        2/27/2023 Initial Diagnosis    Rectal cancer ([facility] [address] )     3/2/2023 -  Cancer Staged    Staging form: Colon and Rectum, AJCC 8th Edition  - Clinical: cT3, cN2, cM0 - Signed by Ran Buck DO on 3/2/2023       3/9/2023 -  Chemotherapy    alteplase (CATHFLO), 2 mg, Intracatheter, Every 1 Minute as needed, 5 of 10 cycles  palonosetron (ALOXI), 0 25 mg, Intravenous, Once, 0 of 2 cycles  pegfilgrastim (NEULASTA), 6 mg, Subcutaneous, Once, 1 of 1 cycle  Pegfilgrastim-bmez (ZIEXTENZO), 6 mg, Subcutaneous, Once, 3 of 8 cycles  Administration: 6 mg (4/21/2023), 6 mg (5/6/2023), 6 mg (5/22/2023)  fluorouracil (ADRUCIL), 400 mg/m2 = 915 mg, Intravenous, Once, 5 of 10 cycles  Administration: 915 mg (3/9/2023), 915 mg (3/22/2023), 915 mg (4/19/2023), 915 mg (5/4/2023), 915 mg (5/17/2023)  leucovorin calcium IVPB, 916 mg, Intravenous, Once, 5 of 10 cycles  Administration: 900 mg (3/9/2023), 900 mg (3/22/2023), 900 mg (4/19/2023), 900 mg (5/4/2023), 900 mg (5/17/2023)  oxaliplatin (ELOXATIN) chemo infusion, 194 65 mg, Intravenous, Once, 5 of 10 cycles  Administration: 200 mg (3/9/2023), 200 mg (3/22/2023), 200 mg (4/19/2023), 200 mg (5/4/2023), 200 mg (5/17/2023)  fluorouracil (ADRUCIL) ambulatory infusion Soln, 1,200 mg/m2/day = 5,495 mg, Intravenous, Over 46 hours, 5 of 10 cycles  aprepitant (CINVANTI) in  mL IVPB, 130 mg, Intravenous, Once, 0 of 2 cycles     6/19/2023 -  Chemotherapy    fluorouracil (ADRUCIL) ambulatory infusion Soln, 200 mg/m2/day = 2,190 mg (88 9 % of original dose 225 mg/m2/day), Intravenous, Over 120 hours, 0 of 5 cycles  Dose modification: 200 mg/m2/day (original dose 225 mg/m2/day, Cycle 1, Reason: Anticipated Tolerance)           Past Medical History:   Diagnosis Date   • Dental infection    • Fatty liver    • Rectal cancer ([facility] [address] )      Past Surgical History:   Procedure Laterality Date   • APPENDECTOMY     • COLONOSCOPY     • DENTAL SURGERY     • IR PORT PLACEMENT  02/28/2023       Family History   Problem Relation Age of Onset   • Lung cancer Mother    • Lung cancer Father    • Cancer Father         head and neck cancer   • Colon cancer Neg Hx    • Colon polyps Neg Hx        Social History   Social History     Substance and Sexual Activity   Alcohol Use Yes   • Alcohol/week: 2 0 standard drinks of alcohol   • Types: 2 Cans of beer per week    Comment: 1-2 daily   previously up to 1 case/day (reduced alcohol intake 7 years ago)     Social History     Substance and Sexual Activity   Drug Use Yes   • Types: Marijuana     Social History     Tobacco Use   Smoking Status Former   • Packs/day: 1 00   • Years: 20 00   • Total pack years: 20 00   • Types: Cigarettes   • Quit date: 2020   • Years since quitting: 3 3   Smokeless Tobacco Never         Meds/Allergies     Current Outpatient Medications:   •  diphenoxylate-atropine (LOMOTIL) 2 5-0 025 mg per tablet, Take 1 tablet by mouth 4 (four) times a day as needed for diarrhea As needed, Disp: , Rfl:   •  ondansetron (ZOFRAN) 8 mg tablet, Take 1 tablet (8 mg total) by mouth every 8 (eight) hours as needed for nausea or vomiting, Disp: 30 tablet, Rfl: 2  •  potassium chloride (K-DUR,KLOR-CON) 20 mEq tablet, Take 1 "tablet (20 mEq total) by mouth 2 (two) times a day for 7 days, Disp: 14 tablet, Rfl: 0  No Known Allergies      Review of Systems   Constitutional: Positive for activity change, fatigue and unexpected weight change  HENT: Positive for postnasal drip  Negative for congestion, mouth sores and sinus pain  Top and bottom dentures    Eyes: Negative for itching  Respiratory: Positive for cough (from post nasal drip )  Cardiovascular: Negative  Gastrointestinal: Positive for diarrhea (intermittent controlled with immodium ), nausea and vomiting  Negative for abdominal distention, abdominal pain, blood in stool and rectal pain  Endocrine: Positive for cold intolerance (after chemo )  Genitourinary: Negative  Musculoskeletal: Negative  Skin: Negative  Allergic/Immunologic: Positive for environmental allergies and food allergies  Neurological: Positive for light-headedness (associated with decreased po intake )  Negative for tremors and weakness  Hematological: Negative  Psychiatric/Behavioral: Positive for agitation, decreased concentration, dysphoric mood and sleep disturbance (sleep interuption and difficulty getting to sleep )  OBJECTIVE:   /58   Pulse 80   Temp 97 7 °F (36 5 °C) (Oral)   Resp 18   Ht 6' 3\" (1 905 m)   Wt 86 kg (189 lb 9 5 oz)   SpO2 99%   BMI 23 70 kg/m²   Pain Assessment:  0  Performance Status: Karnofsky: 90 - Able to carry on normal activity; minor signs or symptoms of disease     Physical Exam  HENT:      Head: Normocephalic and atraumatic  Eyes:      General: No scleral icterus  Extraocular Movements: Extraocular movements intact  Cardiovascular:      Rate and Rhythm: Normal rate  Pulmonary:      Effort: Pulmonary effort is normal    Abdominal:      General: Abdomen is flat  There is no distension  Genitourinary:     Comments: Deferred  Musculoskeletal:      Cervical back: Normal range of motion     Skin:     General: Skin is warm " and dry  Neurological:      General: No focal deficit present  Mental Status: He is alert  Psychiatric:         Mood and Affect: Mood normal           RESULTS  Lab Results    Chemistry        Component Value Date/Time    BUN 4 (L) 05/15/2023 0948     05/15/2023 0948    CO2 24 05/15/2023 0948    CREATININE 0 73 05/15/2023 0948    K 3 4 (L) 05/15/2023 0948        Component Value Date/Time    ALKPHOS 97 05/15/2023 0948    ALT 24 05/15/2023 0948    AST 42 (H) 05/15/2023 0948    CALCIUM 8 9 05/15/2023 0948            Lab Results   Component Value Date    HCT 31 9 (L) 05/15/2023    HGB 10 6 (L) 05/15/2023    MCV 89 05/15/2023     (L) 05/15/2023    WBC 4 46 05/15/2023         Imaging Studies  EGD    Addendum Date: 4/25/2023 Addendum:   [address] [id number] [phone] DATE OF SERVICE: 4/25/23 PHYSICIAN(S): Attending: Liu Yepez MD Fellow: No Staff Documented INDICATION: Alcoholic cirrhosis of liver without ascites ([facility] [address] ) POST-OP DIAGNOSIS: See the impression below  PREPROCEDURE: Informed consent was obtained for the procedure, including sedation  Risks of perforation, hemorrhage, adverse drug reaction and aspiration were discussed  The patient was placed in the left lateral decubitus position  Patient was explained about the risks and benefits of the procedure  Risks including but not limited to bleeding, infection, and perforation were explained in detail  Also explained about less than 100% sensitivity with the exam and other alternatives  PROCEDURE: EGD DETAILS OF PROCEDURE: Patient was taken to the procedure room where a time out was performed to confirm correct patient and correct procedure  The patient underwent monitored anesthesia care, which was administered by an anesthesia professional  The patient's blood pressure, heart rate, level of consciousness, respirations and oxygen were monitored throughout the procedure   The scope was introduced through the mouth and advanced to the second part of the duodenum  Retroflexion was performed in the fundus  The patient experienced no blood loss  The procedure was not difficult  The patient tolerated the procedure well  There were no apparent complications  ANESTHESIA INFORMATION: ASA: II Anesthesia Type: IV Sedation with Anesthesia MEDICATIONS: No administrations occurring from 1101 to 1128 on 04/25/23 FINDINGS: Three large grade IV varices (no red guille sign) in the middle third of the esophagus and lower third of the esophagus; no bleeding was identified; placed 5 bands successfully, resulting in complete eradication Irregular Z-line 40 cm from the incisors; performed 2 cold forceps biopsies  With nodular appearing mucosa at 10 'O clock  Moderate, localized cobblestone, edematous and erythematous mucosa in the fundus of the stomach  Consisted with portal hypertensive gastropathy Moderate, localized edematous and erythematous mucosa in the fundus of the stomach and body of the stomach, consistent with gastritis Performed multiple forceps biopsies to rule out H  pylori in the fundus of the stomach, body of the stomach, greater curve of the stomach, incisura and antrum Edematous and erythematous mucosa in the duodenal bulb and 1st part of the duodenum; performed 3 cold forceps biopsies for dysplasia screening and to rule out celiac disease  With 2 to 3 nodular areas of erythema  SPECIMENS: ID Type Source Tests Collected by Time Destination 1 : r/o duodentitis Tissue Duodenum TISSUE EXAM Ron Workman MD 4/25/2023 11:11 AM  2 : r/o gastritis and h pylori Tissue Stomach TISSUE EXAM Ron oWrkman MD 4/25/2023 11:13 AM  3 : r/o barretts and irregular z line Tissue Esophagus TISSUE EXAM Ron Workman MD 4/25/2023 11:16 AM  IMPRESSION: 3 columns of large varices without stigmata of bleeding  Esophageal band ligation x5  Irregular Z-line with area of nodular mucosa at 10 o'clock position   2 biopsies obtained  Erythematous mucosa in the gastric antrum and body consistent with gastritis  Random gastric biopsies obtained to rule out H  pylori infection  Fundal mucosa appeared edematous and cobblestone consistent with moderate portal congestive gastropathy without evidence of active oozing  Duodenitis in the bulb and first portion with areas of nodularity  Multiple biopsies obtained  RECOMMENDATION:  Await pathology results  Schedule repeat EGD  Follow up with me in clinic  Liquid diet today  Soft puréed foods tomorrow  Soft chewed food day after  Then advance diet as tolerated  If you black tarry stools, seek immediate urgent care for evaluation  Severe abdominal pain, vomiting of black, bloody or coffee-ground material Repeat EGD in 4 to 6 weeks for variceal surveillance and repeat band ligation if necessary  Kraig Padilla MD     Result Date: 4/25/2023  Narrative: Table formatting from the original result was not included  [address]  [place] [id number] [phone] DATE OF SERVICE: 4/25/23 PHYSICIAN(S): Attending: Kraig Padilla MD Fellow: No Staff Documented INDICATION: Alcoholic cirrhosis of liver without ascites ([address] ) POST-OP DIAGNOSIS: See the impression below  PREPROCEDURE: Informed consent was obtained for the procedure, including sedation  Risks of perforation, hemorrhage, adverse drug reaction and aspiration were discussed  The patient was placed in the left lateral decubitus position  Patient was explained about the risks and benefits of the procedure  Risks including but not limited to bleeding, infection, and perforation were explained in detail  Also explained about less than 100% sensitivity with the exam and other alternatives  PROCEDURE: EGD DETAILS OF PROCEDURE: Patient was taken to the procedure room where a time out was performed to confirm correct patient and correct procedure   The patient underwent monitored anesthesia care, which was administered by an anesthesia professional  The patient's blood pressure, heart rate, level of consciousness, respirations and oxygen were monitored throughout the procedure  The scope was introduced through the mouth and advanced to the second part of the duodenum  Retroflexion was performed in the fundus  The patient experienced no blood loss  The procedure was not difficult  The patient tolerated the procedure well  There were no apparent complications  ANESTHESIA INFORMATION: ASA: II Anesthesia Type: IV Sedation with Anesthesia MEDICATIONS: No administrations occurring from 1101 to 1128 on 04/25/23 FINDINGS: Three large grade IV varices (no red guille sign) in the middle third of the esophagus and lower third of the esophagus; no bleeding was identified; placed 5 bands successfully, resulting in complete eradication Irregular Z-line 40 cm from the incisors; performed 2 cold forceps biopsies  With nodular appearing mucosa at 10 'O clock  Moderate, localized cobblestone, edematous and erythematous mucosa in the fundus of the stomach  Consisted with portal hypertensive gastropathy Moderate, localized edematous and erythematous mucosa in the fundus of the stomach and body of the stomach, consistent with gastritis Performed multiple forceps biopsies to rule out H  pylori in the fundus of the stomach, body of the stomach, greater curve of the stomach, incisura and antrum Edematous and erythematous mucosa in the duodenal bulb and 1st part of the duodenum; performed 3 cold forceps biopsies for dysplasia screening and to rule out celiac disease  With 2 to 3 nodular areas of erythema   SPECIMENS: ID Type Source Tests Collected by Time Destination 1 : r/o duodentitis Tissue Duodenum TISSUE EXAM Michael Alonso MD 4/25/2023 11:11 AM  2 : r/o gastritis and h pylori Tissue Stomach TISSUE EXAM Michael Alonso MD 4/25/2023 11:13 AM  3 : r/o barretts and irregular z line Tissue Esophagus TISSUE EXAM Michael Alonso MD 4/25/2023 11:16 AM      Impression: 3 columns of large varices without stigmata of bleeding  Esophageal band ligation x5  Irregular Z-line with area of nodular mucosa at 10 o'clock position  2 biopsies obtained  Erythematous mucosa in the gastric antrum and body consistent with gastritis  Random gastric biopsies obtained to rule out H  pylori infection  Fundal mucosa appeared edematous and cobblestone consistent with moderate portal congestive gastropathy without evidence of active oozing  Duodenitis in the bulb and first portion with areas of nodularity  Multiple biopsies obtained  RECOMMENDATION:  Await pathology results  Schedule repeat EGD  Follow up with me in clinic Repeat EGD in 4 to 6 weeks for variceal surveillance and repeat band ligation if necessary  Ginny Mayo MD         Pathology: See above  ASSESSMENT  1  Rectal cancer [facility])  Ambulatory referral to Radiation Oncology        Cancer Staging   Rectal cancer [facility])  Staging form: Colon and Rectum, AJCC 8th Edition  - Clinical: cT3, cN2, cM0 - Signed by Sri Mauro DO on 3/2/2023        PLAN/DISCUSSION  No orders of the defined types were placed in this encounter  Greyson Urrutia is a 36 y o  male with recently diagnosed stage IIIC [id number] rectal adenocarcinoma now receiving neoadjuvant chemotherapy  For patients with clinical stage III rectal cancer, there is strong evidence to recommend neoadjuvant RT  Multiple prospective trials have demonstrated that neoadjuvant RT decreases the risk of local recurrence, even in the era of total mesorectal excision (TME)   These results were confirmed by several meta-analyses, which consistently found that the hazard ratio for local recurrence with RT was approximately 0 5 compared with surgery alone    Furthermore, three prospective trials randomizing patients between preoperative and postoperative chemoradiation demonstrated improvements in disease-free survival and/or local recurrence-free "survival with a preoperative RT approach   One of these trials, the [facility] rectal trial, also reported fewer acute and long-term toxicities in the patients treated with preoperative chemoradiation      A meta-analysis based on 5 RCTs revealed that while there is no improvement in survival, the addition of concurrent chemotherapy to conventionally fractionated RT increases the 5-year local control and pathologic complete response (pCR) rates compared with preoperative RT alone      For patients receiving conventionally fractionated treatment, after an initial 4500 cGy in 25 fractions delivered to the pelvis, a boost of 540 cGy in 3 fractions to the tumor plus margin is delivered to achieve 5040 cGy composite dose to the tumor, involved mesorectum, and involved suman regions      I discussed the potential toxicities of radiotherapy to the pelvis   These include but are not limited to fatigue, nausea/vomiting, diarrhea, abdominal cramping or pain, cytopenias, bladder irritation, radiation proctitis and secondary malignancy  He agreed to be proceed with preoperative chemoradiation after completion of preoperative chemotherapy   Informed consent was obtained today  CT simulation will tentatively be performed the week of 6/5/23 after his last cycle of chemotherapy       Thank you for involving me in Mr Adriana krueger  Whit Quiles MD  9/38/2439,61:25 AM      Portions of the record may have been created with voice recognition software  Occasional wrong word or \"sound a like\" substitutions may have occurred due to the inherent limitations of voice recognition software  Read the chart carefully and recognize, using context, where substitutions have occurred          "

## 2023-05-25 ENCOUNTER — PATIENT OUTREACH (OUTPATIENT)
Dept: HEMATOLOGY ONCOLOGY | Facility: CLINIC | Age: 43
End: 2023-05-25

## 2023-05-25 DIAGNOSIS — E87.6 LOW SERUM POTASSIUM: ICD-10-CM

## 2023-05-25 DIAGNOSIS — C20 RECTAL CANCER (HCC): ICD-10-CM

## 2023-05-25 RX ORDER — POTASSIUM CHLORIDE 20 MEQ/1
20 TABLET, EXTENDED RELEASE ORAL 2 TIMES DAILY
Qty: 14 TABLET | Refills: 0 | Status: SHIPPED | OUTPATIENT
Start: 2023-05-25 | End: 2023-05-30

## 2023-05-25 RX ORDER — ONDANSETRON HYDROCHLORIDE 8 MG/1
8 TABLET, FILM COATED ORAL EVERY 8 HOURS PRN
Qty: 30 TABLET | Refills: 2 | Status: SHIPPED | OUTPATIENT
Start: 2023-05-25

## 2023-05-25 NOTE — PROGRESS NOTES
HEMATOLOGY / [address] FOLLOW UP NOTE    Primary Care Provider: Heraclio Kim DO  Referring Provider:    MRN: [id number]  : 1980    Reason for Encounter: Follow-up rectal cancer       Oncology History   Rectal cancer ([facility] [address] )   2023 Biopsy    Final Diagnosis   A  Large Intestine, Descending Colon, polyp:    - Tubular adenoma  - Negative for high grade dysplasia  B  Rectum, mass:   -  Adenocarcinoma  See comment  Comment: Immunohistochemical stains performed with adequate controls demonstrates that the tumor is positive for CDX2, SATB2, CK20 (patchy), and negative for CK7, Synaptophysin, Chromogranin A, and p40  The immunophenotype supports the diagnosis   Ancillary testing for mismatch repair (MMR) protein deficiency by immunohistochemical panel (MLH1, PMS2, MSH2, MSH6) is undertaken (Block B1); the results will be issued in a supplemental report, to follow shortly     Addendum   RESULTS OF IMMUNOHISTOCHEMICAL ANALYSIS FOR MISMATCH REPAIR PROTEIN LOSS     INTERPRETATION: NO LOSS OF NUCLEAR EXPRESSION OF MMR PROTEINS: LOW PROBABILITY OF MSI-H      RESULTS:  Antibody            Clone                 Description                                  Results  MLH1                 [id number]          Mismatch repair protein               Intact nuclear expression  MSH2                [id number]        Mismatch repair protein               Intact nuclear expression  MSH6                40                      Mismatch repair protein               Intact nuclear expression  PMS2                 [id number]           Mismatch repair protein               Intact nuclear expression     Comment:   A negative control and a positive control for each antibody have been reviewed and accepted        2023 Initial Diagnosis    Rectal cancer ([facility] [address] )     3/2/2023 -  Cancer Staged    Staging form: Colon and Rectum, AJCC 8th Edition  - Clinical: cT3, cN2, cM0 - Signed by Vandana Ayoub DO on 3/2/2023       3/9/2023 -  Chemotherapy    alteplase (CATHFLO), 2 mg, Intracatheter, Every 1 Minute as needed, 5 of 10 cycles  palonosetron (ALOXI), 0 25 mg, Intravenous, Once, 0 of 2 cycles  pegfilgrastim (NEULASTA), 6 mg, Subcutaneous, Once, 1 of 1 cycle  Pegfilgrastim-bmez (ZIEXTENZO), 6 mg, Subcutaneous, Once, 3 of 8 cycles  Administration: 6 mg (4/21/2023), 6 mg (5/6/2023), 6 mg (5/22/2023)  fluorouracil (ADRUCIL), 400 mg/m2 = 915 mg, Intravenous, Once, 5 of 10 cycles  Administration: 915 mg (3/9/2023), 915 mg (3/22/2023), 915 mg (4/19/2023), 915 mg (5/4/2023), 915 mg (5/17/2023)  leucovorin calcium IVPB, 916 mg, Intravenous, Once, 5 of 10 cycles  Administration: 900 mg (3/9/2023), 900 mg (3/22/2023), 900 mg (4/19/2023), 900 mg (5/4/2023), 900 mg (5/17/2023)  oxaliplatin (ELOXATIN) chemo infusion, 194 65 mg, Intravenous, Once, 5 of 10 cycles  Administration: 200 mg (3/9/2023), 200 mg (3/22/2023), 200 mg (4/19/2023), 200 mg (5/4/2023), 200 mg (5/17/2023)  fluorouracil (ADRUCIL) ambulatory infusion Soln, 1,200 mg/m2/day = 5,495 mg, Intravenous, Over 46 hours, 5 of 10 cycles  aprepitant (CINVANTI) in  mL IVPB, 130 mg, Intravenous, Once, 0 of 2 cycles     6/19/2023 -  Chemotherapy    fluorouracil (ADRUCIL) ambulatory infusion Soln, 200 mg/m2/day = 2,190 mg (88 9 % of original dose 225 mg/m2/day), Intravenous, Over 120 hours, 0 of 5 cycles  Dose modification: 200 mg/m2/day (original dose 225 mg/m2/day, Cycle 1, Reason: Anticipated Tolerance)         Interval History: Patient returns for follow-up prior to cycle 7 of chemotherapy that is scheduled for tomorrow  He will have blood work completed today  He did have some increased nausea after last cycle of treatment  His premeds have been adjusted to include Emend Aloxi and dexamethasone  Otherwise, he is doing fairly well  Is experiencing allergies  Recommended Claritin  Denies any lingering neuropathy  No persistent nausea/vomiting    Does have some intermittent diarrhea intermixed with constipation  Taste buds are off  He is scheduled to meet with nutritionist next week    REVIEW OF SYSTEMS:  Please note that a 14-point review of systems was performed to include Constitutional, HEENT, Respiratory, CVS, GI, , Musculoskeletal, Integumentary, Neurologic, Rheumatologic, Endocrinologic, Psychiatric, Lymphatic, and Hematologic/Oncologic systems were reviewed and are negative unless otherwise stated in HPI  Positive and negative findings pertinent to this evaluation are incorporated into the history of present illness  ECOG PS: 0    PROBLEM LIST:  Patient Active Problem List   Diagnosis   • Rectal wall thickening   • Transaminitis/Hepatitic Steatosis   • Rectal cancer (HCC)   • Chemotherapy-induced neutropenia (HCC)       Assessment / Plan:  1  Rectal cancer [facility])    Patient will have blood work completed today and proceed with cycle 7 of treatment tomorrow without any changes to his chemotherapy dosing  He will receive Emend Aloxi and dexamethasone as premed  He is set up to meet with radiation oncology next week to discuss next phase of treatment plan  He will return for a follow-up visit in 2 weeks with Dr Leopoldo Dural prior to his final cycle of FOLFOX  Patient knows to call anytime with questions or concerns  I spent 30 minutes on chart review, face to face counseling time, coordination of care and documentation  Past Medical History:   has a past medical history of Dental infection, Fatty liver, and Rectal cancer ([facility] [address] )  PAST SURGICAL HISTORY:   has a past surgical history that includes Appendectomy; Dental surgery; IR port placement (02/28/2023); and Colonoscopy      CURRENT MEDICATIONS  Current Outpatient Medications   Medication Sig Dispense Refill   • diphenoxylate-atropine (LOMOTIL) 2 5-0 025 mg per tablet Take 1 tablet by mouth 4 (four) times a day as needed for diarrhea As needed     • [START ON 5/31/2023] fluorouracil 5,495 mg in CADD/Elastomeric Infusion "Device Infuse 5,495 mg (1,200 mg/m2/day x 2 29 m2) into a catheter in a vein via infusion device over 46 hours for 2 days  Infusion planned for May 31, 2023  1 each 0   • ondansetron (ZOFRAN) 8 mg tablet Take 1 tablet (8 mg total) by mouth every 8 (eight) hours as needed for nausea or vomiting 30 tablet 2   • potassium chloride (K-DUR,KLOR-CON) 20 mEq tablet Take 1 tablet (20 mEq total) by mouth 2 (two) times a day for 7 days 14 tablet 0     No current facility-administered medications for this visit  [unfilled]    SOCIAL HISTORY:   reports that he quit smoking about 3 years ago  His smoking use included cigarettes  He has a 20 00 pack-year smoking history  He has never used smokeless tobacco  He reports current alcohol use of about 2 0 standard drinks of alcohol per week  He reports current drug use  Drug: Marijuana  FAMILY HISTORY:  family history includes Cancer in his father; Lung cancer in his father and mother  ALLERGIES:  has No Known Allergies  Physical Exam:  Vital Signs:   Visit Vitals  BP 94/60 (BP Location: Right arm, Patient Position: Sitting, Cuff Size: Adult)   Pulse 81   Temp 97 6 °F (36 4 °C) (Temporal)   Resp 16   Ht 6' 3\" (1 905 m)   Wt 85 7 kg (189 lb)   SpO2 98%   BMI 23 62 kg/m²   Smoking Status Former   BSA 2 14 m²     Body mass index is 23 62 kg/m²  Body surface area is 2 14 meters squared  Physical Exam  Constitutional:       General: He is not in acute distress  Appearance: Normal appearance  HENT:      Head: Normocephalic and atraumatic  Eyes:      General: No scleral icterus  Right eye: No discharge  Left eye: No discharge  Conjunctiva/sclera: Conjunctivae normal    Cardiovascular:      Rate and Rhythm: Normal rate and regular rhythm  Pulmonary:      Effort: Pulmonary effort is normal  No respiratory distress  Breath sounds: Normal breath sounds  Abdominal:      General: Bowel sounds are normal  There is no distension        " Palpations: Abdomen is soft  There is no mass  Tenderness: There is no abdominal tenderness  Musculoskeletal:         General: Normal range of motion  Lymphadenopathy:      Cervical: No cervical adenopathy  Upper Body:      Right upper body: No supraclavicular, axillary or pectoral adenopathy  Left upper body: No supraclavicular, axillary or pectoral adenopathy  Skin:     General: Skin is warm and dry  Neurological:      General: No focal deficit present  Mental Status: He is alert and oriented to person, place, and time     Psychiatric:         Mood and Affect: Mood normal          Behavior: Behavior normal          Labs:  Lab Results   Component Value Date    HCT 31 9 (L) 05/15/2023    HGB 10 6 (L) 05/15/2023    MCV 89 05/15/2023     (L) 05/15/2023    WBC 4 46 05/15/2023     Lab Results   Component Value Date    AGAP 8 05/15/2023    ALKPHOS 97 05/15/2023    ALT 24 05/15/2023    AST 42 (H) 05/15/2023    BUN 4 (L) 05/15/2023    CALCIUM 8 9 05/15/2023     05/15/2023    CO2 24 05/15/2023    CREATININE 0 73 05/15/2023    EGFR 113 05/15/2023    GLUC 122 05/15/2023    GLUF 121 (H) 03/06/2023    K 3 4 (L) 05/15/2023    SODIUM 139 05/15/2023    TBILI 1 44 (H) 05/15/2023    TP 6 1 (L) 05/15/2023

## 2023-05-25 NOTE — PROGRESS NOTES
Called pt to check in, and touch base about his nutrition consult appointment   No answer, left a  Voicemail with my contact information for him to call me back

## 2023-05-30 ENCOUNTER — HOSPITAL ENCOUNTER (OUTPATIENT)
Dept: INFUSION CENTER | Facility: HOSPITAL | Age: 43
Discharge: HOME | End: 2023-05-30
Payer: COMMERCIAL

## 2023-05-30 ENCOUNTER — OFFICE VISIT (OUTPATIENT)
Age: 43
End: 2023-05-30

## 2023-05-30 VITALS
RESPIRATION RATE: 16 BRPM | HEART RATE: 81 BPM | HEIGHT: 75 IN | DIASTOLIC BLOOD PRESSURE: 60 MMHG | TEMPERATURE: 97.6 F | OXYGEN SATURATION: 98 % | BODY MASS INDEX: 23.5 KG/M2 | WEIGHT: 189 LBS | SYSTOLIC BLOOD PRESSURE: 94 MMHG

## 2023-05-30 DIAGNOSIS — E87.6 LOW SERUM POTASSIUM: ICD-10-CM

## 2023-05-30 DIAGNOSIS — C20 RECTAL CANCER (HCC): Primary | ICD-10-CM

## 2023-05-30 PROBLEM — Z59.89 DOES NOT HAVE HEALTH INSURANCE: Status: RESOLVED | Noted: 2023-02-16 | Resolved: 2023-05-30

## 2023-05-30 PROBLEM — Z59.71 DOES NOT HAVE HEALTH INSURANCE: Status: RESOLVED | Noted: 2023-02-16 | Resolved: 2023-05-30

## 2023-05-30 LAB
ALBUMIN SERPL BCP-MCNC: 3.7 G/DL (ref 3.5–5)
ALP SERPL-CCNC: 120 U/L (ref 34–104)
ALT SERPL W P-5'-P-CCNC: 37 U/L (ref 7–52)
ANION GAP SERPL CALCULATED.3IONS-SCNC: 9 MMOL/L (ref 4–13)
ANISOCYTOSIS BLD QL SMEAR: PRESENT
AST SERPL W P-5'-P-CCNC: 68 U/L (ref 13–39)
BASOPHILS # BLD MANUAL: 0 THOUSAND/UL (ref 0–0.1)
BASOPHILS NFR MAR MANUAL: 0 % (ref 0–1)
BILIRUB SERPL-MCNC: 2.21 MG/DL (ref 0.2–1)
BUN SERPL-MCNC: 4 MG/DL (ref 5–25)
BURR CELLS BLD QL SMEAR: PRESENT
CALCIUM SERPL-MCNC: 8.9 MG/DL (ref 8.4–10.2)
CHLORIDE SERPL-SCNC: 109 MMOL/L (ref 96–108)
CO2 SERPL-SCNC: 23 MMOL/L (ref 21–32)
CREAT SERPL-MCNC: 0.74 MG/DL (ref 0.6–1.3)
EOSINOPHIL # BLD MANUAL: 0.08 THOUSAND/UL (ref 0–0.4)
EOSINOPHIL NFR BLD MANUAL: 1 % (ref 0–6)
ERYTHROCYTE [DISTWIDTH] IN BLOOD BY AUTOMATED COUNT: 20.8 % (ref 11.6–15.1)
GFR SERPL CREATININE-BSD FRML MDRD: 112 ML/MIN/1.73SQ M
GLUCOSE SERPL-MCNC: 105 MG/DL (ref 65–140)
HCT VFR BLD AUTO: 31.7 % (ref 36.5–49.3)
HGB BLD-MCNC: 10.7 G/DL (ref 12–17)
LYMPHOCYTES # BLD AUTO: 1.95 THOUSAND/UL (ref 0.6–4.47)
LYMPHOCYTES # BLD AUTO: 23 % (ref 14–44)
MCH RBC QN AUTO: 30.5 PG (ref 26.8–34.3)
MCHC RBC AUTO-ENTMCNC: 33.8 G/DL (ref 31.4–37.4)
MCV RBC AUTO: 90 FL (ref 82–98)
MONOCYTES # BLD AUTO: 1.1 THOUSAND/UL (ref 0–1.22)
MONOCYTES NFR BLD: 13 % (ref 4–12)
MYELOCYTES NFR BLD MANUAL: 5 % (ref 0–1)
NEUTROPHILS # BLD MANUAL: 4.66 THOUSAND/UL (ref 1.85–7.62)
NEUTS BAND NFR BLD MANUAL: 1 % (ref 0–8)
NEUTS SEG NFR BLD AUTO: 54 % (ref 43–75)
OVALOCYTES BLD QL SMEAR: PRESENT
PLATELET # BLD AUTO: 93 THOUSANDS/UL (ref 149–390)
PLATELET BLD QL SMEAR: ABNORMAL
PMV BLD AUTO: 10.5 FL (ref 8.9–12.7)
POLYCHROMASIA BLD QL SMEAR: PRESENT
POTASSIUM SERPL-SCNC: 3.5 MMOL/L (ref 3.5–5.3)
PROMYELOCYTES NFR BLD MANUAL: 3 % (ref 0–0)
PROT SERPL-MCNC: 6.4 G/DL (ref 6.4–8.4)
RBC # BLD AUTO: 3.51 MILLION/UL (ref 3.88–5.62)
RBC MORPH BLD: PRESENT
SODIUM SERPL-SCNC: 141 MMOL/L (ref 135–147)
WBC # BLD AUTO: 8.47 THOUSAND/UL (ref 4.31–10.16)

## 2023-05-30 PROCEDURE — 80053 COMPREHEN METABOLIC PANEL: CPT | Performed by: INTERNAL MEDICINE

## 2023-05-30 PROCEDURE — 85027 COMPLETE CBC AUTOMATED: CPT | Performed by: INTERNAL MEDICINE

## 2023-05-30 PROCEDURE — 85007 BL SMEAR W/DIFF WBC COUNT: CPT | Performed by: INTERNAL MEDICINE

## 2023-05-30 RX ORDER — POTASSIUM CHLORIDE 20 MEQ/1
TABLET, EXTENDED RELEASE ORAL
Qty: 14 TABLET | Refills: 0 | Status: SHIPPED | OUTPATIENT
Start: 2023-05-30

## 2023-05-30 NOTE — TELEPHONE ENCOUNTER
Second attempt made today to reach OG to establish care and discuss his nutrition  Spoke with OG and introduced self, explained reason for call  Discussed oncology nutrition services available to him  He would like to meet with RD to discuss his nutrition  Initial RD phone consultation set up for 6/5/23 at 1 pm       Provided this RD’s contact information asking that OG reach out prn  All questions/concerns addressed at this time

## 2023-05-31 ENCOUNTER — HOSPITAL ENCOUNTER (OUTPATIENT)
Dept: INFUSION CENTER | Facility: HOSPITAL | Age: 43
Discharge: HOME | End: 2023-05-31
Attending: INTERNAL MEDICINE
Payer: COMMERCIAL

## 2023-05-31 VITALS
DIASTOLIC BLOOD PRESSURE: 55 MMHG | BODY MASS INDEX: 23.74 KG/M2 | TEMPERATURE: 96.4 F | OXYGEN SATURATION: 98 % | HEIGHT: 75 IN | HEART RATE: 84 BPM | SYSTOLIC BLOOD PRESSURE: 110 MMHG | WEIGHT: 190.92 LBS | RESPIRATION RATE: 16 BRPM

## 2023-05-31 DIAGNOSIS — C20 RECTAL CANCER (HCC): Primary | ICD-10-CM

## 2023-05-31 PROCEDURE — 96411 CHEMO IV PUSH ADDL DRUG: CPT

## 2023-05-31 PROCEDURE — 96415 CHEMO IV INFUSION ADDL HR: CPT

## 2023-05-31 PROCEDURE — 96368 THER/DIAG CONCURRENT INF: CPT

## 2023-05-31 PROCEDURE — G0498 CHEMO EXTEND IV INFUS W/PUMP: HCPCS

## 2023-05-31 PROCEDURE — 96367 TX/PROPH/DG ADDL SEQ IV INF: CPT

## 2023-05-31 PROCEDURE — 96375 TX/PRO/DX INJ NEW DRUG ADDON: CPT

## 2023-05-31 PROCEDURE — 96413 CHEMO IV INFUSION 1 HR: CPT

## 2023-05-31 RX ORDER — PALONOSETRON 0.05 MG/ML
0.25 INJECTION, SOLUTION INTRAVENOUS ONCE
Status: COMPLETED | OUTPATIENT
Start: 2023-05-31 | End: 2023-05-31

## 2023-05-31 RX ORDER — FLUOROURACIL 50 MG/ML
400 INJECTION, SOLUTION INTRAVENOUS ONCE
Status: COMPLETED | OUTPATIENT
Start: 2023-05-31 | End: 2023-05-31

## 2023-05-31 RX ORDER — LOPERAMIDE HYDROCHLORIDE 2 MG/1
2 CAPSULE ORAL 4 TIMES DAILY PRN
Status: DISCONTINUED | OUTPATIENT
Start: 2023-05-31 | End: 2023-06-03 | Stop reason: HOSPADM

## 2023-05-31 RX ORDER — DEXTROSE MONOHYDRATE 50 MG/ML
20 INJECTION, SOLUTION INTRAVENOUS ONCE
Status: COMPLETED | OUTPATIENT
Start: 2023-05-31 | End: 2023-05-31

## 2023-05-31 RX ORDER — SODIUM CHLORIDE 9 MG/ML
20 INJECTION, SOLUTION INTRAVENOUS ONCE AS NEEDED
Status: DISCONTINUED | OUTPATIENT
Start: 2023-05-31 | End: 2023-06-03 | Stop reason: HOSPADM

## 2023-05-31 RX ADMIN — LOPERAMIDE HYDROCHLORIDE 2 MG: 2 CAPSULE ORAL at 13:49

## 2023-05-31 RX ADMIN — PALONOSETRON 0.25 MG: 0.05 INJECTION, SOLUTION INTRAVENOUS at 11:33

## 2023-05-31 RX ADMIN — FLUOROURACIL 915 MG: 50 INJECTION, SOLUTION INTRAVENOUS at 13:49

## 2023-05-31 RX ADMIN — LEUCOVORIN CALCIUM 900 MG: 500 INJECTION, POWDER, LYOPHILIZED, FOR SOLUTION INTRAMUSCULAR; INTRAVENOUS at 11:42

## 2023-05-31 RX ADMIN — DEXAMETHASONE SODIUM PHOSPHATE 10 MG: 10 INJECTION, SOLUTION INTRAMUSCULAR; INTRAVENOUS at 10:34

## 2023-05-31 RX ADMIN — APREPITANT 130 MG: 130 INJECTION, EMULSION INTRAVENOUS at 11:03

## 2023-05-31 RX ADMIN — DEXTROSE 20 ML/HR: 5 SOLUTION INTRAVENOUS at 11:36

## 2023-05-31 RX ADMIN — SODIUM CHLORIDE 20 ML/HR: 0.9 INJECTION, SOLUTION INTRAVENOUS at 10:34

## 2023-05-31 RX ADMIN — OXALIPLATIN 200 MG: 5 INJECTION, SOLUTION INTRAVENOUS at 11:44

## 2023-05-31 NOTE — PROGRESS NOTES
Pt tolerated chemo infusion well without complication  Port remains accessed with [facility] CDI  Elastomeric pump connected with all clamps taped open and flow dial taped color side down to pt  Pt aware of disconnect appt  Left ambulatory with steady gait for d/c

## 2023-06-02 ENCOUNTER — HOSPITAL ENCOUNTER (OUTPATIENT)
Dept: INFUSION CENTER | Facility: HOSPITAL | Age: 43
End: 2023-06-02
Attending: INTERNAL MEDICINE
Payer: COMMERCIAL

## 2023-06-02 DIAGNOSIS — C20 RECTAL CANCER (HCC): Primary | ICD-10-CM

## 2023-06-02 RX ADMIN — PEGFILGRASTIM-BMEZ 6 MG: 6 INJECTION SUBCUTANEOUS at 12:13

## 2023-06-02 NOTE — PROGRESS NOTES
Pt arrived amb for CADD d/c   Elastomere appears empty, removed, port flushed and de-accessed, dsd applied  Ziextenzo given SQ BART, dsd applied  Disch amb to home, steady gait

## 2023-06-05 ENCOUNTER — NUTRITION (OUTPATIENT)
Dept: NUTRITION | Facility: HOSPITAL | Age: 43
End: 2023-06-05

## 2023-06-05 DIAGNOSIS — Z71.3 NUTRITIONAL COUNSELING: Primary | ICD-10-CM

## 2023-06-05 NOTE — PROGRESS NOTES
Outpatient Oncology Nutrition Consultation   Type of Consult: Initial Consult  Care Location: Telephone Call    Reason for referral: Received notification by [facility] RN, Ryan Benites , on 5/24/23 that pt has triggered for oncology nutrition care (reason for referral: Malnutrition Screening Tool (MST) Triggers: scored a 3 indicating 14-23# (6 4-10 5 kg) recent wt loss and is eating poorly due to a decreased appetite  (Date of MST: 5/24/23) and rectal cancer)  Nutrition Assessment:   Oncology Diagnosis & Treatments: dx with rectal cancer 2/2023  -currently received chemo (FOLFOX), started 3/2023, x 10 cycles  -plan to start concurrent chemo/RT once finished with current chemo regimen  Oncology History   Rectal cancer ([facility] [address] )   2/17/2023 Biopsy    Final Diagnosis   A  Large Intestine, Descending Colon, polyp:    - Tubular adenoma  - Negative for high grade dysplasia  B  Rectum, mass:   -  Adenocarcinoma  See comment  Comment: Immunohistochemical stains performed with adequate controls demonstrates that the tumor is positive for CDX2, SATB2, CK20 (patchy), and negative for CK7, Synaptophysin, Chromogranin A, and p40  The immunophenotype supports the diagnosis   Ancillary testing for mismatch repair (MMR) protein deficiency by immunohistochemical panel (MLH1, PMS2, MSH2, MSH6) is undertaken (Block B1); the results will be issued in a supplemental report, to follow shortly     Addendum   RESULTS OF IMMUNOHISTOCHEMICAL ANALYSIS FOR MISMATCH REPAIR PROTEIN LOSS     INTERPRETATION: NO LOSS OF NUCLEAR EXPRESSION OF MMR PROTEINS: LOW PROBABILITY OF MSI-H      RESULTS:  Antibody            Clone                 Description                                  Results  MLH1                 [id number]          Mismatch repair protein               Intact nuclear expression  MSH2                [id number]        Mismatch repair protein               Intact nuclear expression  MSH6                44                      Mismatch repair protein               Intact nuclear expression  PMS2                 [id number]           Mismatch repair protein               Intact nuclear expression     Comment:   A negative control and a positive control for each antibody have been reviewed and accepted        2/27/2023 Initial Diagnosis    Rectal cancer ([facility] [address] )     3/2/2023 -  Cancer Staged    Staging form: Colon and Rectum, AJCC 8th Edition  - Clinical: cT3, cN2, cM0 - Signed by Dong Lockhart DO on 3/2/2023       3/9/2023 -  Chemotherapy    alteplase (CATHFLO), 2 mg, Intracatheter, Every 1 Minute as needed, 6 of 10 cycles  palonosetron (ALOXI), 0 25 mg, Intravenous, Once, 1 of 2 cycles  Administration: 0 25 mg (5/31/2023)  pegfilgrastim (NEULASTA), 6 mg, Subcutaneous, Once, 1 of 1 cycle  Pegfilgrastim-bmez (ZIEXTENZO), 6 mg, Subcutaneous, Once, 4 of 8 cycles  Administration: 6 mg (4/21/2023), 6 mg (5/6/2023), 6 mg (5/22/2023), 6 mg (6/2/2023)  fluorouracil (ADRUCIL), 400 mg/m2 = 915 mg, Intravenous, Once, 6 of 10 cycles  Administration: 915 mg (3/9/2023), 915 mg (3/22/2023), 915 mg (4/19/2023), 915 mg (5/4/2023), 915 mg (5/17/2023), 915 mg (5/31/2023)  leucovorin calcium IVPB, 916 mg, Intravenous, Once, 6 of 10 cycles  Administration: 900 mg (3/9/2023), 900 mg (3/22/2023), 900 mg (4/19/2023), 900 mg (5/4/2023), 900 mg (5/17/2023), 900 mg (5/31/2023)  oxaliplatin (ELOXATIN) chemo infusion, 194 65 mg, Intravenous, Once, 6 of 10 cycles  Administration: 200 mg (3/9/2023), 200 mg (3/22/2023), 200 mg (4/19/2023), 200 mg (5/4/2023), 200 mg (5/17/2023), 200 mg (5/31/2023)  fluorouracil (ADRUCIL) ambulatory infusion Soln, 1,200 mg/m2/day = 5,495 mg, Intravenous, Over 46 hours, 6 of 10 cycles  aprepitant (CINVANTI) in  mL IVPB, 130 mg, Intravenous, Once, 1 of 2 cycles  Administration: 130 mg (5/31/2023)     6/19/2023 -  Chemotherapy    fluorouracil (ADRUCIL) ambulatory infusion Soln, 200 mg/m2/day = 2,190 mg (88 9 % of original dose 225 mg/m2/day), Intravenous, Over 120 hours, 0 of 5 cycles  Dose modification: 200 mg/m2/day (original dose 225 mg/m2/day, Cycle 1, Reason: Anticipated Tolerance)       Past Medical & Surgical Hx:   Patient Active Problem List   Diagnosis   • Rectal wall thickening   • Transaminitis/Hepatitic Steatosis   • Rectal cancer ([facility] [address] )   • Chemotherapy-induced neutropenia (HCC)     Past Medical History:   Diagnosis Date   • Dental infection    • Fatty liver    • Rectal cancer ([facility] [address] )      Past Surgical History:   Procedure Laterality Date   • APPENDECTOMY     • COLONOSCOPY     • DENTAL SURGERY     • IR PORT PLACEMENT  02/28/2023       Review of Medications:   Vitamins, Supplements and Herbals: Med List Reviewed & pt is only taking: [place] men packet (MVI)CURTIS, Discussed the potential interactions of high dose antioxidants with cancer tx and recommended exercising caution when taking these supplements and Discussed reading supplement labels and making sure supplements have <100% of the daily value for all nutrients to avoid over supplementation    Current Outpatient Medications:   •  diphenoxylate-atropine (LOMOTIL) 2 5-0 025 mg per tablet, Take 1 tablet by mouth 4 (four) times a day as needed for diarrhea As needed, Disp: , Rfl:   •  ondansetron (ZOFRAN) 8 mg tablet, Take 1 tablet (8 mg total) by mouth every 8 (eight) hours as needed for nausea or vomiting, Disp: 30 tablet, Rfl: 2  •  potassium chloride (K-DUR,KLOR-CON) 20 mEq tablet, take 1 tablet by mouth twice a day for 7 days, Disp: 14 tablet, Rfl: 0  No current facility-administered medications for this visit      Most Recent Lab Results:   Lab Results   Component Value Date    ALB 3 7 05/30/2023    ALT 37 05/30/2023    AST 68 (H) 05/30/2023    BUN 4 (L) 05/30/2023    BUN 4 (L) 05/15/2023    CALCIUM 8 9 05/30/2023     (H) 05/30/2023    CREATININE 0 74 05/30/2023    CREATININE 0 73 05/15/2023    EGFR 112 05/30/2023    FERRITIN 81 02/16/2023    GLUC 105 05/30/2023    GLUF 121 (H) "03/06/2023    IRON 138 02/16/2023    K 3 5 05/30/2023    K 3 4 (L) 05/15/2023    MG 1 9 02/17/2023    NEUTROABS 2 44 04/17/2023    POCGLU 107 02/17/2023    SODIUM 141 05/30/2023    SODIUM 139 05/15/2023    TIBC 356 02/16/2023    WBC 8 47 05/30/2023       Anthropometric Measurements:   Height: 75\"  Ht Readings from Last 1 Encounters:   05/31/23 6' 3\" (1 905 m)     Wt Readings from Last 20 Encounters:   05/31/23 86 6 kg (190 lb 14 7 oz)   05/30/23 85 7 kg (189 lb)   05/24/23 86 kg (189 lb 9 5 oz)   05/17/23 90 7 kg (199 lb 15 3 oz)   05/17/23 (P) 90 7 kg (200 lb)   05/04/23 91 9 kg (202 lb 9 6 oz)   05/03/23 92 4 kg (203 lb 9 6 oz)   04/25/23 96 2 kg (212 lb)   04/19/23 95 kg (209 lb 7 oz)   04/17/23 95 9 kg (211 lb 6 4 oz)   04/03/23 96 6 kg (213 lb)   03/22/23 96 8 kg (213 lb 6 5 oz)   03/20/23 97 5 kg (215 lb)   03/09/23 98 7 kg (217 lb 9 5 oz)   03/02/23 99 3 kg (219 lb)   03/01/23 99 8 kg (220 lb)   02/28/23 99 8 kg (220 lb)   02/22/23 99 8 kg (220 lb)   02/16/23 100 kg (220 lb 7 4 oz)   02/16/23 100 kg (220 lb 7 4 oz)       Weight History:   • Usual Weight: 165-175#, then went up to 230# prior to dx  • Varian: n/a  • Home Scale: n/a    Oncology Nutrition-Anthropometrics    Flowsheet Row Nutrition from 6/5/2023 in [address] [place] [id number] Dietitian [facility]   Patient age (years): 39 years   Patient (male) height (in): 76 in   Current weight (lbs): 190 9 lbs   Current weight to be used for anthropometric calculations (kg) 86 8 kg   BMI: 23 9   IBW male 196 lb   IBW (kg) male 89 1 kg   IBW % (male) 97 4 %   Adjusted BW (male): 194 7 lbs   Adjusted BW in kg (male): 88 5 kg   % weight change after 1 month: -6 2 %   Weight change after 1 month (lbs) -12 7 lbs   % weight change after 3 months: -13 2 %   Weight change after 3 months (lbs) -29 1 lbs          Nutrition-Focused Physical Findings: n/a due to telephone call    Food/Nutrition-Related History & Client/Social History:    Current Nutrition Impact Symptoms:  [x] " "Nausea -has medication [x] Reduced Appetite  [] Acid Reflux    [] Vomiting  [x] Unintended Wt Loss  [] Malabsorption    [x] Diarrhea -has medication [] Unintended Wt Gain  [] Dumping Syndrome    [x] Constipation  [] Thick Mucous/Secretions  [] Abdominal Pain    [x] Dysgeusia (Altered Taste) -hardly any taste [] Xerostomia (Dry Mouth)  [] Gas    [] Dysosmia (Altered Smell)  [] Thrush  [] Difficulty Chewing    [] Oral Mucositis (Sore Mouth)  [x] Fatigue  [] Hyperglycemia   No results found for: \"HGBA1C\"   [] Odynophagia  [] Esophagitis  [] Other:    [] Dysphagia  [] Early Satiety  [] No Problems Eating      Food Allergies & Intolerances: no    Current Diet: Regular Diet, No Restrictions  Current Nutrition Intake: Unchanged from usual  Appetite: Good, Fair , Fluctuating  Nutrition Route: PO  Oral Care: has dentures, brushes gums  Activity level: ADLs, trying to exercise when feels good    24 Hr Diet Recall:   Breakfast: cereal (cheerios), 2% milk and banana  Snack: applesauce  Lunch: nothing yet today   Yesterday had a big brunch  Snack: fruit, Ensure complete  Dinner: hot dogs, beans  Snack: cheese its    Beverages: water (Fredric Olszewski x8-10, maybe more), green tea (8oz x1), gatorade (dilautes with water), milk (8oz x1 sometimes) ,Ensure complete (10oz x1-2)  Supplements:   • Ensure Complete (10 oz, 350 kcal, 30 g pro) 1-2 daily      Oncology Nutrition-Estimated Needs    Flowsheet Row Nutrition from 2023 in [address] [place] [address] Dietitian [facility]   Weight type used Actual weight   Weight in kilograms (kg) used for estimated needs 86 8 kg   Energy needs formula:  30-35 kcal/kg   Energy needs based on 30 kcal/k kcal   Energy needs based on 35 kcal/k kcal   Protein needs formula: 1 2-1 5 g/kg   Protein needs based on 1 2 g/k g   Protein needs based on 1 5 g/kg 130 g   Fluid needs formula: 30-35 mL/kg   Fluid needs based on 30 mL/kg 2604 mL   Fluid needs in ounces 88 oz   Fluid needs based on 35 mL/kg 3038 " mL   Fluid needs in ounces 103 oz           Discussion & Intervention:   Jose M Rojas was evaluated today for an initial RD consultation regarding unintended weight loss and poor po intake  Jose M Rojas is currently undergoing tx for rectal cancer  Jose M Rojas reports appetite fluctuates at times  He is trying to eat smaller/more frequent meals throughout the day  He c/o diarrhea and constipation, which fluctuates  He also c/o dysgeusia  We reviewed MNT for dysgeusia, diarrhea and constipation  He states the diarrhea and constipation often makes him nausea  He does have anti-emetics  We reviewed ideas for small/frequent meals  Reviewed 24 hour recall, which revealed an suboptimal po intake, and discussed ways to increase kcal, protein, and fluid intakes and optimize nutrient intake  Also reviewed the importance of wt management throughout the tx process and the role of a high kcal/ high protein diet in managing wt and overall health    Based on today's assessment, discussion included: MNT for: nausea, diarrhea, constipation, fatigue, dysgeusia, reduced appetite, wt loss, how to modify foods for anticipated nutrition impact symptoms pt may experience during CA tx, practicing proper oral care, a high kcal/protein diet & food choices to include at all meals & snacks (Examples of high kcal foods: cheese, full-fat dairy products, nut butter, plant-based fats, coconut oil/milk, avocado, butter, cream soup, etc  Examples of high protein foods: eggs, chicken, fish, beans/legumes, nuts/nut butters, bone broth, etc ) , fortifying foods for added kcal and protein (examples include: adding cheese to foods such as eggs, mashed potatoes, casseroles, etc ; Making oatmeal with whole milk rather than water; Making fortified mashed potatoes with cream, butter, dry milk powder, plain [place] yogurt, and cheese ), adequate hydration & fluid choices, sipping on calorie containing beverages (examples include: adding whole milk or cream to coffee, oral nutrition supplements, juice, electrolyte replacement beverages, milk, etc ), eating smaller more frequent meals every 2-3 hours (5-6 small meals/day), continuing oral nutrition supplements and recipe suggestions/resources, trying new recipes, & cooking at home more often  Moving forward, Maria Luz Gudino was encouraged to increase kcal, protein, and fluid intakes   Materials Provided: all mailed to pt: , [facility] Eating Hints book, Ensure Coupons, Oncology Milkshake &  Smoothie Recipe Packet, Constipation handout, Diarrhea handout, Taste and Smell Changes handout, Oncology Nutrition Services Brochure and Oncology Nutrition Class Flyer  All questions and concerns addressed during today’s visit  Maria Luz Gudino has RD contact information  Nutrition Diagnosis:   • Inadequate Energy Intake related to physiological causes, disease state and treatment related issues as evidenced by food recall, wt loss and discussion with pt and/or family  • Increased Nutrient Needs (kcal & pro) related to increased demand for nutrients and disease state as evidenced by cancer dx and pt undergoing tx for cancer  Monitoring & Evaluation:   Goals:  · weight maintenance/stabilization  · adequate nutrition impact symptom management  · pt to meet >/=75% estimated nutrition needs daily    · Progress Towards Goals: Initiated    Nutrition Rx & Recommendations:  · Diet: High Calorie, High Protein (for high calorie foods see pages 52-53, and for high protein foods see pages 49-51 in your Eating Hints book)  · Keep a daily food journal (pen/paper, doc such as [employer])  · Nutrition Supplements: continue Ensure complete shakes 1-2 times daily  May use homemade shakes/smoothies as desired  If using a pre-made shake/bar, choose ones with >300 calories and >10 grams protein (ex  Ensure Complete, Ensure Enlive, Ensure Plus, Boost Plus, Boost Very High Calorie, etc )  · Small, frequent meals/snacks may be easier to tolerate than 3 large daily meals    Aim for 5-6 small meals per day (every 2-3 hours)  · Include protein at all meals/snacks  · Include a variety of foods (as tolerated/allowed by diet)  · Incorporate physical activity as able/allowed  · Stay hydrated by sipping fluids of choice/tolerance throughout the day  · Liquid nutrition may be better tolerated than solids at times  · Alter food choices and eating patterns to accommodate changing needs  · Refer to Eating Hints book for other meal/snack ideas and symptom management  · Follow proper oral care; Try baking soda/salt water rinse recipe (mix 3/4 tsp salt + 1 tsp baking soda + 1 qt water; rinse with plain water after using) in Eating Hints book (pg 18)  Brush your teeth before/after meals & before bed  · For lack of taste: Practice good oral care  Blend fresh fruits into shakes, ice cream or yogurt  Eat frozen fruits: grapes, chopped cantaloupe, watermelon  Select fresh vegetables (may be more appealing than canned/frozen)  Add extra flavor to foods: experiment with spices, salt & sweeteners, extra oil/butter, acidic foods; marinate meats (see pages 29-30 in your Eating Hints book)  · For appetite loss: try powdered or liquid nutrition supplements; eating by the clock every 2-3 hours; set a timer to remind yourself to eat, keep snacks nearby; add extra protein & calories to your diet; drink liquids in between meals, choose liquids that add calories; eat a bedtime snack; eat soft, cool or frozen foods; eat a larger meal when you feel well & rested; only sip small amounts of liquids during meals (see pages 10-12 in your Eating Hints book)    · For weight loss: monitor your weight at home at least 2x/week, record your weight, start by adding 250-500 extra calories per day, eat 5-6 small scheduled meals every 2-3 hrs, choose foods that are high in protein and calories (see pages 49-53 in your Eating Hints book), drink liquids with calories for example: milkshakes/smoothies/juice/soup/whole milk/chocolate "milk, cook with protein fortified milk (see recipe on page 36 in your Eating Hints book), consider ready-to-drink oral nutrition supplements such as Ensure Plus, Ensure Enlive, Boost Plus, or Boost Very High Calorie, avoid \"diet\" and \"light\" foods when possible, avoid drinking too much with meals, contact your dietitian with any continued weight loss over the course of 1 week  For more info see pages 35-37 in your Eating Hints book  · For constipation: drink plenty of fluids (>64 oz/day); drink hot liquids; eat high-fiber foods as tolerated (whole grains, beans, peas, nuts, seeds, fruits, vegetables, etc); increase physical activity as tolerated  Avoid increasing fiber intake too quickly, add fiber into your diet slowly; keep a record of your bowel movements (see pages 13-14 in you Eating Hints book)  · For diarrhea: drink plenty of fluids (>64 oz/day), avoid carbonation; eat 5-6 small meals/day; include high sodium & potassium foods/liquids (Sodium: soup/broth; Potassium: bananas, canned apricots, potatoes); eat low-fiber foods; choose foods/liquids that are room temperature; avoid foods/drinks that can make diarrhea worse (ex  high fiber, high sugar, hot/cold drinks, greasy/fatty foods, gas forming foods such as beans, raw produce, milk products, alcohol, spicy foods, caffeine, sugar alcohols (xylitol, sorbitol), and apple juice (high in sorbitol) (see pages 15-16 in your Eating Hints book)  If diarrhea is severe, consider adding Banatrol (banana flakes) 1-3 times per day mixed in applesauce (can be purchased online from [address])  · Try adding soluble fiber: applesauce, banana, oatmeal, potatoes, rice, etc  to help thicken stools    · For nausea/vomiting: try plain/bland foods; try lemon/lime flavors; eat 5-6 small meals/day (except when vomiting); do not skip meals/snacks; choose appealing foods; sip only small amounts of liquids during meals; stay hydrated in between meals; eat foods/drinks that are " room temperature; eat dry toast/crackers (see pages  21-22 and 31-32 in your Eating Hints book)  · Weigh yourself regularly  If you notice weight loss, make an effort to increase your daily food/calorie intake  If you continue to notice loss after these efforts, reach out to your dietitian to establish a plan to stabilize weight  [place] with the F A S [facility]  Concept - add extra Fat, Acidity (as long as you do not have mouth or throat pain), Salt, and Sweetness to foods to help improve flavor    · Small/frequent meal/snack ideas:  · Ensure shakes or homemade smoothies/milkshakes  · Cheese and crackers  · Peanut butter toast  · Greek yogurt  · Soup or bone broth  · 1/2 sandwich  · hummus and judith  · Hard boiled egg  · Tuna/chicken/egg salad with crackers    Follow Up Plan: 6/28/23 during infusion  Recommend Referral to Other Providers: none at this time

## 2023-06-05 NOTE — PATIENT INSTRUCTIONS
Nutrition Rx & Recommendations:  Diet: High Calorie, High Protein (for high calorie foods see pages 52-53, and for high protein foods see pages 49-51 in your Eating Hints book)  Keep a daily food journal (pen/paper, doc such as [employer])  Nutrition Supplements: continue Ensure complete shakes 1-2 times daily  May use homemade shakes/smoothies as desired  If using a pre-made shake/bar, choose ones with >300 calories and >10 grams protein (ex  Ensure Complete, Ensure Enlive, Ensure Plus, Boost Plus, Boost Very High Calorie, etc )  Small, frequent meals/snacks may be easier to tolerate than 3 large daily meals  Aim for 5-6 small meals per day (every 2-3 hours)  Include protein at all meals/snacks  Include a variety of foods (as tolerated/allowed by diet)  Incorporate physical activity as able/allowed  Stay hydrated by sipping fluids of choice/tolerance throughout the day  Liquid nutrition may be better tolerated than solids at times  Alter food choices and eating patterns to accommodate changing needs  Refer to Eating Hints book for other meal/snack ideas and symptom management  Follow proper oral care; Try baking soda/salt water rinse recipe (mix 3/4 tsp salt + 1 tsp baking soda + 1 qt water; rinse with plain water after using) in Eating Hints book (pg 18)  Brush your teeth before/after meals & before bed  For lack of taste: Practice good oral care  Blend fresh fruits into shakes, ice cream or yogurt  Eat frozen fruits: grapes, chopped cantaloupe, watermelon  Select fresh vegetables (may be more appealing than canned/frozen)  Add extra flavor to foods: experiment with spices, salt & sweeteners, extra oil/butter, acidic foods; marinate meats (see pages 29-30 in your Eating Hints book)    For appetite loss: try powdered or liquid nutrition supplements; eating by the clock every 2-3 hours; set a timer to remind yourself to eat, keep snacks nearby; add extra protein & calories to your diet; drink "liquids in between meals, choose liquids that add calories; eat a bedtime snack; eat soft, cool or frozen foods; eat a larger meal when you feel well & rested; only sip small amounts of liquids during meals (see pages 10-12 in your Eating Hints book)  For weight loss: monitor your weight at home at least 2x/week, record your weight, start by adding 250-500 extra calories per day, eat 5-6 small scheduled meals every 2-3 hrs, choose foods that are high in protein and calories (see pages 49-53 in your Eating Hints book), drink liquids with calories for example: milkshakes/smoothies/juice/soup/whole milk/chocolate milk, cook with protein fortified milk (see recipe on page 36 in your Eating Hints book), consider ready-to-drink oral nutrition supplements such as Ensure Plus, Ensure Enlive, Boost Plus, or Boost Very High Calorie, avoid \"diet\" and \"light\" foods when possible, avoid drinking too much with meals, contact your dietitian with any continued weight loss over the course of 1 week  For more info see pages 35-37 in your Eating Hints book  For constipation: drink plenty of fluids (>64 oz/day); drink hot liquids; eat high-fiber foods as tolerated (whole grains, beans, peas, nuts, seeds, fruits, vegetables, etc); increase physical activity as tolerated  Avoid increasing fiber intake too quickly, add fiber into your diet slowly; keep a record of your bowel movements (see pages 13-14 in you Eating Hints book)  For diarrhea: drink plenty of fluids (>64 oz/day), avoid carbonation; eat 5-6 small meals/day; include high sodium & potassium foods/liquids (Sodium: soup/broth;   Potassium: bananas, canned apricots, potatoes); eat low-fiber foods; choose foods/liquids that are room temperature; avoid foods/drinks that can make diarrhea worse (ex  high fiber, high sugar, hot/cold drinks, greasy/fatty foods, gas forming foods such as beans, raw produce, milk products, alcohol, spicy foods, caffeine, sugar alcohols (xylitol, " sorbitol), and apple juice (high in sorbitol) (see pages 15-16 in your Eating Hints book)  If diarrhea is severe, consider adding Banatrol (banana flakes) 1-3 times per day mixed in applesauce (can be purchased online from [id number] [place] [address])  Try adding soluble fiber: applesauce, banana, oatmeal, potatoes, rice, etc  to help thicken stools  For nausea/vomiting: try plain/bland foods; try lemon/lime flavors; eat 5-6 small meals/day (except when vomiting); do not skip meals/snacks; choose appealing foods; sip only small amounts of liquids during meals; stay hydrated in between meals; eat foods/drinks that are room temperature; eat dry toast/crackers (see pages  21-22 and 31-32 in your Eating Hints book)  Weigh yourself regularly  If you notice weight loss, make an effort to increase your daily food/calorie intake  If you continue to notice loss after these efforts, reach out to your dietitian to establish a plan to stabilize weight  [place] [facility]  Concept - add extra Fat, Acidity (as long as you do not have mouth or throat pain), Salt, and Sweetness to foods to help improve flavor    Small/frequent meal/snack ideas:  Ensure shakes or homemade smoothies/milkshakes  Cheese and crackers  Peanut butter toast  Greek yogurt  Soup or bone broth  1/2 sandwich  hummus and judith  Hard boiled egg  Tuna/chicken/egg salad with crackers    Follow Up Plan: 6/28/23 during infusion  Recommend Referral to Other Providers: none at this time

## 2023-06-07 ENCOUNTER — PATIENT OUTREACH (OUTPATIENT)
Dept: HEMATOLOGY ONCOLOGY | Facility: CLINIC | Age: 43
End: 2023-06-07

## 2023-06-07 ENCOUNTER — OFFICE VISIT (OUTPATIENT)
Age: 43
End: 2023-06-07
Payer: COMMERCIAL

## 2023-06-07 VITALS
OXYGEN SATURATION: 99 % | BODY MASS INDEX: 23.25 KG/M2 | HEIGHT: 75 IN | DIASTOLIC BLOOD PRESSURE: 59 MMHG | WEIGHT: 187 LBS | RESPIRATION RATE: 17 BRPM | SYSTOLIC BLOOD PRESSURE: 101 MMHG | HEART RATE: 80 BPM | TEMPERATURE: 97 F

## 2023-06-07 DIAGNOSIS — C20 RECTAL CANCER (HCC): Primary | ICD-10-CM

## 2023-06-07 PROCEDURE — 99213 OFFICE O/P EST LOW 20 MIN: CPT | Performed by: INTERNAL MEDICINE

## 2023-06-07 RX ORDER — SODIUM CHLORIDE 9 MG/ML
20 INJECTION, SOLUTION INTRAVENOUS ONCE AS NEEDED
Status: CANCELLED | OUTPATIENT
Start: 2023-06-14

## 2023-06-07 RX ORDER — LOPERAMIDE HYDROCHLORIDE 2 MG/1
2 CAPSULE ORAL 4 TIMES DAILY PRN
Status: CANCELLED
Start: 2023-06-14

## 2023-06-07 RX ORDER — DEXTROSE MONOHYDRATE 50 MG/ML
20 INJECTION, SOLUTION INTRAVENOUS ONCE
Status: CANCELLED | OUTPATIENT
Start: 2023-06-14

## 2023-06-07 RX ORDER — FLUOROURACIL 50 MG/ML
400 INJECTION, SOLUTION INTRAVENOUS ONCE
Status: CANCELLED | OUTPATIENT
Start: 2023-06-14

## 2023-06-07 RX ORDER — PALONOSETRON 0.05 MG/ML
0.25 INJECTION, SOLUTION INTRAVENOUS ONCE
Status: CANCELLED | OUTPATIENT
Start: 2023-06-14

## 2023-06-07 NOTE — PROGRESS NOTES
Called pt to check in and see how hes doing  He stated hes doing well  We discussed setting up the post treatment imaging , once we have a treatment end date  He stated he was appreciative for the assistance with setting his imaging appointment dates  I will follow up with Ester Cordero once I have the radiation end date    He was thankful for the call, and has my contact information if he needs anything

## 2023-06-08 ENCOUNTER — OFFICE VISIT (OUTPATIENT)
Dept: GASTROENTEROLOGY | Facility: CLINIC | Age: 43
End: 2023-06-08
Payer: COMMERCIAL

## 2023-06-08 VITALS
DIASTOLIC BLOOD PRESSURE: 50 MMHG | WEIGHT: 188 LBS | TEMPERATURE: 98.4 F | OXYGEN SATURATION: 98 % | HEART RATE: 75 BPM | HEIGHT: 75 IN | BODY MASS INDEX: 23.38 KG/M2 | SYSTOLIC BLOOD PRESSURE: 100 MMHG

## 2023-06-08 DIAGNOSIS — R74.01 TRANSAMINITIS: ICD-10-CM

## 2023-06-08 DIAGNOSIS — K70.30 ALCOHOLIC CIRRHOSIS OF LIVER WITHOUT ASCITES (HCC): Primary | ICD-10-CM

## 2023-06-08 DIAGNOSIS — I85.10 SECONDARY ESOPHAGEAL VARICES WITHOUT BLEEDING (HCC): ICD-10-CM

## 2023-06-08 DIAGNOSIS — C20 RECTAL CANCER (HCC): ICD-10-CM

## 2023-06-08 PROCEDURE — 99214 OFFICE O/P EST MOD 30 MIN: CPT | Performed by: INTERNAL MEDICINE

## 2023-06-08 NOTE — PROGRESS NOTES
[facility] - Outpatient Follow-up Note  aGry Olivier 39 y o  male MRN: [id number]  Encounter: [id number]          ASSESSMENT AND PLAN:      59-year-old male with a recent diagnosis of rectal cancer (T3N2), on FOLFOX under the care of Dr Merly Ordoñez  He is tolerating treatment well  He comes back to see me due to the diagnosis of cirrhosis with evidence of portal hypertension first noted on MRI in February  He has no physical evidence of cirrhosis or portal hypertension  However, endoscopy done after his last office visit showed large esophageal varices requiring band ligation  Repeat EGD has not been scheduled given that he has been on chemotherapy  At this point in time, he is tolerating chemotherapy very well without any evidence of decompensated liver disease  He is up-to-date on abdominal imaging  He does not require diuretics at this time  He has no evidence of hepatic encephalopathy  MELD score was low 3 months ago  I have requested updated MELD labs  I will reach out to his oncology team to determine if I can proceed with repeat EGD in between his eighth cycle of FOLFOX and any subsequent treatment  MELD 3 0: 7 at 3/6/2023 10:31 AM  MELD-Na: 7 at 3/6/2023 10:31 AM  Calculated from:  Serum Creatinine: 0 78 mg/dL (Using min of 1 mg/dL) at 3/6/2023 10:31 AM  Serum Sodium: 138 mmol/L (Using max of 137 mmol/L) at 3/6/2023 10:31 AM  Total Bilirubin: 0 98 mg/dL (Using min of 1 mg/dL) at 3/6/2023 10:31 AM  Serum Albumin: 3 8 g/dL (Using max of 3 5 g/dL) at 3/6/2023 10:31 AM  INR(ratio): 1 12 at 3/6/2023 10:31 AM  Age at listing (hypothetical): 36 years  Sex: Male at 3/6/2023 10:31 AM    I will continue to follow along with you and monitor his liver function while he undergoes chemotherapy  FOLLOW-UP:  Return in about 3 months (around 9/8/2023)  VISIT DIAGNOSES AND ORDERS:      1  Alcoholic cirrhosis of liver without ascites ([facility] [address] )    2   Secondary esophageal varices without bleeding ([facility] [address] )    3  Rectal cancer ([facility] [address] )    4  Transaminitis/Hepatitic Steatosis      No orders of the defined types were placed in this encounter     ______________________________________________________________________    SUBJECTIVE:         Interval Update:  Started with FOLFOX therapy after his last office visit with me  He has been tolerating this very well  He has completed 7 cycles and has his final 8 cycle pending  He has imaging scheduled in the upcoming week  From a GI/liver perspective, he states he has been doing well  Mr Manjit Golden denies recent or history of yellow eyes/skin, dark urine, GI bleeding, abdominal distention with fluid, lower extremity swelling, excessive bleeding, pruritus or confusion  He does complain of some easy bruising  He denies abdominal pain, nausea, vomiting, difficulty swallowing, early satiety, bloating, diarrhea, constipation or straining with passing stools  He does get occasional heartburn and reflux, but less than 1 time per week and he uses over-the-counter medication for relief  History:    HPI 3/2/23  Mr Rafia Garcia is a 39 y o  male with recent diagnosis of rectal cancer with plans to start chemotherapy as possible bridge to surgery, who comes in today to establish care with hepatology after abdominal imaging showed evidence of cirrhosis with portal hypertension  Patient states he has no known history of liver disease  That being said, he states he has not been following with the primary care physician routinely and has not had regular blood work  He states he used to drink heavily in his 25s but cut back after his son was born  He admits to 12 drinks per week  When he was admitted to the hospital for rectal bleeding, blood work showed elevated LFTs possibly consistent with mild alcoholic hepatitis    I questioned him about his alcohol intake in relation to the abnormal LFTs, and he states while I was drinking "more at that time, including hard liquor  He denies a history of lower extremity edema, abdominal distention with fluid, jaundice, easy bruising or excessive bleeding, overt confusion, dark urine  Other than the rectal bleeding and intermittent diarrhea, he denies a history of upper GI symptoms including heartburn, reflux, dysphagia, odynophagia, dyspepsia  He denies a known family history of liver disease  REVIEW OF SYSTEMS     Review of Systems   All other systems reviewed and are negative  Historical Information   Patient Active Problem List   Diagnosis   • Rectal wall thickening   • Transaminitis/Hepatitic Steatosis   • Rectal cancer (HCC)   • Chemotherapy-induced neutropenia (HCC)     Social History     Substance and Sexual Activity   Alcohol Use Not Currently   • Alcohol/week: 2 0 standard drinks of alcohol   • Types: 2 Cans of beer per week    Comment: 1-2 daily  previously up to 1 case/day (reduced alcohol intake 7 years ago)     Social History     Substance and Sexual Activity   Drug Use Yes   • Types: Marijuana     Social History     Tobacco Use   Smoking Status Former   • Packs/day: 1 00   • Years: 20 00   • Total pack years: 20 00   • Types: Cigarettes   • Quit date: 1/1/2020   • Years since quitting: 3 4   Smokeless Tobacco Never       Meds/Allergies       Current Outpatient Medications:   •  diphenoxylate-atropine (LOMOTIL) 2 5-0 025 mg per tablet  •  [START ON 6/14/2023] fluorouracil 5,495 mg in CADD/Elastomeric Infusion Device  •  ondansetron (ZOFRAN) 8 mg tablet  •  potassium chloride (K-DUR,KLOR-CON) 20 mEq tablet    No Known Allergies        Objective     Blood pressure 100/50, pulse 75, temperature 98 4 °F (36 9 °C), height 6' 3\" (1 905 m), weight 85 3 kg (188 lb), SpO2 98 %  Body mass index is 23 5 kg/m²  PHYSICAL EXAM:      Physical Exam  Vitals reviewed  Constitutional:       General: He is not in acute distress  Appearance: Normal appearance   He is not " ill-appearing  HENT:      Head: Normocephalic and atraumatic  Nose: Nose normal       Mouth/Throat:      Pharynx: Oropharynx is clear  No posterior oropharyngeal erythema  Eyes:      General: No scleral icterus  Extraocular Movements: Extraocular movements intact  Cardiovascular:      Rate and Rhythm: Normal rate and regular rhythm  Heart sounds: No murmur heard  Pulmonary:      Effort: Pulmonary effort is normal  No respiratory distress  Breath sounds: Normal breath sounds  No rales  Abdominal:      General: There is distension (mild, but no evidence of ascites)  Palpations: Abdomen is soft  There is no shifting dullness, fluid wave, hepatomegaly or splenomegaly  Tenderness: There is no abdominal tenderness  There is no guarding  Musculoskeletal:         General: No swelling  Normal range of motion  Cervical back: Normal range of motion and neck supple  Skin:     General: Skin is warm  Coloration: Skin is not jaundiced  Neurological:      General: No focal deficit present  Mental Status: He is oriented to person, place, and time     Psychiatric:         Mood and Affect: Mood normal          Lab Results:   Lab Results   Component Value Date    BUN 4 (L) 05/30/2023     (H) 05/30/2023    CO2 23 05/30/2023    CREATININE 0 74 05/30/2023    K 3 5 05/30/2023     Lab Results   Component Value Date    HCT 31 7 (L) 05/30/2023    HGB 10 7 (L) 05/30/2023    MCV 90 05/30/2023    PLT 93 (L) 05/30/2023    WBC 8 47 05/30/2023     Lab Results   Component Value Date    ALT 37 05/30/2023    AST 68 (H) 05/30/2023    INR 1 12 03/06/2023    TP 6 4 05/30/2023      Lab Results   Component Value Date    FERRITIN 81 02/16/2023    IRON 138 02/16/2023     MELD 3 0: 7 at 3/6/2023 10:31 AM  MELD-Na: 7 at 3/6/2023 10:31 AM  Calculated from:  Serum Creatinine: 0 78 mg/dL (Using min of 1 mg/dL) at 3/6/2023 10:31 AM  Serum Sodium: 138 mmol/L (Using max of 137 mmol/L) at 3/6/2023 10:31 AM  Total Bilirubin: 0 98 mg/dL (Using min of 1 mg/dL) at 3/6/2023 10:31 AM  Serum Albumin: 3 8 g/dL (Using max of 3 5 g/dL) at 3/6/2023 10:31 AM  INR(ratio): 1 12 at 3/6/2023 10:31 AM  Age at listing (hypothetical): 36 years  Sex: Male at 3/6/2023 10:31 AM        Radiology Results:   Colonoscopy    Result Date: 2/17/2023  Narrative: Table formatting from the original result was not included  [address]  [employer] [id number] [phone] DATE OF SERVICE: 2/17/23 PHYSICIAN(S): Attending: Claudia Lee DO Fellow: No Staff Documented INDICATION: Rectal mass POST-OP DIAGNOSIS: See the impression below  HISTORY: Prior colonoscopy: No prior colonoscopy  BOWEL PREPARATION: Enema; Miralax/Dulcolax PREPROCEDURE: Informed consent was obtained for the procedure, including sedation  Risks including but not limited to bleeding, infection, perforation, adverse drug reaction and aspiration were explained in detail  Also explained about less than 100% sensitivity with the exam and other alternatives  The patient was placed in the left lateral decubitus position  Procedure: Colonoscopy DETAILS OF PROCEDURE: Patient was taken to the procedure room where a time out was performed to confirm correct patient and correct procedure  The patient underwent monitored anesthesia care, which was administered by an anesthesia professional  The patient's blood pressure, heart rate, level of consciousness, oxygen and respirations were monitored throughout the procedure  A digital rectal exam was performed  The scope was introduced through the anus and advanced to the cecum  Retroflexion was performed in the rectum  The quality of bowel preparation was evaluated using the [facility] Bowel Preparation Scale with scores of: right colon = 3, transverse colon = 2, left colon = 3  The total BBPS score was 8  Bowel prep was adequate  The patient experienced no blood loss  The procedure was not difficult  The patient tolerated the procedure well  There were no apparent complications  ANESTHESIA INFORMATION: ASA: II Anesthesia Type: IV Sedation with Anesthesia MEDICATIONS: No administrations occurring from 1007 to 1034 on 02/17/23 FINDINGS: Single malignant-appearing mass (traversable) measuring 7 cm in the rectum 2 cm from the anal verge observed during digital rectal exam, covering the whole circumference; bleeding occurred before intervention; performed cold forceps biopsy 5 mm sessile polyp in the descending colon; removed en bloc by cold snare and retrieved specimen EVENTS: Procedure Events Event Event Time ENDO CECUM REACHED 2/17/2023 10:20 AM ENDO SCOPE OUT TIME 2/17/2023 10:30 AM SPECIMENS: ID Type Source Tests Collected by Time Destination 1 : polyp Tissue Large Intestine, Left/Descending Colon TISSUE EXAM Danford Buerger, DO 2/17/2023 10:24 AM  2 : mass Tissue Rectum TISSUE EXAM Danford Buerger, DO 2/17/2023 10:27 AM  EQUIPMENT: Colonoscope -[id number]     Impression: Malignant friable rectal mass, palpable on rectal exam   Circumferential but able to be traversed  Extends from 2 to 9 cm  Multiple biopsies obtained Polyp removed from the descending colon with a cold snare RECOMMENDATION:  Repeat colonoscopy in 1 year  Personal history of colon cancer  Endoscopist will call with biopsy results within 2 weeks Will arrange outpatient follow-up for cirrhosis, as well as outpatient consultation with medical and surgical oncology Discussed with internal medicine PA Danford Buerger, DO     CT chest w contrast    Result Date: 2/17/2023  Narrative: CT CHEST WITH IV CONTRAST INDICATION:   Occult malignancy assess for malignancy  Per my review of the medical record, the patient underwent surgery today for malignant friable rectal mass  COMPARISON:  Abdomen CT from 2/16/2023  TECHNIQUE: Chest CT with intravenous contrast   Axial, sagittal, coronal 2D reformats and coronal MIPS from source data   Radiation dose length product (DLP):  451 79 mGy-cm   Radiation dose exposure minimized using iterative reconstruction and automated exposure control  IV Contrast:  100 mL of iohexol (OMNIPAQUE) FINDINGS: LUNGS:  No metastases  Mild paraseptal emphysema  AIRWAYS: No significant filling defects  PLEURA:  Unremarkable  HEART/GREAT VESSELS:  Normal for age  MEDIASTINUM AND ALLEN:  Esophageal varices  CHEST WALL AND LOWER NECK: Unremarkable  UPPER ABDOMEN: Abnormal liver  OSSEOUS STRUCTURES: Normal for age  Impression: No lung metastases  Mild emphysema  Esophageal varices  Abnormal liver  See abdomen CT  Splenomegaly  Workstation performed: [id number]     MRI abdomen w wo contrast    Result Date: 2/24/2023  Narrative: MRI - ABDOMEN - WITH AND WITHOUT CONTRAST INDICATION: 36 years / Male  K62 89: Other specified diseases of anus and rectum  As per review of electronic medical record,  patient with recently diagnosed rectal cancer and cirrhosis  COMPARISON: CT of the abdomen and pelvis from 2/16/2023  TECHNIQUE:  Multiplanar/multisequence MRI of the abdomen was performed before and after administration of contrast  IV Contrast:  10 mL of Gadobutrol injection (SINGLE-DOSE) FINDINGS: LOWER CHEST:   Unremarkable  LIVER: The liver demonstrates a macronodular contour and volume redistribution compatible with cirrhosis  There is diffuse hepatic steatosis, with some areas demonstrating moderate steatosis and others with severe steatosis  There are genius areas of decreased T1 signal throughout the liver parenchyma, which are somewhat nodular and partially exophytic in the left hepatic lobe, as on the recent CT  There is more pronounced steatosis corresponding to these areas  Evaluation for underlying lesion such as hepatocellular carcinoma is somewhat limited as a true arterial phase was not obtained, however no evidence of definite enhancing lesions or lesions demonstrating washout  The hepatic veins and portal veins are patent   BILE DUCTS:  No intrahepatic or extrahepatic bile duct dilation  The common bile duct is at the upper limits of normal in diameter, measuring 6 mm and tapering gradually to smaller caliber closer to the ampulla of Vater  GALLBLADDER: No gallstones or sludge  The gallbladder is somewhat underdistended, limiting evaluation  The gallbladder wall is at the upper limits of normal in thickness, measuring 3 mm  There is small amount of fluid adjacent to the gallbladder, however the appearance is more likely reactive to underlying liver disease rather than acute cholecystitis  PANCREAS:  Unremarkable appearance of the pancreas  No dilation of the main pancreatic duct  ADRENAL GLANDS:  Within normal limits  SPLEEN:  The spleen is enlarged, measuring 14 3 cm in greatest cranial caudal dimension  There are no focal splenic lesions  KIDNEYS/PROXIMAL URETERS:  No hydroureteronephrosis  No suspicious renal mass  BOWEL:   No dilated loops of bowel  PERITONEUM/RETROPERITONEUM:  Trace perihepatic ascites  LYMPH NODES:  No abdominal lymphadenopathy  VASCULAR STRUCTURES:  Normal caliber abdominal aorta  Patent major branch vessels  There are gastroesophageal varices  There is recanalization of the periumbilical vein  ABDOMINAL WALL:  Unremarkable  OSSEOUS STRUCTURES:  No suspicious osseous lesion  Impression: Cirrhotic liver morphology with evidence of portal hypertension manifested by splenomegaly, gastroesophageal varices, recanalization of the periumbilical vein, and trace perihepatic ascites  Diffuse moderate hepatic steatosis with irregular areas of more severe steatosis  The areas of more severe steatosis correspond to the areas of decreased attenuation on the recent CT, and correspond to the areas of decreased T1 signal on this exam  No definite focal hepatic lesions to suggest either a primary liver neoplasm or metastatic disease  Likely reactive changes in the gallbladder    If there is clinical concern for acute cholecystitis, this would be better confirmed with HIDA scan  Workstation performed: [id number]     MRI pelvis rectal cancer staging wo contrast    Result Date: 2/24/2023  Narrative: MRI PELVIS - WITHOUT CONTRAST (RECTAL CANCER STAGING) INDICATION:   K62 89: Other specified diseases of anus and rectum  Based on colonoscopy from 2/17/2023, the tumor is a circumferential mass measuring approximately 7 cm, 2 cm from the anal verge  CEA of 39 2  Patient has not had preoperative chemoradiation treatment  COMPARISON: CT of the abdomen and pelvis from 2/16/2023 TECHNIQUE: Multiplanar/multisequence MRI of the pelvis without contrast was performed using rectal cancer imaging protocol  An additional small field of view, axial oblique T2-weighted sequence was performed through the tumor, perpendicular to the long axis of the rectum at that level  IV contrast was not given  FINDINGS: TUMOR LOCATION AND CHARACTERISTICS -  Tumor location: Mid rectal cancer (5 to 10 cm), 6 5 cm from the anal verge  -  Distance of the lowest extent of tumor from the top of the anal sphincter: 1 1 cm  -  Relationship to anterior peritoneal reflection: Below -  Morphology: Mostly polypoid and partially annular  -  Circumference (percent, relative to wall): A large portion of the lesion is polypoid an irregular filling of most of the lumen  The inferior most aspect of the mass extends from the 3 o'clock position to the 7 o'clock position  More superiorly in the mass is inseparable from the rectal wall for approximately 12 o'clock to 6 o'clock position  -  Tumor craniocaudal length: 5 7 cm  -  Mucinous: No T-STAGING: T3c (tumor penetrates >5-15 mm beyond muscularis propria) ANAL SPHINCTER INVOLVEMENT (FOR LOW RECTAL CA): N/A EXTRAMURAL VASCULAR INVASION: EMVI is likely present (series 16 image 17-15)  MESORECTAL FASCIA (MRF):         -Shortest distance of tumor to MRF: 3 mm, at the 12-1 o'clock position   Series 16 image 22         -Is there a separate tumor deposit, LN or EMVI threatening ( >=  1mm and  <= 2 mm) or invading (< 1 mm) the MRF? Yes, and enlarged, suspicious right perirectal lymph node at the 6 o'clock position is at least within 2 mm of the mesorectal fascia (series 16 image 14)  Another suspicious lymph node at the 8 o'clock position abuts the mesorectal fascia more superiorly (series 11 image 17 and series 16 image 6)  TUMOR DEPOSITS: No  LYMPH NODES:   There are 4 or greater suspicious locoregional nodes (N2)  Some suspicious mesorectal/superior rectal lymph nodes: -Series 16 image 14, Right anterior mesorectal, 10 mm  -Series 16 image 1, Superior rectal, 9 mm  -Series 16 image 8, left posterior mesorectal, 9 mm  -Series 16 image 6, right posterior mesorectal, 9 mm  -Series 15 image 12, superior rectal, 12 mm and 10 mm  -Series 15 image 15, superior rectal, 11 mm  Suspicious extra-mesorectal locoregional nodes: No   Suspicious non-locoregional nodes: Right external iliac lymph node measuring 1 cm in short axis (series 11 image 21)  REST OF THE PELVIS:       REPRODUCTIVE STRUCTURES: The prostate gland is not enlarged  BLADDER:  Within normal limits  OTHER BOWEL LOOPS: No bowel obstruction  PELVIC CAVITY:  There is edema in the presacral space  VASCULAR STRUCTURES:  Limited evaluation of the visualized vasculature on this non-contrast MRI is unremarkable  PELVIC WALL:  Unremarkable  OSSEOUS STRUCTURES: No definite osseous destruction  Impression: Unenhanced MRI of the Pelvis (Rectal Protocol) Overall MRI stage: T3c, N2, Mid rectal cancer  MRF: involved (tumor margin within 1 mm of MRF) Sphincter involvement: No  Suspicious extra mesorectal lymph nodes:Yes, right external iliac chain  EMVI: Likely present  For the purpose of radiation therapy planning, series 16 images 10-30 would be most useful   Workstation performed: [id number]     US right upper quadrant    Result Date: 2/16/2023  Narrative: RIGHT UPPER QUADRANT ULTRASOUND INDICATION: abnormal LFTS, distended GB, r/o cholecystitis  COMPARISON:  CT performed earlier today TECHNIQUE:   Real-time ultrasound of the right upper quadrant was performed with a curvilinear transducer with both volumetric sweeps and still imaging techniques  FINDINGS: PANCREAS:  Obscured by bowel gas AORTA AND IVC:  Visualized portions are normal for patient age  LIVER: Size:  Moderately enlarged  The liver measures 22 1 cm in the midclavicular line  Contour:  Secondary to severe hepatic steatosis, the liver is better evaluated on the prior CT study  Parenchyma: There is marked diffuse increased echogenicity with smooth echotexture and significant beam attenuation with loss of periportal echogenicity  Most consistent with severe hepatic steatosis  The presence of focal hepatic abnormalities are better appreciated on the prior CT with prior recommendation for MRI Limited imaging of the main portal vein shows it to be patent and hepatopetal   BILIARY: The gallbladder is distended  No intrahepatic biliary dilatation  CBD measures 6 0 mm  No choledocholithiasis  Gallbladder wall thickness is approximately 2 to 3 mm  Negative Styles sign KIDNEY: Right kidney measures 11 3 x 6 6 x 6 0 cm  Volume 236 3 mL Kidney within normal limits  ASCITES:   None  Impression: 1  Markedly abnormal liver appearing enlarged and demonstrating severe hepatic steatosis  Please refer to prior CT report for additional findings and recommendations 2  Distended gallbladder, without evidence of gallbladder wall thickening, significant biliary ductal dilatation or point tenderness  No gallstones are seen Workstation performed: [id number]     CT abdomen pelvis with contrast    Result Date: 2/16/2023  Narrative: CT ABDOMEN AND PELVIS WITH IV CONTRAST INDICATION:   Abdominal pain, acute, nonlocalized abdominal pain, N/V  History of appendectomy  Blood in stool  COMPARISON:  None   TECHNIQUE:  CT examination of the abdomen and pelvis was performed  Axial, sagittal, and coronal 2D reformatted images were created from the source data and submitted for interpretation  Radiation dose length product (DLP) for this visit:  1056 9 mGy-cm   This examination, like all CT scans performed in the [facility], was performed utilizing techniques to minimize radiation dose exposure, including the use of iterative  reconstruction and automated exposure control  IV Contrast:  100 mL of iohexol (OMNIPAQUE) Enteric Contrast:  Enteric contrast was not administered  FINDINGS: ABDOMEN LOWER CHEST:  Multiple paraesophageal varices  LIVER/BILIARY TREE:  Liver is heterogeneous in attenuation with multiple focal areas of decreased attenuation  Nodular contours of the liver  There are more discrete somewhat rounded areas of decreased attenuation with capsular irregularity involving the left lobe of the liver series 2 image 29, and the caudate lobe 2/54 cm  Areas of narrowing of the hepatic veins and intrahepatic IVC  GALLBLADDER:  Distended measuring 13 3 cm  No radiopaque calculi or appreciable pericholecystic inflammation  SPLEEN:  Unremarkable  PANCREAS:  Unremarkable  ADRENAL GLANDS:  Unremarkable  KIDNEYS/URETERS:  Nonobstructing punctate left lower pole calculus    No hydronephrosis  STOMACH AND BOWEL:  There is colonic diverticulosis without evidence of acute diverticulitis  Asymmetric rectal wall thickening appears  There is perirectal edema and rounded approximate 7 mm perirectal lymph nodes 2/159, 2/163  APPENDIX:  There are expected postoperative changes of appendectomy  ABDOMINOPELVIC CAVITY:  No ascites  No pneumoperitoneum  11 mm and 10 mm retroperitoneal lymph nodes, 2/102, 96  Multiple prominent perirectal lymph nodes  VESSELS:  Paraesophageal, perigastric, and upper abdominal varices  Recanalized paraumbilical vein  Varices in the pelvis  PELVIS REPRODUCTIVE ORGANS:  Unremarkable for patient's age  URINARY BLADDER:  Unremarkable  Perivesicular varices  ABDOMINAL WALL/INGUINAL REGIONS:  Unremarkable  OSSEOUS STRUCTURES:  No acute fracture or destructive osseous lesion  Degenerative changes  Impression: Asymmetric rectal wall thickening, perirectal edema, prominent in size perirectal and low retroperitoneal lymph nodes  This is most concerning for neoplasm  Findings of hepatic cirrhosis and portal hypertension  Multiple somewhat geographic areas of decreased attenuation in the liver suspected to represent areas of focal fatty infiltration  More discrete rounded areas with contour irregularity involving the left lobe and caudate lobe may represent regenerative or dysplastic nodules  Recommend further evaluation with MRI with and without intravenous contrast  Gallbladder is markedly distended without radiopaque calculi or findings for cholecystitis  Additional findings as above  This study demonstrates a significant  finding and was documented as such in [facility] for liaison and referring practitioner notification  Workstation performed: [id number]     IR port placement    Result Date: 2/28/2023  Narrative: INDICATION: Rectal cancer  PROCEDURE: 1  Internal jugular approach port placement FINDINGS: 1  Single lumen port placed via right internal jugular vein with tip in the right atrium  Impression: Port placement  The catheter is ready for use  _______________________________________________________________ COMPARISON: None PROCEDURE DETAILS: Operators: Dr Shashank Rai Anesthesia: Moderate sedation was provided for 30 minutes  Hemodynamic parameters were continuously monitored by IR nursing  Local anesthesia  Medications: 1% lidocaine, fentanyl, Versed Contrast: 0 mL of Omnipaque 300 Fluoroscopy time: 1 0 minutes Images: 2 COMMENTS: The site was prepped and draped in the usual sterile fashion    All elements of maximal sterile barrier technique were followed (cap, mask, sterile gown, sterile gloves, large sterile full-body drape, hand hygiene, and 2% chlorhexidine for cutaneous antisepsis)  Sterile ultrasound technique with sterile gel and sterile probe cover was also utilized  A preprocedure timeout was performed per [facility] protocol  Patent right internal jugular vein was compressible and accessed using a micropuncture needle using real-time ultrasound guidance  Static ultrasound image was saved to PACS  Over a microwire, the needle was exchanged for a micropuncture sheath followed  by exchange for a J-wire advanced into the inferior vena cava  Next, a transverse incision was made over the upper chest wall and a subcutaneous pocket was created using blunt dissection  Following catheter tunneling, the micropuncture sheath was exchanged for a peel-away sheath over a wire allowing catheter advancement into the right atrium using fluoroscopic guidance  Peel-away sheath was then removed  Following catheter length confirmation and position with fluoroscopy, catheter was cut, connected to the port hub and accessed using a noncoring Arias needle for aspiration and flushing  The port was placed into the subcutaneous pocket  The pocket was closed in layers with 3-0 interrupted Vicryl sutures and subcuticular continuous sutures using a Stratafix absorbable suture  3-0 Vicryl suture of the venotomy was performed  Exofin glue was applied over the venotomy and chest incisions   Workstation performed: [id number]         Francisco Lee MD

## 2023-06-08 NOTE — PATIENT INSTRUCTIONS
Continue with the blood work as scheduled by the oncology team   I will reach out to them to figure out the timing of your planned EGD  Avoid alcohol and tobacco products  Also avoid raw seafood/oysters and avoid swimming/wading in unchlorinated nunez, particularly from the [facility], due to increased risk of infection from a bacteria called Vibrio Vulnificus  Avoid medications called NSAID's (Motrin/Ibuprofen, Naproxen/Naprosyn/Aleve) if possible, due to increase risk of kidney dysfunction, and if you use medications containing acetaminophen (Tylenol, percocet, Endocet, and many cough/cold medications), the dose should not exceed 2 grams/day  Seek immediate medical attention if you develop fevers over 101 F, have evidence of GI bleeding (black or bloody stools, bloody or coffee ground emesis) or confusion  Call our office if you develop worsening symptoms related to lower extremity edema, ascites, jaundice or excessive bruising/bleeding

## 2023-06-09 ENCOUNTER — APPOINTMENT (OUTPATIENT)
Facility: HOSPITAL | Age: 43
End: 2023-06-09
Attending: RADIOLOGY
Payer: COMMERCIAL

## 2023-06-09 PROCEDURE — 77399 UNLISTED PX MED RADJ PHYSICS: CPT | Performed by: RADIOLOGY

## 2023-06-09 PROCEDURE — 77470 SPECIAL RADIATION TREATMENT: CPT | Performed by: RADIOLOGY

## 2023-06-11 NOTE — PROGRESS NOTES
HEMATOLOGY / [address] FOLLOW UP NOTE    Primary Care Provider: Chencho Fuchs DO  Referring Provider:    MRN: [id number]  : 1980    Reason for Encounter: follow up Actal cancer       Oncology History Overview Note   2023 - [id number] rectal adenocarcinoma    3/9/2023 - start neoadjuvant FOLFOX     Rectal cancer ([address] )   2023 Biopsy    Final Diagnosis   A  Large Intestine, Descending Colon, polyp:    - Tubular adenoma  - Negative for high grade dysplasia  B  Rectum, mass:   -  Adenocarcinoma  See comment  Comment: Immunohistochemical stains performed with adequate controls demonstrates that the tumor is positive for CDX2, SATB2, CK20 (patchy), and negative for CK7, Synaptophysin, Chromogranin A, and p40  The immunophenotype supports the diagnosis   Ancillary testing for mismatch repair (MMR) protein deficiency by immunohistochemical panel (MLH1, PMS2, MSH2, MSH6) is undertaken (Block B1); the results will be issued in a supplemental report, to follow shortly     Addendum   RESULTS OF IMMUNOHISTOCHEMICAL ANALYSIS FOR MISMATCH REPAIR PROTEIN LOSS     INTERPRETATION: NO LOSS OF NUCLEAR EXPRESSION OF MMR PROTEINS: LOW PROBABILITY OF MSI-H      RESULTS:  Antibody            Clone                 Description                                  Results  MLH1                 [id number]          Mismatch repair protein               Intact nuclear expression  MSH2                [id number]        Mismatch repair protein               Intact nuclear expression  MSH6                40                      Mismatch repair protein               Intact nuclear expression  PMS2                 [id number]           Mismatch repair protein               Intact nuclear expression     Comment:   A negative control and a positive control for each antibody have been reviewed and accepted        2023 Initial Diagnosis    Rectal cancer ([facility] [address] )     3/2/2023 -  Cancer Staged    Staging form: Colon and Rectum, AJCC 8th Edition  - Clinical: cT3, cN2, cM0 - Signed by Melanie Odonnell DO on 3/2/2023       3/9/2023 - 6/2/2023 Chemotherapy    alteplase (CATHFLO), 2 mg, Intracatheter, Every 1 Minute as needed, 6 of 10 cycles  palonosetron (ALOXI), 0 25 mg, Intravenous, Once, 1 of 2 cycles  Administration: 0 25 mg (5/31/2023)  pegfilgrastim (NEULASTA), 6 mg, Subcutaneous, Once, 1 of 1 cycle  Pegfilgrastim-bmez (ZIEXTENZO), 6 mg, Subcutaneous, Once, 4 of 8 cycles  Administration: 6 mg (4/21/2023), 6 mg (5/6/2023), 6 mg (5/22/2023), 6 mg (6/2/2023)  fluorouracil (ADRUCIL), 400 mg/m2 = 915 mg, Intravenous, Once, 6 of 10 cycles  Administration: 915 mg (3/9/2023), 915 mg (3/22/2023), 915 mg (4/19/2023), 915 mg (5/4/2023), 915 mg (5/17/2023), 915 mg (5/31/2023)  leucovorin calcium IVPB, 916 mg, Intravenous, Once, 6 of 10 cycles  Administration: 900 mg (3/9/2023), 900 mg (3/22/2023), 900 mg (4/19/2023), 900 mg (5/4/2023), 900 mg (5/17/2023), 900 mg (5/31/2023)  oxaliplatin (ELOXATIN) chemo infusion, 194 65 mg, Intravenous, Once, 6 of 10 cycles  Administration: 200 mg (3/9/2023), 200 mg (3/22/2023), 200 mg (4/19/2023), 200 mg (5/4/2023), 200 mg (5/17/2023), 200 mg (5/31/2023)  fluorouracil (ADRUCIL) ambulatory infusion Soln, 1,200 mg/m2/day = 5,495 mg, Intravenous, Over 46 hours, 6 of 10 cycles  aprepitant (CINVANTI) in  mL IVPB, 130 mg, Intravenous, Once, 1 of 2 cycles  Administration: 130 mg (5/31/2023)     6/26/2023 -  Chemotherapy    alteplase (CATHFLO), 2 mg, Intracatheter, Every 1 Minute as needed, 0 of 5 cycles  fluorouracil (ADRUCIL) ambulatory infusion Soln, 200 mg/m2/day = 2,190 mg (88 9 % of original dose 225 mg/m2/day), Intravenous, Over 120 hours, 0 of 5 cycles  Dose modification: 200 mg/m2/day (original dose 225 mg/m2/day, Cycle 1, Reason: Anticipated Tolerance)         Interval History: Patient presents for follow up of his rectal cancer  He presents prior to cycle 8 of neoadjuvant FOLFOX    He starts neoadjuvant radiation in a few weeks  He has not required any dose reductions  He has cold-induced neuropathy but nothing more persistent  He has diarrhea that alternates with constipation  No bright red blood per rectum  No pain  He is taking Compazine for nausea and vomiting  REVIEW OF SYSTEMS:  Please note that a 14-point review of systems was performed to include Constitutional, HEENT, Respiratory, CVS, GI, , Musculoskeletal, Integumentary, Neurologic, Rheumatologic, Endocrinologic, Psychiatric, Lymphatic, and Hematologic/Oncologic systems were reviewed and are negative unless otherwise stated in HPI  Positive and negative findings pertinent to this evaluation are incorporated into the history of present illness  ECOG PS: 0    PROBLEM LIST:  Patient Active Problem List   Diagnosis   • Rectal wall thickening   • Transaminitis/Hepatitic Steatosis   • Rectal cancer (HCC)   • Chemotherapy-induced neutropenia (HCC)       Assessment / Plan: 76-year-old male with rectal cancer being treated in the neoadjuvant setting  He is approved for his last cycle of FOLFOX  We discussed the 5-FU pump versus Xeloda for concurrent chemotherapy with radiation  I do find the 5-FU pump to be better tolerated although it is less convenient in terms of having to wear the pump Monday through Friday  He decided to go with the 5-FU pump  I have adjusted his chemotherapy orders and he will start the 5-FU pump on the Monday of his first week of radiation therapy  I will see him back in follow-up in the first 2 to 3 weeks of concurrent therapy for ongoing evaluation  I spent 20 minutes on chart review, face to face counseling time, coordination of care and documentation  Past Medical History:   has a past medical history of Cirrhosis ([address] ) (3/18/24), Colon cancer ([address] ), Dental infection, Fatty liver, and Rectal cancer ([facility] [address] )  PAST SURGICAL HISTORY:   has a past surgical history that includes Appendectomy;  Dental surgery; IR "port placement (02/28/2023); Colonoscopy; and Upper gastrointestinal endoscopy  CURRENT MEDICATIONS  Current Outpatient Medications   Medication Sig Dispense Refill   • diphenoxylate-atropine (LOMOTIL) 2 5-0 025 mg per tablet Take 1 tablet by mouth 4 (four) times a day as needed for diarrhea As needed     • ondansetron (ZOFRAN) 8 mg tablet Take 1 tablet (8 mg total) by mouth every 8 (eight) hours as needed for nausea or vomiting 30 tablet 2   • potassium chloride (K-DUR,KLOR-CON) 20 mEq tablet take 1 tablet by mouth twice a day for 7 days 14 tablet 0   • [START ON 6/14/2023] fluorouracil 5,495 mg in CADD/Elastomeric Infusion Device Infuse 5,495 mg (1,200 mg/m2/day x 2 29 m2) into a catheter in a vein via infusion device over 46 hours for 2 days  Infusion planned for June 14, 2023  1 each 0     No current facility-administered medications for this visit  [unfilled]    SOCIAL HISTORY:   reports that he quit smoking about 3 years ago  His smoking use included cigarettes  He has a 20 00 pack-year smoking history  He has never used smokeless tobacco  He reports that he does not currently use alcohol after a past usage of about 2 0 standard drinks of alcohol per week  He reports current drug use  Drug: Marijuana  FAMILY HISTORY:  family history includes Cancer in his father and mother; Lung cancer in his father and mother  ALLERGIES:  has No Known Allergies  Physical Exam:  Vital Signs:   Visit Vitals  /59 (BP Location: Right arm, Patient Position: Sitting, Cuff Size: Adult)   Pulse 80   Temp (!) 97 °F (36 1 °C)   Resp 17   Ht 6' 3\" (1 905 m)   Wt 84 8 kg (187 lb)   SpO2 99%   BMI 23 37 kg/m²   Smoking Status Former   BSA 2 13 m²     Body mass index is 23 37 kg/m²  Body surface area is 2 13 meters squared      GEN: Alert, awake oriented x3, in no acute distress  HEENT- No pallor, icterus, cyanosis, no oral mucosal lesions,   LAD - no palpable cervical, clavicle, axillary, inguinal LAD  Heart- " normal S1 S2, regular rate and rhythm, No murmur, rubs     Lungs- clear breathing sound bilateral    Abdomen- soft, Non tender, bowel sounds present  Extremities- No cyanosis, clubbing, edema  Neuro- No focal neurological deficit    Labs:  Lab Results   Component Value Date    HCT 31 7 (L) 05/30/2023    HGB 10 7 (L) 05/30/2023    MCV 90 05/30/2023    PLT 93 (L) 05/30/2023    WBC 8 47 05/30/2023     Lab Results   Component Value Date    AGAP 9 05/30/2023    ALKPHOS 120 (H) 05/30/2023    ALT 37 05/30/2023    AST 68 (H) 05/30/2023    BUN 4 (L) 05/30/2023    CALCIUM 8 9 05/30/2023     (H) 05/30/2023    CO2 23 05/30/2023    CREATININE 0 74 05/30/2023    EGFR 112 05/30/2023    GLUC 105 05/30/2023    GLUF 121 (H) 03/06/2023    K 3 5 05/30/2023    SODIUM 141 05/30/2023    TBILI 2 21 (H) 05/30/2023    TP 6 4 05/30/2023

## 2023-06-12 ENCOUNTER — HOSPITAL ENCOUNTER (OUTPATIENT)
Dept: INFUSION CENTER | Facility: HOSPITAL | Age: 43
Discharge: HOME | End: 2023-06-12
Payer: COMMERCIAL

## 2023-06-12 DIAGNOSIS — E87.6 LOW SERUM POTASSIUM: ICD-10-CM

## 2023-06-12 DIAGNOSIS — C20 RECTAL CANCER (HCC): Primary | ICD-10-CM

## 2023-06-12 LAB
ALBUMIN SERPL BCP-MCNC: 3.3 G/DL (ref 3.5–5)
ALP SERPL-CCNC: 99 U/L (ref 34–104)
ALT SERPL W P-5'-P-CCNC: 28 U/L (ref 7–52)
ANION GAP SERPL CALCULATED.3IONS-SCNC: 6 MMOL/L (ref 4–13)
AST SERPL W P-5'-P-CCNC: 41 U/L (ref 13–39)
BASOPHILS # BLD AUTO: 0.03 THOUSANDS/ÂΜL (ref 0–0.1)
BASOPHILS NFR BLD AUTO: 1 % (ref 0–1)
BILIRUB SERPL-MCNC: 1.69 MG/DL (ref 0.2–1)
BUN SERPL-MCNC: 7 MG/DL (ref 5–25)
CALCIUM ALBUM COR SERPL-MCNC: 9 MG/DL (ref 8.3–10.1)
CALCIUM SERPL-MCNC: 8.4 MG/DL (ref 8.4–10.2)
CHLORIDE SERPL-SCNC: 112 MMOL/L (ref 96–108)
CO2 SERPL-SCNC: 20 MMOL/L (ref 21–32)
CREAT SERPL-MCNC: 0.64 MG/DL (ref 0.6–1.3)
EOSINOPHIL # BLD AUTO: 0.07 THOUSAND/ÂΜL (ref 0–0.61)
EOSINOPHIL NFR BLD AUTO: 3 % (ref 0–6)
ERYTHROCYTE [DISTWIDTH] IN BLOOD BY AUTOMATED COUNT: 21.8 % (ref 11.6–15.1)
GFR SERPL CREATININE-BSD FRML MDRD: 119 ML/MIN/1.73SQ M
GLUCOSE SERPL-MCNC: 133 MG/DL (ref 65–140)
HCT VFR BLD AUTO: 27.6 % (ref 36.5–49.3)
HGB BLD-MCNC: 9.3 G/DL (ref 12–17)
IMM GRANULOCYTES # BLD AUTO: 0.05 THOUSAND/UL (ref 0–0.2)
IMM GRANULOCYTES NFR BLD AUTO: 2 % (ref 0–2)
LYMPHOCYTES # BLD AUTO: 1.12 THOUSANDS/ÂΜL (ref 0.6–4.47)
LYMPHOCYTES NFR BLD AUTO: 44 % (ref 14–44)
MCH RBC QN AUTO: 31.3 PG (ref 26.8–34.3)
MCHC RBC AUTO-ENTMCNC: 33.7 G/DL (ref 31.4–37.4)
MCV RBC AUTO: 93 FL (ref 82–98)
MONOCYTES # BLD AUTO: 0.63 THOUSAND/ÂΜL (ref 0.17–1.22)
MONOCYTES NFR BLD AUTO: 25 % (ref 4–12)
NEUTROPHILS # BLD AUTO: 0.63 THOUSANDS/ÂΜL (ref 1.85–7.62)
NEUTS SEG NFR BLD AUTO: 25 % (ref 43–75)
NRBC BLD AUTO-RTO: 0 /100 WBCS
PLATELET # BLD AUTO: 84 THOUSANDS/UL (ref 149–390)
PMV BLD AUTO: 11.1 FL (ref 8.9–12.7)
POTASSIUM SERPL-SCNC: 3.3 MMOL/L (ref 3.5–5.3)
PROT SERPL-MCNC: 6 G/DL (ref 6.4–8.4)
RBC # BLD AUTO: 2.97 MILLION/UL (ref 3.88–5.62)
SODIUM SERPL-SCNC: 138 MMOL/L (ref 135–147)
WBC # BLD AUTO: 2.53 THOUSAND/UL (ref 4.31–10.16)

## 2023-06-12 PROCEDURE — 85025 COMPLETE CBC W/AUTO DIFF WBC: CPT | Performed by: INTERNAL MEDICINE

## 2023-06-12 PROCEDURE — 80053 COMPREHEN METABOLIC PANEL: CPT | Performed by: INTERNAL MEDICINE

## 2023-06-12 RX ORDER — POTASSIUM CHLORIDE 20 MEQ/1
20 TABLET, EXTENDED RELEASE ORAL DAILY
Qty: 14 TABLET | Refills: 0 | Status: SHIPPED | OUTPATIENT
Start: 2023-06-12 | End: 2023-06-24

## 2023-06-12 NOTE — PROGRESS NOTES
Pt here for port labs tolerated well no adverse reactions declined AVS and left ambulatory with steady gait

## 2023-06-13 ENCOUNTER — TELEPHONE (OUTPATIENT)
Dept: HEMATOLOGY ONCOLOGY | Facility: CLINIC | Age: 43
End: 2023-06-13

## 2023-06-13 DIAGNOSIS — C20 RECTAL CANCER (HCC): Primary | ICD-10-CM

## 2023-06-13 NOTE — TELEPHONE ENCOUNTER
Spoke with patient to let him know that we are going to cancel his treatment this week  I told him that we are going to get blood work completed on 6/20 and he will start the new treatment plan on 6/26  Patient verbalized understanding and is in agreement with the plan

## 2023-06-14 ENCOUNTER — HOSPITAL ENCOUNTER (OUTPATIENT)
Dept: INFUSION CENTER | Facility: HOSPITAL | Age: 43
Discharge: HOME | End: 2023-06-14
Attending: INTERNAL MEDICINE

## 2023-06-16 ENCOUNTER — HOSPITAL ENCOUNTER (OUTPATIENT)
Dept: INFUSION CENTER | Facility: HOSPITAL | Age: 43
End: 2023-06-16
Attending: INTERNAL MEDICINE

## 2023-06-19 PROCEDURE — 77338 DESIGN MLC DEVICE FOR IMRT: CPT | Performed by: RADIOLOGY

## 2023-06-19 PROCEDURE — 77300 RADIATION THERAPY DOSE PLAN: CPT | Performed by: RADIOLOGY

## 2023-06-19 PROCEDURE — 77301 RADIOTHERAPY DOSE PLAN IMRT: CPT | Performed by: RADIOLOGY

## 2023-06-20 DIAGNOSIS — C20 RECTAL CANCER (HCC): Primary | ICD-10-CM

## 2023-06-21 ENCOUNTER — APPOINTMENT (OUTPATIENT)
Facility: HOSPITAL | Age: 43
End: 2023-06-21
Attending: RADIOLOGY
Payer: COMMERCIAL

## 2023-06-21 PROCEDURE — 77386 HB NTSTY MODUL RAD TX DLVR CPLX: CPT | Performed by: RADIOLOGY

## 2023-06-21 PROCEDURE — 77427 RADIATION TX MANAGEMENT X5: CPT | Performed by: RADIOLOGY

## 2023-06-21 PROCEDURE — 77014 CHG CT GUIDANCE RADIATION THERAPY FLDS PLACEMENT: CPT | Performed by: RADIOLOGY

## 2023-06-22 ENCOUNTER — PATIENT OUTREACH (OUTPATIENT)
Dept: HEMATOLOGY ONCOLOGY | Facility: CLINIC | Age: 43
End: 2023-06-22

## 2023-06-22 PROCEDURE — 77386 HB NTSTY MODUL RAD TX DLVR CPLX: CPT | Performed by: RADIOLOGY

## 2023-06-22 PROCEDURE — 77014 CHG CT GUIDANCE RADIATION THERAPY FLDS PLACEMENT: CPT | Performed by: RADIOLOGY

## 2023-06-22 NOTE — PROGRESS NOTES
Scheduled post treatment CT and MRI, called pt to give him the details, no answer left my contact information for pt to call me back

## 2023-06-23 ENCOUNTER — HOSPITAL ENCOUNTER (OUTPATIENT)
Dept: INFUSION CENTER | Facility: HOSPITAL | Age: 43
End: 2023-06-23
Payer: COMMERCIAL

## 2023-06-23 DIAGNOSIS — C20 RECTAL CANCER (HCC): Primary | ICD-10-CM

## 2023-06-23 DIAGNOSIS — E87.6 LOW SERUM POTASSIUM: ICD-10-CM

## 2023-06-23 LAB
ALBUMIN SERPL BCP-MCNC: 3.5 G/DL (ref 3.5–5)
ALP SERPL-CCNC: 98 U/L (ref 34–104)
ALT SERPL W P-5'-P-CCNC: 33 U/L (ref 7–52)
ANION GAP SERPL CALCULATED.3IONS-SCNC: 8 MMOL/L
AST SERPL W P-5'-P-CCNC: 67 U/L (ref 13–39)
BASOPHILS # BLD AUTO: 0.05 THOUSANDS/ÂΜL (ref 0–0.1)
BASOPHILS NFR BLD AUTO: 1 % (ref 0–1)
BILIRUB SERPL-MCNC: 2.28 MG/DL (ref 0.2–1)
BUN SERPL-MCNC: 8 MG/DL (ref 5–25)
CALCIUM SERPL-MCNC: 8.9 MG/DL (ref 8.4–10.2)
CHLORIDE SERPL-SCNC: 109 MMOL/L (ref 96–108)
CO2 SERPL-SCNC: 20 MMOL/L (ref 21–32)
CREAT SERPL-MCNC: 0.57 MG/DL (ref 0.6–1.3)
EOSINOPHIL # BLD AUTO: 0.03 THOUSAND/ÂΜL (ref 0–0.61)
EOSINOPHIL NFR BLD AUTO: 1 % (ref 0–6)
ERYTHROCYTE [DISTWIDTH] IN BLOOD BY AUTOMATED COUNT: 21.8 % (ref 11.6–15.1)
GFR SERPL CREATININE-BSD FRML MDRD: 124 ML/MIN/1.73SQ M
GLUCOSE SERPL-MCNC: 98 MG/DL (ref 65–140)
HCT VFR BLD AUTO: 32.2 % (ref 36.5–49.3)
HGB BLD-MCNC: 10.6 G/DL (ref 12–17)
IMM GRANULOCYTES # BLD AUTO: 0.03 THOUSAND/UL (ref 0–0.2)
IMM GRANULOCYTES NFR BLD AUTO: 1 % (ref 0–2)
LYMPHOCYTES # BLD AUTO: 0.93 THOUSANDS/ÂΜL (ref 0.6–4.47)
LYMPHOCYTES NFR BLD AUTO: 19 % (ref 14–44)
MCH RBC QN AUTO: 32 PG (ref 26.8–34.3)
MCHC RBC AUTO-ENTMCNC: 32.9 G/DL (ref 31.4–37.4)
MCV RBC AUTO: 97 FL (ref 82–98)
MONOCYTES # BLD AUTO: 0.78 THOUSAND/ÂΜL (ref 0.17–1.22)
MONOCYTES NFR BLD AUTO: 16 % (ref 4–12)
NEUTROPHILS # BLD AUTO: 3.02 THOUSANDS/ÂΜL (ref 1.85–7.62)
NEUTS SEG NFR BLD AUTO: 62 % (ref 43–75)
NRBC BLD AUTO-RTO: 0 /100 WBCS
PLATELET # BLD AUTO: 138 THOUSANDS/UL (ref 149–390)
PMV BLD AUTO: 10.3 FL (ref 8.9–12.7)
POTASSIUM SERPL-SCNC: 3.7 MMOL/L (ref 3.5–5.3)
PROT SERPL-MCNC: 6.7 G/DL (ref 6.4–8.4)
RBC # BLD AUTO: 3.31 MILLION/UL (ref 3.88–5.62)
SODIUM SERPL-SCNC: 137 MMOL/L (ref 135–147)
WBC # BLD AUTO: 4.84 THOUSAND/UL (ref 4.31–10.16)

## 2023-06-23 PROCEDURE — 85025 COMPLETE CBC W/AUTO DIFF WBC: CPT | Performed by: INTERNAL MEDICINE

## 2023-06-23 PROCEDURE — 77386 HB NTSTY MODUL RAD TX DLVR CPLX: CPT | Performed by: RADIOLOGY

## 2023-06-23 PROCEDURE — 77014 CHG CT GUIDANCE RADIATION THERAPY FLDS PLACEMENT: CPT | Performed by: RADIOLOGY

## 2023-06-23 PROCEDURE — 80053 COMPREHEN METABOLIC PANEL: CPT | Performed by: INTERNAL MEDICINE

## 2023-06-24 RX ORDER — POTASSIUM CHLORIDE 20 MEQ/1
TABLET, EXTENDED RELEASE ORAL
Qty: 14 TABLET | Refills: 0 | Status: SHIPPED | OUTPATIENT
Start: 2023-06-24

## 2023-06-26 ENCOUNTER — HOSPITAL ENCOUNTER (OUTPATIENT)
Dept: INFUSION CENTER | Facility: HOSPITAL | Age: 43
Discharge: HOME | End: 2023-06-26
Attending: INTERNAL MEDICINE

## 2023-06-26 VITALS
HEIGHT: 75 IN | WEIGHT: 188.49 LBS | DIASTOLIC BLOOD PRESSURE: 57 MMHG | OXYGEN SATURATION: 100 % | TEMPERATURE: 97.8 F | RESPIRATION RATE: 18 BRPM | SYSTOLIC BLOOD PRESSURE: 112 MMHG | HEART RATE: 80 BPM | BODY MASS INDEX: 23.44 KG/M2

## 2023-06-26 DIAGNOSIS — C20 RECTAL CANCER (HCC): Primary | ICD-10-CM

## 2023-06-26 PROCEDURE — 77386 HB NTSTY MODUL RAD TX DLVR CPLX: CPT | Performed by: RADIOLOGY

## 2023-06-26 PROCEDURE — 77014 CHG CT GUIDANCE RADIATION THERAPY FLDS PLACEMENT: CPT | Performed by: RADIOLOGY

## 2023-06-26 NOTE — PROGRESS NOTES
Patient CADD infusion pump connected per protocol, double checked and started by second RN  Green light blinking and pump running  Patient aware of disconnect time and appointment- 6/30 at 2:00 pm  Verified with Erlin Obrien RN- OK to disconnect early  Patient declined AVS at this time and was discharged in stable condition to home via ambulation

## 2023-06-27 ENCOUNTER — DOCUMENTATION (OUTPATIENT)
Dept: HEMATOLOGY ONCOLOGY | Facility: CLINIC | Age: 43
End: 2023-06-27

## 2023-06-27 ENCOUNTER — PATIENT OUTREACH (OUTPATIENT)
Dept: HEMATOLOGY ONCOLOGY | Facility: CLINIC | Age: 43
End: 2023-06-27

## 2023-06-27 PROCEDURE — 77336 RADIATION PHYSICS CONSULT: CPT | Performed by: RADIOLOGY

## 2023-06-27 PROCEDURE — 77386 HB NTSTY MODUL RAD TX DLVR CPLX: CPT | Performed by: RADIOLOGY

## 2023-06-27 PROCEDURE — 77014 CHG CT GUIDANCE RADIATION THERAPY FLDS PLACEMENT: CPT | Performed by: RADIOLOGY

## 2023-06-27 NOTE — PROGRESS NOTES
In basket sent to add pt to the [facility] 9/28/2023  to be presented by Dr Pura Castellanos

## 2023-06-27 NOTE — PROGRESS NOTES
Called pt to confirm scheduled appointment with Dr Angie White 8/30/2023  Confirmed date, time and location  Also, confirmed the scheduled CT and MRI scheduled after treatment completion    Pt understood, and was thankful for the call

## 2023-06-28 ENCOUNTER — TELEPHONE (OUTPATIENT)
Dept: NUTRITION | Facility: CLINIC | Age: 43
End: 2023-06-28

## 2023-06-28 PROCEDURE — 77386 HB NTSTY MODUL RAD TX DLVR CPLX: CPT | Performed by: RADIOLOGY

## 2023-06-28 PROCEDURE — 77427 RADIATION TX MANAGEMENT X5: CPT | Performed by: RADIOLOGY

## 2023-06-28 PROCEDURE — 77014 CHG CT GUIDANCE RADIATION THERAPY FLDS PLACEMENT: CPT | Performed by: RADIOLOGY

## 2023-06-28 NOTE — TELEPHONE ENCOUNTER
Myesha Kirk was scheduled for an RD follow up visit today during his infusion, but infusion was cancelled or changed after appointment was initially scheduled  Called Myesha Kirk to touch base and change to a phone appointment or reschedule for another day  No answer  Left VM explaining reason for call and encouraging him to call back to reschedule

## 2023-06-29 PROCEDURE — 77014 CHG CT GUIDANCE RADIATION THERAPY FLDS PLACEMENT: CPT | Performed by: RADIOLOGY

## 2023-06-29 PROCEDURE — 77386 HB NTSTY MODUL RAD TX DLVR CPLX: CPT | Performed by: RADIOLOGY

## 2023-06-30 ENCOUNTER — HOSPITAL ENCOUNTER (OUTPATIENT)
Dept: INFUSION CENTER | Facility: HOSPITAL | Age: 43
End: 2023-06-30
Attending: INTERNAL MEDICINE
Payer: COMMERCIAL

## 2023-06-30 VITALS — TEMPERATURE: 98.5 F

## 2023-06-30 DIAGNOSIS — C20 RECTAL CANCER (HCC): Primary | ICD-10-CM

## 2023-06-30 LAB
ALBUMIN SERPL BCP-MCNC: 3.5 G/DL (ref 3.5–5)
ALP SERPL-CCNC: 82 U/L (ref 34–104)
ALT SERPL W P-5'-P-CCNC: 27 U/L (ref 7–52)
ANION GAP SERPL CALCULATED.3IONS-SCNC: 7 MMOL/L
AST SERPL W P-5'-P-CCNC: 55 U/L (ref 13–39)
BASOPHILS # BLD AUTO: 0.03 THOUSANDS/ÂΜL (ref 0–0.1)
BASOPHILS NFR BLD AUTO: 1 % (ref 0–1)
BILIRUB SERPL-MCNC: 2.95 MG/DL (ref 0.2–1)
BUN SERPL-MCNC: 6 MG/DL (ref 5–25)
CALCIUM SERPL-MCNC: 8.7 MG/DL (ref 8.4–10.2)
CHLORIDE SERPL-SCNC: 107 MMOL/L (ref 96–108)
CO2 SERPL-SCNC: 21 MMOL/L (ref 21–32)
CREAT SERPL-MCNC: 0.58 MG/DL (ref 0.6–1.3)
EOSINOPHIL # BLD AUTO: 0.05 THOUSAND/ÂΜL (ref 0–0.61)
EOSINOPHIL NFR BLD AUTO: 2 % (ref 0–6)
ERYTHROCYTE [DISTWIDTH] IN BLOOD BY AUTOMATED COUNT: 19.1 % (ref 11.6–15.1)
GFR SERPL CREATININE-BSD FRML MDRD: 123 ML/MIN/1.73SQ M
GLUCOSE SERPL-MCNC: 130 MG/DL (ref 65–140)
HCT VFR BLD AUTO: 31.9 % (ref 36.5–49.3)
HGB BLD-MCNC: 10.5 G/DL (ref 12–17)
IMM GRANULOCYTES # BLD AUTO: 0.01 THOUSAND/UL (ref 0–0.2)
IMM GRANULOCYTES NFR BLD AUTO: 0 % (ref 0–2)
LYMPHOCYTES # BLD AUTO: 0.6 THOUSANDS/ÂΜL (ref 0.6–4.47)
LYMPHOCYTES NFR BLD AUTO: 18 % (ref 14–44)
MCH RBC QN AUTO: 32.7 PG (ref 26.8–34.3)
MCHC RBC AUTO-ENTMCNC: 32.9 G/DL (ref 31.4–37.4)
MCV RBC AUTO: 99 FL (ref 82–98)
MONOCYTES # BLD AUTO: 0.42 THOUSAND/ÂΜL (ref 0.17–1.22)
MONOCYTES NFR BLD AUTO: 13 % (ref 4–12)
NEUTROPHILS # BLD AUTO: 2.21 THOUSANDS/ÂΜL (ref 1.85–7.62)
NEUTS SEG NFR BLD AUTO: 66 % (ref 43–75)
NRBC BLD AUTO-RTO: 0 /100 WBCS
PLATELET # BLD AUTO: 117 THOUSANDS/UL (ref 149–390)
PMV BLD AUTO: 10.1 FL (ref 8.9–12.7)
POTASSIUM SERPL-SCNC: 3.3 MMOL/L (ref 3.5–5.3)
PROT SERPL-MCNC: 6.5 G/DL (ref 6.4–8.4)
RBC # BLD AUTO: 3.21 MILLION/UL (ref 3.88–5.62)
SODIUM SERPL-SCNC: 135 MMOL/L (ref 135–147)
WBC # BLD AUTO: 3.32 THOUSAND/UL (ref 4.31–10.16)

## 2023-06-30 PROCEDURE — 85025 COMPLETE CBC W/AUTO DIFF WBC: CPT | Performed by: INTERNAL MEDICINE

## 2023-06-30 PROCEDURE — 80053 COMPREHEN METABOLIC PANEL: CPT | Performed by: INTERNAL MEDICINE

## 2023-06-30 PROCEDURE — 77014 CHG CT GUIDANCE RADIATION THERAPY FLDS PLACEMENT: CPT | Performed by: RADIOLOGY

## 2023-06-30 PROCEDURE — 77386 HB NTSTY MODUL RAD TX DLVR CPLX: CPT | Performed by: RADIOLOGY

## 2023-06-30 NOTE — PROGRESS NOTES
Pt arrived amb from Radiation therapy appt  For CADD d/c   15 2ml left  Pt offers no c/o  CADD removed, port labs obtained and de-accessed  Dsd applied  Disch amb ot home, steady gait

## 2023-07-03 ENCOUNTER — APPOINTMENT (OUTPATIENT)
Facility: HOSPITAL | Age: 43
End: 2023-07-03
Attending: RADIOLOGY
Payer: COMMERCIAL

## 2023-07-03 ENCOUNTER — HOSPITAL ENCOUNTER (OUTPATIENT)
Dept: INFUSION CENTER | Facility: HOSPITAL | Age: 43
Discharge: HOME | End: 2023-07-03
Attending: INTERNAL MEDICINE

## 2023-07-03 VITALS
HEIGHT: 75 IN | HEART RATE: 99 BPM | TEMPERATURE: 96.9 F | RESPIRATION RATE: 16 BRPM | WEIGHT: 187 LBS | SYSTOLIC BLOOD PRESSURE: 113 MMHG | BODY MASS INDEX: 23.25 KG/M2 | OXYGEN SATURATION: 99 % | DIASTOLIC BLOOD PRESSURE: 77 MMHG

## 2023-07-03 DIAGNOSIS — C20 RECTAL CANCER (HCC): Primary | ICD-10-CM

## 2023-07-03 PROCEDURE — 77014 CHG CT GUIDANCE RADIATION THERAPY FLDS PLACEMENT: CPT | Performed by: RADIOLOGY

## 2023-07-03 PROCEDURE — 77386 HB NTSTY MODUL RAD TX DLVR CPLX: CPT | Performed by: RADIOLOGY

## 2023-07-03 NOTE — PROGRESS NOTES
Pt here for chemo tolerated well no adverse reactions. CADD pump connected and green light flashing, declined AVS and left ambulatory with steady gait.

## 2023-07-05 ENCOUNTER — APPOINTMENT (OUTPATIENT)
Facility: HOSPITAL | Age: 43
End: 2023-07-05
Payer: COMMERCIAL

## 2023-07-05 PROCEDURE — 77336 RADIATION PHYSICS CONSULT: CPT | Performed by: RADIOLOGY

## 2023-07-05 PROCEDURE — 77014 CHG CT GUIDANCE RADIATION THERAPY FLDS PLACEMENT: CPT | Performed by: RADIOLOGY

## 2023-07-05 PROCEDURE — 77386 HB NTSTY MODUL RAD TX DLVR CPLX: CPT | Performed by: RADIOLOGY

## 2023-07-06 ENCOUNTER — APPOINTMENT (OUTPATIENT)
Facility: HOSPITAL | Age: 43
End: 2023-07-06
Payer: COMMERCIAL

## 2023-07-06 PROCEDURE — 77386 HB NTSTY MODUL RAD TX DLVR CPLX: CPT | Performed by: RADIOLOGY

## 2023-07-06 PROCEDURE — 77014 CHG CT GUIDANCE RADIATION THERAPY FLDS PLACEMENT: CPT | Performed by: RADIOLOGY

## 2023-07-06 PROCEDURE — 77427 RADIATION TX MANAGEMENT X5: CPT | Performed by: RADIOLOGY

## 2023-07-07 ENCOUNTER — APPOINTMENT (OUTPATIENT)
Facility: HOSPITAL | Age: 43
End: 2023-07-07
Payer: COMMERCIAL

## 2023-07-07 ENCOUNTER — HOSPITAL ENCOUNTER (OUTPATIENT)
Dept: INFUSION CENTER | Facility: HOSPITAL | Age: 43
End: 2023-07-07
Attending: INTERNAL MEDICINE
Payer: COMMERCIAL

## 2023-07-07 ENCOUNTER — TELEPHONE (OUTPATIENT)
Age: 43
End: 2023-07-07

## 2023-07-07 DIAGNOSIS — C20 RECTAL CANCER (HCC): Primary | ICD-10-CM

## 2023-07-07 LAB
ALBUMIN SERPL BCP-MCNC: 3.5 G/DL (ref 3.5–5)
ALP SERPL-CCNC: 68 U/L (ref 34–104)
ALT SERPL W P-5'-P-CCNC: 24 U/L (ref 7–52)
ANION GAP SERPL CALCULATED.3IONS-SCNC: 9 MMOL/L
AST SERPL W P-5'-P-CCNC: 51 U/L (ref 13–39)
BASOPHILS # BLD AUTO: 0.03 THOUSANDS/ÂΜL (ref 0–0.1)
BASOPHILS NFR BLD AUTO: 1 % (ref 0–1)
BILIRUB SERPL-MCNC: 2.12 MG/DL (ref 0.2–1)
BUN SERPL-MCNC: 7 MG/DL (ref 5–25)
CALCIUM SERPL-MCNC: 8.9 MG/DL (ref 8.4–10.2)
CHLORIDE SERPL-SCNC: 105 MMOL/L (ref 96–108)
CO2 SERPL-SCNC: 19 MMOL/L (ref 21–32)
CREAT SERPL-MCNC: 0.56 MG/DL (ref 0.6–1.3)
EOSINOPHIL # BLD AUTO: 0.25 THOUSAND/ÂΜL (ref 0–0.61)
EOSINOPHIL NFR BLD AUTO: 9 % (ref 0–6)
ERYTHROCYTE [DISTWIDTH] IN BLOOD BY AUTOMATED COUNT: 18.6 % (ref 11.6–15.1)
GFR SERPL CREATININE-BSD FRML MDRD: 125 ML/MIN/1.73SQ M
GLUCOSE SERPL-MCNC: 117 MG/DL (ref 65–140)
HCT VFR BLD AUTO: 30.5 % (ref 36.5–49.3)
HGB BLD-MCNC: 10.2 G/DL (ref 12–17)
IMM GRANULOCYTES # BLD AUTO: 0.01 THOUSAND/UL (ref 0–0.2)
IMM GRANULOCYTES NFR BLD AUTO: 0 % (ref 0–2)
LYMPHOCYTES # BLD AUTO: 0.61 THOUSANDS/ÂΜL (ref 0.6–4.47)
LYMPHOCYTES NFR BLD AUTO: 21 % (ref 14–44)
MCH RBC QN AUTO: 33.7 PG (ref 26.8–34.3)
MCHC RBC AUTO-ENTMCNC: 33.4 G/DL (ref 31.4–37.4)
MCV RBC AUTO: 101 FL (ref 82–98)
MONOCYTES # BLD AUTO: 0.43 THOUSAND/ÂΜL (ref 0.17–1.22)
MONOCYTES NFR BLD AUTO: 15 % (ref 4–12)
NEUTROPHILS # BLD AUTO: 1.56 THOUSANDS/ÂΜL (ref 1.85–7.62)
NEUTS SEG NFR BLD AUTO: 54 % (ref 43–75)
NRBC BLD AUTO-RTO: 0 /100 WBCS
PLATELET # BLD AUTO: 71 THOUSANDS/UL (ref 149–390)
PMV BLD AUTO: 9.5 FL (ref 8.9–12.7)
POTASSIUM SERPL-SCNC: 3.4 MMOL/L (ref 3.5–5.3)
PROT SERPL-MCNC: 6.6 G/DL (ref 6.4–8.4)
RBC # BLD AUTO: 3.03 MILLION/UL (ref 3.88–5.62)
SODIUM SERPL-SCNC: 133 MMOL/L (ref 135–147)
WBC # BLD AUTO: 2.89 THOUSAND/UL (ref 4.31–10.16)

## 2023-07-07 PROCEDURE — 85025 COMPLETE CBC W/AUTO DIFF WBC: CPT | Performed by: INTERNAL MEDICINE

## 2023-07-07 PROCEDURE — 77386 HB NTSTY MODUL RAD TX DLVR CPLX: CPT | Performed by: RADIOLOGY

## 2023-07-07 PROCEDURE — 77014 CHG CT GUIDANCE RADIATION THERAPY FLDS PLACEMENT: CPT | Performed by: RADIOLOGY

## 2023-07-07 PROCEDURE — 80053 COMPREHEN METABOLIC PANEL: CPT | Performed by: INTERNAL MEDICINE

## 2023-07-07 NOTE — TELEPHONE ENCOUNTER
Talked to patient to schedule a follow up with Dr. Campbell Orourke. He is scheduled for 7/19 after his radiation appointment. Patient aware and agreeable. He knows to call in the meantime with any questions/concerns.

## 2023-07-07 NOTE — PROGRESS NOTES
Pt in infusion center today for 5FU CADD disconnect. Order on chart to disconnect at time of arrival (early)  CADD disconnected with 17.3 ml remaining. Labs drawn from right CW port. Port deaccessed with no adverse reactions. Pt then d/c'd home ambulatory with steady gait.

## 2023-07-10 ENCOUNTER — APPOINTMENT (OUTPATIENT)
Facility: HOSPITAL | Age: 43
End: 2023-07-10
Attending: RADIOLOGY
Payer: COMMERCIAL

## 2023-07-10 ENCOUNTER — HOSPITAL ENCOUNTER (OUTPATIENT)
Dept: INFUSION CENTER | Facility: HOSPITAL | Age: 43
Discharge: HOME | End: 2023-07-10
Attending: INTERNAL MEDICINE

## 2023-07-10 VITALS
HEART RATE: 93 BPM | HEIGHT: 75 IN | SYSTOLIC BLOOD PRESSURE: 125 MMHG | RESPIRATION RATE: 12 BRPM | WEIGHT: 184.97 LBS | OXYGEN SATURATION: 98 % | BODY MASS INDEX: 23 KG/M2 | DIASTOLIC BLOOD PRESSURE: 73 MMHG | TEMPERATURE: 97.9 F

## 2023-07-10 DIAGNOSIS — C20 RECTAL CANCER (HCC): Primary | ICD-10-CM

## 2023-07-10 PROCEDURE — 77014 CHG CT GUIDANCE RADIATION THERAPY FLDS PLACEMENT: CPT | Performed by: RADIOLOGY

## 2023-07-10 PROCEDURE — 77386 HB NTSTY MODUL RAD TX DLVR CPLX: CPT | Performed by: RADIOLOGY

## 2023-07-10 NOTE — PROGRESS NOTES
Patient CADD infusion pump connected per protocol, double checked and started by second RN. Green light blinking and pump running. Patient aware of disconnect time and appointment- . Patient declined AVS at this time and was discharged in stable condition to home via ambulation.

## 2023-07-11 ENCOUNTER — APPOINTMENT (OUTPATIENT)
Facility: HOSPITAL | Age: 43
End: 2023-07-11
Payer: COMMERCIAL

## 2023-07-11 PROCEDURE — 77386 HB NTSTY MODUL RAD TX DLVR CPLX: CPT | Performed by: RADIOLOGY

## 2023-07-11 PROCEDURE — 77014 CHG CT GUIDANCE RADIATION THERAPY FLDS PLACEMENT: CPT | Performed by: RADIOLOGY

## 2023-07-12 ENCOUNTER — APPOINTMENT (OUTPATIENT)
Facility: HOSPITAL | Age: 43
End: 2023-07-12
Attending: RADIOLOGY
Payer: COMMERCIAL

## 2023-07-12 PROCEDURE — 77386 HB NTSTY MODUL RAD TX DLVR CPLX: CPT | Performed by: RADIOLOGY

## 2023-07-12 PROCEDURE — 77014 CHG CT GUIDANCE RADIATION THERAPY FLDS PLACEMENT: CPT | Performed by: RADIOLOGY

## 2023-07-12 PROCEDURE — 77336 RADIATION PHYSICS CONSULT: CPT | Performed by: RADIOLOGY

## 2023-07-13 ENCOUNTER — APPOINTMENT (OUTPATIENT)
Facility: HOSPITAL | Age: 43
End: 2023-07-13
Attending: RADIOLOGY
Payer: COMMERCIAL

## 2023-07-14 ENCOUNTER — APPOINTMENT (OUTPATIENT)
Facility: HOSPITAL | Age: 43
End: 2023-07-14
Attending: RADIOLOGY
Payer: COMMERCIAL

## 2023-07-14 ENCOUNTER — APPOINTMENT (OUTPATIENT)
Facility: HOSPITAL | Age: 43
End: 2023-07-14
Payer: COMMERCIAL

## 2023-07-14 ENCOUNTER — TELEPHONE (OUTPATIENT)
Dept: HEMATOLOGY ONCOLOGY | Facility: CLINIC | Age: 43
End: 2023-07-14

## 2023-07-14 ENCOUNTER — HOSPITAL ENCOUNTER (OUTPATIENT)
Dept: INFUSION CENTER | Facility: HOSPITAL | Age: 43
End: 2023-07-14
Attending: INTERNAL MEDICINE
Payer: COMMERCIAL

## 2023-07-14 VITALS — TEMPERATURE: 99.3 F

## 2023-07-14 DIAGNOSIS — C20 RECTAL CANCER (HCC): Primary | ICD-10-CM

## 2023-07-14 LAB
ALBUMIN SERPL BCP-MCNC: 3.7 G/DL (ref 3.5–5)
ALP SERPL-CCNC: 52 U/L (ref 34–104)
ALT SERPL W P-5'-P-CCNC: 17 U/L (ref 7–52)
ANION GAP SERPL CALCULATED.3IONS-SCNC: 9 MMOL/L
AST SERPL W P-5'-P-CCNC: 34 U/L (ref 13–39)
BASOPHILS # BLD AUTO: 0.02 THOUSANDS/ÂΜL (ref 0–0.1)
BASOPHILS NFR BLD AUTO: 1 % (ref 0–1)
BILIRUB SERPL-MCNC: 2.32 MG/DL (ref 0.2–1)
BUN SERPL-MCNC: 12 MG/DL (ref 5–25)
CALCIUM SERPL-MCNC: 9.1 MG/DL (ref 8.4–10.2)
CHLORIDE SERPL-SCNC: 105 MMOL/L (ref 96–108)
CO2 SERPL-SCNC: 20 MMOL/L (ref 21–32)
CREAT SERPL-MCNC: 0.52 MG/DL (ref 0.6–1.3)
EOSINOPHIL # BLD AUTO: 0.23 THOUSAND/ÂΜL (ref 0–0.61)
EOSINOPHIL NFR BLD AUTO: 8 % (ref 0–6)
ERYTHROCYTE [DISTWIDTH] IN BLOOD BY AUTOMATED COUNT: 17.6 % (ref 11.6–15.1)
GFR SERPL CREATININE-BSD FRML MDRD: 129 ML/MIN/1.73SQ M
GLUCOSE SERPL-MCNC: 116 MG/DL (ref 65–140)
HCT VFR BLD AUTO: 28.4 % (ref 36.5–49.3)
HGB BLD-MCNC: 9.8 G/DL (ref 12–17)
IMM GRANULOCYTES # BLD AUTO: 0.01 THOUSAND/UL (ref 0–0.2)
IMM GRANULOCYTES NFR BLD AUTO: 0 % (ref 0–2)
LYMPHOCYTES # BLD AUTO: 0.52 THOUSANDS/ÂΜL (ref 0.6–4.47)
LYMPHOCYTES NFR BLD AUTO: 17 % (ref 14–44)
MCH RBC QN AUTO: 35 PG (ref 26.8–34.3)
MCHC RBC AUTO-ENTMCNC: 34.5 G/DL (ref 31.4–37.4)
MCV RBC AUTO: 101 FL (ref 82–98)
MONOCYTES # BLD AUTO: 0.42 THOUSAND/ÂΜL (ref 0.17–1.22)
MONOCYTES NFR BLD AUTO: 14 % (ref 4–12)
NEUTROPHILS # BLD AUTO: 1.83 THOUSANDS/ÂΜL (ref 1.85–7.62)
NEUTS SEG NFR BLD AUTO: 60 % (ref 43–75)
NRBC BLD AUTO-RTO: 0 /100 WBCS
PLATELET # BLD AUTO: 64 THOUSANDS/UL (ref 149–390)
PMV BLD AUTO: 9.6 FL (ref 8.9–12.7)
POTASSIUM SERPL-SCNC: 3.5 MMOL/L (ref 3.5–5.3)
PROT SERPL-MCNC: 6.4 G/DL (ref 6.4–8.4)
RBC # BLD AUTO: 2.8 MILLION/UL (ref 3.88–5.62)
SODIUM SERPL-SCNC: 134 MMOL/L (ref 135–147)
WBC # BLD AUTO: 3.03 THOUSAND/UL (ref 4.31–10.16)

## 2023-07-14 PROCEDURE — 80053 COMPREHEN METABOLIC PANEL: CPT | Performed by: INTERNAL MEDICINE

## 2023-07-14 PROCEDURE — 77014 CHG CT GUIDANCE RADIATION THERAPY FLDS PLACEMENT: CPT | Performed by: RADIOLOGY

## 2023-07-14 PROCEDURE — 77427 RADIATION TX MANAGEMENT X5: CPT | Performed by: INTERNAL MEDICINE

## 2023-07-14 PROCEDURE — 77386 HB NTSTY MODUL RAD TX DLVR CPLX: CPT | Performed by: RADIOLOGY

## 2023-07-14 PROCEDURE — 85025 COMPLETE CBC W/AUTO DIFF WBC: CPT | Performed by: INTERNAL MEDICINE

## 2023-07-14 NOTE — PROGRESS NOTES
Pt arrived amb from [address] for CADD d/c and port labs. Rt chest dssg barely hanging on. Pt states he perspired quite a bit this past week and he tried to reinforce with tape but tape fell off. CADD has 13.9 mls remaining. CADD removed, port flushed with NSS, labs obtained. Port deaccessed, dsd applied. Chest wall intact. No redness seen. Pt inquired about Neulasta shot, but he doesn't get it with weekly CADDs. His WBCs and ANC were low last week. Carlette Jeans RN notified. She will check results when available. Gave pt instructions on preventing  Infection d/t low counts last week. Disch amb to home, steady gait.

## 2023-07-14 NOTE — TELEPHONE ENCOUNTER
Spoke with patient to let him know that he still will get radiation next week. Patient verbalized understanding.

## 2023-07-14 NOTE — TELEPHONE ENCOUNTER
Spoke with patient to let him know that his PLT count is 64, which is too low to safely give him any chemo treatment. Patient verbalized understanding of this. Patient still wants labs drawn from his port on Friday. I told him that I will make sure this is set up. Patient is in agreement with the plan and verbalized understanding.

## 2023-07-17 ENCOUNTER — APPOINTMENT (OUTPATIENT)
Facility: HOSPITAL | Age: 43
End: 2023-07-17
Attending: RADIOLOGY
Payer: COMMERCIAL

## 2023-07-17 ENCOUNTER — HOSPITAL ENCOUNTER (OUTPATIENT)
Dept: INFUSION CENTER | Facility: HOSPITAL | Age: 43
Discharge: HOME | End: 2023-07-17
Attending: INTERNAL MEDICINE

## 2023-07-17 DIAGNOSIS — C20 RECTAL CANCER (HCC): Primary | ICD-10-CM

## 2023-07-17 PROCEDURE — 77014 CHG CT GUIDANCE RADIATION THERAPY FLDS PLACEMENT: CPT | Performed by: RADIOLOGY

## 2023-07-17 PROCEDURE — 77386 HB NTSTY MODUL RAD TX DLVR CPLX: CPT | Performed by: RADIOLOGY

## 2023-07-18 ENCOUNTER — APPOINTMENT (OUTPATIENT)
Facility: HOSPITAL | Age: 43
End: 2023-07-18
Attending: RADIOLOGY
Payer: COMMERCIAL

## 2023-07-18 PROCEDURE — 77386 HB NTSTY MODUL RAD TX DLVR CPLX: CPT | Performed by: RADIOLOGY

## 2023-07-18 PROCEDURE — 77014 CHG CT GUIDANCE RADIATION THERAPY FLDS PLACEMENT: CPT | Performed by: RADIOLOGY

## 2023-07-19 ENCOUNTER — APPOINTMENT (OUTPATIENT)
Facility: HOSPITAL | Age: 43
End: 2023-07-19
Payer: COMMERCIAL

## 2023-07-19 ENCOUNTER — OFFICE VISIT (OUTPATIENT)
Age: 43
End: 2023-07-19
Payer: COMMERCIAL

## 2023-07-19 ENCOUNTER — PATIENT OUTREACH (OUTPATIENT)
Dept: HEMATOLOGY ONCOLOGY | Facility: CLINIC | Age: 43
End: 2023-07-19

## 2023-07-19 VITALS
RESPIRATION RATE: 17 BRPM | WEIGHT: 185.8 LBS | OXYGEN SATURATION: 98 % | HEART RATE: 78 BPM | HEIGHT: 75 IN | DIASTOLIC BLOOD PRESSURE: 64 MMHG | SYSTOLIC BLOOD PRESSURE: 102 MMHG | BODY MASS INDEX: 23.1 KG/M2 | TEMPERATURE: 98 F

## 2023-07-19 DIAGNOSIS — C20 RECTAL CANCER (HCC): Primary | ICD-10-CM

## 2023-07-19 PROCEDURE — 77386 HB NTSTY MODUL RAD TX DLVR CPLX: CPT | Performed by: RADIOLOGY

## 2023-07-19 PROCEDURE — 99213 OFFICE O/P EST LOW 20 MIN: CPT | Performed by: INTERNAL MEDICINE

## 2023-07-19 PROCEDURE — 77014 CHG CT GUIDANCE RADIATION THERAPY FLDS PLACEMENT: CPT | Performed by: RADIOLOGY

## 2023-07-19 NOTE — PROGRESS NOTES
Phone outreach, no answer, left VM, stated my name title and reason for call- to check in on the pt and see if he had any questions/need clarification on upcoming appts as well as rvw the appts w him, left my contact number and waiting for call back.

## 2023-07-20 ENCOUNTER — APPOINTMENT (OUTPATIENT)
Facility: HOSPITAL | Age: 43
End: 2023-07-20
Payer: COMMERCIAL

## 2023-07-20 PROCEDURE — 77336 RADIATION PHYSICS CONSULT: CPT | Performed by: INTERNAL MEDICINE

## 2023-07-20 PROCEDURE — 77014 CHG CT GUIDANCE RADIATION THERAPY FLDS PLACEMENT: CPT | Performed by: INTERNAL MEDICINE

## 2023-07-20 PROCEDURE — 77386 HB NTSTY MODUL RAD TX DLVR CPLX: CPT | Performed by: INTERNAL MEDICINE

## 2023-07-21 ENCOUNTER — HOSPITAL ENCOUNTER (OUTPATIENT)
Dept: INFUSION CENTER | Facility: HOSPITAL | Age: 43
Discharge: HOME | End: 2023-07-21
Attending: INTERNAL MEDICINE

## 2023-07-21 ENCOUNTER — APPOINTMENT (OUTPATIENT)
Facility: HOSPITAL | Age: 43
End: 2023-07-21
Payer: COMMERCIAL

## 2023-07-21 ENCOUNTER — HOSPITAL ENCOUNTER (OUTPATIENT)
Dept: INFUSION CENTER | Facility: HOSPITAL | Age: 43
End: 2023-07-21
Payer: COMMERCIAL

## 2023-07-21 DIAGNOSIS — C20 RECTAL CANCER (HCC): Primary | ICD-10-CM

## 2023-07-21 LAB
ALBUMIN SERPL BCP-MCNC: 3.8 G/DL (ref 3.5–5)
ALP SERPL-CCNC: 49 U/L (ref 34–104)
ALT SERPL W P-5'-P-CCNC: 17 U/L (ref 7–52)
ANION GAP SERPL CALCULATED.3IONS-SCNC: 7 MMOL/L
AST SERPL W P-5'-P-CCNC: 37 U/L (ref 13–39)
BASOPHILS # BLD AUTO: 0.02 THOUSANDS/ÂΜL (ref 0–0.1)
BASOPHILS NFR BLD AUTO: 1 % (ref 0–1)
BILIRUB SERPL-MCNC: 2.12 MG/DL (ref 0.2–1)
BUN SERPL-MCNC: 11 MG/DL (ref 5–25)
CALCIUM SERPL-MCNC: 9.2 MG/DL (ref 8.4–10.2)
CHLORIDE SERPL-SCNC: 106 MMOL/L (ref 96–108)
CO2 SERPL-SCNC: 22 MMOL/L (ref 21–32)
CREAT SERPL-MCNC: 0.49 MG/DL (ref 0.6–1.3)
EOSINOPHIL # BLD AUTO: 0.13 THOUSAND/ÂΜL (ref 0–0.61)
EOSINOPHIL NFR BLD AUTO: 5 % (ref 0–6)
ERYTHROCYTE [DISTWIDTH] IN BLOOD BY AUTOMATED COUNT: 17 % (ref 11.6–15.1)
GFR SERPL CREATININE-BSD FRML MDRD: 132 ML/MIN/1.73SQ M
GLUCOSE SERPL-MCNC: 115 MG/DL (ref 65–140)
HCT VFR BLD AUTO: 29.6 % (ref 36.5–49.3)
HGB BLD-MCNC: 10.1 G/DL (ref 12–17)
IMM GRANULOCYTES # BLD AUTO: 0.01 THOUSAND/UL (ref 0–0.2)
IMM GRANULOCYTES NFR BLD AUTO: 0 % (ref 0–2)
LYMPHOCYTES # BLD AUTO: 0.45 THOUSANDS/ÂΜL (ref 0.6–4.47)
LYMPHOCYTES NFR BLD AUTO: 18 % (ref 14–44)
MCH RBC QN AUTO: 34.9 PG (ref 26.8–34.3)
MCHC RBC AUTO-ENTMCNC: 34.1 G/DL (ref 31.4–37.4)
MCV RBC AUTO: 102 FL (ref 82–98)
MONOCYTES # BLD AUTO: 0.41 THOUSAND/ÂΜL (ref 0.17–1.22)
MONOCYTES NFR BLD AUTO: 16 % (ref 4–12)
NEUTROPHILS # BLD AUTO: 1.49 THOUSANDS/ÂΜL (ref 1.85–7.62)
NEUTS SEG NFR BLD AUTO: 60 % (ref 43–75)
NRBC BLD AUTO-RTO: 0 /100 WBCS
PLATELET # BLD AUTO: 89 THOUSANDS/UL (ref 149–390)
PLATELET BLD QL SMEAR: ABNORMAL
PMV BLD AUTO: 9.1 FL (ref 8.9–12.7)
POTASSIUM SERPL-SCNC: 3.5 MMOL/L (ref 3.5–5.3)
PROT SERPL-MCNC: 6.7 G/DL (ref 6.4–8.4)
RBC # BLD AUTO: 2.89 MILLION/UL (ref 3.88–5.62)
RBC MORPH BLD: NORMAL
SODIUM SERPL-SCNC: 135 MMOL/L (ref 135–147)
WBC # BLD AUTO: 2.51 THOUSAND/UL (ref 4.31–10.16)

## 2023-07-21 PROCEDURE — 80053 COMPREHEN METABOLIC PANEL: CPT | Performed by: INTERNAL MEDICINE

## 2023-07-21 PROCEDURE — 77427 RADIATION TX MANAGEMENT X5: CPT | Performed by: RADIOLOGY

## 2023-07-21 PROCEDURE — 77386 HB NTSTY MODUL RAD TX DLVR CPLX: CPT | Performed by: RADIOLOGY

## 2023-07-21 PROCEDURE — 85025 COMPLETE CBC W/AUTO DIFF WBC: CPT | Performed by: INTERNAL MEDICINE

## 2023-07-21 PROCEDURE — 77014 CHG CT GUIDANCE RADIATION THERAPY FLDS PLACEMENT: CPT | Performed by: RADIOLOGY

## 2023-07-21 NOTE — PROGRESS NOTES
Pt arrived amb for port labs after radiation Tx. Port accessed, labs obtained, de-accessed, dsd applied. Disch amb to home, steady gait. Pt states he has 1 more wk of radiation.

## 2023-07-21 NOTE — PROGRESS NOTES
HEMATOLOGY / [facility] FOLLOW UP NOTE    Primary Care Provider: Lakesha Garcia DO  Referring Provider:    MRN: [id number]  : 1980    Reason for Encounter: follow up rectal cancer       Oncology History Overview Note   2023 - [id number] rectal adenocarcinoma    3/9/2023 - start neoadjuvant FOLFOX    2023 - start 5-FU/RT    2023 - 5-FU held due thrombocytopenia     Rectal cancer ([address])   2023 Biopsy    Final Diagnosis   A. Large Intestine, Descending Colon, polyp:    - Tubular adenoma. - Negative for high grade dysplasia. B. Rectum, mass:   -  Adenocarcinoma. See comment. Comment: Immunohistochemical stains performed with adequate controls demonstrates that the tumor is positive for CDX2, SATB2, CK20 (patchy), and negative for CK7, Synaptophysin, Chromogranin A, and p40. The immunophenotype supports the diagnosis.  Ancillary testing for mismatch repair (MMR) protein deficiency by immunohistochemical panel (MLH1, PMS2, MSH2, MSH6) is undertaken (Block B1); the results will be issued in a supplemental report, to follow shortly     Addendum   RESULTS OF IMMUNOHISTOCHEMICAL ANALYSIS FOR MISMATCH REPAIR PROTEIN LOSS     INTERPRETATION: NO LOSS OF NUCLEAR EXPRESSION OF MMR PROTEINS: LOW PROBABILITY OF MSI-H.     RESULTS:  Antibody            Clone                 Description                                  Results  MLH1                 [id number]          Mismatch repair protein               Intact nuclear expression  MSH2                [id number]        Mismatch repair protein               Intact nuclear expression  MSH6                40                      Mismatch repair protein               Intact nuclear expression  PMS2                 [id number]           Mismatch repair protein               Intact nuclear expression     Comment:   A negative control and a positive control for each antibody have been reviewed and accepted        2023 Initial Diagnosis    Rectal cancer ([address] [facility])     3/2/2023 -  Cancer Staged    Staging form: Colon and Rectum, AJCC 8th Edition  - Clinical: cT3, cN2, cM0 - Signed by Camlile Espinoza DO on 3/2/2023       3/9/2023 - 6/2/2023 Chemotherapy    alteplase (CATHFLO), 2 mg, Intracatheter, Every 1 Minute as needed, 6 of 10 cycles  palonosetron (ALOXI), 0.25 mg, Intravenous, Once, 1 of 2 cycles  Administration: 0.25 mg (5/31/2023)  pegfilgrastim (NEULASTA), 6 mg, Subcutaneous, Once, 1 of 1 cycle  Pegfilgrastim-bmez (ZIEXTENZO), 6 mg, Subcutaneous, Once, 4 of 8 cycles  Administration: 6 mg (4/21/2023), 6 mg (5/6/2023), 6 mg (5/22/2023), 6 mg (6/2/2023)  fluorouracil (ADRUCIL), 400 mg/m2 = 915 mg, Intravenous, Once, 6 of 10 cycles  Administration: 915 mg (3/9/2023), 915 mg (3/22/2023), 915 mg (4/19/2023), 915 mg (5/4/2023), 915 mg (5/17/2023), 915 mg (5/31/2023)  leucovorin calcium IVPB, 916 mg, Intravenous, Once, 6 of 10 cycles  Administration: 900 mg (3/9/2023), 900 mg (3/22/2023), 900 mg (4/19/2023), 900 mg (5/4/2023), 900 mg (5/17/2023), 900 mg (5/31/2023)  oxaliplatin (ELOXATIN) chemo infusion, 194.65 mg, Intravenous, Once, 6 of 10 cycles  Administration: 200 mg (3/9/2023), 200 mg (3/22/2023), 200 mg (4/19/2023), 200 mg (5/4/2023), 200 mg (5/17/2023), 200 mg (5/31/2023)  fluorouracil (ADRUCIL) ambulatory infusion Soln, 1,200 mg/m2/day = 5,495 mg, Intravenous, Over 46 hours, 6 of 10 cycles  aprepitant (CINVANTI) in  mL IVPB, 130 mg, Intravenous, Once, 1 of 2 cycles  Administration: 130 mg (5/31/2023)     6/26/2023 -  Chemotherapy    alteplase (CATHFLO), 2 mg, Intracatheter, Every 1 Minute as needed, 3 of 4 cycles  fluorouracil (ADRUCIL) ambulatory infusion Soln, 200 mg/m2/day = 2,190 mg (88.9 % of original dose 225 mg/m2/day), Intravenous, Over 120 hours, 3 of 4 cycles  Dose modification: 200 mg/m2/day (original dose 225 mg/m2/day, Cycle 1, Reason: Anticipated Tolerance)         Interval History: Patient presents for follow up of his rectal cancer.   He is receiving neoadjuvant 5-FU with radiation therapy. His pump was held this week due to thrombocytopenia with a platelet count of 60,580. His hemoglobin was also 9.8. He is starting to have some perirectal irritation. He has soft stools but no overt diarrhea. He has not required any of his home medications for nausea or diarrhea. He denies any fevers. He has occasional sharp abdominal pain but it seems to pass without any alarming symptoms. No blood per rectum. He is not drinking any alcohol. REVIEW OF SYSTEMS:  Please note that a 14-point review of systems was performed to include Constitutional, HEENT, Respiratory, CVS, GI, , Musculoskeletal, Integumentary, Neurologic, Rheumatologic, Endocrinologic, Psychiatric, Lymphatic, and Hematologic/Oncologic systems were reviewed and are negative unless otherwise stated in HPI. Positive and negative findings pertinent to this evaluation are incorporated into the history of present illness. ECOG PS: 0    PROBLEM LIST:  Patient Active Problem List   Diagnosis   • Rectal wall thickening   • Transaminitis/Hepatitic Steatosis   • Rectal cancer (HCC)   • Chemotherapy-induced neutropenia (HCC)       Assessment / Plan: 54-year-old male being treated in the neoadjuvant setting for a rectal adenocarcinoma. Next week will be his last week of the 5-FU pump. I am hopeful that his platelets will have recovered by then with a hold of the 5-FU pump this week. He has a CT scan scheduled prior to surgery in mid August and I will see him after that to go over those results. He is also seeing Dr. Jaya Cleaning at that time. Overall he has done very well with treatment. He knows to call me in the interim with any questions or concerns. I spent 20 minutes on chart review, face to face counseling time, coordination of care and documentation.     Past Medical History:   has a past medical history of Cirrhosis ([address]) (3/18/24), Colon cancer ([address]), Dental infection, Fatty liver, and Rectal cancer ([address] PAST SURGICAL HISTORY:   has a past surgical history that includes Appendectomy; Dental surgery; IR port placement (02/28/2023); Colonoscopy; and Upper gastrointestinal endoscopy. CURRENT MEDICATIONS  Current Outpatient Medications   Medication Sig Dispense Refill   • diphenoxylate-atropine (LOMOTIL) 2.5-0.025 mg per tablet Take 1 tablet by mouth 4 (four) times a day as needed for diarrhea As needed     • [START ON 7/24/2023] fluorouracil 2,190 mg in CADD/Elastomeric Infusion Device Infuse 2,190 mg (200 mg/m2/day x 2.19 m2) into a catheter in a vein via infusion device over 120 hours for 5 days  Infusion planned for July 24, 2023. 1 each 0   • ondansetron (ZOFRAN) 8 mg tablet Take 1 tablet (8 mg total) by mouth every 8 (eight) hours as needed for nausea or vomiting 30 tablet 2   • potassium chloride (K-DUR,KLOR-CON) 20 mEq tablet take 1 tablet by mouth once daily 14 tablet 0     No current facility-administered medications for this visit. [unfilled]    SOCIAL HISTORY:   reports that he quit smoking about 3 years ago. His smoking use included cigarettes. He has a 20.00 pack-year smoking history. He has never used smokeless tobacco. He reports that he does not currently use alcohol after a past usage of about 2.0 standard drinks of alcohol per week. He reports current drug use. Drug: Marijuana. FAMILY HISTORY:  family history includes Cancer in his father and mother; Lung cancer in his father and mother. ALLERGIES:  has No Known Allergies. Physical Exam:  Vital Signs:   Visit Vitals  /64 (BP Location: Left arm, Patient Position: Sitting, Cuff Size: Adult)   Pulse 78   Temp 98 °F (36.7 °C)   Resp 17   Ht 6' 3" (1.905 m)   Wt 84.3 kg (185 lb 12.8 oz)   SpO2 98%   BMI 23.22 kg/m²   Smoking Status Former   BSA 2.13 m²     Body mass index is 23.22 kg/m². Body surface area is 2.13 meters squared.     GEN: Alert, awake oriented x3, in no acute distress  HEENT- No pallor, icterus, cyanosis, no oral mucosal lesions,   LAD - no palpable cervical, clavicle, axillary, inguinal LAD  Heart- normal S1 S2, regular rate and rhythm, No murmur, rubs.    Lungs- clear breathing sound bilateral.   Abdomen- soft, Non tender, bowel sounds present  Extremities- No cyanosis, clubbing, edema  Neuro- No focal neurological deficit    Labs:  Lab Results   Component Value Date    WBC 3.03 (L) 07/14/2023    HGB 9.8 (L) 07/14/2023    HCT 28.4 (L) 07/14/2023     (H) 07/14/2023    PLT 64 (L) 07/14/2023     Lab Results   Component Value Date    SODIUM 134 (L) 07/14/2023    K 3.5 07/14/2023     07/14/2023    CO2 20 (L) 07/14/2023    AGAP 9 07/14/2023    BUN 12 07/14/2023    CREATININE 0.52 (L) 07/14/2023    GLUC 116 07/14/2023    GLUF 121 (H) 03/06/2023    CALCIUM 9.1 07/14/2023    AST 34 07/14/2023    ALT 17 07/14/2023    ALKPHOS 52 07/14/2023    TP 6.4 07/14/2023    TBILI 2.32 (H) 07/14/2023    EGFR 129 07/14/2023

## 2023-07-24 ENCOUNTER — HOSPITAL ENCOUNTER (OUTPATIENT)
Dept: INFUSION CENTER | Facility: HOSPITAL | Age: 43
Discharge: HOME | End: 2023-07-24
Attending: INTERNAL MEDICINE
Payer: COMMERCIAL

## 2023-07-24 ENCOUNTER — APPOINTMENT (OUTPATIENT)
Facility: HOSPITAL | Age: 43
End: 2023-07-24
Payer: COMMERCIAL

## 2023-07-24 VITALS
RESPIRATION RATE: 16 BRPM | WEIGHT: 183.2 LBS | BODY MASS INDEX: 22.78 KG/M2 | TEMPERATURE: 97.7 F | SYSTOLIC BLOOD PRESSURE: 102 MMHG | DIASTOLIC BLOOD PRESSURE: 56 MMHG | HEIGHT: 75 IN | HEART RATE: 83 BPM | OXYGEN SATURATION: 98 %

## 2023-07-24 DIAGNOSIS — C20 RECTAL CANCER (HCC): Primary | ICD-10-CM

## 2023-07-24 PROCEDURE — 77386 HB NTSTY MODUL RAD TX DLVR CPLX: CPT | Performed by: RADIOLOGY

## 2023-07-24 PROCEDURE — G0498 CHEMO EXTEND IV INFUS W/PUMP: HCPCS

## 2023-07-24 PROCEDURE — 77014 CHG CT GUIDANCE RADIATION THERAPY FLDS PLACEMENT: CPT | Performed by: RADIOLOGY

## 2023-07-24 NOTE — PROGRESS NOTES
CADD connected per protocol, double checked and started by second RN. Green light blinking, pump running. Pt aware of disconnect time Friday after radiation appt. AVS declined.

## 2023-07-25 ENCOUNTER — APPOINTMENT (OUTPATIENT)
Facility: HOSPITAL | Age: 43
End: 2023-07-25
Payer: COMMERCIAL

## 2023-07-25 PROCEDURE — 77386 HB NTSTY MODUL RAD TX DLVR CPLX: CPT | Performed by: RADIOLOGY

## 2023-07-25 PROCEDURE — 77014 CHG CT GUIDANCE RADIATION THERAPY FLDS PLACEMENT: CPT | Performed by: RADIOLOGY

## 2023-07-26 ENCOUNTER — APPOINTMENT (OUTPATIENT)
Facility: HOSPITAL | Age: 43
End: 2023-07-26
Payer: COMMERCIAL

## 2023-07-26 PROCEDURE — 77386 HB NTSTY MODUL RAD TX DLVR CPLX: CPT | Performed by: RADIOLOGY

## 2023-07-26 PROCEDURE — 77014 CHG CT GUIDANCE RADIATION THERAPY FLDS PLACEMENT: CPT | Performed by: RADIOLOGY

## 2023-07-27 ENCOUNTER — APPOINTMENT (OUTPATIENT)
Facility: HOSPITAL | Age: 43
End: 2023-07-27
Payer: COMMERCIAL

## 2023-07-27 PROCEDURE — 77014 CHG CT GUIDANCE RADIATION THERAPY FLDS PLACEMENT: CPT | Performed by: RADIOLOGY

## 2023-07-27 PROCEDURE — 77336 RADIATION PHYSICS CONSULT: CPT | Performed by: RADIOLOGY

## 2023-07-27 PROCEDURE — 77386 HB NTSTY MODUL RAD TX DLVR CPLX: CPT | Performed by: RADIOLOGY

## 2023-07-28 ENCOUNTER — HOSPITAL ENCOUNTER (OUTPATIENT)
Dept: INFUSION CENTER | Facility: HOSPITAL | Age: 43
End: 2023-07-28
Attending: INTERNAL MEDICINE
Payer: COMMERCIAL

## 2023-07-28 ENCOUNTER — APPOINTMENT (OUTPATIENT)
Facility: HOSPITAL | Age: 43
End: 2023-07-28
Attending: RADIOLOGY
Payer: COMMERCIAL

## 2023-07-28 ENCOUNTER — APPOINTMENT (OUTPATIENT)
Facility: HOSPITAL | Age: 43
End: 2023-07-28
Payer: COMMERCIAL

## 2023-07-28 VITALS
DIASTOLIC BLOOD PRESSURE: 67 MMHG | TEMPERATURE: 98.2 F | RESPIRATION RATE: 16 BRPM | OXYGEN SATURATION: 98 % | SYSTOLIC BLOOD PRESSURE: 114 MMHG | HEART RATE: 85 BPM

## 2023-07-28 DIAGNOSIS — C20 RECTAL CANCER (HCC): Primary | ICD-10-CM

## 2023-07-28 LAB
ALBUMIN SERPL BCP-MCNC: 3.7 G/DL (ref 3.5–5)
ALP SERPL-CCNC: 44 U/L (ref 34–104)
ALT SERPL W P-5'-P-CCNC: 19 U/L (ref 7–52)
ANION GAP SERPL CALCULATED.3IONS-SCNC: 5 MMOL/L
AST SERPL W P-5'-P-CCNC: 34 U/L (ref 13–39)
BASOPHILS # BLD AUTO: 0.02 THOUSANDS/ÂΜL (ref 0–0.1)
BASOPHILS NFR BLD AUTO: 1 % (ref 0–1)
BILIRUB SERPL-MCNC: 1.89 MG/DL (ref 0.2–1)
BUN SERPL-MCNC: 12 MG/DL (ref 5–25)
CALCIUM SERPL-MCNC: 9.5 MG/DL (ref 8.4–10.2)
CHLORIDE SERPL-SCNC: 107 MMOL/L (ref 96–108)
CO2 SERPL-SCNC: 24 MMOL/L (ref 21–32)
CREAT SERPL-MCNC: 0.48 MG/DL (ref 0.6–1.3)
EOSINOPHIL # BLD AUTO: 0.11 THOUSAND/ÂΜL (ref 0–0.61)
EOSINOPHIL NFR BLD AUTO: 4 % (ref 0–6)
ERYTHROCYTE [DISTWIDTH] IN BLOOD BY AUTOMATED COUNT: 15.3 % (ref 11.6–15.1)
GFR SERPL CREATININE-BSD FRML MDRD: 134 ML/MIN/1.73SQ M
GLUCOSE SERPL-MCNC: 90 MG/DL (ref 65–140)
HCT VFR BLD AUTO: 29.6 % (ref 36.5–49.3)
HGB BLD-MCNC: 10.1 G/DL (ref 12–17)
IMM GRANULOCYTES # BLD AUTO: 0.01 THOUSAND/UL (ref 0–0.2)
IMM GRANULOCYTES NFR BLD AUTO: 0 % (ref 0–2)
LYMPHOCYTES # BLD AUTO: 0.38 THOUSANDS/ÂΜL (ref 0.6–4.47)
LYMPHOCYTES NFR BLD AUTO: 15 % (ref 14–44)
MCH RBC QN AUTO: 34.2 PG (ref 26.8–34.3)
MCHC RBC AUTO-ENTMCNC: 34.1 G/DL (ref 31.4–37.4)
MCV RBC AUTO: 100 FL (ref 82–98)
MONOCYTES # BLD AUTO: 0.44 THOUSAND/ÂΜL (ref 0.17–1.22)
MONOCYTES NFR BLD AUTO: 17 % (ref 4–12)
NEUTROPHILS # BLD AUTO: 1.65 THOUSANDS/ÂΜL (ref 1.85–7.62)
NEUTS SEG NFR BLD AUTO: 63 % (ref 43–75)
NRBC BLD AUTO-RTO: 0 /100 WBCS
PLATELET # BLD AUTO: 97 THOUSANDS/UL (ref 149–390)
PMV BLD AUTO: 8.5 FL (ref 8.9–12.7)
POTASSIUM SERPL-SCNC: 3.5 MMOL/L (ref 3.5–5.3)
PROT SERPL-MCNC: 6.6 G/DL (ref 6.4–8.4)
RBC # BLD AUTO: 2.95 MILLION/UL (ref 3.88–5.62)
SODIUM SERPL-SCNC: 136 MMOL/L (ref 135–147)
WBC # BLD AUTO: 2.61 THOUSAND/UL (ref 4.31–10.16)

## 2023-07-28 PROCEDURE — 80053 COMPREHEN METABOLIC PANEL: CPT | Performed by: INTERNAL MEDICINE

## 2023-07-28 PROCEDURE — 77427 RADIATION TX MANAGEMENT X5: CPT | Performed by: RADIOLOGY

## 2023-07-28 PROCEDURE — 85025 COMPLETE CBC W/AUTO DIFF WBC: CPT | Performed by: INTERNAL MEDICINE

## 2023-07-28 PROCEDURE — 77386 HB NTSTY MODUL RAD TX DLVR CPLX: CPT | Performed by: RADIOLOGY

## 2023-07-28 PROCEDURE — 77014 CHG CT GUIDANCE RADIATION THERAPY FLDS PLACEMENT: CPT | Performed by: RADIOLOGY

## 2023-07-28 NOTE — PROGRESS NOTES
Pt arrived amb for final CADD d/c. Has 2 more radiation treatments to make up. CADD has 14.8mls left. Removed. Port flushed and labs obtained, de-accessed, dsd applied. Disch amb to home, steady gait.

## 2023-07-31 ENCOUNTER — APPOINTMENT (OUTPATIENT)
Facility: HOSPITAL | Age: 43
End: 2023-07-31
Payer: COMMERCIAL

## 2023-07-31 PROCEDURE — 77014 CHG CT GUIDANCE RADIATION THERAPY FLDS PLACEMENT: CPT | Performed by: RADIOLOGY

## 2023-07-31 PROCEDURE — 77386 HB NTSTY MODUL RAD TX DLVR CPLX: CPT | Performed by: RADIOLOGY

## 2023-08-01 ENCOUNTER — APPOINTMENT (OUTPATIENT)
Facility: HOSPITAL | Age: 43
End: 2023-08-01
Attending: RADIOLOGY
Payer: COMMERCIAL

## 2023-08-01 PROCEDURE — 77336 RADIATION PHYSICS CONSULT: CPT | Performed by: RADIOLOGY

## 2023-08-01 PROCEDURE — 77386 HB NTSTY MODUL RAD TX DLVR CPLX: CPT | Performed by: RADIOLOGY

## 2023-08-01 PROCEDURE — 77014 CHG CT GUIDANCE RADIATION THERAPY FLDS PLACEMENT: CPT | Performed by: RADIOLOGY

## 2023-08-17 ENCOUNTER — DOCUMENTATION (OUTPATIENT)
Dept: HEMATOLOGY ONCOLOGY | Facility: CLINIC | Age: 43
End: 2023-08-17

## 2023-08-17 NOTE — PROGRESS NOTES
In-basket message received from [facility] to add patient to [place] [address] on 9/14/2023. Chart reviewed and prep started. Pending CT CAP scheduled on 8/212023 and MRI pelvis scheduled on 9/11/2023. I will follow up.

## 2023-08-21 ENCOUNTER — HOSPITAL ENCOUNTER (OUTPATIENT)
Dept: CT IMAGING | Facility: HOSPITAL | Age: 43
Discharge: HOME | End: 2023-08-21
Payer: COMMERCIAL

## 2023-08-21 DIAGNOSIS — C20 RECTAL CANCER (HCC): ICD-10-CM

## 2023-08-21 PROCEDURE — 74177 CT ABD & PELVIS W/CONTRAST: CPT

## 2023-08-21 PROCEDURE — 71260 CT THORAX DX C+: CPT

## 2023-08-21 PROCEDURE — G1004 CDSM NDSC: HCPCS

## 2023-08-21 RX ADMIN — IOHEXOL 100 ML: 350 INJECTION, SOLUTION INTRAVENOUS at 12:18

## 2023-08-21 RX ADMIN — IOHEXOL 50 ML: 240 INJECTION, SOLUTION INTRATHECAL; INTRAVASCULAR; INTRAVENOUS; ORAL at 12:18

## 2023-08-23 ENCOUNTER — TELEPHONE (OUTPATIENT)
Age: 43
End: 2023-08-23

## 2023-08-23 ENCOUNTER — OFFICE VISIT (OUTPATIENT)
Age: 43
End: 2023-08-23
Payer: COMMERCIAL

## 2023-08-23 VITALS
BODY MASS INDEX: 22.43 KG/M2 | OXYGEN SATURATION: 99 % | WEIGHT: 180.4 LBS | SYSTOLIC BLOOD PRESSURE: 104 MMHG | DIASTOLIC BLOOD PRESSURE: 70 MMHG | HEIGHT: 75 IN | HEART RATE: 77 BPM | RESPIRATION RATE: 16 BRPM | TEMPERATURE: 97.9 F

## 2023-08-23 DIAGNOSIS — C20 RECTAL CANCER (HCC): Primary | ICD-10-CM

## 2023-08-23 PROCEDURE — 99213 OFFICE O/P EST LOW 20 MIN: CPT | Performed by: INTERNAL MEDICINE

## 2023-08-23 NOTE — TELEPHONE ENCOUNTER
Left detailed message with date & time, can view on MyChart, gave my Teams # to call back for questions.

## 2023-08-25 NOTE — PROGRESS NOTES
HEMATOLOGY / [address] [facility] FOLLOW UP NOTE    Primary Care Provider: Nahum Love DO  Referring Provider:    MRN: [id number]  : 1980    Reason for Encounter: follow up rectal cancer       Oncology History Overview Note   2023 - [id number] rectal adenocarcinoma    3/9/2023 - start neoadjuvant FOLFOX    2023 - start 5-FU/RT    2023 - 5-FU held due thrombocytopenia     Rectal cancer ([address])   2023 Biopsy    Final Diagnosis   A. Large Intestine, Descending Colon, polyp:    - Tubular adenoma. - Negative for high grade dysplasia. B. Rectum, mass:   -  Adenocarcinoma. See comment. Comment: Immunohistochemical stains performed with adequate controls demonstrates that the tumor is positive for CDX2, SATB2, CK20 (patchy), and negative for CK7, Synaptophysin, Chromogranin A, and p40. The immunophenotype supports the diagnosis.  Ancillary testing for mismatch repair (MMR) protein deficiency by immunohistochemical panel (MLH1, PMS2, MSH2, MSH6) is undertaken (Block B1); the results will be issued in a supplemental report, to follow shortly     Addendum   RESULTS OF IMMUNOHISTOCHEMICAL ANALYSIS FOR MISMATCH REPAIR PROTEIN LOSS     INTERPRETATION: NO LOSS OF NUCLEAR EXPRESSION OF MMR PROTEINS: LOW PROBABILITY OF MSI-H.     RESULTS:  Antibody            Clone                 Description                                  Results  MLH1                 [id number]          Mismatch repair protein               Intact nuclear expression  MSH2                [id number]        Mismatch repair protein               Intact nuclear expression  MSH6                40                      Mismatch repair protein               Intact nuclear expression  PMS2                 [id number]           Mismatch repair protein               Intact nuclear expression     Comment:   A negative control and a positive control for each antibody have been reviewed and accepted        2023 Initial Diagnosis    Rectal cancer ([address] [facility])     3/2/2023 -  Cancer Staged    Staging form: Colon and Rectum, AJCC 8th Edition  - Clinical: cT3, cN2, cM0 - Signed by Gabriel Santos DO on 3/2/2023       3/9/2023 - 6/2/2023 Chemotherapy    alteplase (CATHFLO), 2 mg, Intracatheter, Every 1 Minute as needed, 6 of 10 cycles  palonosetron (ALOXI), 0.25 mg, Intravenous, Once, 1 of 2 cycles  Administration: 0.25 mg (5/31/2023)  pegfilgrastim (NEULASTA), 6 mg, Subcutaneous, Once, 1 of 1 cycle  Pegfilgrastim-bmez (ZIEXTENZO), 6 mg, Subcutaneous, Once, 4 of 8 cycles  Administration: 6 mg (4/21/2023), 6 mg (5/6/2023), 6 mg (5/22/2023), 6 mg (6/2/2023)  fluorouracil (ADRUCIL), 400 mg/m2 = 915 mg, Intravenous, Once, 6 of 10 cycles  Administration: 915 mg (3/9/2023), 915 mg (3/22/2023), 915 mg (4/19/2023), 915 mg (5/4/2023), 915 mg (5/17/2023), 915 mg (5/31/2023)  leucovorin calcium IVPB, 916 mg, Intravenous, Once, 6 of 10 cycles  Administration: 900 mg (3/9/2023), 900 mg (3/22/2023), 900 mg (4/19/2023), 900 mg (5/4/2023), 900 mg (5/17/2023), 900 mg (5/31/2023)  oxaliplatin (ELOXATIN) chemo infusion, 194.65 mg, Intravenous, Once, 6 of 10 cycles  Administration: 200 mg (3/9/2023), 200 mg (3/22/2023), 200 mg (4/19/2023), 200 mg (5/4/2023), 200 mg (5/17/2023), 200 mg (5/31/2023)  fluorouracil (ADRUCIL) ambulatory infusion Soln, 1,200 mg/m2/day = 5,495 mg, Intravenous, Over 46 hours, 6 of 10 cycles  aprepitant (CINVANTI) in  mL IVPB, 130 mg, Intravenous, Once, 1 of 2 cycles  Administration: 130 mg (5/31/2023)     6/26/2023 - 7/28/2023 Chemotherapy    alteplase (CATHFLO), 2 mg, Intracatheter, Every 1 Minute as needed, 4 of 4 cycles  fluorouracil (ADRUCIL) ambulatory infusion Soln, 200 mg/m2/day = 2,190 mg (88.9 % of original dose 225 mg/m2/day), Intravenous, Over 120 hours, 4 of 4 cycles  Dose modification: 200 mg/m2/day (original dose 225 mg/m2/day, Cycle 1, Reason: Anticipated Tolerance)         Interval History: Patient presents for follow up of his rectal cancer. He has completed total neoadjuvant chemotherapy with FOLFOX and 5-FU RT. He had a CT scan prior to this visit that does not show any evidence of new metastatic disease. There has been improvement of rectal wall thickening and perirectal and retroperitoneal lymph nodes. He does still have evidence of portal hypertension and there was interval improvement in the appearance of the liver in terms of steatosis. He did start to have some numbness in the tips of his fingers and his feet feel sensitive. This started after the last dose of oxaliplatin. He also does have occasional pain with the bowel movement but no bright red blood per rectum. He will be meeting with Dr. Muna Gamboa next week to plan for surgery. REVIEW OF SYSTEMS:  Please note that a 14-point review of systems was performed to include Constitutional, HEENT, Respiratory, CVS, GI, , Musculoskeletal, Integumentary, Neurologic, Rheumatologic, Endocrinologic, Psychiatric, Lymphatic, and Hematologic/Oncologic systems were reviewed and are negative unless otherwise stated in HPI. Positive and negative findings pertinent to this evaluation are incorporated into the history of present illness. ECOG PS: 0    PROBLEM LIST:  Patient Active Problem List   Diagnosis   • Rectal wall thickening   • Transaminitis/Hepatitic Steatosis   • Rectal cancer (HCC)   • Chemotherapy-induced neutropenia (HCC)       Assessment / Plan: 48-year-old male with rectal cancer now completing total neoadjuvant therapy. I told him he could start some vitamin B6 50 mg daily for the neuropathy. 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 I will see him back in 2 mos. He will be due for a port flush the week of 9/11/23. Overall he has done well and I wished him luck with surgery.        I spent 20 minutes on chart review, face to face counseling time, coordination of care and documentation. Past Medical History:   has a past medical history of Cirrhosis ([address]) (3/18/24), Colon cancer ([address]), Dental infection, Fatty liver, and Rectal cancer ([address] PAST SURGICAL HISTORY:   has a past surgical history that includes Appendectomy; Dental surgery; IR port placement (02/28/2023); Colonoscopy; and Upper gastrointestinal endoscopy. CURRENT MEDICATIONS  Current Outpatient Medications   Medication Sig Dispense Refill   • diphenoxylate-atropine (LOMOTIL) 2.5-0.025 mg per tablet Take 1 tablet by mouth 4 (four) times a day as needed for diarrhea As needed     • ondansetron (ZOFRAN) 8 mg tablet Take 1 tablet (8 mg total) by mouth every 8 (eight) hours as needed for nausea or vomiting 30 tablet 2   • potassium chloride (K-DUR,KLOR-CON) 20 mEq tablet take 1 tablet by mouth once daily 14 tablet 0     No current facility-administered medications for this visit. [unfilled]    SOCIAL HISTORY:   reports that he quit smoking about 3 years ago. His smoking use included cigarettes. He has a 20.00 pack-year smoking history. He has never used smokeless tobacco. He reports that he does not currently use alcohol after a past usage of about 2.0 standard drinks of alcohol per week. He reports current drug use. Drug: Marijuana. FAMILY HISTORY:  family history includes Cancer in his father and mother; Lung cancer in his father and mother. ALLERGIES:  has No Known Allergies. Physical Exam:  Vital Signs:   Visit Vitals  /70 (BP Location: Left arm, Patient Position: Sitting, Cuff Size: Standard)   Pulse 77   Temp 97.9 °F (36.6 °C) (Temporal)   Resp 16   Ht 6' 3" (1.905 m)   Wt 81.8 kg (180 lb 6.4 oz)   SpO2 99%   BMI 22.55 kg/m²   Smoking Status Former   BSA 2.1 m²     Body mass index is 22.55 kg/m². Body surface area is 2.1 meters squared.     GEN: Alert, awake oriented x3, in no acute distress  HEENT- No pallor, icterus, cyanosis, no oral mucosal lesions,   LAD - no palpable cervical, clavicle, axillary, inguinal LAD  Heart- normal S1 S2, regular rate and rhythm, No murmur, rubs.    Lungs- clear breathing sound bilateral.   Abdomen- soft, Non tender, bowel sounds present  Extremities- No cyanosis, clubbing, edema  Neuro- No focal neurological deficit    Labs:  Lab Results   Component Value Date    WBC 2.61 (L) 07/28/2023    HGB 10.1 (L) 07/28/2023    HCT 29.6 (L) 07/28/2023     (H) 07/28/2023    PLT 97 (L) 07/28/2023     Lab Results   Component Value Date    SODIUM 136 07/28/2023    K 3.5 07/28/2023     07/28/2023    CO2 24 07/28/2023    AGAP 5 07/28/2023    BUN 12 07/28/2023    CREATININE 0.48 (L) 07/28/2023    GLUC 90 07/28/2023    GLUF 121 (H) 03/06/2023    CALCIUM 9.5 07/28/2023    AST 34 07/28/2023    ALT 19 07/28/2023    ALKPHOS 44 07/28/2023    TP 6.6 07/28/2023    TBILI 1.89 (H) 07/28/2023    EGFR 134 07/28/2023

## 2023-08-29 NOTE — PROGRESS NOTES
Colon and Rectal Surgery   Tawana Montilla 39 y.o. male MRN [id number]  Encounter: [id number]  08/29/23 10:17 AM            Assessment: Tawana Montilla is a 39 y.o. male who ***      Plan:   No problem-specific Assessment & Plan notes found for this encounter. Subjective     HPI    Tawana Montilla is a 39 y.o. male who is here today for surgical discussion for rectal cancer. He has completed total neoadjuvant chemotherapy with FOLFOX and 5-FU RT.    CT chest abdomen and pelvis on 8/21/2023: 1)  Interval improvement of asymmetric rectal wall thickening, perirectal and retroperitoneal lymph nodes. 2) Interval improvement in the appearance of the liver. However, evidence of portal hypertension persist including varices and splenomegaly. 3)  Incidental mild wall thickening of the distal ileum. Cannot exclude ileitis in the appropriate clinical situation. MRI staging scheduled for 9/11/2023. Last seen in the office on 3/1/2023.       Lab Results   Component Value Date    CEA 39.2 (H) 02/16/2023     Lab Results   Component Value Date    WBC 2.61 (L) 07/28/2023    HGB 10.1 (L) 07/28/2023    HCT 29.6 (L) 07/28/2023     (H) 07/28/2023    PLT 97 (L) 07/28/2023     Lab Results   Component Value Date    SODIUM 136 07/28/2023    K 3.5 07/28/2023     07/28/2023    CO2 24 07/28/2023    AGAP 5 07/28/2023    BUN 12 07/28/2023    CREATININE 0.48 (L) 07/28/2023    GLUC 90 07/28/2023    GLUF 121 (H) 03/06/2023    CALCIUM 9.5 07/28/2023    AST 34 07/28/2023    ALT 19 07/28/2023    ALKPHOS 44 07/28/2023    TP 6.6 07/28/2023    TBILI 1.89 (H) 07/28/2023    EGFR 134 07/28/2023     Historical Information   Past Medical History:   Diagnosis Date   • Cirrhosis ([address]) 3/18/24   • Colon cancer ([address])    • Dental infection    • Fatty liver    • Rectal cancer [facility])      Past Surgical History:   Procedure Laterality Date   • APPENDECTOMY     • COLONOSCOPY     • DENTAL SURGERY     • IR PORT PLACEMENT  02/28/2023   • UPPER GASTROINTESTINAL ENDOSCOPY         Meds/Allergies       Current Outpatient Medications:   •  diphenoxylate-atropine (LOMOTIL) 2.5-0.025 mg per tablet, Take 1 tablet by mouth 4 (four) times a day as needed for diarrhea As needed, Disp: , Rfl:   •  ondansetron (ZOFRAN) 8 mg tablet, Take 1 tablet (8 mg total) by mouth every 8 (eight) hours as needed for nausea or vomiting, Disp: 30 tablet, Rfl: 2  •  potassium chloride (K-DUR,KLOR-CON) 20 mEq tablet, take 1 tablet by mouth once daily, Disp: 14 tablet, Rfl: 0  No Known Allergies    Social History   Social History     Substance and Sexual Activity   Drug Use Yes   • Types: Marijuana     Social History     Tobacco Use   Smoking Status Former   • Packs/day: 1.00   • Years: 20.00   • Total pack years: 20.00   • Types: Cigarettes   • Quit date: 1/1/2020   • Years since quitting: 3.6   Smokeless Tobacco Never         Family History   Problem Relation Age of Onset   • Lung cancer Mother    • Cancer Mother         Lung   • Lung cancer Father    • Cancer Father         Mouth throat   • Colon cancer Neg Hx    • Colon polyps Neg Hx          Review of Systems    Objective   Current Vitals: There were no vitals filed for this visit.       Physical Exam

## 2023-08-29 NOTE — PROGRESS NOTES
Colon and Rectal Surgery   Serena Ascencio 39 y.o. male MRN [id number]  Encounter: [id number]  08/30/23 4:18 PM            Assessment: Serena Ascencio is a 39 y.o. male who has rectal cancer. Plan:   Rectal cancer [facility])  The patient is doing fairly well after chemoradiation that concluded 1 month ago. He had a 3-month course of chemotherapy followed by chemoradiation. A repeat CT scan shows reduction of lymphadenopathy as well as the size of the rectal tumor. There is some persistent retroperitoneal adenopathy along the iliacs versus in the high mesentery. I explained that removing these lymph nodes has not been shown to enhance survival.  If we see these lymph nodes we may very pick them but a lymphadenectomy is not planned along the retroperitoneum. A low anterior resection with coloanal anastomosis is planned. A diverting ileostomy will be used. I explained this to the patient, including the ileostomy and its purpose. Risks of surgery, including but not limited to bleeding, need for transfusion of blood products, pain, infection, hernia formation, injury to the ureter or other internal organs requiring surgery specific to these injuries, anastomotic leak, impotence, deep vein thrombosis, renal failure, pulmonary embolism, myocardial infarction or heart failure, stroke, death, need for and enterostomy, or other unspecified complications were discussed. Benefits and alternatives were discussed. Questions were answered. Hopefully, continence will be preserved well with his operation, given normal gap of mucosa between the tumor and the anus at initial evaluation. Potential risks for positive margins were also also discussed but hopefully we can get a negative margin due to to the benefits of the chemoradiation. Questions were answered. We await the MRI results before reviewing him at multidisciplinary conference. Surgery can then be done.       Subjective     HPI    Serena Ascencio is a 39 y.o. male who is here today for surgical discussion for rectal cancer. Patient notes bowel movements are normal and soft. He is having about 2-3 bowel movements a day. No rectal bleeding noted; no rectal pain. He has completed total neoadjuvant chemotherapy with FOLFOX and 5-FU RT.     CT chest abdomen and pelvis on 8/21/2023: 1)  Interval improvement of asymmetric rectal wall thickening, perirectal and retroperitoneal lymph nodes. 2) Interval improvement in the appearance of the liver. However, evidence of portal hypertension persist including varices and splenomegaly. 3)  Incidental mild wall thickening of the distal ileum. Cannot exclude ileitis in the appropriate clinical situation.     MRI staging scheduled for 9/11/2023.     Last seen in the office on 3/1/2023.   Historical Information   Past Medical History:   Diagnosis Date   • Cirrhosis ([address]) 3/18/24   • Colon cancer ([address])    • Dental infection    • Fatty liver    • Rectal cancer ([address])      Past Surgical History:   Procedure Laterality Date   • APPENDECTOMY     • COLONOSCOPY     • DENTAL SURGERY     • IR PORT PLACEMENT  02/28/2023   • UPPER GASTROINTESTINAL ENDOSCOPY         Meds/Allergies       Current Outpatient Medications:   •  diphenoxylate-atropine (LOMOTIL) 2.5-0.025 mg per tablet, Take 1 tablet by mouth 4 (four) times a day as needed for diarrhea As needed, Disp: , Rfl:   •  ondansetron (ZOFRAN) 8 mg tablet, Take 1 tablet (8 mg total) by mouth every 8 (eight) hours as needed for nausea or vomiting, Disp: 30 tablet, Rfl: 2  •  potassium chloride (K-DUR,KLOR-CON) 20 mEq tablet, take 1 tablet by mouth once daily, Disp: 14 tablet, Rfl: 0  No Known Allergies    Social History   Social History     Substance and Sexual Activity   Drug Use Yes   • Types: Marijuana     Social History     Tobacco Use   Smoking Status Former   • Packs/day: 1.00   • Years: 20.00   • Total pack years: 20.00   • Types: Cigarettes   • Quit date: 1/1/2020   • Years since quitting: 3.6   Smokeless Tobacco Never         Family History   Problem Relation Age of Onset   • Lung cancer Mother    • Cancer Mother         Lung   • Lung cancer Father    • Cancer Father         Mouth throat   • Colon cancer Neg Hx    • Colon polyps Neg Hx          Review of Systems   Constitutional: Negative. Respiratory: Negative. Cardiovascular: Negative. Gastrointestinal: Negative. Objective   Current Vitals:  Vitals:    08/30/23 1534   BP: 110/60   Weight: 83.4 kg (183 lb 12.8 oz)   Height: 6' 3" (1.905 m)         Physical Exam  Constitutional:       Appearance: Normal appearance. Eyes:      Conjunctiva/sclera: Conjunctivae normal.   Cardiovascular:      Rate and Rhythm: Normal rate and regular rhythm. Pulmonary:      Effort: Pulmonary effort is normal.      Breath sounds: Normal breath sounds. Abdominal:      General: Abdomen is flat. Palpations: Abdomen is soft. There is no mass. Tenderness: There is no abdominal tenderness. There is no guarding. Neurological:      General: No focal deficit present. Mental Status: He is alert and oriented to person, place, and time.    Psychiatric:         Mood and Affect: Mood normal.         Behavior: Behavior normal.

## 2023-08-30 ENCOUNTER — OFFICE VISIT (OUTPATIENT)
Age: 43
End: 2023-08-30
Payer: COMMERCIAL

## 2023-08-30 VITALS
BODY MASS INDEX: 22.85 KG/M2 | SYSTOLIC BLOOD PRESSURE: 110 MMHG | WEIGHT: 183.8 LBS | DIASTOLIC BLOOD PRESSURE: 60 MMHG | HEIGHT: 75 IN

## 2023-08-30 DIAGNOSIS — C20 RECTAL CANCER (HCC): Primary | ICD-10-CM

## 2023-08-30 PROCEDURE — 99215 OFFICE O/P EST HI 40 MIN: CPT | Performed by: COLON & RECTAL SURGERY

## 2023-08-30 RX ORDER — HEPARIN SODIUM 5000 [USP'U]/ML
5000 INJECTION, SOLUTION INTRAVENOUS; SUBCUTANEOUS ONCE
OUTPATIENT
Start: 2023-08-30 | End: 2023-08-30

## 2023-08-30 RX ORDER — NEOMYCIN SULFATE 500 MG/1
1000 TABLET ORAL 3 TIMES DAILY
OUTPATIENT
Start: 2023-08-30 | End: 2023-08-31

## 2023-08-30 RX ORDER — METRONIDAZOLE 250 MG/1
1000 TABLET ORAL 3 TIMES DAILY
OUTPATIENT
Start: 2023-08-30 | End: 2023-08-31

## 2023-08-30 NOTE — ASSESSMENT & PLAN NOTE
The patient is doing fairly well after chemoradiation that concluded 1 month ago. He had a 3-month course of chemotherapy followed by chemoradiation. A repeat CT scan shows reduction of lymphadenopathy as well as the size of the rectal tumor. There is some persistent retroperitoneal adenopathy along the iliacs versus in the high mesentery. I explained that removing these lymph nodes has not been shown to enhance survival.  If we see these lymph nodes we may very pick them but a lymphadenectomy is not planned along the retroperitoneum. A low anterior resection with coloanal anastomosis is planned. A diverting ileostomy will be used. I explained this to the patient, including the ileostomy and its purpose. Risks of surgery, including but not limited to bleeding, need for transfusion of blood products, pain, infection, hernia formation, injury to the ureter or other internal organs requiring surgery specific to these injuries, anastomotic leak, impotence, deep vein thrombosis, renal failure, pulmonary embolism, myocardial infarction or heart failure, stroke, death, need for and enterostomy, or other unspecified complications were discussed. Benefits and alternatives were discussed. Questions were answered. Hopefully, continence will be preserved well with his operation, given normal gap of mucosa between the tumor and the anus at initial evaluation. Potential risks for positive margins were also also discussed but hopefully we can get a negative margin due to to the benefits of the chemoradiation. Questions were answered. We await the MRI results before reviewing him at multidisciplinary conference. Surgery can then be done.

## 2023-08-30 NOTE — LETTER
August 30, 2023     Samantha Woodard DO  [address]    Patient: Cheko Martin   YOB: 1980   Date of Visit: 8/30/2023       Dear Dr. Aaron Garcia: Thank you for referring Jennifer Lew to me for evaluation. Below are my notes for this consultation. If you have questions, please do not hesitate to call me. I look forward to following your patient along with you. Sincerely,        Arely Pimentel MD        CC: DO Lon Campos RN Evorn Seen, MD W. Palmer Bal, MD  8/30/2023  4:08 PM  Incomplete  Colon and Rectal Surgery   Cheko Martin 39 y.o. male MRN [id number]  Encounter: [id number]  08/30/23 4:08 PM            Assessment: Cheko Martin is a 39 y.o. male who has rectal cancer. Plan:   Rectal cancer [facility])  The patient is doing fairly well after chemoradiation that concluded 1 month ago. He had a 3-month course of chemotherapy followed by chemoradiation. A repeat CT scan shows reduction of lymphadenopathy as well as the size of the rectal tumor. There is some persistent retroperitoneal adenopathy along the iliacs versus in the high mesentery. I explained that removing these lymph nodes has not been shown to enhance survival.  If we see these lymph nodes we may very pick them but a lymphadenectomy is not planned along the retroperitoneum. A low anterior resection with coloanal anastomosis is planned. A diverting ileostomy will be used. I explained this to the patient, including the ileostomy and its purpose.     Risks of surgery, including but not limited to bleeding, need for transfusion of blood products, pain, infection, hernia formation, injury to the ureter or other internal organs requiring surgery specific to these injuries, anastomotic leak, impotence, deep vein thrombosis, renal failure, pulmonary embolism, myocardial infarction or heart failure, stroke, death, need for and enterostomy, or other unspecified complications were discussed. Benefits and alternatives were discussed. Questions were answered. Hopefully, continence will be preserved well with his operation, given normal gap of mucosa between the tumor and the anus at initial evaluation. Potential risks for positive margins were also also discussed but hopefully we can get a negative margin due to to the benefits of the chemoradiation. Questions were answered. We await the MRI results before reviewing him at multidisciplinary conference. Surgery can then be done. Subjective     HPI    [place] is a 39 y.o. male who is here today for surgical discussion for rectal cancer. Patient notes bowel movements are normal and soft. He is having about 2-3 bowel movements a day. No rectal bleeding noted; no rectal pain. He has completed total neoadjuvant chemotherapy with FOLFOX and 5-FU RT.     CT chest abdomen and pelvis on 8/21/2023: 1)  Interval improvement of asymmetric rectal wall thickening, perirectal and retroperitoneal lymph nodes. 2) Interval improvement in the appearance of the liver. However, evidence of portal hypertension persist including varices and splenomegaly. 3)  Incidental mild wall thickening of the distal ileum. Cannot exclude ileitis in the appropriate clinical situation. MRI staging scheduled for 9/11/2023. Last seen in the office on 3/1/2023.   Historical Information   Past Medical History:   Diagnosis Date   • Cirrhosis ([address]) 3/18/24   • Colon cancer ([address])    • Dental infection    • Fatty liver    • Rectal cancer [facility])      Past Surgical History:   Procedure Laterality Date   • APPENDECTOMY     • COLONOSCOPY     • DENTAL SURGERY     • IR PORT PLACEMENT  02/28/2023   • UPPER GASTROINTESTINAL ENDOSCOPY         Meds/Allergies       Current Outpatient Medications:   •  diphenoxylate-atropine (LOMOTIL) 2.5-0.025 mg per tablet, Take 1 tablet by mouth 4 (four) times a day as needed for diarrhea As needed, Disp: , Rfl:   •  ondansetron (ZOFRAN) 8 mg tablet, Take 1 tablet (8 mg total) by mouth every 8 (eight) hours as needed for nausea or vomiting, Disp: 30 tablet, Rfl: 2  •  potassium chloride (K-DUR,KLOR-CON) 20 mEq tablet, take 1 tablet by mouth once daily, Disp: 14 tablet, Rfl: 0  No Known Allergies    Social History   Social History     Substance and Sexual Activity   Drug Use Yes   • Types: Marijuana     Social History     Tobacco Use   Smoking Status Former   • Packs/day: 1.00   • Years: 20.00   • Total pack years: 20.00   • Types: Cigarettes   • Quit date: 1/1/2020   • Years since quitting: 3.6   Smokeless Tobacco Never         Family History   Problem Relation Age of Onset   • Lung cancer Mother    • Cancer Mother         Lung   • Lung cancer Father    • Cancer Father         Mouth throat   • Colon cancer Neg Hx    • Colon polyps Neg Hx          Review of Systems   Constitutional: Negative. Respiratory: Negative. Cardiovascular: Negative. Gastrointestinal: Negative. Objective   Current Vitals:  Vitals:    08/30/23 1534   BP: 110/60   Weight: 83.4 kg (183 lb 12.8 oz)   Height: 6' 3" (1.905 m)         Physical Exam  Constitutional:       Appearance: Normal appearance. Eyes:      Conjunctiva/sclera: Conjunctivae normal.   Cardiovascular:      Rate and Rhythm: Normal rate and regular rhythm. Pulmonary:      Effort: Pulmonary effort is normal.      Breath sounds: Normal breath sounds. Abdominal:      General: Abdomen is flat. Palpations: Abdomen is soft. There is no mass. Tenderness: There is no abdominal tenderness. There is no guarding. Neurological:      General: No focal deficit present. Mental Status: He is alert and oriented to person, place, and time.    Psychiatric:         Mood and Affect: Mood normal.         Behavior: Behavior normal.

## 2023-08-31 ENCOUNTER — DOCUMENTATION (OUTPATIENT)
Dept: HEMATOLOGY ONCOLOGY | Facility: CLINIC | Age: 43
End: 2023-08-31

## 2023-08-31 DIAGNOSIS — C20 RECTAL CANCER (HCC): Primary | ICD-10-CM

## 2023-08-31 NOTE — TELEPHONE ENCOUNTER
----- Message from Tucker Sommer MD sent at 8/30/2023  4:18 PM EDT -----  OR in 1-2 months after MRI and MDC are done

## 2023-08-31 NOTE — TELEPHONE ENCOUNTER
Called and spoke with patient. Surgery scheduled 10/17/23 @ [facility]  Paperwork mailed to patient.

## 2023-08-31 NOTE — PROGRESS NOTES
TB rqst sent to add this pt on for 10/26 lower GI meeting as a POST OP rectal case, to be presented by Dr. Linda Hernandez.

## 2023-08-31 NOTE — LETTER
Zohaib Bradley  [place] [address]        8/31/2023    Dear Zohaib Bradley,    Your surgery is scheduled for: October 17, 2023   The hospital will call the evening prior to your surgery with your expected arrival time. Location:[place] [address]    CHECK LIST PRIOR TO INPATIENT SURGERY  It is your responsibility to obtain any/all referrals needed for your surgery if required by your insurance. Our office will contact you to discuss your insurance coverage for this procedure. Special instructions required if you are taking any blood thinners. Please verify with the prescribing physician. Examples include Coumadin, Plavix, Xarelto, Eliquis, Pradaxa, etc.    Please check with your family physician if you are taking the following medications: Aspirin or any Aspirin containing medication, Gingko biloba, Ginseng, Feverfew, and/or Gage’s Wort. We suggest stopping these for 3 days. The night before and the day of your surgery, wash from your neck to groin with chlorhexidine soap. This soap is available at most retail pharmacies under such brand names as Hibiclens, Endure or Aplicare. Pre-admission testing Required: YES NO x     BOWEL PREPARATION REQUIRED: YES x NO   If yes, start date: 10/16/23. Please see attached bowel preparation instructions. NOTHING TO EAT OR DRINK AFTER MIDNIGHT PRIOR TO SURGERY. Please do not hesitate to call our office with any questions regarding your surgery. MIRALAX/GATORADE SURGERY PREPARATION INSTRUCTIONS    Purchase:   (1) 238g bottle of MiraLax or Glycolax - no prescription needed   4 Dulcolax Laxative Tablets - no prescription needed   64 oz. bottle of Gatorade (NOT RED), Crystal Light, or another clear liquid of  choice. **REMEMBER** A prescription for antibiotics has been called into your pharmacy for  prior to your surgery.     One Day Prior to Surgery  Have a normal breakfast, light lunch, and clear liquids thereafter. It is important that you drink plenty of clear liquids throughout the day to prevent dehydration. CLEAR LIQUIDS INCLUDE:  Water, Clear Fruit Juice (Apple, Cranberry, Grape), Black Coffee, Tea, Ginger Ale, Gatorade, Rafael-Aid, Hi-C, plain Jello, Ice Pops, Clear Broth or Bouillon, Crystal Light, Lemonade, Soda (regular or diet). No Milk Products! At 1:00 pm, take (4) Dulcolax Tablets with 8 oz. of water. Swallow the tablets whole with a full glass of water. The package may direct you not to exceed (2) tablets at any time but for the purpose of this examination, you should take (4). At 1:00 pm, take your first dose of antibiotic as prescribed. At 1:00 pm, Mix the 238g bottle of MiraLax in 64 oz. of Gatorade and shake the solution until the MiraLax is dissolved. Drink 8 oz. glassfuls at your own pace. It may take 3-4 hours to drink all the solution. At 10:00 pm, take your last dose of antibiotic as prescribed. REMEMBER TO STAY CLOSE TO TOILET FACILITIES. The Day of Surgery  Take nothing by mouth until after surgery. Post-Operative Care Information  Abdominal Surgery  What are my post-operative instructions? The following information and instructions are for your continued care after discharge from the hospital. Please read the information (including the After Visit Summary provided to you at the time of discharge) carefully. If you have any questions or concerns, please contact the clinical staff at [phone]    How do I take care of my incision? Your incision(s) may have surgical glue, dissolvable sutures with white pieces of tape called steri strips, or staples. If you have steri strips or surgical glue, do not remove them. Steri strips will fall off naturally in 7-10 days. Surgical glue (Exofin) will fall off over a period of up to 2-3 weeks.    Do not put any topical ointments or lotions on the incisions. Avoid tight clothing around your incision(s) or fabric that may irritate your skin such as wool, hooks and anthony. Should I continue taking my prescribed medications? Take medications as prescribed. Please review the After Visit Summary provided to you at the time of discharge for details. How will I manage my pain at home? For best pain control take your pain medication at regular intervals for the first couple of days, about every 4-6 hours. This will prevent pain build-up, which occurs when medication is taken on an as needed basis. Use only as much prescription pain medication as you need (i.e. 1 tablet instead of 2). Taper off the prescription pain medication as pain decreases, stopping narcotics first.   Use over-the-counter acetaminophen (Tylenolâ) if your doctor allows. When using over-the-counter medication, never use more than what is prescribed on the package directions. Do not take more than 3000mg of Tylenolâ in a 24 hour period. Do not take more than 2400mg of Ibuprofen (such as Motrinâ or Advilâ) in a 24 hour period. While taking prescription pain medication you may not drive, work, or drink alcohol. Are there any diet restrictions? Continue low fiber diet up to 1 week post op. (This allows the bowel to rest)    It is normal for bowel movements to be loose and occur more frequently post-operatively. This should improve over time. During this time pay attention to hydration  1.5 weeks post op you may slowly begin to add fiber back into your diet. By 2 weeks post op you may resume a normal high fiber diet. What activity restrictions will I have? Walk as much as tolerated. Do not lift, pull, or push objects greater than 10 pounds for 6 weeks.  This includes vacuuming, lifting children or groceries, walking the dog, mowing lawns, snow shoveling, etc. Please discuss other specific physical activities with the doctor at your post op appointment. Do not drive while taking pain medications. You may drive when you have no pain - usually in 1-2 weeks following surgery. You may climb stairs in moderation but do not overtire. Resume sexual activity after you are cleared by your doctor. When will I receive follow-up care? You will be scheduled to return to see your physician in the office typically in 3-4 weeks after surgery. If you do not have a scheduled appointment at the time of discharge, please call the office at [phone]. When should I call my doctor? Notify your doctor for any of the following signs and symptoms of possible infection:   Temperature above 101 degrees. Increase in pain or discomfort. Redness, swelling, drainage at incision site(s). A small amount of clear yellow or pinkish-yellow drainage is normal  If incision begins to open.      Call if you have any changes in your overall health such as having:   Nausea   Vomiting   Chills   profuse (excessive) sweating   inability to urinate or completely empty bladder   diarrhea   constipation

## 2023-09-01 RX ORDER — NEOMYCIN SULFATE 500 MG/1
TABLET ORAL
Qty: 4 TABLET | Refills: 0 | Status: SHIPPED | OUTPATIENT
Start: 2023-10-16 | End: 2023-10-16

## 2023-09-01 RX ORDER — METRONIDAZOLE 500 MG/1
TABLET ORAL
Qty: 2 TABLET | Refills: 0 | Status: SHIPPED | OUTPATIENT
Start: 2023-10-16 | End: 2023-10-16

## 2023-09-06 PROBLEM — Z95.828 PORT-A-CATH IN PLACE: Status: ACTIVE | Noted: 2023-09-06

## 2023-09-08 ENCOUNTER — TELEPHONE (OUTPATIENT)
Age: 43
End: 2023-09-08

## 2023-09-08 NOTE — TELEPHONE ENCOUNTER
Patients GI provider:  Dr. Rylie Lainez    Number to return call: [phone]    Reason for call: Analia Scott from [facility] calling to speak with someone urgently regarding prior SIMIN prior auth.  Patient is scheduled 9-    Scheduled procedure/appointment date if applicable: Apt/procedure

## 2023-09-11 ENCOUNTER — HOSPITAL ENCOUNTER (OUTPATIENT)
Dept: MRI IMAGING | Facility: HOSPITAL | Age: 43
Discharge: HOME | End: 2023-09-11
Payer: COMMERCIAL

## 2023-09-11 ENCOUNTER — HOSPITAL ENCOUNTER (OUTPATIENT)
Dept: INFUSION CENTER | Facility: HOSPITAL | Age: 43
Discharge: HOME | End: 2023-09-11
Payer: COMMERCIAL

## 2023-09-11 DIAGNOSIS — C20 RECTAL CANCER (HCC): ICD-10-CM

## 2023-09-11 DIAGNOSIS — Z95.828 PORT-A-CATH IN PLACE: ICD-10-CM

## 2023-09-11 DIAGNOSIS — C20 RECTAL CANCER (HCC): Primary | ICD-10-CM

## 2023-09-11 LAB
ALBUMIN SERPL BCP-MCNC: 3.8 G/DL (ref 3.5–5)
ALP SERPL-CCNC: 46 U/L (ref 34–104)
ALT SERPL W P-5'-P-CCNC: 23 U/L (ref 7–52)
ANION GAP SERPL CALCULATED.3IONS-SCNC: 4 MMOL/L
AST SERPL W P-5'-P-CCNC: 36 U/L (ref 13–39)
BASOPHILS # BLD AUTO: 0.02 THOUSANDS/ÂΜL (ref 0–0.1)
BASOPHILS NFR BLD AUTO: 1 % (ref 0–1)
BILIRUB SERPL-MCNC: 1.1 MG/DL (ref 0.2–1)
BUN SERPL-MCNC: 10 MG/DL (ref 5–25)
CALCIUM SERPL-MCNC: 9.6 MG/DL (ref 8.4–10.2)
CHLORIDE SERPL-SCNC: 110 MMOL/L (ref 96–108)
CO2 SERPL-SCNC: 24 MMOL/L (ref 21–32)
CREAT SERPL-MCNC: 0.53 MG/DL (ref 0.6–1.3)
EOSINOPHIL # BLD AUTO: 0.13 THOUSAND/ÂΜL (ref 0–0.61)
EOSINOPHIL NFR BLD AUTO: 5 % (ref 0–6)
ERYTHROCYTE [DISTWIDTH] IN BLOOD BY AUTOMATED COUNT: 12.6 % (ref 11.6–15.1)
GFR SERPL CREATININE-BSD FRML MDRD: 128 ML/MIN/1.73SQ M
GLUCOSE SERPL-MCNC: 94 MG/DL (ref 65–140)
HCT VFR BLD AUTO: 34.1 % (ref 36.5–49.3)
HGB BLD-MCNC: 11.5 G/DL (ref 12–17)
IMM GRANULOCYTES # BLD AUTO: 0.02 THOUSAND/UL (ref 0–0.2)
IMM GRANULOCYTES NFR BLD AUTO: 1 % (ref 0–2)
LYMPHOCYTES # BLD AUTO: 0.71 THOUSANDS/ÂΜL (ref 0.6–4.47)
LYMPHOCYTES NFR BLD AUTO: 24 % (ref 14–44)
MCH RBC QN AUTO: 33 PG (ref 26.8–34.3)
MCHC RBC AUTO-ENTMCNC: 33.7 G/DL (ref 31.4–37.4)
MCV RBC AUTO: 98 FL (ref 82–98)
MONOCYTES # BLD AUTO: 0.43 THOUSAND/ÂΜL (ref 0.17–1.22)
MONOCYTES NFR BLD AUTO: 15 % (ref 4–12)
NEUTROPHILS # BLD AUTO: 1.61 THOUSANDS/ÂΜL (ref 1.85–7.62)
NEUTS SEG NFR BLD AUTO: 54 % (ref 43–75)
NRBC BLD AUTO-RTO: 0 /100 WBCS
PLATELET # BLD AUTO: 86 THOUSANDS/UL (ref 149–390)
PMV BLD AUTO: 9.5 FL (ref 8.9–12.7)
POTASSIUM SERPL-SCNC: 3.8 MMOL/L (ref 3.5–5.3)
PROT SERPL-MCNC: 6.8 G/DL (ref 6.4–8.4)
RBC # BLD AUTO: 3.49 MILLION/UL (ref 3.88–5.62)
SODIUM SERPL-SCNC: 138 MMOL/L (ref 135–147)
WBC # BLD AUTO: 2.92 THOUSAND/UL (ref 4.31–10.16)

## 2023-09-11 PROCEDURE — 85025 COMPLETE CBC W/AUTO DIFF WBC: CPT

## 2023-09-11 PROCEDURE — 80053 COMPREHEN METABOLIC PANEL: CPT

## 2023-09-11 PROCEDURE — G1004 CDSM NDSC: HCPCS

## 2023-09-11 PROCEDURE — 72195 MRI PELVIS W/O DYE: CPT

## 2023-09-11 NOTE — PROGRESS NOTES
Pt arrived amb for port labs. Accessed, labs obtained, de-accessed, dsd applied. Having surgery next month. Disch amb to home, steady gait. Will make more flush appts after surgery.

## 2023-09-12 DIAGNOSIS — C20 RECTAL CANCER (HCC): Primary | ICD-10-CM

## 2023-09-15 ENCOUNTER — DOCUMENTATION (OUTPATIENT)
Dept: HEMATOLOGY ONCOLOGY | Facility: CLINIC | Age: 43
End: 2023-09-15

## 2023-09-15 NOTE — PROGRESS NOTES
RECTAL/GI MULTIDISCIPLINARY CASE REVIEW  PRE OP RECTAL CANCER PATIENTS    DATE: 9/15/2023      PRESENTING DOCTOR: Dr. Michaela Hull      DIAGNOSIS: Rectal cancer    Susy Daley was presented at the Rectal/GI Multidisciplinary Conference today. IMAGING REVIEWED:  8/21/2023- CT CAP  9/11/2023- MRI pelvis rectal cancer staging    COMPLETED TOTAL NEOADJUVANT TREATMENT DATE:  7/31/2023    TREATMENT RESPONSE PER MRI:  Again seen is mid rectal cancer, with markedly decreased volume compared to the pretreatment MR, with residual tissues T2 hypointense consistent with scar. Also, there has been significant improvement in mesorectal adenopathy.     SINCE 2/23/2023, POST TREATMENT PRIMARY TUMOR ASSESSMENT: Complete/near complete response. (Please note this imaging appearance does not rule out small volume residual viable tumor.)     SUSPICIOUS MESORECTAL LYMPH NODES: Yes. Yes there remains a a 9 mm superior rectal node (series 5 image 1) and a 5 mm right posterolateral mesorectal node (series 5 image 22.)     SUSPICIOUS EXTRAMESORECTAL LYMPH NODES: No.    SURGERY DATE:  10/17/2023    EXPECTED SURGERY:  Proctectomy with coloanal anastomosis and loop ileostomy. PHYSICIAN RECOMMENDED PLAN:    -Patient is scheduled for surgery on 10/17/2023 (Proctectomy with coloanal anastomosis and loop ileostomy). Team agreed to plan. The final treatment plan will be left to the discretion of the patient and the treating physician. DISCLAIMERS:  TO THE TREATING PHYSICIAN:  This conference is a meeting of clinicians from various specialty areas who evaluate and discuss patients for whom a multidisciplinary treatment approach is being considered. Please note that the above opinion was a consensus of the conference attendees and is intended only to assist in quality care of the discussed patient.   The responsibility for follow up on the input given during the conference, along with any final decisions regarding plan of care, is that of the patient and the patient's provider. Accordingly, appointments have only been recommended based on this information and have NOT been scheduled unless otherwise noted. TO THE PATIENT:  This summary is a brief record of major aspects of your cancer treatment. You may choose to share a copy with any of your doctors or nurses. However, this is not a detailed or comprehensive record of your care.       NCCN guidelines were readily available for review at this discussion

## 2023-09-18 ENCOUNTER — ANESTHESIA EVENT (OUTPATIENT)
Dept: PERIOP | Facility: HOSPITAL | Age: 43
End: 2023-09-18
Payer: COMMERCIAL

## 2023-09-21 NOTE — PRE-PROCEDURE INSTRUCTIONS
Pre-Surgery Instructions:   Medication Instructions   • [START ON 10/16/2023] metroNIDAZOLE (FLAGYL) 500 mg tablet Instructions provided by MD   • [START ON 10/16/2023] neomycin (MYCIFRADIN) 500 mg tablet Instructions provided by MD   • ondansetron (ZOFRAN) 8 mg tablet Uses PRN- OK to take day of surgery    Medication instructions for day surgery reviewed. Please use only a sip of water to take your instructed medications. Avoid all over the counter vitamins, supplements and NSAIDS for one week prior to surgery per anesthesia guidelines. Tylenol is ok to take as needed. You will receive a call one business day prior to surgery with an arrival time and hospital directions. If your surgery is scheduled on a Monday, the hospital will be calling you on the Friday prior to your surgery. If you have not heard from anyone by 8pm, please call the hospital supervisor through the hospital  at [phone]. Brandin Mujica [phone]). Do not eat or drink anything after midnight the night before your surgery, including candy, mints, lifesavers, or chewing gum. Do not drink alcohol 24hrs before your surgery. Try not to smoke at least 24hrs before your surgery. Follow the pre surgery showering instructions as listed in the [facility] Surgical Experience Booklet” or otherwise provided by your surgeon's office. Do not shave the surgical area 24 hours before surgery. Do not apply any lotions, creams, including makeup, cologne, deodorant, or perfumes after showering on the day of your surgery. No contact lenses, eye make-up, or artificial eyelashes. Remove nail polish, including gel polish, and any artificial, gel, or acrylic nails if possible. Remove all jewelry including rings and body piercing jewelry. Wear causal clothing that is easy to take on and off. Consider your type of surgery. Keep any valuables, jewelry, piercings at home.  Please bring any specially ordered equipment (sling, braces) if indicated. Arrange for a responsible person to drive you to and from the hospital on the day of your surgery. Visitor Guidelines discussed. Call the surgeon's office with any new illnesses, exposures, or additional questions prior to surgery. Please reference your [facility] Surgical Experience Booklet” for additional information to prepare for your upcoming surgery.     Bowel prep and oral ABX  per Dr Workman Agent reviewed   No Ensure drinks provided by surgeon's office

## 2023-09-22 ENCOUNTER — DOCUMENTATION (OUTPATIENT)
Dept: HEMATOLOGY ONCOLOGY | Facility: CLINIC | Age: 43
End: 2023-09-22

## 2023-09-27 ENCOUNTER — LAB REQUISITION (OUTPATIENT)
Dept: LAB | Facility: HOSPITAL | Age: 43
End: 2023-09-27
Payer: COMMERCIAL

## 2023-09-27 ENCOUNTER — APPOINTMENT (OUTPATIENT)
Dept: LAB | Facility: HOSPITAL | Age: 43
End: 2023-09-27
Payer: COMMERCIAL

## 2023-09-27 DIAGNOSIS — Z01.818 OTHER SPECIFIED PRE-OPERATIVE EXAMINATION: ICD-10-CM

## 2023-09-27 DIAGNOSIS — C20 RECTAL CANCER (HCC): ICD-10-CM

## 2023-09-27 DIAGNOSIS — Z01.818 ENCOUNTER FOR OTHER PREPROCEDURAL EXAMINATION: ICD-10-CM

## 2023-09-27 LAB
ABO GROUP BLD: NORMAL
BLD GP AB SCN SERPL QL: NEGATIVE
RH BLD: POSITIVE
SPECIMEN EXPIRATION DATE: NORMAL

## 2023-09-27 PROCEDURE — 36415 COLL VENOUS BLD VENIPUNCTURE: CPT

## 2023-09-27 PROCEDURE — 86850 RBC ANTIBODY SCREEN: CPT | Performed by: COLON & RECTAL SURGERY

## 2023-09-27 PROCEDURE — 86900 BLOOD TYPING SEROLOGIC ABO: CPT | Performed by: COLON & RECTAL SURGERY

## 2023-09-27 PROCEDURE — 86901 BLOOD TYPING SEROLOGIC RH(D): CPT | Performed by: COLON & RECTAL SURGERY

## 2023-09-27 NOTE — TELEPHONE ENCOUNTER
Patient called regarding a prep for a procedure he is scheduled for on 10/17/23. He stated that his pharmacy did not receive a presciption.  Please review and send script if appropriate

## 2023-09-29 RX ORDER — NEOMYCIN SULFATE 500 MG/1
TABLET ORAL
Qty: 4 TABLET | Refills: 0 | Status: SHIPPED | OUTPATIENT
Start: 2023-10-16 | End: 2023-10-16

## 2023-09-29 RX ORDER — METRONIDAZOLE 500 MG/1
TABLET ORAL
Qty: 2 TABLET | Refills: 0 | Status: SHIPPED | OUTPATIENT
Start: 2023-10-16 | End: 2023-10-16

## 2023-09-29 NOTE — TELEPHONE ENCOUNTER
Called and spoke with patient. He states the pharmacy did not received his prescription. I double checked his pharmacy with him and Dr Delores Decker can resend his antibiotics.

## 2023-10-10 ENCOUNTER — TELEPHONE (OUTPATIENT)
Facility: HOSPITAL | Age: 43
End: 2023-10-10

## 2023-10-10 NOTE — TELEPHONE ENCOUNTER
Called and informed pt to check in @ Wound care and gave our direct #, pt verbalized understanding and confirmed appt time and date.

## 2023-10-11 ENCOUNTER — OFFICE VISIT (OUTPATIENT)
Facility: HOSPITAL | Age: 43
End: 2023-10-11
Payer: COMMERCIAL

## 2023-10-11 VITALS
SYSTOLIC BLOOD PRESSURE: 130 MMHG | HEIGHT: 75 IN | DIASTOLIC BLOOD PRESSURE: 72 MMHG | BODY MASS INDEX: 23 KG/M2 | RESPIRATION RATE: 18 BRPM | WEIGHT: 185 LBS

## 2023-10-11 DIAGNOSIS — C20 RECTAL CANCER (HCC): Primary | ICD-10-CM

## 2023-10-11 DIAGNOSIS — Z71.89 ENCOUNTER FOR OSTOMY CARE EDUCATION: ICD-10-CM

## 2023-10-11 PROCEDURE — 99213 OFFICE O/P EST LOW 20 MIN: CPT

## 2023-10-11 PROCEDURE — 99203 OFFICE O/P NEW LOW 30 MIN: CPT

## 2023-10-11 NOTE — PROGRESS NOTES
Patient ID: Presley Hanson is a 39 y.o. male Date of Birth 1980       Chief Complaint   Patient presents with    New Patient Visit     Stoma marking for loop ileostomy       Allergies:  Patient has no known allergies. Diagnosis:      Diagnosis ICD-10-CM Associated Orders   1. Rectal cancer ([address])  C20       2. Encounter for ostomy care education  Z71.89               Assessment & Plan:  Ostomy marking completed today. Seen in conjunction with WOCN. The patient was visualized in the sitting, standing, and laying positions to assess the contours of the abdomen. The rectus muscle was identified by having the patient perform a "crunch type maneuver". 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 He wears his pants at the level of hip. The patient was ultimately marked in the RUQ and RLQ. These areas are flat, within the rectus muscle, and are in good view of the patient. It is away from bony prominences, scars and the umbilicus. The skin was marked using a skin marker and marking was covered using a tegaderm dressing. The patient given a skin marker and additional tegaderm dressings to maintain the marking, educated to darken the marking and cover with tegaderm dressing as needed. Education: briefly discussed 1 and 2 piece ostomy pouches, normal stoma appearance, diet, and expected stomal output. Educational materials provided to patient - "What to expect after ileostomy surgery" by [facility], and ostomy resource/support pamphlet. Patient made aware that stoma marking is a guide, and the final location of the stoma will be determined by the surgeon. Patient verbalized understanding. Wound care team will follow along with patient during inpatient stay for ostomy teaching and education. Subjective:   Patient is 39year old male who is referred to the ostomy clinic from the colo-rectal surgical group. Patient has a history of rectal cancer is s/p chemotherapy. Patient is scheduled to under go proctectomy with coloanal anastomosis with creation of loop ileostomy on 10/17/23 with colorectal surgery. Patient presents to the ostomy clinic today for stoma markings prior to surgery. Patient denies any significant abdominal pain or distention. Reviewed rationale of stoma marking with patient, and they are agreeable to proceed. The following portions of the patient's history were reviewed and updated as appropriate:   Patient Active Problem List   Diagnosis    Rectal wall thickening    Transaminitis/Hepatitic Steatosis    Rectal cancer ([address])    Chemotherapy-induced neutropenia     Port-A-Cath in place     Past Medical History:   Diagnosis Date    Cirrhosis ([address]) 3/18/24    Colon cancer ([address])     Dental infection     Fatty liver     Rectal cancer ([address])      Past Surgical History:   Procedure Laterality Date    APPENDECTOMY      COLONOSCOPY      DENTAL SURGERY      IR PORT PLACEMENT  02/28/2023    UPPER GASTROINTESTINAL ENDOSCOPY       Family History   Problem Relation Age of Onset    Lung cancer Mother     Cancer Mother         Lung    Lung cancer Father     Cancer Father         Mouth throat    Colon cancer Neg Hx     Colon polyps Neg Hx       Social History     Socioeconomic History    Marital status: Single     Spouse name: None    Number of children: None    Years of education: None    Highest education level: None   Occupational History    None   Tobacco Use    Smoking status: Former     Packs/day: 1.00     Years: 20.00     Total pack years: 20.00     Types: Cigarettes     Quit date: 1/1/2020     Years since quitting: 3.7    Smokeless tobacco: Never   Vaping Use    Vaping Use: Every day    Substances: Nicotine, Flavoring   Substance and Sexual Activity    Alcohol use: Not Currently     Alcohol/week: 2.0 standard drinks of alcohol     Types: 2 Cans of beer per week     Comment: 1-2 daily.  previously up to 1 case/day (reduced alcohol intake 7 years ago)    Drug use: Not Currently     Types: Marijuana    Sexual activity: Not Currently   Other Topics Concern    None   Social History Narrative    None     Social Determinants of Health     Financial Resource Strain: Not on file   Food Insecurity: No Food Insecurity (2/16/2023)    Hunger Vital Sign     Worried About Running Out of Food in the Last Year: Never true     Ran Out of Food in the Last Year: Never true   Transportation Needs: No Transportation Needs (2/16/2023)    PRAPARE - Transportation     Lack of Transportation (Medical): No     Lack of Transportation (Non-Medical): No   Physical Activity: Not on file   Stress: Not on file   Social Connections: Not on file   Intimate Partner Violence: Not on file   Housing Stability: Low Risk  (2/16/2023)    Housing Stability Vital Sign     Unable to Pay for Housing in the Last Year: No     Number of Places Lived in the Last Year: 1     Unstable Housing in the Last Year: No        Current Outpatient Medications:     [START ON 10/16/2023] metroNIDAZOLE (FLAGYL) 500 mg tablet, Take 1 tablet at 1:00pm and 1 tablet at 10:00pm the day prior to surgery. Do not start before October 16, 2023., Disp: 2 tablet, Rfl: 0    [START ON 10/16/2023] neomycin (MYCIFRADIN) 500 mg tablet, Take 2 tablets at 1:00pm and 2 tablets at 10:00pm the day prior to surgery. Do not start before October 16, 2023., Disp: 4 tablet, Rfl: 0    ondansetron (ZOFRAN) 8 mg tablet, Take 1 tablet (8 mg total) by mouth every 8 (eight) hours as needed for nausea or vomiting, Disp: 30 tablet, Rfl: 2    Review of Systems   Constitutional:  Negative for activity change, appetite change, chills and fever. HENT:  Negative for congestion and sneezing. Respiratory:  Negative for cough and shortness of breath. Gastrointestinal:  Negative for abdominal distention and abdominal pain. Musculoskeletal:  Negative for gait problem. Psychiatric/Behavioral:  Negative for agitation.         Objective:  /72   Resp 18   Ht 6' 3" (1.905 m)   Wt 83.9 kg (185 lb)   BMI 23.12 kg/m²   Pain Score: 0-No pain     Physical Exam  Constitutional:       General: He is not in acute distress. Appearance: He is not ill-appearing or diaphoretic. HENT:      Head: Normocephalic and atraumatic. Right Ear: External ear normal.      Left Ear: External ear normal.   Eyes:      Conjunctiva/sclera: Conjunctivae normal.   Pulmonary:      Effort: Pulmonary effort is normal. No respiratory distress. Abdominal:      General: There is no distension. Palpations: Abdomen is soft. Tenderness: There is no abdominal tenderness. Hernia: No hernia is present. Comments: No surgical scars present. Skin:     General: Skin is warm and dry. Neurological:      Mental Status: He is alert and oriented to person, place, and time. No results found for: "HGBA1C"            ISAAC Taylor, GEORGESP-C, CWON    Portions of the record may have been created with voice recognition software. Occasional wrong word or "sound alike" substitutions may have occurred due to the inherent limitations of voice recognition software. Read the chart carefully and recognize, using context, where substitutions have occurred.

## 2023-10-16 ENCOUNTER — DOCUMENTATION (OUTPATIENT)
Dept: HEMATOLOGY ONCOLOGY | Facility: CLINIC | Age: 43
End: 2023-10-16

## 2023-10-16 NOTE — PROGRESS NOTES
In-basket message received from Chayo Gonsalves RN to move patient's case presentation from the 10/26/2023 [address] to 11/9/2023. This has been completed.

## 2023-10-17 ENCOUNTER — ANESTHESIA (OUTPATIENT)
Dept: PERIOP | Facility: HOSPITAL | Age: 43
End: 2023-10-17
Payer: COMMERCIAL

## 2023-10-17 ENCOUNTER — HOSPITAL ENCOUNTER (INPATIENT)
Facility: HOSPITAL | Age: 43
LOS: 4 days | Discharge: HOME HEALTH | DRG: 230 | End: 2023-10-21
Attending: COLON & RECTAL SURGERY | Admitting: COLON & RECTAL SURGERY
Payer: COMMERCIAL

## 2023-10-17 DIAGNOSIS — R74.01 TRANSAMINITIS: ICD-10-CM

## 2023-10-17 DIAGNOSIS — C20 RECTAL CANCER (HCC): ICD-10-CM

## 2023-10-17 DIAGNOSIS — Z01.818 PRE-OP TESTING: Primary | ICD-10-CM

## 2023-10-17 LAB
ABO GROUP BLD: NORMAL
ALBUMIN SERPL BCP-MCNC: 3.2 G/DL (ref 3.5–5)
ALP SERPL-CCNC: 35 U/L (ref 34–104)
ALT SERPL W P-5'-P-CCNC: 19 U/L (ref 7–52)
ANION GAP SERPL CALCULATED.3IONS-SCNC: 7 MMOL/L
AST SERPL W P-5'-P-CCNC: 28 U/L (ref 13–39)
BASE EXCESS BLDA CALC-SCNC: -6 MMOL/L (ref -2–3)
BASOPHILS # BLD AUTO: 0.02 THOUSANDS/ÂΜL (ref 0–0.1)
BASOPHILS NFR BLD AUTO: 0 % (ref 0–1)
BILIRUB SERPL-MCNC: 1.16 MG/DL (ref 0.2–1)
BUN SERPL-MCNC: 11 MG/DL (ref 5–25)
CA-I BLD-SCNC: 1.19 MMOL/L (ref 1.12–1.32)
CALCIUM ALBUM COR SERPL-MCNC: 8.5 MG/DL (ref 8.3–10.1)
CALCIUM SERPL-MCNC: 7.9 MG/DL (ref 8.4–10.2)
CHLORIDE SERPL-SCNC: 111 MMOL/L (ref 96–108)
CO2 SERPL-SCNC: 21 MMOL/L (ref 21–32)
CREAT SERPL-MCNC: 0.79 MG/DL (ref 0.6–1.3)
EOSINOPHIL # BLD AUTO: 0.02 THOUSAND/ÂΜL (ref 0–0.61)
EOSINOPHIL NFR BLD AUTO: 0 % (ref 0–6)
ERYTHROCYTE [DISTWIDTH] IN BLOOD BY AUTOMATED COUNT: 12.9 % (ref 11.6–15.1)
GFR SERPL CREATININE-BSD FRML MDRD: 109 ML/MIN/1.73SQ M
GLUCOSE SERPL-MCNC: 153 MG/DL (ref 65–140)
GLUCOSE SERPL-MCNC: 155 MG/DL (ref 65–140)
GLUCOSE SERPL-MCNC: 164 MG/DL (ref 65–140)
HCO3 BLDA-SCNC: 20.1 MMOL/L (ref 24–30)
HCT VFR BLD AUTO: 28.3 % (ref 36.5–49.3)
HCT VFR BLD CALC: 30 % (ref 36.5–49.3)
HGB BLD-MCNC: 9.7 G/DL (ref 12–17)
HGB BLDA-MCNC: 10.2 G/DL (ref 12–17)
IMM GRANULOCYTES # BLD AUTO: 0.03 THOUSAND/UL (ref 0–0.2)
IMM GRANULOCYTES NFR BLD AUTO: 1 % (ref 0–2)
INR PPP: 1.44 (ref 0.84–1.19)
LYMPHOCYTES # BLD AUTO: 0.33 THOUSANDS/ÂΜL (ref 0.6–4.47)
LYMPHOCYTES NFR BLD AUTO: 5 % (ref 14–44)
MCH RBC QN AUTO: 32.8 PG (ref 26.8–34.3)
MCHC RBC AUTO-ENTMCNC: 34.3 G/DL (ref 31.4–37.4)
MCV RBC AUTO: 96 FL (ref 82–98)
MONOCYTES # BLD AUTO: 0.36 THOUSAND/ÂΜL (ref 0.17–1.22)
MONOCYTES NFR BLD AUTO: 6 % (ref 4–12)
NEUTROPHILS # BLD AUTO: 5.33 THOUSANDS/ÂΜL (ref 1.85–7.62)
NEUTS SEG NFR BLD AUTO: 88 % (ref 43–75)
NRBC BLD AUTO-RTO: 0 /100 WBCS
PCO2 BLD: 21 MMOL/L (ref 21–32)
PCO2 BLD: 43 MM HG (ref 42–50)
PH BLD: 7.28 [PH] (ref 7.3–7.4)
PLATELET # BLD AUTO: 106 THOUSANDS/UL (ref 149–390)
PMV BLD AUTO: 8.9 FL (ref 8.9–12.7)
PO2 BLD: 159 MM HG (ref 35–45)
POTASSIUM BLD-SCNC: 3.9 MMOL/L (ref 3.5–5.3)
POTASSIUM SERPL-SCNC: 4 MMOL/L (ref 3.5–5.3)
PROT SERPL-MCNC: 5.2 G/DL (ref 6.4–8.4)
PROTHROMBIN TIME: 17.3 SECONDS (ref 11.6–14.5)
RBC # BLD AUTO: 2.96 MILLION/UL (ref 3.88–5.62)
RH BLD: POSITIVE
SAO2 % BLD FROM PO2: 99 % (ref 60–85)
SODIUM BLD-SCNC: 139 MMOL/L (ref 136–145)
SODIUM SERPL-SCNC: 139 MMOL/L (ref 135–147)
SPECIMEN SOURCE: ABNORMAL
WBC # BLD AUTO: 6.09 THOUSAND/UL (ref 4.31–10.16)

## 2023-10-17 PROCEDURE — 82947 ASSAY GLUCOSE BLOOD QUANT: CPT

## 2023-10-17 PROCEDURE — 0DJD8ZZ INSPECTION OF LOWER INTESTINAL TRACT, VIA NATURAL OR ARTIFICIAL OPENING ENDOSCOPIC: ICD-10-PCS | Performed by: COLON & RECTAL SURGERY

## 2023-10-17 PROCEDURE — 85025 COMPLETE CBC W/AUTO DIFF WBC: CPT | Performed by: SURGERY

## 2023-10-17 PROCEDURE — 0D1B4Z4 BYPASS ILEUM TO CUTANEOUS, PERCUTANEOUS ENDOSCOPIC APPROACH: ICD-10-PCS | Performed by: COLON & RECTAL SURGERY

## 2023-10-17 PROCEDURE — 94760 N-INVAS EAR/PLS OXIMETRY 1: CPT

## 2023-10-17 PROCEDURE — 80053 COMPREHEN METABOLIC PANEL: CPT | Performed by: SURGERY

## 2023-10-17 PROCEDURE — 85610 PROTHROMBIN TIME: CPT | Performed by: SURGERY

## 2023-10-17 PROCEDURE — 84132 ASSAY OF SERUM POTASSIUM: CPT

## 2023-10-17 PROCEDURE — 82803 BLOOD GASES ANY COMBINATION: CPT

## 2023-10-17 PROCEDURE — 82330 ASSAY OF CALCIUM: CPT

## 2023-10-17 PROCEDURE — 0DBP0ZZ EXCISION OF RECTUM, OPEN APPROACH: ICD-10-PCS | Performed by: COLON & RECTAL SURGERY

## 2023-10-17 PROCEDURE — 84295 ASSAY OF SERUM SODIUM: CPT

## 2023-10-17 PROCEDURE — 45397 LAP REMOVE RECTUM W/POUCH: CPT | Performed by: COLON & RECTAL SURGERY

## 2023-10-17 PROCEDURE — 85014 HEMATOCRIT: CPT

## 2023-10-17 PROCEDURE — C9290 INJ, BUPIVACAINE LIPOSOME: HCPCS | Performed by: ANESTHESIOLOGY

## 2023-10-17 PROCEDURE — 82948 REAGENT STRIP/BLOOD GLUCOSE: CPT

## 2023-10-17 PROCEDURE — 88309 TISSUE EXAM BY PATHOLOGIST: CPT | Performed by: PATHOLOGY

## 2023-10-17 RX ORDER — CEFAZOLIN SODIUM 2 G/50ML
2000 SOLUTION INTRAVENOUS ONCE
Status: COMPLETED | OUTPATIENT
Start: 2023-10-17 | End: 2023-10-17

## 2023-10-17 RX ORDER — NEOMYCIN SULFATE 500 MG/1
1000 TABLET ORAL 3 TIMES DAILY
Status: DISCONTINUED | OUTPATIENT
Start: 2023-10-17 | End: 2023-10-17 | Stop reason: HOSPADM

## 2023-10-17 RX ORDER — LABETALOL HYDROCHLORIDE 5 MG/ML
10 INJECTION, SOLUTION INTRAVENOUS EVERY 4 HOURS PRN
Status: DISCONTINUED | OUTPATIENT
Start: 2023-10-17 | End: 2023-10-21 | Stop reason: HOSPADM

## 2023-10-17 RX ORDER — DEXAMETHASONE SODIUM PHOSPHATE 10 MG/ML
INJECTION, SOLUTION INTRAMUSCULAR; INTRAVENOUS AS NEEDED
Status: DISCONTINUED | OUTPATIENT
Start: 2023-10-17 | End: 2023-10-17

## 2023-10-17 RX ORDER — ROCURONIUM BROMIDE 10 MG/ML
INJECTION, SOLUTION INTRAVENOUS AS NEEDED
Status: DISCONTINUED | OUTPATIENT
Start: 2023-10-17 | End: 2023-10-17

## 2023-10-17 RX ORDER — FENTANYL CITRATE 50 UG/ML
INJECTION, SOLUTION INTRAMUSCULAR; INTRAVENOUS AS NEEDED
Status: DISCONTINUED | OUTPATIENT
Start: 2023-10-17 | End: 2023-10-17

## 2023-10-17 RX ORDER — ALBUMIN, HUMAN INJ 5% 5 %
SOLUTION INTRAVENOUS CONTINUOUS PRN
Status: DISCONTINUED | OUTPATIENT
Start: 2023-10-17 | End: 2023-10-17

## 2023-10-17 RX ORDER — GABAPENTIN 100 MG/1
100 CAPSULE ORAL 3 TIMES DAILY
Status: DISCONTINUED | OUTPATIENT
Start: 2023-10-17 | End: 2023-10-21 | Stop reason: HOSPADM

## 2023-10-17 RX ORDER — MAGNESIUM HYDROXIDE 1200 MG/15ML
LIQUID ORAL AS NEEDED
Status: DISCONTINUED | OUTPATIENT
Start: 2023-10-17 | End: 2023-10-17 | Stop reason: HOSPADM

## 2023-10-17 RX ORDER — ONDANSETRON 2 MG/ML
4 INJECTION INTRAMUSCULAR; INTRAVENOUS EVERY 6 HOURS PRN
Status: DISCONTINUED | OUTPATIENT
Start: 2023-10-17 | End: 2023-10-21 | Stop reason: HOSPADM

## 2023-10-17 RX ORDER — ACETAMINOPHEN 325 MG/1
975 TABLET ORAL EVERY 6 HOURS SCHEDULED
Status: DISCONTINUED | OUTPATIENT
Start: 2023-10-17 | End: 2023-10-21 | Stop reason: HOSPADM

## 2023-10-17 RX ORDER — HYDROMORPHONE HCL/PF 1 MG/ML
SYRINGE (ML) INJECTION AS NEEDED
Status: DISCONTINUED | OUTPATIENT
Start: 2023-10-17 | End: 2023-10-17

## 2023-10-17 RX ORDER — DIPHENHYDRAMINE HYDROCHLORIDE 50 MG/ML
12.5 INJECTION INTRAMUSCULAR; INTRAVENOUS ONCE AS NEEDED
Status: DISCONTINUED | OUTPATIENT
Start: 2023-10-17 | End: 2023-10-17 | Stop reason: HOSPADM

## 2023-10-17 RX ORDER — SODIUM CHLORIDE, SODIUM LACTATE, POTASSIUM CHLORIDE, CALCIUM CHLORIDE 600; 310; 30; 20 MG/100ML; MG/100ML; MG/100ML; MG/100ML
125 INJECTION, SOLUTION INTRAVENOUS CONTINUOUS
Status: DISCONTINUED | OUTPATIENT
Start: 2023-10-17 | End: 2023-10-18

## 2023-10-17 RX ORDER — SODIUM CHLORIDE 9 MG/ML
INJECTION, SOLUTION INTRAVENOUS CONTINUOUS PRN
Status: DISCONTINUED | OUTPATIENT
Start: 2023-10-17 | End: 2023-10-17

## 2023-10-17 RX ORDER — FENTANYL CITRATE/PF 50 MCG/ML
50 SYRINGE (ML) INJECTION
Status: DISCONTINUED | OUTPATIENT
Start: 2023-10-17 | End: 2023-10-17 | Stop reason: HOSPADM

## 2023-10-17 RX ORDER — METRONIDAZOLE 500 MG/100ML
500 INJECTION, SOLUTION INTRAVENOUS ONCE
Status: COMPLETED | OUTPATIENT
Start: 2023-10-17 | End: 2023-10-17

## 2023-10-17 RX ORDER — MIDAZOLAM HYDROCHLORIDE 2 MG/2ML
INJECTION, SOLUTION INTRAMUSCULAR; INTRAVENOUS AS NEEDED
Status: DISCONTINUED | OUTPATIENT
Start: 2023-10-17 | End: 2023-10-17

## 2023-10-17 RX ORDER — LIDOCAINE HYDROCHLORIDE 10 MG/ML
0.5 INJECTION, SOLUTION EPIDURAL; INFILTRATION; INTRACAUDAL; PERINEURAL ONCE AS NEEDED
Status: DISCONTINUED | OUTPATIENT
Start: 2023-10-17 | End: 2023-10-17 | Stop reason: HOSPADM

## 2023-10-17 RX ORDER — METRONIDAZOLE 500 MG/1
1000 TABLET ORAL 3 TIMES DAILY
Status: DISCONTINUED | OUTPATIENT
Start: 2023-10-17 | End: 2023-10-17 | Stop reason: HOSPADM

## 2023-10-17 RX ORDER — ENOXAPARIN SODIUM 100 MG/ML
40 INJECTION SUBCUTANEOUS DAILY
Status: DISCONTINUED | OUTPATIENT
Start: 2023-10-18 | End: 2023-10-21 | Stop reason: HOSPADM

## 2023-10-17 RX ORDER — HYDROMORPHONE HCL/PF 1 MG/ML
0.5 SYRINGE (ML) INJECTION
Status: DISCONTINUED | OUTPATIENT
Start: 2023-10-17 | End: 2023-10-17 | Stop reason: HOSPADM

## 2023-10-17 RX ORDER — ONDANSETRON 2 MG/ML
INJECTION INTRAMUSCULAR; INTRAVENOUS AS NEEDED
Status: DISCONTINUED | OUTPATIENT
Start: 2023-10-17 | End: 2023-10-17

## 2023-10-17 RX ORDER — ENOXAPARIN SODIUM 100 MG/ML
40 INJECTION SUBCUTANEOUS DAILY
Status: DISCONTINUED | OUTPATIENT
Start: 2023-10-17 | End: 2023-10-17

## 2023-10-17 RX ORDER — LIDOCAINE HYDROCHLORIDE 20 MG/ML
INJECTION, SOLUTION EPIDURAL; INFILTRATION; INTRACAUDAL; PERINEURAL AS NEEDED
Status: DISCONTINUED | OUTPATIENT
Start: 2023-10-17 | End: 2023-10-17

## 2023-10-17 RX ORDER — PROPOFOL 10 MG/ML
INJECTION, EMULSION INTRAVENOUS AS NEEDED
Status: DISCONTINUED | OUTPATIENT
Start: 2023-10-17 | End: 2023-10-17

## 2023-10-17 RX ORDER — SODIUM CHLORIDE, SODIUM LACTATE, POTASSIUM CHLORIDE, CALCIUM CHLORIDE 600; 310; 30; 20 MG/100ML; MG/100ML; MG/100ML; MG/100ML
125 INJECTION, SOLUTION INTRAVENOUS CONTINUOUS
Status: DISCONTINUED | OUTPATIENT
Start: 2023-10-17 | End: 2023-10-17

## 2023-10-17 RX ORDER — DIPHENHYDRAMINE HYDROCHLORIDE 50 MG/ML
25 INJECTION INTRAMUSCULAR; INTRAVENOUS EVERY 6 HOURS PRN
Status: DISCONTINUED | OUTPATIENT
Start: 2023-10-17 | End: 2023-10-21 | Stop reason: HOSPADM

## 2023-10-17 RX ORDER — HEPARIN SODIUM 5000 [USP'U]/ML
5000 INJECTION, SOLUTION INTRAVENOUS; SUBCUTANEOUS ONCE
Status: COMPLETED | OUTPATIENT
Start: 2023-10-17 | End: 2023-10-17

## 2023-10-17 RX ORDER — LANOLIN ALCOHOL/MO/W.PET/CERES
50 CREAM (GRAM) TOPICAL DAILY
COMMUNITY

## 2023-10-17 RX ORDER — ONDANSETRON 2 MG/ML
4 INJECTION INTRAMUSCULAR; INTRAVENOUS ONCE AS NEEDED
Status: DISCONTINUED | OUTPATIENT
Start: 2023-10-17 | End: 2023-10-17 | Stop reason: HOSPADM

## 2023-10-17 RX ADMIN — HYDROMORPHONE HYDROCHLORIDE: 10 INJECTION, SOLUTION INTRAMUSCULAR; INTRAVENOUS; SUBCUTANEOUS at 16:20

## 2023-10-17 RX ADMIN — FENTANYL CITRATE 50 MCG: 50 INJECTION INTRAMUSCULAR; INTRAVENOUS at 16:12

## 2023-10-17 RX ADMIN — SODIUM CHLORIDE: 0.9 INJECTION, SOLUTION INTRAVENOUS at 10:33

## 2023-10-17 RX ADMIN — GABAPENTIN 100 MG: 100 CAPSULE ORAL at 23:00

## 2023-10-17 RX ADMIN — PROPOFOL 200 MG: 10 INJECTION, EMULSION INTRAVENOUS at 10:27

## 2023-10-17 RX ADMIN — ROCURONIUM BROMIDE 20 MG: 10 INJECTION, SOLUTION INTRAVENOUS at 11:45

## 2023-10-17 RX ADMIN — FENTANYL CITRATE 50 MCG: 50 INJECTION INTRAMUSCULAR; INTRAVENOUS at 10:57

## 2023-10-17 RX ADMIN — FENTANYL CITRATE 100 MCG: 50 INJECTION INTRAMUSCULAR; INTRAVENOUS at 10:27

## 2023-10-17 RX ADMIN — ROCURONIUM BROMIDE 10 MG: 10 INJECTION, SOLUTION INTRAVENOUS at 12:37

## 2023-10-17 RX ADMIN — MIDAZOLAM 2 MG: 1 INJECTION INTRAMUSCULAR; INTRAVENOUS at 10:19

## 2023-10-17 RX ADMIN — HYDROMORPHONE HYDROCHLORIDE 0.5 MG: 1 INJECTION, SOLUTION INTRAMUSCULAR; INTRAVENOUS; SUBCUTANEOUS at 12:47

## 2023-10-17 RX ADMIN — HEPARIN SODIUM 5000 UNITS: 5000 INJECTION INTRAVENOUS; SUBCUTANEOUS at 08:18

## 2023-10-17 RX ADMIN — ROCURONIUM BROMIDE 10 MG: 10 INJECTION, SOLUTION INTRAVENOUS at 14:08

## 2023-10-17 RX ADMIN — ACETAMINOPHEN 975 MG: 325 TABLET, FILM COATED ORAL at 23:00

## 2023-10-17 RX ADMIN — CEFAZOLIN SODIUM 2000 MG: 2 SOLUTION INTRAVENOUS at 14:39

## 2023-10-17 RX ADMIN — DEXAMETHASONE SODIUM PHOSPHATE 10 MG: 10 INJECTION, SOLUTION INTRAMUSCULAR; INTRAVENOUS at 10:27

## 2023-10-17 RX ADMIN — ALBUMIN (HUMAN): 12.5 INJECTION, SOLUTION INTRAVENOUS at 12:48

## 2023-10-17 RX ADMIN — FENTANYL CITRATE 50 MCG: 50 INJECTION INTRAMUSCULAR; INTRAVENOUS at 13:45

## 2023-10-17 RX ADMIN — ROCURONIUM BROMIDE 20 MG: 10 INJECTION, SOLUTION INTRAVENOUS at 11:09

## 2023-10-17 RX ADMIN — ROCURONIUM BROMIDE 20 MG: 10 INJECTION, SOLUTION INTRAVENOUS at 13:45

## 2023-10-17 RX ADMIN — FENTANYL CITRATE 50 MCG: 50 INJECTION INTRAMUSCULAR; INTRAVENOUS at 12:01

## 2023-10-17 RX ADMIN — METRONIDAZOLE: 500 INJECTION, SOLUTION INTRAVENOUS at 10:44

## 2023-10-17 RX ADMIN — BUPIVACAINE HYDROCHLORIDE 15 ML: 2.5 INJECTION, SOLUTION EPIDURAL; INFILTRATION; INTRACAUDAL; PERINEURAL at 15:10

## 2023-10-17 RX ADMIN — SODIUM CHLORIDE, SODIUM LACTATE, POTASSIUM CHLORIDE, AND CALCIUM CHLORIDE 125 ML/HR: .6; .31; .03; .02 INJECTION, SOLUTION INTRAVENOUS at 08:13

## 2023-10-17 RX ADMIN — CEFAZOLIN SODIUM 2000 MG: 2 SOLUTION INTRAVENOUS at 10:39

## 2023-10-17 RX ADMIN — ONDANSETRON 4 MG: 2 INJECTION INTRAMUSCULAR; INTRAVENOUS at 14:34

## 2023-10-17 RX ADMIN — SODIUM CHLORIDE, SODIUM LACTATE, POTASSIUM CHLORIDE, AND CALCIUM CHLORIDE: .6; .31; .03; .02 INJECTION, SOLUTION INTRAVENOUS at 12:04

## 2023-10-17 RX ADMIN — LIDOCAINE HYDROCHLORIDE 100 MG: 20 INJECTION, SOLUTION EPIDURAL; INFILTRATION; INTRACAUDAL; PERINEURAL at 10:27

## 2023-10-17 RX ADMIN — SUGAMMADEX 200 MG: 100 INJECTION, SOLUTION INTRAVENOUS at 15:06

## 2023-10-17 RX ADMIN — BUPIVACAINE 10 ML: 13.3 INJECTION, SUSPENSION, LIPOSOMAL INFILTRATION at 15:10

## 2023-10-17 RX ADMIN — ALBUMIN (HUMAN): 12.5 INJECTION, SOLUTION INTRAVENOUS at 12:59

## 2023-10-17 RX ADMIN — BUPIVACAINE HYDROCHLORIDE 15 ML: 2.5 INJECTION, SOLUTION EPIDURAL; INFILTRATION; INTRACAUDAL; PERINEURAL at 15:12

## 2023-10-17 RX ADMIN — SODIUM CHLORIDE, SODIUM LACTATE, POTASSIUM CHLORIDE, AND CALCIUM CHLORIDE 125 ML/HR: .6; .31; .03; .02 INJECTION, SOLUTION INTRAVENOUS at 16:34

## 2023-10-17 RX ADMIN — FENTANYL CITRATE 50 MCG: 50 INJECTION INTRAMUSCULAR; INTRAVENOUS at 14:34

## 2023-10-17 RX ADMIN — FENTANYL CITRATE 50 MCG: 50 INJECTION INTRAMUSCULAR; INTRAVENOUS at 15:09

## 2023-10-17 RX ADMIN — ROCURONIUM BROMIDE 50 MG: 10 INJECTION, SOLUTION INTRAVENOUS at 10:28

## 2023-10-17 RX ADMIN — ROCURONIUM BROMIDE 10 MG: 10 INJECTION, SOLUTION INTRAVENOUS at 10:57

## 2023-10-17 RX ADMIN — BUPIVACAINE 10 ML: 13.3 INJECTION, SUSPENSION, LIPOSOMAL INFILTRATION at 15:12

## 2023-10-17 RX ADMIN — PROPOFOL 100 MG: 10 INJECTION, EMULSION INTRAVENOUS at 10:28

## 2023-10-17 NOTE — H&P
History and Physical   Colon and Rectal Surgery   Brianna Zaragoza 39 y.o. male MRN: [id number]  Unit/Bed#: [facility] [place] Encounter: [id number]  10/17/23   9:40 AM      CC:  Rectal cancer    History of Present Illness   HPI:  Brianna Zaragoza is a 39 y.o. male for low anterior resection, ileostomy. Historical Information   Past Medical History:   Diagnosis Date    Cirrhosis ([address] 3/18/24    Colon cancer ([facility])     Dental infection     Fatty liver     Rectal cancer ([facility])      Past Surgical History:   Procedure Laterality Date    APPENDECTOMY      COLONOSCOPY      DENTAL SURGERY      IR PORT PLACEMENT  02/28/2023    UPPER GASTROINTESTINAL ENDOSCOPY         Meds/Allergies     Medications Prior to Admission   Medication    metroNIDAZOLE (FLAGYL) 500 mg tablet    neomycin (MYCIFRADIN) 500 mg tablet    pyridoxine (VITAMIN B6) 50 mg tablet    ondansetron (ZOFRAN) 8 mg tablet         Current Facility-Administered Medications:     metroNIDAZOLE (FLAGYL) IVPB (premix) 500 mg 100 mL, 500 mg, Intravenous, Once **AND** ceFAZolin (ANCEF) IVPB (premix in dextrose) 2,000 mg 50 mL, 2,000 mg, Intravenous, Once, Robert Lora MD    lactated ringers infusion, 125 mL/hr, Intravenous, Continuous, Radha Yeung MD, Last Rate: 125 mL/hr at 10/17/23 0830, Continue from Pre-op at 10/17/23 0830    lidocaine (PF) (XYLOCAINE-MPF) 1 % injection 0.5 mL, 0.5 mL, Infiltration, Once PRN, Radha Yeung MD    neomycin [facility]) tablet 1,000 mg, 1,000 mg, Oral, TID **AND** metroNIDAZOLE (FLAGYL) tablet 1,000 mg, 1,000 mg, Oral, TID, W Rose Nelson MD    No Known Allergies      Social History   Social History     Substance and Sexual Activity   Alcohol Use Not Currently    Alcohol/week: 2.0 standard drinks of alcohol    Types: 2 Cans of beer per week    Comment: 1-2 daily.  previously up to 1 case/day (reduced alcohol intake 7 years ago)     Social History     Substance and Sexual Activity   Drug Use Not Currently Types: Marijuana     Social History     Tobacco Use   Smoking Status Former    Packs/day: 1.00    Years: 20.00    Total pack years: 20.00    Types: Cigarettes    Quit date: 1/1/2020    Years since quitting: 3.7   Smokeless Tobacco Never         Family History:   Family History   Problem Relation Age of Onset    Lung cancer Mother     Cancer Mother         Lung    Lung cancer Father     Cancer Father         Mouth throat    Colon cancer Neg Hx     Colon polyps Neg Hx          Objective     Current Vitals:   Blood Pressure: 129/74 (10/17/23 0748)  Pulse: 97 (10/17/23 0748)  Temperature: (!) 97.1 °F (36.2 °C) (10/17/23 0748)  Temp Source: Temporal (10/17/23 0748)  Respirations: 18 (10/17/23 0748)  Height: 6' 3" (190.5 cm) (10/17/23 0748)  Weight - Scale: 83 kg (183 lb) (10/17/23 0748)  SpO2: 99 % (10/17/23 0748)  No intake or output data in the 24 hours ending 10/17/23 0940    Physical Exam:  General:  Well nourished, no distress. Neuro: Alert and oriented  Eyes:Sclera anicteric, conjunctiva pink. Pulm: Clear to auscultation bilaterally. No respiratory Distress. CV:  Regular rate and rhythm. No murmurs. Abdomen:  Soft, flat, non-tender, without masses or hepatosplenomegaly. Head and all 4s ok    Lab Results:     Assessment: Luis Miguel Carrizales is a 39 y.o. male who has rectal cancer. Plan:   Rectal cancer [facility])  The patient is doing fairly well after chemoradiation that concluded 1 month ago. He had a 3-month course of chemotherapy followed by chemoradiation. A repeat CT scan shows reduction of lymphadenopathy as well as the size of the rectal tumor. There is some persistent retroperitoneal adenopathy along the iliacs versus in the high mesentery. I explained that removing these lymph nodes has not been shown to enhance survival.  If we see these lymph nodes we may very pick them but a lymphadenectomy is not planned along the retroperitoneum.   A low anterior resection with coloanal anastomosis is planned. A diverting ileostomy will be used. I explained this to the patient, including the ileostomy and its purpose. It is noted that the radiologist agrees that the "ileac" lymph nodes may be high mesenteric nodes. I will do a high ligation to compensate for this. Risks of surgery, including but not limited to bleeding, need for transfusion of blood products, pain, infection, hernia formation, injury to the ureter or other internal organs requiring surgery specific to these injuries, anastomotic leak, impotence, deep vein thrombosis, renal failure, pulmonary embolism, myocardial infarction or heart failure, stroke, death, need for and enterostomy, or other unspecified complications were discussed. Benefits and alternatives were discussed. Questions were answered. Added risks due to steatohepatitis and splenomegaly with noted portal hypertension findings on CT were reviewed. Hopefully, continence will be preserved well with his operation, given normal gap of mucosa between the tumor and the anus at initial evaluation. Potential risks for positive margins were also also discussed but hopefully we can get a negative margin due to to the benefits of the chemoradiation. Questions were answered. We await the MRI results before reviewing him at multidisciplinary conference. Surgery can then be done.   Allen Miller MD

## 2023-10-17 NOTE — ANESTHESIA POSTPROCEDURE EVALUATION
Post-Op Assessment Note    CV Status:  Stable    Pain management: adequate     Mental Status:  Sleepy   Hydration Status:  Euvolemic   PONV Controlled:  Controlled   Airway Patency:  Patent  Airway: intubated   Two or more mitigation strategies used for obstructive sleep apnea   Post Op Vitals Reviewed: Yes      Staff: CRNA         No notable events documented.     /70 (10/17/23 1525)    Temp (!) 97 °F (36.1 °C) (10/17/23 1525)    Pulse 97 (10/17/23 1525)   Resp 17 (10/17/23 1525)    SpO2 100 % (10/17/23 1525)

## 2023-10-17 NOTE — OP NOTE
OPERATIVE REPORT  PATIENT NAME: Mary Riley    :  1980  MRN: [id number]  Pt Location: BE OR ROOM 10    SURGERY DATE: 10/17/2023    Surgeon(s) and Role:     * Gio Velazquez MD - Primary     * Dione Maynard MD - Assisting    Preop Diagnosis:  Rectal cancer ([address]) [C20]  Cirrhosis of the liver    Post-Op Diagnosis Codes:     * Rectal cancer ([address]) [C20]  Cirrhosis of the liver    Procedure(s):  LAPAROSCOPIC HAND ASSIST PROCTECTOMY. ABDOMINAL APPROACH. coloanal anastomosis (very low pull through). loop ileostomy,   Laparoscopic mobilization of the splenic flexure  SIGMOIDOSCOPY FLEXIBLE    Specimen(s):  ID Type Source Tests Collected by Time Destination   1 : rectum, sigmoid colon, and two donuts Tissue Soft Tissue, Other TISSUE EXAM Gio Velazquez MD 10/17/2023 1357        Estimated Blood Loss:   800 mL    Drains:  Urethral Catheter 16 Fr. (Active)   Number of days: 0       Anesthesia Type:   General    Operative Indications:  Rectal cancer ([address]) [C20]    Operative Findings:  Cirrhosis of the liver    Complications:   None    Procedure and Technique:  The patient was placed in a supine position on a pink pad with his legs in [employer] The arms were cushioned and wrapped at his sides and the chest was wrapped using pink pad material.  The perineum was prepped using Betadine and the abdomen was prepped widely using ChloraPrep after shaving the suprapubic and pubic areas with an electric razor. The ChloraPrep was allowed to dry completely. The area was draped in a sterile manner. A timeout was done. We placed a lower midline incision for hand-assisted technique. The skin was incised down through the fascia using cautery. The peritoneum was punctured and the abdomen was entered without difficulty. The wound was extended for the length of the GelPort to a size needed for a 7-1/2 gloved hand.   A 12 mm trocar was placed in the right lower quadrant, a 5 mm trocar was placed in the right lateral abdomen, a 5 mm trocar was placed in the left abdomen, and a right para umbilical 11 mm trocar was placed through the previously marked stoma site. There were some adhesions from a prior appendectomy that were lysed using Enseal technique. We also mobilized a small amount of small bowel, as well that was taken down from lateral pelvic sidewall attachments using sharp dissection with laparoscopic scissors. This allowed good access to the medial side of the sigmoid colon and rectum. The mesentery of the sigmoid colon was somewhat thickened and scarred and seemed adhesed in the area of the sacral promontory. I elected to approach the sigmoid from a lateral approach. The sigmoid lateral attachments were taken down using Enseal and blunt dissection. We entered an avascular plane in the area immediately lateral and posterior to the sigmoid mesentery. This plane was used to dissected into the avascular plane in the retrorectal space distal to the sacral promontory. The package of retroperitoneal structures including the gonadal vessels and ureter was never entered, as we could see clearly the posterior side of the mesentery of the rectum and sigmoid colon. Dissection was carried superiorly to the inferior mesenteric artery which was surrounded with care to avoid entry into the retroperitoneal space. The inferior mesenteric artery was divided very close to the aorta in order to maximize her lymphatic harvest because of prior CT findings at the high ligation point where abnormal lymph nodes have been seen. These were not palpated but inclusion in the specimen was ensured. No other abnormal retroperitoneal lymph nodes were identified. We then moved to medial dissection up to and around the ligament of Treitz. The avascular plane was maintained and the inferior mesenteric vein was lifted up and away from the base of the mesentery and divided using multiple fires of the Enseal device.   A medial approach was used to dissected laterally and the avascular plane to expose the anterior surface of Gerota's fascia entirely. The dissection was continued onto the central portion of the transverse colon just to the left of the ligamentum teres. Lateral dissection of the splenic flexure was then accomplished,  the bowel from the omentum rather than  the omentum from the spleen. This allowed for nice clean dissection in the appropriate avascular planes with avoidance of the spleen entirely. Presence of splenomegaly. Each area that we dissected seem to bleed somewhat immediately but hemostasis was thereafter maintained and controlled easily. It seemed that there was some element of portal hypertension leading to this but the planes all remain dry after dissection was concluded. No aggressive bleeding occurred during the operation. The splenic flexure being taken down completely, dissection was continued to ensure that all attachments of the splenic flexure, descending colon and sigmoid colon were completely taken down. Once this was assured, the lower midline incision was extended from pubis to inferior to the umbilicus. We were then able to perform direct dissection on the rectum. Laparoscopic surgery would have been difficult laparoscopically due to extensive scarring and radiation changes in the rectum and perirectal tissues. I felt the cancer care was better by doing the lower portion of the operation open. We had very clear views of our dissection area but had some difficulty  scar tissue and identifying the planes. Scissors, cautery, and Enseal technique were used to separate the rectum from posterolateral lateral attachments followed by anterolateral attachments followed by posterior midline attachments and finally the anterior attachments.   The seminal vesicles, rectal wall, and prostate were never encountered but we were able to maintain the appropriate planes between the structures. Dissection was carried inferiorly down to the levator ani muscles posteriorly and the anus junction distal to the rectal mesentery. Palpation confirmed this level of dissection. I rescrubbed to conclude the anastomosis. A contour stapler was passed at the level of the very distal rectum adjacent to the anal rectal junction. It was held for 30 seconds before firing and holding for 30 seconds. A good staple line was achieved. The bowel was delivered into the wound and the mesentery was sequentially divided using Enseal technique. This was proximal to the inferior mesenteric artery staple line. The proximal side of the anastomosis was fashioned. It had been marked earlier at the sacral promontory in order to ensure that we did not use radiated tissue for our anastomosis. Just proximal to this by a few centimeters, we fashion the proximal side of the anastomosis. The anvil of the 29 mm [facility] stapler was positioned and doubly wrapped with a full-thickness collar of supple, well vascularized descending colon. The length of this we reached far beyond the inferior border of the pubis. A double stapled anastomosis was then created using a 29 mm [facility] stapler. 2 excellent donuts that were circumferentially intact were obtained. An air test was negative for leak. The anastomosis appeared to be circumferentially intact with pink bowel proximal and distal to it. Palpation revealed the anastomosis to be at 3-1/2 cm from the anal verge. There was no tension on the anastomosis, whatsoever. The pelvis was suctioned free of the fluid that had been introduced. We then moved to create an ileostomy through the previously marked site that had been used for one of our trocars. The ileostomy skin was opened appropriately and the wound was opened to 2 fingerbreadths. A loop of ileum approximately 40 to 50 cm proximal to the ileocecal valve was selected and brought out through the ileostomy opening. This was well away from the ileum that appeared to be somewhat thickened on CT scan. I avoided this to avoid using terminal ileum that may have been radiated. This terminal ileum also was somewhat adhesed from the prior appendectomy, I suspect. These adhesions were nonobstructive and were left untouched. Basically this was simply some attachments of the terminal ileum that held the loops together over the distal 20 or 30 cm of the ileum. The small bowel had been marked in order to ensure orientation and was brought through the ileostomy opening and held in position while we closed the wounds. Inspection of the dissection planes revealed no significant bleeding. The small bowel was positioned in a fashion to avoid torsion or herniation. The omentum was brought beneath the wound to the pelvis. The midline was irrigated and dried. The fascia was closed using a running 1 PDS suture placed from either end of the wound. The wound was again irrigated and dried. The skin was closed using a running subcuticular 4-0 Monocryl suture. The trocars have been removed earlier and the sites were irrigated and closed at the subcuticular layer using interrupted suture. Wounds were cleansed, dried, and covered with Exofin. Once the Exofin dried, the ileostomy was matured. This was done using a looped Kari ileostomy on the proximal side and a flush maturation on the distal side. This was all done using 3-0 chromic suture. An ileostomy appliance was positioned. Wand technique revealed no retained gauze. Sponge needle and instrument counts were correct. I called his sister regarding our findings and care. Patient Disposition:  PACU     Colon Resection  Operation performed with curative intent Yes   Tumor Location (select all that apply) Rectum, NOS   Extent of colon and vascular resection (select all that apply) Very low anterior resection of the dome down to the level of the anus.   A coloanal anastomosis was performed using a stapler.          SIGNATURE: Lenwood Mcardle, MD  DATE: October 17, 2023  TIME: 3:12 PM

## 2023-10-17 NOTE — RESPIRATORY THERAPY NOTE
RT Protocol Note  Pilar Ayala 39 y.o. male MRN: [id number]  [facility] Encounter: [id number]    Assessment    Active Problems: There are no active Hospital Problems. Home Pulmonary Medications:  none       Past Medical History:   Diagnosis Date    Cirrhosis ([address] [facility]) 3/18/24    Colon cancer ([address] [facility])     Dental infection     Fatty liver     Rectal cancer ([address] [facility])      Social History     Socioeconomic History    Marital status: Single     Spouse name: None    Number of children: None    Years of education: None    Highest education level: None   Occupational History    None   Tobacco Use    Smoking status: Former     Packs/day: 1.00     Years: 20.00     Total pack years: 20.00     Types: Cigarettes     Quit date: 1/1/2020     Years since quitting: 3.7    Smokeless tobacco: Never   Vaping Use    Vaping Use: Every day    Substances: Nicotine, Flavoring   Substance and Sexual Activity    Alcohol use: Not Currently     Alcohol/week: 2.0 standard drinks of alcohol     Types: 2 Cans of beer per week     Comment: 1-2 daily. previously up to 1 case/day (reduced alcohol intake 7 years ago)    Drug use: Not Currently     Types: Marijuana    Sexual activity: Not Currently   Other Topics Concern    None   Social History Narrative    None     Social Determinants of Health     Financial Resource Strain: Not on file   Food Insecurity: No Food Insecurity (2/16/2023)    Hunger Vital Sign     Worried About Running Out of Food in the Last Year: Never true     Ran Out of Food in the Last Year: Never true   Transportation Needs: No Transportation Needs (2/16/2023)    PRAPARE - Transportation     Lack of Transportation (Medical): No     Lack of Transportation (Non-Medical):  No   Physical Activity: Not on file   Stress: Not on file   Social Connections: Not on file   Intimate Partner Violence: Not on file   Housing Stability: Low Risk  (2/16/2023)    Housing Stability Vital Sign     Unable to Pay for Housing in the Last Year: No     Number of Places Lived in the Last Year: 1     Unstable Housing in the Last Year: No       Subjective         Objective    Physical Exam:   Assessment Type: Assess only  General Appearance: Drowsy  Respiratory Pattern: Normal  Chest Assessment: Chest expansion symmetrical  Bilateral Breath Sounds: Clear    Vitals:  Blood pressure 130/86, pulse 100, temperature 97.9 °F (36.6 °C), resp. rate 20, height 6' 3" (1.905 m), weight 83 kg (183 lb), SpO2 94 %. Imaging and other studies: I have personally reviewed pertinent reports. Plan    Respiratory Plan: Discontinue Protocol        Resp Comments: Pt assessed at this time per resp protocol. Pt admitted S/P surgery. No resp distress noted, SpO2 WNL on RA. Pt has no pulmonary HX and takes no resp medications at home. Pt is being monitored on PSN, per capnography order requirements order will be D/C. No resp interventions indicated resp protocol will be D/C.

## 2023-10-17 NOTE — ANESTHESIA PREPROCEDURE EVALUATION
Procedure:  LAPAROSCOPIC HAND ASSIST PROCTECTOMY, ABDOMINAL APPROACH, coloanal anastomosis, loop ileostomy (Abdomen)     - denies any chest pain, palpitations, shortness of breath, syncope, lightheadedness, seizures   - denies any recent infectious symptoms such as fevers, chills, cough   - denies taking any anticoagulation medications or any issues with bleeding, bruising, clotting    Relevant Problems   ANESTHESIA (within normal limits)      CARDIO (within normal limits)      ENDO (within normal limits)      GI/HEPATIC   (+) Rectal cancer (HCC)      /RENAL (within normal limits)      GYN (within normal limits)      HEMATOLOGY (within normal limits)      MUSCULOSKELETAL (within normal limits)      NEURO/PSYCH (within normal limits)      PULMONARY (within normal limits)      Other   (+) Chemotherapy-induced neutropenia    (+) Port-A-Cath in place   (+) Rectal wall thickening   (+) Transaminitis/Hepatitic Steatosis      Lab Results   Component Value Date    WBC 2.92 (L) 09/11/2023    HGB 11.5 (L) 09/11/2023    HCT 34.1 (L) 09/11/2023    MCV 98 09/11/2023    PLT 86 (L) 09/11/2023     Lab Results   Component Value Date    SODIUM 138 09/11/2023    K 3.8 09/11/2023     (H) 09/11/2023    CO2 24 09/11/2023    AGAP 4 09/11/2023    BUN 10 09/11/2023    CREATININE 0.53 (L) 09/11/2023    GLUC 94 09/11/2023    GLUF 121 (H) 03/06/2023    CALCIUM 9.6 09/11/2023    AST 36 09/11/2023    ALT 23 09/11/2023    ALKPHOS 46 09/11/2023    TP 6.8 09/11/2023    TBILI 1.10 (H) 09/11/2023    EGFR 128 09/11/2023     Lab Results   Component Value Date    PTT 32 02/27/2023     Lab Results   Component Value Date    INR 1.12 03/06/2023    INR 1.12 02/27/2023    INR 1.32 (H) 02/16/2023    PROTIME 15.2 (H) 03/06/2023    PROTIME 15.2 (H) 02/27/2023    PROTIME 17.2 (H) 02/16/2023         Physical Exam    Airway    Mallampati score: II  TM Distance: >3 FB  Neck ROM: full     Dental        Cardiovascular  Rhythm: regular, Rate: normal, Cardiovascular exam normal    Pulmonary  Pulmonary exam normal Breath sounds clear to auscultation    Other Findings        Anesthesia Plan  ASA Score- 3     Anesthesia Type- general with ASA Monitors. Additional Monitors:     Airway Plan: ETT. Plan Factors-Exercise tolerance (METS): >4 METS. Chart reviewed. EKG reviewed. Imaging results reviewed. Existing labs reviewed. Patient summary reviewed. Patient is not a current smoker. Patient did not smoke on day of surgery. Obstructive sleep apnea risk education given perioperatively. Induction- intravenous. Postoperative Plan- Plan for postoperative opioid use. Informed Consent- Anesthetic plan and risks discussed with patient. I personally reviewed this patient with the CRNA. Discussed and agreed on the Anesthesia Plan with the CRNA. Jayden Crvaen

## 2023-10-18 ENCOUNTER — APPOINTMENT (OUTPATIENT)
Dept: RADIOLOGY | Facility: HOSPITAL | Age: 43
DRG: 230 | End: 2023-10-18
Payer: COMMERCIAL

## 2023-10-18 LAB
AFP-TM SERPL-MCNC: 4.79 NG/ML (ref 0–9)
ALBUMIN SERPL BCP-MCNC: 3.5 G/DL (ref 3.5–5)
ALP SERPL-CCNC: 35 U/L (ref 34–104)
ALT SERPL W P-5'-P-CCNC: 18 U/L (ref 7–52)
ANION GAP SERPL CALCULATED.3IONS-SCNC: 7 MMOL/L
AST SERPL W P-5'-P-CCNC: 31 U/L (ref 13–39)
BASOPHILS # BLD AUTO: 0.01 THOUSANDS/ÂΜL (ref 0–0.1)
BASOPHILS NFR BLD AUTO: 0 % (ref 0–1)
BILIRUB SERPL-MCNC: 1.59 MG/DL (ref 0.2–1)
BUN SERPL-MCNC: 15 MG/DL (ref 5–25)
CALCIUM SERPL-MCNC: 8.7 MG/DL (ref 8.4–10.2)
CHLORIDE SERPL-SCNC: 106 MMOL/L (ref 96–108)
CO2 SERPL-SCNC: 22 MMOL/L (ref 21–32)
CREAT SERPL-MCNC: 0.7 MG/DL (ref 0.6–1.3)
EOSINOPHIL # BLD AUTO: 0 THOUSAND/ÂΜL (ref 0–0.61)
EOSINOPHIL NFR BLD AUTO: 0 % (ref 0–6)
ERYTHROCYTE [DISTWIDTH] IN BLOOD BY AUTOMATED COUNT: 13 % (ref 11.6–15.1)
GFR SERPL CREATININE-BSD FRML MDRD: 114 ML/MIN/1.73SQ M
GLUCOSE SERPL-MCNC: 126 MG/DL (ref 65–140)
HCT VFR BLD AUTO: 26.1 % (ref 36.5–49.3)
HGB BLD-MCNC: 9.2 G/DL (ref 12–17)
IMM GRANULOCYTES # BLD AUTO: 0.03 THOUSAND/UL (ref 0–0.2)
IMM GRANULOCYTES NFR BLD AUTO: 0 % (ref 0–2)
INR PPP: 1.43 (ref 0.84–1.19)
LYMPHOCYTES # BLD AUTO: 0.58 THOUSANDS/ÂΜL (ref 0.6–4.47)
LYMPHOCYTES NFR BLD AUTO: 8 % (ref 14–44)
MAGNESIUM SERPL-MCNC: 1.5 MG/DL (ref 1.9–2.7)
MCH RBC QN AUTO: 32.7 PG (ref 26.8–34.3)
MCHC RBC AUTO-ENTMCNC: 35.2 G/DL (ref 31.4–37.4)
MCV RBC AUTO: 93 FL (ref 82–98)
MONOCYTES # BLD AUTO: 1.01 THOUSAND/ÂΜL (ref 0.17–1.22)
MONOCYTES NFR BLD AUTO: 14 % (ref 4–12)
NEUTROPHILS # BLD AUTO: 5.78 THOUSANDS/ÂΜL (ref 1.85–7.62)
NEUTS SEG NFR BLD AUTO: 78 % (ref 43–75)
NRBC BLD AUTO-RTO: 0 /100 WBCS
PHOSPHATE SERPL-MCNC: 4.1 MG/DL (ref 2.7–4.5)
PLATELET # BLD AUTO: 86 THOUSANDS/UL (ref 149–390)
PMV BLD AUTO: 9.3 FL (ref 8.9–12.7)
POTASSIUM SERPL-SCNC: 4.1 MMOL/L (ref 3.5–5.3)
PROT SERPL-MCNC: 5.8 G/DL (ref 6.4–8.4)
PROTHROMBIN TIME: 17.2 SECONDS (ref 11.6–14.5)
RBC # BLD AUTO: 2.81 MILLION/UL (ref 3.88–5.62)
SODIUM SERPL-SCNC: 135 MMOL/L (ref 135–147)
WBC # BLD AUTO: 7.41 THOUSAND/UL (ref 4.31–10.16)

## 2023-10-18 PROCEDURE — 84100 ASSAY OF PHOSPHORUS: CPT | Performed by: SURGERY

## 2023-10-18 PROCEDURE — NC001 PR NO CHARGE

## 2023-10-18 PROCEDURE — 82105 ALPHA-FETOPROTEIN SERUM: CPT | Performed by: STUDENT IN AN ORGANIZED HEALTH CARE EDUCATION/TRAINING PROGRAM

## 2023-10-18 PROCEDURE — 85025 COMPLETE CBC W/AUTO DIFF WBC: CPT | Performed by: SURGERY

## 2023-10-18 PROCEDURE — 97162 PT EVAL MOD COMPLEX 30 MIN: CPT

## 2023-10-18 PROCEDURE — 85610 PROTHROMBIN TIME: CPT | Performed by: STUDENT IN AN ORGANIZED HEALTH CARE EDUCATION/TRAINING PROGRAM

## 2023-10-18 PROCEDURE — 76705 ECHO EXAM OF ABDOMEN: CPT

## 2023-10-18 PROCEDURE — 99222 1ST HOSP IP/OBS MODERATE 55: CPT | Performed by: INTERNAL MEDICINE

## 2023-10-18 PROCEDURE — 99024 POSTOP FOLLOW-UP VISIT: CPT | Performed by: COLON & RECTAL SURGERY

## 2023-10-18 PROCEDURE — 97166 OT EVAL MOD COMPLEX 45 MIN: CPT

## 2023-10-18 PROCEDURE — 80053 COMPREHEN METABOLIC PANEL: CPT | Performed by: SURGERY

## 2023-10-18 PROCEDURE — 83735 ASSAY OF MAGNESIUM: CPT | Performed by: SURGERY

## 2023-10-18 PROCEDURE — 99222 1ST HOSP IP/OBS MODERATE 55: CPT | Performed by: ANESTHESIOLOGY

## 2023-10-18 RX ORDER — MAGNESIUM SULFATE HEPTAHYDRATE 40 MG/ML
2 INJECTION, SOLUTION INTRAVENOUS ONCE
Status: COMPLETED | OUTPATIENT
Start: 2023-10-18 | End: 2023-10-18

## 2023-10-18 RX ORDER — DEXTROSE MONOHYDRATE, SODIUM CHLORIDE, AND POTASSIUM CHLORIDE 50; 1.49; 4.5 G/1000ML; G/1000ML; G/1000ML
84 INJECTION, SOLUTION INTRAVENOUS CONTINUOUS
Status: DISCONTINUED | OUTPATIENT
Start: 2023-10-18 | End: 2023-10-19

## 2023-10-18 RX ORDER — BUPIVACAINE HYDROCHLORIDE 2.5 MG/ML
INJECTION, SOLUTION EPIDURAL; INFILTRATION; INTRACAUDAL
Status: DISCONTINUED | OUTPATIENT
Start: 2023-10-17 | End: 2023-10-18

## 2023-10-18 RX ADMIN — ACETAMINOPHEN 975 MG: 325 TABLET, FILM COATED ORAL at 17:16

## 2023-10-18 RX ADMIN — GABAPENTIN 100 MG: 100 CAPSULE ORAL at 21:06

## 2023-10-18 RX ADMIN — ACETAMINOPHEN 975 MG: 325 TABLET, FILM COATED ORAL at 11:20

## 2023-10-18 RX ADMIN — ACETAMINOPHEN 975 MG: 325 TABLET, FILM COATED ORAL at 05:23

## 2023-10-18 RX ADMIN — Medication 2 G: at 17:18

## 2023-10-18 RX ADMIN — DEXTROSE, SODIUM CHLORIDE, AND POTASSIUM CHLORIDE 84 ML/HR: 5; .45; .15 INJECTION INTRAVENOUS at 11:22

## 2023-10-18 RX ADMIN — GABAPENTIN 100 MG: 100 CAPSULE ORAL at 17:16

## 2023-10-18 RX ADMIN — ENOXAPARIN SODIUM 40 MG: 40 INJECTION SUBCUTANEOUS at 05:24

## 2023-10-18 RX ADMIN — GABAPENTIN 100 MG: 100 CAPSULE ORAL at 07:51

## 2023-10-18 RX ADMIN — SODIUM CHLORIDE, SODIUM LACTATE, POTASSIUM CHLORIDE, AND CALCIUM CHLORIDE 125 ML/HR: .6; .31; .03; .02 INJECTION, SOLUTION INTRAVENOUS at 06:47

## 2023-10-18 RX ADMIN — Medication 2 G: at 21:11

## 2023-10-18 RX ADMIN — DEXTROSE, SODIUM CHLORIDE, AND POTASSIUM CHLORIDE 84 ML/HR: 5; .45; .15 INJECTION INTRAVENOUS at 21:15

## 2023-10-18 RX ADMIN — MAGNESIUM SULFATE HEPTAHYDRATE 2 G: 40 INJECTION, SOLUTION INTRAVENOUS at 10:34

## 2023-10-18 RX ADMIN — ACETAMINOPHEN 975 MG: 325 TABLET, FILM COATED ORAL at 23:03

## 2023-10-18 NOTE — UTILIZATION REVIEW
Initial Clinical Review    Elective IP surgical procedure  Age/Sex: 39 y.o. male  Surgery Date: 10/17/2023  Procedure:   LAPAROSCOPIC HAND ASSIST PROCTECTOMY. ABDOMINAL APPROACH. coloanal anastomosis (very low pull through). loop ileostomy,   Laparoscopic mobilization of the splenic flexure  SIGMOIDOSCOPY FLEXIBLE  Anesthesia: General  Operative Findings: Cirrhosis of the liver     POD#1 Progress Note: Pt states that pain is well controlled. 9 pushes on PCA. Has not ambulated yet. Tolerated some ice chips. Denies bowel function. Afebrile, VSS, sat 95% on RA. AM labs pending. UOP 1.4L. 5 cc thin serous output from ostomy. Npo with ice chips, advance diet as tolerated. Monitor ostomy output. dPCA. Antiemetic prn. Lovenox, SCDs. Labetalol prn for HTN. [place] catheter to remain 2 days postop. Encourage OOB and Incentive spirometry. PT/OT    Admission Orders: Date/Time/Statement:   Admission Orders (From admission, onward)       Ordered        10/17/23 1532  Inpatient Admission  Once                          Orders Placed This Encounter   Procedures    Inpatient Admission     Standing Status:   Standing     Number of Occurrences:   1     Order Specific Question:   Level of Care     Answer:   Level 2 Stepdown / HOT [14]     Order Specific Question:   Estimated length of stay     Answer:   More than 2 Midnights     Order Specific Question:   Certification     Answer:   I certify that inpatient services are medically necessary for this patient for a duration of greater than two midnights. See H&P and MD Progress Notes for additional information about the patient's course of treatment.      Vital Signs:   Date/Time Temp Pulse Resp BP MAP (mmHg) SpO2 O2 Flow Rate (L/min) O2 Device Cardiac (WDL)   10/18/23 07:55:08 97.9 °F (36.6 °C) 84 16 107/58 74 94 % -- -- --   10/18/23 05:30:15 97.5 °F (36.4 °C) 78 -- 110/66 81 94 % -- -- --   10/18/23 02:37:13 98 °F (36.7 °C) 88 18 101/58 72 95 % -- -- --   10/17/23 22:43:12 98.4 °F (36.9 °C) 98 20 127/78 94 95 % -- -- --   10/17/23 22:42:50 98.4 °F (36.9 °C) 92 18 127/78 94 96 % -- -- --   10/17/23 2100 -- -- -- -- -- -- -- None (Room air) --   10/17/23 18:32:05 97.9 °F (36.6 °C) 100 20 130/86 101 94 % -- -- --   10/17/23 1829 -- 90 -- -- -- 94 % -- None (Room air) --   10/17/23 1730 -- -- -- -- -- 94 % -- None (Room air) --   10/17/23 16:57:50 98 °F (36.7 °C) 92 18 131/88 102 97 % -- -- --   10/17/23 1630 97.6 °F (36.4 °C) 92 15 131/74 91 94 % -- None (Room air) WDL   10/17/23 1620 -- 94 16 121/82 94 96 % -- -- --   10/17/23 1615 -- 92 15 -- -- 99 % -- None (Room air) --   10/17/23 1600 -- 98 18 131/75 94 97 % -- None (Room air) --   10/17/23 1545 -- 98 20 120/69 88 97 % 6 L/min Simple mask --   10/17/23 1530 -- 98 19 117/71 84 99 % 6 L/min Simple mask --   10/17/23 1525 97 °F (36.1 °C) Abnormal  97 17 120/70 85 100 % 6 L/min Simple mask WDL   10/17/23 0748 97.1 °F (36.2 °C) Abnormal  97 18 129/74 -- 99 % -- None (Room air) --       Pertinent Labs/Diagnostic Test Results:   Results from last 7 days   Lab Units 10/18/23  0557 10/17/23  1539 10/17/23  1317   WBC Thousand/uL 7.41 6.09  --    HEMOGLOBIN g/dL 9.2* 9.7*  --    I STAT HEMOGLOBIN g/dl  --   --  10.2*   HEMATOCRIT % 26.1* 28.3*  --    HEMATOCRIT, ISTAT %  --   --  30*   PLATELETS Thousands/uL 86* 106*  --    NEUTROS ABS Thousands/µL 5.78 5.33  --        Results from last 7 days   Lab Units 10/18/23  0557 10/17/23  1539 10/17/23  1317   SODIUM mmol/L 135 139  --    POTASSIUM mmol/L 4.1 4.0  --    CHLORIDE mmol/L 106 111*  --    CO2 mmol/L 22 21  --    CO2, I-STAT mmol/L  --   --  21   ANION GAP mmol/L 7 7  --    BUN mg/dL 15 11  --    CREATININE mg/dL 0.70 0.79  --    EGFR ml/min/1.73sq m 114 109  --    CALCIUM mg/dL 8.7 7.9*  --    CALCIUM, IONIZED, ISTAT mmol/L  --   --  1.19   MAGNESIUM mg/dL 1.5*  --   --    PHOSPHORUS mg/dL 4.1  --   --      Results from last 7 days   Lab Units 10/18/23  0557 10/17/23  1539   AST U/L 31 28   ALT U/L 18 19   ALK PHOS U/L 35 35   TOTAL PROTEIN g/dL 5.8* 5.2*   ALBUMIN g/dL 3.5 3.2*   TOTAL BILIRUBIN mg/dL 1.59* 1.16*     Results from last 7 days   Lab Units 10/17/23  1527   POC GLUCOSE mg/dl 155*     Results from last 7 days   Lab Units 10/18/23  0557 10/17/23  1539   GLUCOSE RANDOM mg/dL 126 153*     Results from last 7 days   Lab Units 10/17/23  1317   PH, RALPH I-STAT  7.277*   PCO2, RALPH ISTAT mm HG 43.0   PO2, RALPH ISTAT mm .0*   HCO3, RALPH ISTAT mmol/L 20.1*   I STAT BASE EXC mmol/L -6*   I STAT O2 SAT % 99*     Results from last 7 days   Lab Units 10/17/23  1539   PROTIME seconds 17.3*   INR  1.44*     Scheduled Medications:  acetaminophen, 975 mg, Oral, Q6H PATRICIA  bupivacaine liposomal, 10 mL, Infiltration, Once  enoxaparin, 40 mg, Subcutaneous, Daily  gabapentin, 100 mg, Oral, TID  magnesium sulfate, 2 g, Intravenous, Once    Continuous IV Infusions:  HYDROmorphone, , Intravenous, Continuous  lactated ringers, 125 mL/hr, Intravenous, Continuous    PRN Meds:  diphenhydrAMINE, 25 mg, Intravenous, Q6H PRN  labetalol, 10 mg, Intravenous, Q4H PRN  ondansetron, 4 mg, Intravenous, Q6H PRN        Network Utilization Review Department  ATTENTION: Please call with any questions or concerns to [phone] and carefully listen to the prompts so that you are directed to the right person. All voicemails are confidential.   For Discharge needs, contact Care Management DC Support Team at [phone] opt. 2  Send all requests for admission clinical reviews, approved or denied determinations and any other requests to dedicated fax number below belonging to the campus where the patient is receiving treatment.  List of dedicated fax numbers for the Facilities:  [place] DENIALS (Administrative/Medical Necessity) [phone]   DISCHARGE SUPPORT TEAM (NETWORK) [id number] [facility] (Maternity/NICU/Pediatrics) [phone]   [address] [phone] [facility] [address] [facility] [phone]   [address] [facility] [address] [address] [facility] [address] [place] [address] [place] [phone]

## 2023-10-18 NOTE — CASE MANAGEMENT
Case Management Assessment & Discharge Planning Note    Patient name Zohaib Bradley  Location [facility] 920/[facility] 788-44 MRN [id number]  : 1980 Date 10/18/2023       Current Admission Date: 10/17/2023  Current Admission Diagnosis:Rectal cancer [facility])   Patient Active Problem List    Diagnosis Date Noted    Port-A-Cath in place 2023    Chemotherapy-induced neutropenia  2023    Rectal cancer ([address]) 2023    Rectal wall thickening 2023    Transaminitis/Hepatitic Steatosis 2023      LOS (days): 1  Geometric Mean LOS (GMLOS) (days):   Days to GMLOS:     OBJECTIVE:    Risk of Unplanned Readmission Score: 11.4         Current admission status: Inpatient       Preferred Pharmacy:   [address] [place] [address] #[id number] Venus Paige [place] [id number] [address] [address]  Phone: [phone] Fax: [place] - [address]  Phone: [phone] Fax: [phone]    Primary Care Provider: Trista Chung DO    Primary Insurance: [facility]  Secondary Insurance:     ASSESSMENT:  Jeff Fleming [facility] Representative - Friend   Primary Phone: [phone] (Mobile)                                Patient Information  Admitted from[de-identified] Home  Mental Status: Alert  During Assessment patient was accompanied by: Not accompanied during assessment  Assessment information provided by[de-identified] Patient  Primary Caregiver: Self  Support Systems: Self  Home entry access options.  Select all that apply.: Stairs  Number of steps to enter home.: One Flight  Do the steps have railings?: Yes  In the last 12 months, was there a time when you were not able to pay the mortgage or rent on time?: No  In the last 12 months, was there a time when you did not have a steady place to sleep or slept in a shelter (including now)?: No  Homeless/housing insecurity resource given?: N/A  Living Arrangements: Lives Alone  Is patient a ?: No    Activities of Daily Living Prior to Admission  Functional Status: Independent  Completes ADLs independently?: Yes  Ambulates independently?: Yes  Does patient use assisted devices?: No  Does patient currently own DME?: No  Does patient have a history of Outpatient Therapy (PT/OT)?: No  Does the patient have a history of Short-Term Rehab?: No  Does patient have a history of HHC?: No  Does patient currently have [facility]?: No         Patient Information Continued  Income Source: Unemployed  Does patient have prescription coverage?: Yes  Within the past 12 months, you worried that your food would run out before you got the money to buy more.: Never true  Within the past 12 months, the food you bought just didn't last and you didn't have money to get more.: Never true  Food insecurity resource given?: N/A  Does patient receive dialysis treatments?: No         Means of Transportation  Means of Transport to Appts[de-identified] Drives Self  In the past 12 months, has lack of transportation kept you from medical appointments or from getting medications?: No  In the past 12 months, has lack of transportation kept you from meetings, work, or from getting things needed for daily living?: No  Was application for public transport provided?: N/A        DISCHARGE DETAILS:  Met at bedside with patient. Lives alone, has 6 yr old son. IADL's, does not own DME, drives himself, has car here but can have brother in law drive if needed. CM to continue to support.

## 2023-10-18 NOTE — UTILIZATION REVIEW
NOTIFICATION OF INPATIENT ADMISSION      AUTHORIZATION REQUEST   SERVICING FACILITY:   [address] [place] [address]  Address: [address] [place] [address] [address] [facility] [address] [address]  Tax ID: [id number]  NPI: [id number] ATTENDING PROVIDER:  Attending Name and NPI#: Demarco Mata Md [[id number]]  Address: [address] [place] [address] [address] [facility] [address] [address]  Phone: [phone]   ADMISSION INFORMATION:  Place of Service: [address] [place] [address]  Place of Service Code: 21  Inpatient Admission Date/Time: 10/17/23  3:32 PM  Discharge Date/Time: No discharge date for patient encounter. Admitting Diagnosis Code/Description:  Rectal cancer ([address] ) [C20]     UTILIZATION REVIEW CONTACT:  Justin Quijano Utilization   Network Utilization Review Department  Phone: [phone]  Fax: [phone]  Email: [email or web address]  Contact for approvals/pending authorizations, clinical reviews, and discharge. PHYSICIAN ADVISORY SERVICES:  Medical Necessity Denial & Dklx-ii-Amna Review  Phone: [phone]  Fax: [phone]  Email: [email or web address]     DISCHARGE SUPPORT TEAM:  For Patients Discharge Needs & Updates  Phone: [phone] opt. 2 Fax: [phone]  Email: [email or web address]

## 2023-10-18 NOTE — ADDENDUM NOTE
Addendum  created 10/18/23 0837 by Holland Sahni MD    Child order released for a procedure order, Clinical Note Signed, Diagnosis association updated, Intraprocedure Blocks edited, Intraprocedure Event edited, SmartForm saved

## 2023-10-18 NOTE — QUICK NOTE
S: Steffi García is a 39 y.o. male who returns to the floor s/p laparoscopic hand-assisted proctectomy with coloanal anastomosis and loop ileostomy. Patient has no complaints at this time. Patient has not ambulated yet. Patient has had some ice chips which she has tolerated. He was instructed how to use incentive spirometer. He denies fever/chills, chest pains, shortness of breath. O:    Vitals:    10/17/23 1832   BP: 130/86   Pulse: 100   Resp: 20   Temp: 97.9 °F (36.6 °C)   SpO2: 94%       General:   General - no acute distress, responsive and cooperative  CV - warm, regular rate  Pulm - normal work of breathing, no respiratory distress  Abd - soft, appropriately tender, nondistended. Ostomy in place with serosanguineous output. Ostomy pink patent and nonretracted. Neuro - m/s grossly intact, cn grossly intact  Ext - moving all extremities       A: 71-year-old male s/p laparoscopic hand-assisted proctectomy with coloanal anastomosis and loop ileostomy.     P:  N.p.o. with ice chips  dPCA  Antiemetic as needed  Lovenox  Labetalol as needed for HTN  Campoverde catheter to remain 2 days postop  Encourage OOB and I-S use

## 2023-10-18 NOTE — OCCUPATIONAL THERAPY NOTE
Occupational Therapy Evaluation     Patient Name: Brit Brady  ZJWSH'U Date: 10/18/2023  Problem List  Active Problems: There are no active Hospital Problems. Past Medical History  Past Medical History:   Diagnosis Date    Cirrhosis ([address]) 3/18/24    Colon cancer ([address])     Dental infection     Fatty liver     Rectal cancer [facility])      Past Surgical History  Past Surgical History:   Procedure Laterality Date    APPENDECTOMY      COLONOSCOPY      DENTAL SURGERY      IR PORT PLACEMENT  02/28/2023    UPPER GASTROINTESTINAL ENDOSCOPY          10/18/23 0835   OT Last Visit   OT Visit Date 10/18/23   Note Type   Note type Evaluation   Pain Assessment   Pain Assessment Tool 0-10   Pain Score 2   Pain Location/Orientation Location: Abdomen   Effect of Pain on Daily Activities limits mobility and activity tolerance   Patient's Stated Pain Goal No pain   Hospital Pain Intervention(s) Repositioned; Ambulation/increased activity; Emotional support   Restrictions/Precautions   Weight Bearing Precautions Per Order No   Other Precautions Multiple lines; Fall Risk;Pain   Home Living   Type of Home   (2nd floor apartment)   Home Layout One level;Performs ADLs on one level; Able to live on main level with bedroom/bathroom  ([address])   Bathroom Shower/Tub Tub/shower unit   Bathroom Toilet Standard   Bathroom Equipment   (pt denies)   Home Equipment   (pt denies)   Prior Function   Level of [place] Independent with ADLs; Independent with functional mobility; Independent with IADLS   Lives With Son  (6year old son)   [address] Help From Family  (local sister and brother-in-law)   IADLs Independent with driving; Independent with meal prep; Independent with medication management   Falls in the last 6 months 0   Vocational Full time employment   Lifestyle   Autonomy Pt reports I w/ ADLs, IADLs, and fxnl mobility w/o AD at baseline; + driving. Reciprocal Relationships Pt lives w/ his 6year old son.  Pt also has a local sister and brother-in-law. 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 Intrinsic Gratification Pt enjoys spending time w/ his son. General   Family/Caregiver Present No   ADL   Eating Assistance Unable to assess  (anticipate I w/ eating)   Eating Deficit NPO   Grooming Assistance 7  Independent   UB Bathing Assistance 7  Independent   LB Bathing Assistance 6  Modified Independent   UB Dressing Assistance 7  Independent   LB Dressing Assistance 6  Modified independent   LB Dressing Deficit Setup;Don/doff R sock; Don/doff L sock  (in figure four position)   Toileting Assistance  6  Modified independent   Bed Mobility   Supine to Sit 6  Modified independent   Additional items HOB elevated; Bedrails   Sit to Supine Unable to assess   Additional Comments Pt seated OOB in chair at end of OT evaluation w/ all needs within reach. Transfers   Sit to Stand 7  Independent   Stand to Sit 7  Independent   Additional Comments transfers w/o AD   Functional Mobility   Functional Mobility 5  Supervision   Additional Comments Pt household distance w/ S and w/o AD. Additional items   (no AD)   Balance   Static Sitting Good   Dynamic Sitting Good   Static Standing Fair +   Dynamic Standing Fair   Ambulatory Fair   Activity Tolerance   Activity Tolerance Patient tolerated treatment well   Medical Staff Made Aware PTTo, due to pt's medical complexity and multiple comorbidities   Nurse Made Aware RN clearance prior to therapy   RUE Assessment   RUE Assessment WFL   LUE Assessment   LUE Assessment WFL   Hand Function   Gross Motor Coordination Functional   Fine Motor Coordination Functional   Cognition   Overall Cognitive Status WFL   Arousal/Participation Alert; Responsive; Cooperative   Attention Within functional limits   Orientation Level Oriented X4   Memory Within functional limits   Following Commands Follows all commands and directions without difficulty   Comments Pt was pleasant, cooperative, and willing to participate in OT evaluation. Assessment   Assessment Pt is a 39 y.o. male seen for OT evaluation s/p admission to [facility] on 10/17/2023 s/p laparoscopic hand-assisted proctectomy with coloanal anastomosis and loop ileostomy. Pt has a significant PMH impacting occupational performance including: Cirrhosis ([address]), Colon cancer ([address]), Dental infection, Fatty liver, and Rectal cancer ([address]). Pt with active OT evaluation and treatment orders and activity orders. PTA, pt living with his 6year old son in a 2nd floor apartment w/ a full flight of steps to enter. Pt agreeable and willing to participate in OT evaluation. During evaluation, pt was I for grooming and UB ADLs and mod I for LB ADLs and toileting. Pt was also mod I for bed mobility, I for transfers, and S for fxnl mobility w/o AD. Pt performing ADLs at/near baseline level of independence and has good social support upon return home. No further acute OT needs identified at this time. Recommend continued active ADL participation and mobilization with hospital staff while in the hospital to increase pt’s endurance and strength upon D/C. From OT standpoint, recommend D/C to home with family support when medically cleared. D/C pt from OT caseload at this time.    Goals   Patient Goals to go home   Plan   OT Frequency Eval only   Discharge Recommendation   OT Discharge Recommendation No rehabilitation needs   AM-PAC Daily Activity Inpatient   Lower Body Dressing 4   Bathing 4   Toileting 4   Upper Body Dressing 4   Grooming 4   Eating 4   Daily Activity Raw Score 24   Daily Activity Standardized Score (Calc for Raw Score >=11) 57.54   AM-PAC Applied Cognition Inpatient   Following a Speech/Presentation 4   Understanding Ordinary Conversation 4   Taking Medications 4   Remembering Where Things Are Placed or Put Away 4   Remembering List of 4-5 Errands 4   Taking Care of Complicated Tasks 4   Applied Cognition Raw Score 24   Applied Cognition Standardized Score 62.21 The patient's raw score on the AM-PAC Daily Activity Inpatient Short Form is 24. A raw score of greater than or equal to 19 suggests the patient may benefit from discharge to home. Please refer to the recommendation of the Occupational Therapist for safe discharge planning.     Irma Connor MS, OTR/L

## 2023-10-18 NOTE — CONSULTS
Duplicate consult order, please see consult note from today by acute pain service.     ISAAC Perkins  Acute pain service

## 2023-10-18 NOTE — PLAN OF CARE
Problem: PAIN - ADULT  Goal: Verbalizes/displays adequate comfort level or baseline comfort level  Description: Interventions:  - Encourage patient to monitor pain and request assistance  - Assess pain using appropriate pain scale  - Administer analgesics based on type and severity of pain and evaluate response  - Implement non-pharmacological measures as appropriate and evaluate response  - Consider cultural and social influences on pain and pain management  - Notify physician/advanced practitioner if interventions unsuccessful or patient reports new pain  Outcome: Progressing     Problem: INFECTION - ADULT  Goal: Absence or prevention of progression during hospitalization  Description: INTERVENTIONS:  - Assess and monitor for signs and symptoms of infection  - Monitor lab/diagnostic results  - Monitor all insertion sites, i.e. indwelling lines, tubes, and drains  - Monitor endotracheal if appropriate and nasal secretions for changes in amount and color  - [place] appropriate cooling/warming therapies per order  - Administer medications as ordered  - Instruct and encourage patient and family to use good hand hygiene technique  - Identify and instruct in appropriate isolation precautions for identified infection/condition  Outcome: Progressing  Goal: Absence of fever/infection during neutropenic period  Description: INTERVENTIONS:  - Monitor WBC    Outcome: Progressing

## 2023-10-18 NOTE — CONSULTS
[facility] - Inpatient Consultation    PATIENT INFORMATION      Steffi García 39 y.o. male MRN: [id number]  Unit/Bed#: [facility] 920-01 Encounter: [id number]  PCP: Zeke Persaud DO  Date of Admission:  10/17/2023  Date of Consultation: 10/18/23  Requesting Physician: Harleen Hernandez MD       ASSESSMENT & PLAN     Steffi García is a 39 y.o. old male with PMH of Compensated Alcoholic Liver Cirrhosis, Rectal Cancer s/p 3 months of chemoradiation who presented for proctectomy with coloanal anastomosis and loop ileostomy. Compensated Alcoholic Cirrhosis  1. Cirrhosis - complicated by thrombocytopenia, non-bleeding varices s/p band ligation x5 in 4/2023   Lazarus-August: 6  MELD-Na: MELD 3.0: 13 at 10/18/2023 10:16 AM  MELD-Na: 14 at 10/18/2023 10:16 AM  Calculated from:  Serum Creatinine: 0.70 mg/dL (Using min of 1 mg/dL) at 10/18/2023  5:57 AM  Serum Sodium: 135 mmol/L at 10/18/2023  5:57 AM  Total Bilirubin: 1.59 mg/dL at 10/18/2023  5:57 AM  Serum Albumin: 3.5 g/dL at 10/18/2023  5:57 AM  INR(ratio): 1.43 at 10/18/2023 10:16 AM  Age at listing (hypothetical): 39 years  Sex: Male at 10/18/2023 10:16 AM    Etiology: Alcohol  Varices - Last EGD 4/25/2023 revealed 3 columns of large varices without stigmata of bleeding. Esophageal band ligation x5 No history of variceal bleeding. No sign of overt bleeding at this time   EGD was due for 4-6 weeks following his EGD in April but due to him being on chemoradiation at that time it was unable to be done. Will plan for EGD OP when stable for DC  Continue to monitor for signs of overt bleeding   Coagulopathy - Elevated INR is misleading as patient is missing both pro- and anti-coagulant factors in the setting of liver disease. Continue VTE ppx  Patient reports not drinking since dx of Rectal Cancer/Cirrhosis in February  AFP negative  Check RUQ U/S.  Last RUQ U/S done in 2/2023  DVT ppx  Will continue to monitor  EGD OP following DC    Rectal Cancer s/p 3 months of chemoradiation. POD 1 from proctectomy with coloanal anastomosis and loop ileostomy  Care per Colorectal surgery    REASON FOR CONSULTATION      Cirrhosis       HISTORY OF PRESENT ILLNESS      Brianna Zaragoza is a 39 y.o. male with PMH significant for PMH of Compensated Alcoholic Liver Cirrhosis, Rectal Cancer s/p 3 months of chemoradiation who presented for proctectomy with coloanal anastomosis and loop ileostomy. He is now POD 1 and is feeling okay with no complaints. Patient reports tolerating ice chips well and reports passing gas from below as well as air in the ostomy. He denies having a bowel movement as of now. He has been using IS. He was diagnosed with cirrhosis in February of this year by MRI which found cirrhotic liver with portal htn changes. He was seen by Dr. Parker Villalobos and performed EGD and found 3 columns of large varices and were banded x5. He was supposed to get repeat EGD in 4-6 weeks but was unable to get done due to him receiving chemoradiation. He reports that he used to drink 6 pack of beers daily and on the weekends he would binge drink and unable to quantify how much he would drink. He reports that he hasn't drank since February and does not plan on drinking again. He denies LE swelling, abdominal pain and transient episodes of confusion.          REVIEW OF SYSTEMS     CONSTITUTIONAL: Denies any fever, chills, rigors, and weight loss  HEENT: No earache or tinnitus, denies hearing loss or visual disturbances  CARDIOVASCULAR: No chest pain or palpitations   RESPIRATORY: Denies any cough, hemoptysis, shortness of breath or dyspnea on exertion  GASTROINTESTINAL: As noted in the History of Present Illness   GENITOURINARY: No problems with urination, denies any hematuria or dysuria  NEUROLOGIC: No dizziness or vertigo, denies headaches   MUSCULOSKELETAL: Denies any muscle or joint pain   SKIN: Denies skin rashes or itching   ENDOCRINE: Denies excessive thirst, denies intolerance to heat or cold  PSYCHOSOCIAL: Denies depression or anxiety, denies any recent memory loss     PAST MEDICAL & SURGICAL HISTORY      Past Medical History:   Diagnosis Date    Cirrhosis ([address]) 3/18/24    Colon cancer ([address])     Dental infection     Fatty liver     Rectal cancer ([address])        Past Surgical History:   Procedure Laterality Date    APPENDECTOMY      COLONOSCOPY      DENTAL SURGERY      IR PORT PLACEMENT  2023    OR LAPS PROCTECTOMY COMBINED PULL-THRU W/RESERVOIR N/A 10/17/2023    Procedure: LAPAROSCOPIC HAND ASSIST PROCTECTOMY, ABDOMINAL APPROACH, coloanal anastomosis, loop ileostomy;  Surgeon: Arely Pimentel MD;  Location: [facility];  Service: Colorectal    OR SIGMOIDOSCOPY FLX DX W/COLLJ SPEC BR/WA IF PFRMD N/A 10/17/2023    Procedure: Alondra Precise;  Surgeon: Arely Pimentel MD;  Location: [facility];  Service: Colorectal    UPPER GASTROINTESTINAL ENDOSCOPY         MEDICATIONS & ALLERGIES       Medications:   Medications Prior to Admission   Medication    [] metroNIDAZOLE (FLAGYL) 500 mg tablet    [] neomycin (MYCIFRADIN) 500 mg tablet    pyridoxine (VITAMIN B6) 50 mg tablet    ondansetron (ZOFRAN) 8 mg tablet     Current Facility-Administered Medications   Medication Dose Route Frequency    acetaminophen (TYLENOL) tablet 975 mg  975 mg Oral Q6H [address]    bupivacaine liposomal (EXPAREL) 1.3 % injection 10 mL  10 mL Infiltration Once    dextrose 5 % and sodium chloride 0.45 % with KCl 20 mEq/L infusion  84 mL/hr Intravenous Continuous    diphenhydrAMINE (BENADRYL) injection 25 mg  25 mg Intravenous Q6H PRN    enoxaparin (LOVENOX) subcutaneous injection 40 mg  40 mg Subcutaneous Daily    gabapentin (NEURONTIN) capsule 100 mg  100 mg Oral TID    HYDROmorphone (DILAUDID) 1 mg/mL 50 mL PCA   Intravenous Continuous    labetalol (NORMODYNE) injection 10 mg  10 mg Intravenous Q4H PRN    ondansetron (ZOFRAN) injection 4 mg  4 mg Intravenous Q6H PRN Allergies:   No Known Allergies    SOCIAL HISTORY      Social History     Marital Status: Single    Substance Use History:   Social History     Substance and Sexual Activity   Alcohol Use Not Currently    Alcohol/week: 2.0 standard drinks of alcohol    Types: 2 Cans of beer per week    Comment: 1-2 daily. previously up to 1 case/day (reduced alcohol intake 7 years ago)     Social History     Tobacco Use   Smoking Status Former    Packs/day: 1.00    Years: 20.00    Total pack years: 20.00    Types: Cigarettes    Quit date: 1/1/2020    Years since quitting: 3.7   Smokeless Tobacco Never     Social History     Substance and Sexual Activity   Drug Use Not Currently    Types: Marijuana       FAMILY HISTORY      Family History   Problem Relation Age of Onset    Lung cancer Mother     Cancer Mother         Lung    Lung cancer Father     Cancer Father         Mouth throat    Colon cancer Neg Hx     Colon polyps Neg Hx        PHYSICAL EXAM     Objective   Blood pressure 107/58, pulse 84, temperature 97.9 °F (36.6 °C), resp. rate 16, height 6' 3" (1.905 m), weight 83 kg (183 lb), SpO2 92 %. Body mass index is 22.87 kg/m². Intake/Output Summary (Last 24 hours) at 10/18/2023 1345  Last data filed at 10/18/2023 1300  Gross per 24 hour   Intake 975.92 ml   Output 2030 ml   Net -1054.08 ml       General Appearance:   Alert, cooperative, no distress   HEENT:   Normocephalic, atraumatic, anicteric     Neck:   Supple, symmetrical, trachea midline   Lungs:   Equal chest rise, respirations unlabored    Heart:   Regular rate and rhythm   Abdomen:   Soft, non-tender, non-distended; no masses, no organomegaly; Stoma is patent with serosanguineous output and air in ostomy bag. Rectal:   Deferred    Extremities:   No cyanosis, clubbing or edema    Neuro:    Moves all 4 extremities    Skin:   No jaundice, rashes, or lesions      ADDITIONAL DATA     Lab Results:     Results from last 7 days   Lab Units 10/18/23  0557   WBC Thousand/uL 7.41   HEMOGLOBIN g/dL 9.2*   HEMATOCRIT % 26.1*   PLATELETS Thousands/uL 86*   NEUTROS PCT % 78*   LYMPHS PCT % 8*   MONOS PCT % 14*   EOS PCT % 0     Results from last 7 days   Lab Units 10/18/23  0557 10/17/23  1539 10/17/23  1317   POTASSIUM mmol/L 4.1   < >  --    CHLORIDE mmol/L 106   < >  --    CO2 mmol/L 22   < >  --    CO2, I-STAT mmol/L  --   --  21   BUN mg/dL 15   < >  --    CREATININE mg/dL 0.70   < >  --    CALCIUM mg/dL 8.7   < >  --    ALK PHOS U/L 35   < >  --    ALT U/L 18   < >  --    AST U/L 31   < >  --    GLUCOSE, ISTAT mg/dl  --   --  164*    < > = values in this interval not displayed. Results from last 7 days   Lab Units 10/18/23  1016   INR  1.43*       Imaging:    No results found. EKG, Pathology, and Other Studies Reviewed on Admission:   EKG: Reviewed      Counseling / Coordination of Care Time: 30 total mins spent in consult. Greater than 50% of total time spent on patient counseling and coordination of care. Terranandageraldn Ganser, [address] [facility]  Available on Budge  . ..............................................................................................................................................  ** Please Note: This note is constructed using a voice recognition dictation system.  **

## 2023-10-18 NOTE — CONSULTS
Consultation - Acute Pain Service  Franchesca Marte 39 y.o. male MRN: [id number]  Unit/Bed#: [facility] 920-01 Encounter: [id number]               Franchesca Marte is a 39 y.o. male s/p laparoscopic hand-assisted proctectomy with coloanal anastomosis and loop ileostomy creation on 10/17 for rectal ca. He slept overnight. His pain is at most a 3/10. He was sitting in a chair and had already ambulated. He described his abdominal pain as minimal.    Normal expected pain control from bilateral quadratus lumborum blocks with exparel. Continue medical pain management by colorectal team with normal post operative pathway. No new Assessment & Plan notes have been filed under this hospital service since the last note was generated. Service: Acute Pain      APS will sign off at this time. Thank you for the consult. All opioids and other analgesics to be written at discretion of primary team. Please contact Acute Pain Service - via Kahub from [phone] with additional questions or concerns. See Kahub or Rey for additional contacts and after hours information. History of Present Illness    Admit Date:  10/17/2023  Hospital Day:  1 day  Primary Service:  Colorectal  Attending Provider:  Brook Ledesma MD  Physician Requesting Consult: Brook Ledesma MD  Reason for Consult / Principal Problem: s/p OR with QL blocks  HPI: Franchesca Marte is a 39y.o. year old male who presents with colorectal cancer. He received chemoradiation and was scheduled for the OR. He received the above operation and at the end was administered a QL block with exparel.     Current pain location(s): Pain Score: 3  Pain Location/Orientation: Location: Abdomen  Pain Scale: Pain Assessment Tool: 0-10  Current Analgesic regimen:  see MAR    Pain History: none  Pain Management Physician:  n/a    I have reviewed the patient's controlled substance dispensing history in the Prescription Drug Monitoring Program in compliance with the [facility] regulations before prescribing any controlled substances. Inpatient consult to Acute Pain Service  Consult performed by: Willie Wilburn MD  Consult ordered by: Reji Arcos MD          Review of Systems   Constitutional: Negative. HENT: Negative. Eyes: Negative. Respiratory: Negative. Cardiovascular: Negative. Gastrointestinal: Negative. Endocrine: Negative. Genitourinary: Negative. Musculoskeletal: Negative. Allergic/Immunologic: Negative. Neurological: Negative. Hematological: Negative. Psychiatric/Behavioral: Negative. Historical Information   Past Medical History:   Diagnosis Date    Cirrhosis ([facility]) 3/18/24    Colon cancer ([facility])     Dental infection     Fatty liver     Rectal cancer [facility])      Past Surgical History:   Procedure Laterality Date    APPENDECTOMY      COLONOSCOPY      DENTAL SURGERY      IR PORT PLACEMENT  02/28/2023    UPPER GASTROINTESTINAL ENDOSCOPY       Social History   Social History     Substance and Sexual Activity   Alcohol Use Not Currently    Alcohol/week: 2.0 standard drinks of alcohol    Types: 2 Cans of beer per week    Comment: 1-2 daily.  previously up to 1 case/day (reduced alcohol intake 7 years ago)     Social History     Substance and Sexual Activity   Drug Use Not Currently    Types: Marijuana     Social History     Tobacco Use   Smoking Status Former    Packs/day: 1.00    Years: 20.00    Total pack years: 20.00    Types: Cigarettes    Quit date: 1/1/2020    Years since quitting: 3.7   Smokeless Tobacco Never     Family History: non-contributory    Meds/Allergies   all current active meds have been reviewed    No Known Allergies    Objective   Vitals:    10/17/23 2243 10/18/23 0237 10/18/23 0530 10/18/23 0755   BP: 127/78 101/58 110/66 107/58   Pulse: 98 88 78 84   Resp: 20 18  16   Temp: 98.4 °F (36.9 °C) 98 °F (36.7 °C) 97.5 °F (36.4 °C) 97.9 °F (36.6 °C)   TempSrc:       SpO2: 95% 95% 94% 94%   Weight:       Height: Intake/Output Summary (Last 24 hours) at 10/18/2023 1141  Last data filed at 10/18/2023 1122  Gross per 24 hour   Intake 1772.92 ml   Output 2680 ml   Net -907.08 ml       Physical Exam  Constitutional:       General: He is not in acute distress. HENT:      Head: Normocephalic. Nose: Nose normal.      Mouth/Throat:      Mouth: Mucous membranes are moist.   Pulmonary:      Effort: Pulmonary effort is normal.   Abdominal:      General: Abdomen is flat. There is no distension. Tenderness: There is abdominal tenderness. Musculoskeletal:         General: Normal range of motion. Cervical back: Normal range of motion. Skin:     General: Skin is warm and dry. Neurological:      General: No focal deficit present. Mental Status: He is alert and oriented to person, place, and time. Mental status is at baseline. Psychiatric:         Mood and Affect: Mood normal.         Behavior: Behavior normal.         Thought Content: Thought content normal.         Judgment: Judgment normal.         Lab Results:  Estimated Creatinine Clearance: 158.1 mL/min (by C-G formula based on SCr of 0.7 mg/dL). Lab Results   Component Value Date    WBC 7.41 10/18/2023    HGB 9.2 (L) 10/18/2023    HCT 26.1 (L) 10/18/2023    PLT 86 (L) 10/18/2023         Component Value Date/Time    K 4.1 10/18/2023 0557     10/18/2023 0557    CO2 22 10/18/2023 0557    CO2 21 10/17/2023 1317    BUN 15 10/18/2023 0557    CREATININE 0.70 10/18/2023 0557         Component Value Date/Time    CALCIUM 8.7 10/18/2023 0557    ALKPHOS 35 10/18/2023 0557    AST 31 10/18/2023 0557    ALT 18 10/18/2023 0557    TP 5.8 (L) 10/18/2023 0557    ALB 3.5 10/18/2023 0557       Imaging Studies/EKG: I have personally reviewed pertinent reports. Please note that the APS provides consultative services regarding pain management only.   With the exception of ketamine and epidural infusions and except when indicated, final decisions regarding starting or changing doses of analgesic medications are at the discretion of the consulting service.   Gini Garcia MD  Acute Pain Service

## 2023-10-18 NOTE — PHYSICAL THERAPY NOTE
Physical Therapy Evaluation     Patient's Name: Tamara Caceres    Admitting Diagnosis  Rectal cancer [facility]) [C20]    Problem List  Patient Active Problem List   Diagnosis    Rectal wall thickening    Transaminitis/Hepatitic Steatosis    Rectal cancer ([address])    Chemotherapy-induced neutropenia     Port-A-Cath in place       Past Medical History  Past Medical History:   Diagnosis Date    Cirrhosis ([address]) 3/18/24    Colon cancer ([address])     Dental infection     Fatty liver     Rectal cancer [facility])        Past Surgical History  Past Surgical History:   Procedure Laterality Date    APPENDECTOMY      COLONOSCOPY      DENTAL SURGERY      IR PORT PLACEMENT  02/28/2023    UPPER GASTROINTESTINAL ENDOSCOPY          10/18/23 0834   PT Last Visit   PT Visit Date 10/18/23   Note Type   Note type Evaluation   Pain Assessment   Pain Assessment Tool 0-10   Pain Score 2   Pain Location/Orientation Location: Abdomen   Hospital Pain Intervention(s) Repositioned; Ambulation/increased activity; Emotional support   Restrictions/Precautions   Weight Bearing Precautions Per Order No   Other Precautions Multiple lines; Fall Risk;Pain   Home Living   Type of [address] One level;Performs ADLs on one level; Able to live on main level with bedroom/bathroom  (2nd floor apartment, [address])   Bathroom Shower/Tub Tub/shower unit   Bathroom Toilet Standard   Bathroom Equipment   (denies)   Home Equipment   (denies)   Prior Function   Level of [place] Independent with ADLs; Independent with functional mobility; Independent with IADLS   Lives With Son  (5 y/o son; currently son staying with mother and her family)   [address] Help From Family  (local sister + DANIELE)   IADLs Independent with driving; Independent with meal prep; Independent with medication management   Falls in the last 6 months 0   General   Family/Caregiver Present No   Cognition   Overall Cognitive Status [facility]   Arousal/Participation Cooperative   Orientation Level Oriented X4   Memory Within functional limits   Following Commands Follows all commands and directions without difficulty   Comments pleasant and cooperative   RLE Assessment   RLE Assessment WFL   LLE Assessment   LLE Assessment WFL   Bed Mobility   Supine to Sit 6  Modified independent   Additional items HOB elevated; Bedrails   Sit to Supine Unable to assess   Additional Comments pt left sitting OOB in recliner with all needs wihtin reach   Transfers   Sit to Stand 7  Independent   Stand to Sit 7  Independent   Additional Comments transfers without AD   Ambulation/Elevation   Gait pattern Short stride;Decreased foot clearance   Gait Assistance 5  Supervision   Assistive Device None   Distance 120'   Stair Management Assistance 5  Supervision   Stair Management Technique One rail R;Alternating pattern; Foreward   Number of Stairs 3  (limited by lines- defers questions or concerns)   Balance   Static Sitting Good   Dynamic Sitting Good   Static Standing Fair +   Dynamic Standing Fair   Ambulatory Fair   Activity Tolerance   Activity Tolerance Patient tolerated treatment well   Medical Staff Made Aware RON Cleaning; co-session completed this date 2* increased medical complexity and multiple co-morbidities   Nurse Made Aware RN cleared pt to participate in therapy session   Assessment   Prognosis Good   Assessment Pt seen for moderate complexity PT evaluation. Pt with active PT eval/treat and up as tolerated orders. Pt is a 39 y.o. male who was admitted to [address] [place] [address] on 10/17 with rectal cancer s/p laparoscopic hand-assisted proctectomy with coloanal anastomosis and loop ileostomy. Pt's active problems include: chemo induced neutropenia. Pt  has a past medical history of Cirrhosis ([address]) (3/18/24), Colon cancer ([address]), Dental infection, Fatty liver, and Rectal cancer ([address] Pt resides with son in 1 level apartment with [address] and was independent prior to hospital admission.  Currently upon evaluation pt performing bed mobility tasks at mod I level, funational transfers at I level and ambulation at S level without AD. Pt was left sitting OOB in recliner at the end of PT session with all needs within reach. Pt with no questions or concerns regarding d/c home; appears to be functioning at/ near baseline mobility levels. Pt with no further acute inpatient PT needs at this time- please re-consult if needed. PT to d/c pt and recommends home with family support. Encourage pt to ambulate at least 3-4x/day with restorative/ nursing staff while remaining in hospital. The patient's [facility] Mobility Inpatient Short Form Raw Score is 22. A Raw score of greater than 16 suggests the patient may benefit from discharge to home. Please also refer to the recommendation of the Physical Therapist for safe discharge planning.    Goals   Patient Goals to go home   Plan   PT Frequency   (eval only- d/c PT)   Discharge Recommendation   PT Discharge Recommendation No rehabilitation needs   AM-PAC Basic Mobility Inpatient   Turning in Flat Bed Without Bedrails 4   Lying on Back to Sitting on Edge of Flat Bed Without Bedrails 4   Moving Bed to Chair 4   Standing Up From Chair Using Arms 4   Walk in Room 3   Climb 3-5 Stairs With Railing 3   Basic Mobility Inpatient Raw Score 22   Basic Mobility Standardized Score 47.4   Highest Level Of Mobility   JH-HLM Goal 7: Walk 25 feet or more   JH-HLM Achieved 7: Walk 25 feet or more   Modified Walls Scale   Modified Walls Scale 2           Howie Sanchez, PT DPT

## 2023-10-18 NOTE — WOUND OSTOMY CARE
Progress Note- Ostomy  Dock Lars 39 y.o. male  [id number]  [facility] 920-01        Assessment:  Patient is a 38 yo male admitted to [facility] for LAR with Ileostomy. Patient underwent RLQ ileostomy formation on 10/17- POD#1. Patient seen today for introduction of [address] [place] [address] team and their roles after their surgery. Patient reports soreness but otherwise feels okay. Patient is still on PCA pump. Patient reports having read educational materials given from pre-op marking appointment. Education provided to patient: frequency of pouch changes, one vs two piece pouches, high output pouches, avoiding placing belt line/ seatbelts over stoma, proper hydration and chewing of foods, and that patient may expell mucus/stool from rectum. Plan for ostomy teaching with patient tomorrow. Discharge materials left at bedside for patient. Patient aggreeable to plan of care. RN told patient to come up with questions overnight and that we will return tomorrow for ostomy teaching. Patient verbally stated that he was interested in sample programs upon discharge for sample ostomy products    RLQ Stoma appearance:     Stoma: red, budded, pouch appears well adhered and intact. Small brown liquid effluent noted in pouch.              Rigoberto Ardon RN, BSN

## 2023-10-18 NOTE — PROGRESS NOTES
Progress Note - Colorectal Surgery   Serena Ascencio 39 y.o. male MRN: [id number]  Unit/Bed#: [facility] 920-01 Encounter: [id number]    Assessment:  43-year-old male s/p laparoscopic hand-assisted proctectomy with coloanal anastomosis and loop ileostomy creation on 10/17 for rectal ca. Afebrile, VSS, satting 95% on room air  A.m. labs pending  UOP 1.4L  5 cc thin serous output from ostomy  Plan:  N.p.o. with ice chips, advance as tolerated  Monitor ostomy output  dPCA  Antiemetic as needed  Lovenox  Labetalol as needed for HTN  Campoverde catheter to remain 2 days postop  Encourage OOB and I-S use  PT/OT    Subjective/Objective   Subjective: Patient seen and examined. NAEO. States that pain is well controlled. 9 pushes on PCA. Has not ambulated yet. Tolerated some ice chips. Denies bowel function. Objective:     Blood pressure 110/66, pulse 78, temperature 97.5 °F (36.4 °C), resp. rate 18, height 6' 3" (1.905 m), weight 83 kg (183 lb), SpO2 94 %. ,Body mass index is 22.87 kg/m². Intake/Output Summary (Last 24 hours) at 10/18/2023 0549  Last data filed at 10/18/2023 0531  Gross per 24 hour   Intake 2400 ml   Output 2230 ml   Net 170 ml       Invasive Devices       Central Venous Catheter Line  Duration             Port A Cath 02/28/23 Right Subclavian 231 days              Peripheral Intravenous Line  Duration             Peripheral IV 10/17/23 Dorsal (posterior); Left Hand <1 day    Peripheral IV 10/17/23 Right Hand <1 day              Drain  Duration             Ileostomy RLQ <1 day    Urethral Catheter 16 Fr. <1 day                    Physical Exam:   General - no acute distress, responsive and cooperative  CV - warm, regular rate  Pulm - normal work of breathing, no respiratory distress  Abd - soft, appropriately tender, nondistended. Ostomy in place with serosanguineous output. Ostomy pink patent and nonretracted.   Neuro - m/s grossly intact, cn grossly intact  Ext - moving all extremities

## 2023-10-18 NOTE — ANESTHESIA PROCEDURE NOTES
Peripheral Block    Patient location during procedure: OR  Start time: 10/17/2023 3:10 PM  Reason for block: at surgeon's request and post-op pain management  Staffing  Performed by: aMrina Schreiber MD  Authorized by: Marina Schreiber MD    Preanesthetic Checklist  Completed: patient identified, IV checked, site marked, risks and benefits discussed, surgical consent, monitors and equipment checked, pre-op evaluation and timeout performed  Peripheral Block  Patient position: supine  Prep: ChloraPrep  Patient monitoring: frequent blood pressure checks, continuous pulse oximetry and heart rate  Anesthesia block type: QL. Laterality: bilateral  Injection technique: single-shot  Procedures: ultrasound guided, Ultrasound guidance required for the procedure to increase accuracy and safety of medication placement and decrease risk of complications.   Ultrasound permanent image savedbupivacaine (PF) (MARCAINE) 0.25 % injection 20 mL - Perineural   15 mL - 10/17/2023 3:10:00 PM   15 mL - 10/17/2023 3:12:00 PM  bupivacaine liposomal (EXPAREL) 1.3 % injection 20 mL - Perineural   10 mL - 10/17/2023 3:10:00 PM   10 mL - 10/17/2023 3:12:00 PM  Needle  Needle type: Stimuplex   Needle gauge: 20 G  Needle length: 4 in  Needle localization: anatomical landmarks and ultrasound guidance  Assessment  Injection assessment: incremental injection, frequent aspiration, injected with ease, negative aspiration, negative for heart rate change, no paresthesia on injection, no symptoms of intraneural/intravenous injection and needle tip visualized at all times  Paresthesia pain: none  Post-procedure:  site cleaned  patient tolerated the procedure well with no immediate complications  Additional Notes  Bilateral QL block with (15cc Bupi 0.25% + 10cc Exparel 1.3%) to each side

## 2023-10-18 NOTE — PLAN OF CARE
Problem: PAIN - ADULT  Goal: Verbalizes/displays adequate comfort level or baseline comfort level  Description: Interventions:  - Encourage patient to monitor pain and request assistance  - Assess pain using appropriate pain scale  - Administer analgesics based on type and severity of pain and evaluate response  - Implement non-pharmacological measures as appropriate and evaluate response  - Consider cultural and social influences on pain and pain management  - Notify physician/advanced practitioner if interventions unsuccessful or patient reports new pain  Outcome: Progressing     Problem: INFECTION - ADULT  Goal: Absence or prevention of progression during hospitalization  Description: INTERVENTIONS:  - Assess and monitor for signs and symptoms of infection  - Monitor lab/diagnostic results  - Monitor all insertion sites, i.e. indwelling lines, tubes, and drains  - Monitor endotracheal if appropriate and nasal secretions for changes in amount and color  - [place] appropriate cooling/warming therapies per order  - Administer medications as ordered  - Instruct and encourage patient and family to use good hand hygiene technique  - Identify and instruct in appropriate isolation precautions for identified infection/condition  Outcome: Progressing  Goal: Absence of fever/infection during neutropenic period  Description: INTERVENTIONS:  - Monitor WBC    Outcome: Progressing     Problem: SAFETY ADULT  Goal: Patient will remain free of falls  Description: INTERVENTIONS:  - Educate patient/family on patient safety including physical limitations  - Instruct patient to call for assistance with activity   - Consult OT/PT to assist with strengthening/mobility   - Keep Call bell within reach  - Keep bed low and locked with side rails adjusted as appropriate  - Keep care items and personal belongings within reach  - Initiate and maintain comfort rounds  - Make Fall Risk Sign visible to staff  - Offer Toileting every 2 Hours, in advance of need  - Initiate/Maintain no alarm  - Obtain necessary fall risk management equipment:   - Apply yellow socks and bracelet for high fall risk patients  - Consider moving patient to room near nurses station  Outcome: Progressing  Goal: Maintain or return to baseline ADL function  Description: INTERVENTIONS:  -  Assess patient's ability to carry out ADLs; assess patient's baseline for ADL function and identify physical deficits which impact ability to perform ADLs (bathing, care of mouth/teeth, toileting, grooming, dressing, etc.)  - Assess/evaluate cause of self-care deficits   - Assess range of motion  - Assess patient's mobility; develop plan if impaired  - Assess patient's need for assistive devices and provide as appropriate  - Encourage maximum independence but intervene and supervise when necessary  - Involve family in performance of ADLs  - Assess for home care needs following discharge   - Consider OT consult to assist with ADL evaluation and planning for discharge  - Provide patient education as appropriate  Outcome: Progressing  Goal: Maintains/Returns to pre admission functional level  Description: INTERVENTIONS:  - Perform BMAT or MOVE assessment daily.   - Set and communicate daily mobility goal to care team and patient/family/caregiver. - Collaborate with rehabilitation services on mobility goals if consulted  - Perform Range of Motion 2 times a day. - Reposition patient every 2 hours.   - Dangle patient 2 times a day  - Stand patient 2 times a day  - Ambulate patient 2 times a day  - Out of bed to chair 2 times a day   - Out of bed for meals 2 times a day  - Out of bed for toileting  - Record patient progress and toleration of activity level   Outcome: Progressing

## 2023-10-19 ENCOUNTER — HOME HEALTH ADMISSION (OUTPATIENT)
Dept: HOME HEALTH SERVICES | Facility: HOME HEALTHCARE | Age: 43
End: 2023-10-19

## 2023-10-19 LAB
ALBUMIN SERPL BCP-MCNC: 3.4 G/DL (ref 3.5–5)
ALP SERPL-CCNC: 34 U/L (ref 34–104)
ALT SERPL W P-5'-P-CCNC: 16 U/L (ref 7–52)
ANION GAP SERPL CALCULATED.3IONS-SCNC: 4 MMOL/L
AST SERPL W P-5'-P-CCNC: 30 U/L (ref 13–39)
BASOPHILS # BLD AUTO: 0.01 THOUSANDS/ÂΜL (ref 0–0.1)
BASOPHILS NFR BLD AUTO: 0 % (ref 0–1)
BILIRUB SERPL-MCNC: 1.39 MG/DL (ref 0.2–1)
BUN SERPL-MCNC: 13 MG/DL (ref 5–25)
CALCIUM ALBUM COR SERPL-MCNC: 8.8 MG/DL (ref 8.3–10.1)
CALCIUM SERPL-MCNC: 8.3 MG/DL (ref 8.4–10.2)
CHLORIDE SERPL-SCNC: 108 MMOL/L (ref 96–108)
CO2 SERPL-SCNC: 24 MMOL/L (ref 21–32)
CREAT SERPL-MCNC: 0.58 MG/DL (ref 0.6–1.3)
EOSINOPHIL # BLD AUTO: 0.07 THOUSAND/ÂΜL (ref 0–0.61)
EOSINOPHIL NFR BLD AUTO: 1 % (ref 0–6)
ERYTHROCYTE [DISTWIDTH] IN BLOOD BY AUTOMATED COUNT: 13.2 % (ref 11.6–15.1)
GFR SERPL CREATININE-BSD FRML MDRD: 123 ML/MIN/1.73SQ M
GLUCOSE SERPL-MCNC: 107 MG/DL (ref 65–140)
HCT VFR BLD AUTO: 24.5 % (ref 36.5–49.3)
HGB BLD-MCNC: 8.3 G/DL (ref 12–17)
IMM GRANULOCYTES # BLD AUTO: 0.03 THOUSAND/UL (ref 0–0.2)
IMM GRANULOCYTES NFR BLD AUTO: 1 % (ref 0–2)
LYMPHOCYTES # BLD AUTO: 0.63 THOUSANDS/ÂΜL (ref 0.6–4.47)
LYMPHOCYTES NFR BLD AUTO: 12 % (ref 14–44)
MAGNESIUM SERPL-MCNC: 1.9 MG/DL (ref 1.9–2.7)
MCH RBC QN AUTO: 32.4 PG (ref 26.8–34.3)
MCHC RBC AUTO-ENTMCNC: 33.9 G/DL (ref 31.4–37.4)
MCV RBC AUTO: 96 FL (ref 82–98)
MONOCYTES # BLD AUTO: 0.74 THOUSAND/ÂΜL (ref 0.17–1.22)
MONOCYTES NFR BLD AUTO: 14 % (ref 4–12)
NEUTROPHILS # BLD AUTO: 3.82 THOUSANDS/ÂΜL (ref 1.85–7.62)
NEUTS SEG NFR BLD AUTO: 72 % (ref 43–75)
NRBC BLD AUTO-RTO: 0 /100 WBCS
PHOSPHATE SERPL-MCNC: 1.9 MG/DL (ref 2.7–4.5)
PLATELET # BLD AUTO: 81 THOUSANDS/UL (ref 149–390)
PMV BLD AUTO: 10 FL (ref 8.9–12.7)
POTASSIUM SERPL-SCNC: 3.8 MMOL/L (ref 3.5–5.3)
PROT SERPL-MCNC: 5.6 G/DL (ref 6.4–8.4)
RBC # BLD AUTO: 2.56 MILLION/UL (ref 3.88–5.62)
SODIUM SERPL-SCNC: 136 MMOL/L (ref 135–147)
WBC # BLD AUTO: 5.3 THOUSAND/UL (ref 4.31–10.16)

## 2023-10-19 PROCEDURE — 99222 1ST HOSP IP/OBS MODERATE 55: CPT | Performed by: NURSE PRACTITIONER

## 2023-10-19 PROCEDURE — 99232 SBSQ HOSP IP/OBS MODERATE 35: CPT | Performed by: INTERNAL MEDICINE

## 2023-10-19 PROCEDURE — 99024 POSTOP FOLLOW-UP VISIT: CPT | Performed by: COLON & RECTAL SURGERY

## 2023-10-19 PROCEDURE — 85025 COMPLETE CBC W/AUTO DIFF WBC: CPT

## 2023-10-19 PROCEDURE — 80053 COMPREHEN METABOLIC PANEL: CPT

## 2023-10-19 PROCEDURE — 83735 ASSAY OF MAGNESIUM: CPT

## 2023-10-19 PROCEDURE — 84100 ASSAY OF PHOSPHORUS: CPT

## 2023-10-19 RX ORDER — MAGNESIUM SULFATE 1 G/100ML
1 INJECTION INTRAVENOUS ONCE
Status: COMPLETED | OUTPATIENT
Start: 2023-10-19 | End: 2023-10-19

## 2023-10-19 RX ORDER — OXYCODONE HYDROCHLORIDE 10 MG/1
10 TABLET ORAL EVERY 4 HOURS PRN
Status: DISCONTINUED | OUTPATIENT
Start: 2023-10-19 | End: 2023-10-21 | Stop reason: HOSPADM

## 2023-10-19 RX ORDER — OXYCODONE HYDROCHLORIDE 5 MG/1
5 TABLET ORAL EVERY 4 HOURS PRN
Status: DISCONTINUED | OUTPATIENT
Start: 2023-10-19 | End: 2023-10-21 | Stop reason: HOSPADM

## 2023-10-19 RX ORDER — METHOCARBAMOL 500 MG/1
500 TABLET, FILM COATED ORAL EVERY 8 HOURS SCHEDULED
Status: DISCONTINUED | OUTPATIENT
Start: 2023-10-19 | End: 2023-10-21 | Stop reason: HOSPADM

## 2023-10-19 RX ADMIN — METHOCARBAMOL 500 MG: 500 TABLET ORAL at 14:24

## 2023-10-19 RX ADMIN — METHOCARBAMOL 500 MG: 500 TABLET ORAL at 21:13

## 2023-10-19 RX ADMIN — MAGNESIUM SULFATE HEPTAHYDRATE 1 G: 1 INJECTION, SOLUTION INTRAVENOUS at 12:37

## 2023-10-19 RX ADMIN — GABAPENTIN 100 MG: 100 CAPSULE ORAL at 21:13

## 2023-10-19 RX ADMIN — POTASSIUM PHOSPHATE, MONOBASIC POTASSIUM PHOSPHATE, DIBASIC 30 MMOL: 224; 236 INJECTION, SOLUTION, CONCENTRATE INTRAVENOUS at 14:24

## 2023-10-19 RX ADMIN — METHOCARBAMOL 500 MG: 500 TABLET ORAL at 09:10

## 2023-10-19 RX ADMIN — ACETAMINOPHEN 975 MG: 325 TABLET, FILM COATED ORAL at 05:38

## 2023-10-19 RX ADMIN — GABAPENTIN 100 MG: 100 CAPSULE ORAL at 09:10

## 2023-10-19 RX ADMIN — ACETAMINOPHEN 650 MG: 325 TABLET, FILM COATED ORAL at 12:27

## 2023-10-19 RX ADMIN — OXYCODONE HYDROCHLORIDE 5 MG: 5 TABLET ORAL at 15:48

## 2023-10-19 RX ADMIN — ENOXAPARIN SODIUM 40 MG: 40 INJECTION SUBCUTANEOUS at 09:09

## 2023-10-19 RX ADMIN — ACETAMINOPHEN 975 MG: 325 TABLET, FILM COATED ORAL at 18:08

## 2023-10-19 RX ADMIN — GABAPENTIN 100 MG: 100 CAPSULE ORAL at 15:47

## 2023-10-19 RX ADMIN — DEXTROSE, SODIUM CHLORIDE, AND POTASSIUM CHLORIDE 84 ML/HR: 5; .45; .15 INJECTION INTRAVENOUS at 05:38

## 2023-10-19 NOTE — PROGRESS NOTES
[facility] - Inpatient Progress Note    PATIENT INFORMATION      Vidal Chen 39 y.o. male MRN: [id number]  Unit/Bed#: [facility] 920-01 Encounter: [id number]    ASSESSMENT & PLAN   Vidal Chen is a 39 y.o. old male with PMH of Compensated Alcoholic Liver Cirrhosis, Rectal Cancer s/p 3 months of chemoradiation who presented for proctectomy with coloanal anastomosis and loop ileostomy. Compensated Alcoholic Cirrhosis  1. Cirrhosis - complicated by thrombocytopenia, non-bleeding varices s/p band ligation x5 in 4/2023   Lazarus-August: 6  MELD 3.0: 12 at 10/19/2023  5:16 AM  MELD-Na: 11 at 10/19/2023  5:16 AM  Calculated from:  Serum Creatinine: 0.58 mg/dL (Using min of 1 mg/dL) at 10/19/2023  5:16 AM  Serum Sodium: 136 mmol/L at 10/19/2023  5:16 AM  Total Bilirubin: 1.39 mg/dL at 10/19/2023  5:16 AM  Serum Albumin: 3.4 g/dL at 10/19/2023  5:16 AM  INR(ratio): 1.43 at 10/18/2023 10:16 AM  Age at listing (hypothetical): 39 years  Sex: Male at 10/19/2023  5:16 AM  Etiology: Alcohol  Varices - Last EGD 4/25/2023 revealed 3 columns of large varices without stigmata of bleeding. Esophageal band ligation x5 No history of variceal bleeding. No sign of overt bleeding at this time   EGD was due for 4-6 weeks following his EGD in April but due to him being on chemoradiation at that time it was unable to be done. Will plan for EGD OP when stable for DC  Continue to monitor for signs of overt bleeding   Coagulopathy - Elevated INR is misleading as patient is missing both pro- and anti-coagulant factors in the setting of liver disease. Continue VTE ppx  Patient reports not drinking since dx of Rectal Cancer/Cirrhosis in February  AFP negative  DVT ppx  Will continue to monitor  EGD OP following DC    RUQ U/S positive for acute cholecystitis with CBD dilation up to 11 mm, no choledocholithiasis  He is asymptomatic at this time.  On PE, that patient felt discomfort in his scapula region with deep palpation of RUQ    - OP MRCP  - Pending MRCP eval, may need ERCP then elective cholecystectomy following    Rectal Cancer s/p 3 months of chemoradiation. POD 1 from proctectomy with coloanal anastomosis and loop ileostomy  Care per Colorectal surgery      SUBJECTIVE     Patient seen and evaluated at bedside. Patient denies abdominal pain and states he is feeling good overall with no complaints. He reports passing gas from below and into the ostomy bag    MEDICATIONS & ALLERGIES       Medications:   Medications Prior to Admission   Medication    [] metroNIDAZOLE (FLAGYL) 500 mg tablet    [] neomycin (MYCIFRADIN) 500 mg tablet    pyridoxine (VITAMIN B6) 50 mg tablet    ondansetron (ZOFRAN) 8 mg tablet     Current Facility-Administered Medications   Medication Dose Route Frequency    acetaminophen (TYLENOL) tablet 975 mg  975 mg Oral Q6H PATRICIA    bupivacaine liposomal (EXPAREL) 1.3 % injection 10 mL  10 mL Infiltration Once    Diclofenac Sodium (VOLTAREN) 1 % topical gel 2 g  2 g Topical 4x Daily PRN    diphenhydrAMINE (BENADRYL) injection 25 mg  25 mg Intravenous Q6H PRN    enoxaparin (LOVENOX) subcutaneous injection 40 mg  40 mg Subcutaneous Daily    gabapentin (NEURONTIN) capsule 100 mg  100 mg Oral TID    labetalol (NORMODYNE) injection 10 mg  10 mg Intravenous Q4H PRN    methocarbamol (ROBAXIN) tablet 500 mg  500 mg Oral Q8H PATRICIA    ondansetron (ZOFRAN) injection 4 mg  4 mg Intravenous Q6H PRN    oxyCODONE (ROXICODONE) immediate release tablet 10 mg  10 mg Oral Q4H PRN    oxyCODONE (ROXICODONE) IR tablet 5 mg  5 mg Oral Q4H PRN    potassium phosphates 30 mmol in sodium chloride 0.9 % 250 mL infusion  30 mmol Intravenous Once       Allergies:   No Known Allergies    PHYSICAL EXAM     Objective   Blood pressure 103/63, pulse 69, temperature 97.5 °F (36.4 °C), resp. rate 18, height 6' 3" (1.905 m), weight 83 kg (183 lb), SpO2 96 %. Body mass index is 22.87 kg/m².     Intake/Output Summary (Last 24 hours) at 10/19/2023 1532  Last data filed at 10/19/2023 1424  Gross per 24 hour   Intake 2527.8 ml   Output 2910 ml   Net -382.2 ml       General Appearance:   Alert, cooperative, no distress   HEENT:   Normocephalic, atraumatic, anicteric     Neck:   Supple, symmetrical, trachea midline   Lungs:   Equal chest rise, respirations unlabored    Heart:   Regular rate and rhythm   Abdomen:   Soft, non-tender, mildly distended; Stoma patent and intact. Serosanguineous/gas output in ostomy. discomfort in his scapula region with deep palpation of RUQ   Rectal:   Deferred    Extremities:   No cyanosis, clubbing or edema    Neuro: Moves all 4 extremities    Skin:   No jaundice, rashes, or lesions      ADDITIONAL DATA     Lab Results:     Results from last 7 days   Lab Units 10/19/23  0516   WBC Thousand/uL 5.30   HEMOGLOBIN g/dL 8.3*   HEMATOCRIT % 24.5*   PLATELETS Thousands/uL 81*   NEUTROS PCT % 72   LYMPHS PCT % 12*   MONOS PCT % 14*   EOS PCT % 1     Results from last 7 days   Lab Units 10/19/23  0516 10/17/23  1539 10/17/23  1317   POTASSIUM mmol/L 3.8   < >  --    CHLORIDE mmol/L 108   < >  --    CO2 mmol/L 24   < >  --    CO2, I-STAT mmol/L  --   --  21   BUN mg/dL 13   < >  --    CREATININE mg/dL 0.58*   < >  --    CALCIUM mg/dL 8.3*   < >  --    ALK PHOS U/L 34   < >  --    ALT U/L 16   < >  --    AST U/L 30   < >  --    GLUCOSE, ISTAT mg/dl  --   --  164*    < > = values in this interval not displayed. Results from last 7 days   Lab Units 10/18/23  1016   INR  1.43*       Imaging:    US right upper quadrant    Result Date: 10/19/2023  Narrative: RIGHT UPPER QUADRANT ULTRASOUND INDICATION:     cirrhosis. COMPARISON: Right upper quadrant ultrasound February 16, 2023. Correlation CT abdomen pelvis August 21, 2023 TECHNIQUE:   Real-time ultrasound of the right upper quadrant was performed with a curvilinear transducer with both volumetric sweeps and still imaging techniques.  FINDINGS: PANCREAS:  Visualized portions of the pancreas are within normal limits. AORTA AND IVC:  Visualized portions are normal for patient age. LIVER: Size:  The liver measures 16.1 cm in the midclavicular line. Contour:  Surface contour is lobulated. Parenchyma: Increased echogenicity compatible with steatosis. No liver mass identified. Limited imaging of the main portal vein shows it to be patent and hepatopetal. BILIARY: No gallstones. Gallbladder luminal distention to at least 6 cm is similar to prior study. Gallbladder wall thickness at the upper limits of normal, measuring 3 mm. No pericholecystic free fluid. Negative sonographic Styles sign. No intrahepatic biliary dilatation. CBD measures 11.0 mm. No choledocholithiasis. KIDNEY: Right kidney measures 11.1 x 7.0 x 4.4 cm. Volume 177.2 mL Kidney within normal limits. ASCITES:   None. Impression: No gallstones. Findings equivocal for acute cholecystitis. Mild hepatic cirrhosis. Workstation performed: [id number]       EKG, Pathology, and Other Studies Reviewed on Admission:   EKG: Reviewed      Counseling / Coordination of Care Time: 30 total mins spent n consult. Greater than 50% of total time spent on patient counseling and coordination of care. Cady Norman  Available on elmenus  . ..............................................................................................................................................  ** Please Note: This note is constructed using a voice recognition dictation system.  **

## 2023-10-19 NOTE — WOUND OSTOMY CARE
Progress Note- Ostomy  Leeann Peña 39 y.o. male  [id number]  [facility] 920-[facility] 920-01        Assessment:  Patient is POD # 2 ileostomy creation. Seen for ostomy education and teaching. Introduced self and role to patient. Patient is agreeable to visit. Patient is primary learner. Patient agreeable to ostomy teaching and pouch change, alert and oriented x4, engaged in teaching, remained 100% attentive, asked appropriate questions. All questions and concerns answered. Topics reviewed: nutrition, normal stoma appearance, how and when to empty (1/3 full) to prevent pulling/lifting of the barrier, importance & how to clean the end of the pouch to prevent odor, gas/odor producing foods, frequency of changing of the pouch (every 3-4 days on a schedule), burping, one piece/two piece pouching systems differences, gas valve functionality of one piece, Clothing- including avoiding placing belt-line/seat belts over the stoma, bathing, and how to obtain supplies. Step-by-step pouch change done using 1 piece Coloplast pouch. Reviewed with patient how and when to measure the stoma (weekly). Demonstrated how to trace & cut one piece barrier, and how to apply it to the skin using gentle pressure / warmth of the hands. Good seal obtained. RLQ ileostomy: Stoma measures 1.15 inch, well budded, os is noted centrally, round in shape, mucocutaneous junction is intact. Chiara-stomal skin is intact with no redness or wounds. Effluent is small amount of soft brown stool. Plan is for patient to have VNA at home. Patient verbalized understanding of education and teaching. Patient is active learner. All questions and concerns answered. Encouraged patient to continue to read the educational materials provided - "Life after ileostomy Surgery" by [facility]. Patient gave verbal permission to order sample kits from [employer], [employer], and [facility].      Ostomy Plan:  1. Encourage patient to read education material at bedside.   2. Encourage patient to ambulate. 3. Encourage patient to participate with emptying/burping pouch. 4. Empty pouch when 1/3 full. Ostomy Care:  1. Peel back pouch using push-pull method, may use non-alcohol adhesive remover(Purple and white package). 2. Use warm water only to cleanse skin around the stoma (rafael-stomal skin). 3. Make sure all adhesive residue is removed and skin is dry and not oily. 4. Measure stoma size using measuring guide and trace correct measurements onto back of pouch (Off set opening away from abdominal incision). 5. Then cut backing of pouch out to correct shape/size. 6. Place pouch over stoma and onto skin. 7. Use warmth of hand to apply gentle pressure to help backing of pouch to adhere well to skin. Ileostomy RLQ (Active)   Stomal Appliance 1 piece; Changed;Dry; Intact 10/19/23 1210   Stoma Assessment Budded;Jovista 10/19/23 1210   Stoma size (in) 1.15 in 10/19/23 1210   Stoma Shape Round;Budded 10/19/23 1210   Peristomal Assessment Clean; Intact 10/19/23 1210   Treatment Bag change;Site care 10/19/23 1210   Output (mL) 150 mL 10/19/23 1001   Number of days: 2     Call or tigertext with any questions  Wound and Ostomy Care will continue to follow    Mirna Patel BSN RN CWON  Wound and Ostomy care

## 2023-10-19 NOTE — RESTORATIVE TECHNICIAN NOTE
Restorative Technician Note      Patient Name: Rosibel Dodson     Restorative Tech Visit Date: 10/19/23  Note Type: Mobility  Patient Position Upon Consult: Supine  Activity Performed: Ambulated  Assistive Device: Other (Comment) (None)  Patient Position at End of Consult: Supine;  All needs within reach    Sabina Real Restorative Technician

## 2023-10-19 NOTE — RESTORATIVE TECHNICIAN NOTE
Restorative Technician Note      Patient Name: Leeann Zepeda     Restorative Tech Visit Date: 10/19/23  Note Type: Mobility  Patient Position Upon Consult: Supine  Activity Performed: Ambulated  Assistive Device: Other (Comment) (None)  Patient Position at End of Consult: Supine;  All needs within reach    Kandice Cruz, Restorative Technician

## 2023-10-19 NOTE — PROGRESS NOTES
Progress Note - Colorectal Surgery   Rhona Vazquez 39 y.o. male MRN: [id number]  Unit/Bed#: [facility] 920-01 Encounter: [id number]    Assessment:  59-year-old male s/p laparoscopic hand-assisted proctectomy with coloanal anastomosis and loop ileostomy creation on 10/17 for rectal ca. Afebrile, VSS, RA  A.m. labs pending  UOP 2.9L  290 cc thin stool    Plan:  Low res diet  Monitor ileostomy output  D/c dPCA; oral pain regimen   Antiemetic as needed  Lovenox  D/c harris  Encourage OOB and I-S use  PT/OT    Subjective/Objective   Subjective: Patient seen and examined. NAEO. States that pain is well controlled. Having ileostomy fx. Ambulating at baseline. Tolerating CLD w/o N/V. Objective:     Blood pressure 107/63, pulse 81, temperature 97.5 °F (36.4 °C), resp. rate 18, height 6' 3" (1.905 m), weight 83 kg (183 lb), SpO2 94 %. ,Body mass index is 22.87 kg/m². Intake/Output Summary (Last 24 hours) at 10/19/2023 0600  Last data filed at 10/19/2023 0550  Gross per 24 hour   Intake 2582.72 ml   Output 3210 ml   Net -627.28 ml       Invasive Devices       Central Venous Catheter Line  Duration             Port A Cath 02/28/23 Right Subclavian 232 days              Peripheral Intravenous Line  Duration             Peripheral IV 10/17/23 Dorsal (posterior); Left Hand 1 day    Peripheral IV 10/17/23 Right Hand 1 day              Drain  Duration             Ileostomy RLQ 1 day    Urethral Catheter 16 Fr. 1 day                    Physical Exam:   General - no acute distress, responsive and cooperative  CV - warm, regular rate  Pulm - normal work of breathing, no respiratory distress  Abd - soft, appropriately tender, mildly distended. Ostomy in place with with stool and gas in the bag.  Ostomy pink patent and viable  Neuro - m/s grossly intact, cn grossly intact  Ext - moving all extremities

## 2023-10-19 NOTE — CASE MANAGEMENT
Case Management Discharge Planning Note    Patient name Rosibel Dodson  Location [facility] 920/[facility] 378-06 MRN [id number]  : 1980 Date 10/19/2023       Current Admission Date: 10/17/2023  Current Admission Diagnosis:Rectal cancer [facility])   Patient Active Problem List    Diagnosis Date Noted    Port-A-Cath in place 2023    Chemotherapy-induced neutropenia  2023    Rectal cancer ([address]) 2023    Rectal wall thickening 2023    Transaminitis/Hepatitic Steatosis 2023      LOS (days): 2  Geometric Mean LOS (GMLOS) (days):   Days to GMLOS:     OBJECTIVE:  Risk of Unplanned Readmission Score: 10.9         Current admission status: Inpatient   Preferred Pharmacy:   [address] [place] [address] #[id number] ROSENDA Garcia - [address] [address]  Phone: [phone] Fax: [facility], [address] [place] [address]  Phone: [phone] Fax: [phone]    Primary Care Provider: Efra Marie DO    Primary Insurance: Geoff Mars MA [facility]  Secondary Insurance:     DISCHARGE DETAILS:    Discharge planning discussed with[de-identified] patient  Freedom of Choice: Yes     CM contacted family/caregiver?: No- see comments  Were Treatment Team discharge recommendations reviewed with patient/caregiver?: Yes  Did patient/caregiver verbalize understanding of patient care needs?: Yes  Were patient/caregiver advised of the risks associated with not following Treatment Team discharge recommendations?: Yes         [facility]         Is the patient interested in [facility] at discharge?: Yes  [facility] requested[de-identified] [facility] Name[de-identified] Mars Provider[de-identified] PCP  Home Health Services Needed[de-identified] Wound/Ostomy Care  Homebound Criteria Met[de-identified] Requires the Assistance of Another Person for Safe Ambulation or to Leave the Home  Supporting Clincal Findings[de-identified] Limited Endurance         Other Referral/Resources/Interventions Provided:  Referral Comments: S/w white surgery for care coordination. Pt will need vna forn ew ostomy. Cm s/w pt & discussed vna. Agreeable to referrals in aidin. [facility] accepted. Updated DCI.

## 2023-10-19 NOTE — DISCHARGE INSTR - OTHER ORDERS
Ostomy Care:  -Stoma Measurement: 1 1/4 inch, round, budded (Measure stoma weekly for next 6-8 weeks- stoma will decrease in size due to decrease in swelling)     -Appliance Used During Bag Change: Coloplast Sensura Noe 1 piece pouch REF # [id number]    or    High output Coloplast Sensura Noe 1 piece pouch  REF# [id number]  -Coloplast Sensura [place] belt attachment: REF # [id number]    *Can shower with pouch on or off. Make sure to dry off pouch after shower. Pouch Change Steps:  1. Peel back pouch using push-pull method, may use non-alcohol adhesive remover. Remove pouch from top to bottom. 2. Use warm water only to cleanse skin around the stoma (rafael-stomal skin). 3. Make sure all adhesive residue is removed and skin is dry and not oily. 4. Measure stoma size using measuring guide and trace correct measurements onto back of pouch. 5. Then cut or mold the backing of pouch out to correct shape/size. 6. (If the skin is open, moist or fragile, Crusting can be done now to treat skin and assist with pouch adherence- steps are listed below)  7. Place pouch over stoma and onto skin. 8. Use warmth of hand to apply gentle pressure to help backing of pouch to adhere well to skin. Crusting as needed for moist, red, open skin around the stoma: (Done prior to pouch application)   Apply stoma powder to excoriation, move excess powder away with hand  Use no sting barrier to pat area to form "crust"  Repeat X2 before placing new ostomy pouch     TIPS:  Empty pouch when 1/3 -1/2 full. Change pouch every 3-5 days or if signs of leaking. Ostomy Education Resources:   www.ostomy. org   www.convatec.com   www.coloplast.us   www.[employer]

## 2023-10-19 NOTE — CONSULTS
UROLOGY CONSULTATION NOTE     Patient Identifiers: Joi Cardona (MRN [id number])  Service Requesting Consultation: Dalene Habermann, MD    Service Providing Consultation:  Urology, Davin Layne, [address] [place] [address]    Date of Service: 10/19/2023  Inpatient consult to Urology  Consult performed by: ISAAC Myers  Consult ordered by: Frederick Camarillo MD          Reason for Consultation: Retained Campoverde     ASSESSMENT:     39 y.o. old male with retained Campoverde catheter despite balloon deflation. PLAN:     Palpated glans and penile shaft to confirm baloon deflation  No trauma noted. Applied lubricant with applicator to urethral meatus  Gentle repetitive twisting action resulted in release of Campoverde catheter with complete tip intact. Monitor for voiding. No further  intervention indicated      History of Present Illness:     Joi Cardona is a 39 y.o. old with a history of rectal cancer postoperative day 2 laparoscopic hand-assisted abdominal approach prostatectomy with loop ileostomy creation. He denies prior formal urologic consultation, evaluation or  surgical manipulation, nephrolithiasis, or irritative or obstructive lower urinary tract symptoms. Campoverde catheter inserted for postoperative requirement. We were contacted by the general surgical resident due to multiple attempts at Campoverde catheter removal despite balloon deflation. He is afebrile, hemodynamically stable without leukocytosis or acute kidney injury.   He denies flank pain, dysuria or gross hematuria    Past Medical, Past Surgical History:     Past Medical History:   Diagnosis Date    Cirrhosis ([address]) 3/18/24    Colon cancer ([address])     Dental infection     Fatty liver     Rectal cancer ([address])    :    Past Surgical History:   Procedure Laterality Date    APPENDECTOMY      COLONOSCOPY      DENTAL SURGERY      IR PORT PLACEMENT  02/28/2023    WA LAPS PROCTECTOMY COMBINED PULL-THRU W/RESERVOIR N/A 10/17/2023    Procedure: LAPAROSCOPIC HAND ASSIST PROCTECTOMY, ABDOMINAL APPROACH, coloanal anastomosis, loop ileostomy;  Surgeon: Diana Strauss MD;  Location: [facility];  Service: Colorectal    WY SIGMOIDOSCOPY FLX DX W/COLLJ SPEC BR/WA IF PFRMD N/A 10/17/2023    Procedure: SIGMOIDOSCOPY FLEXIBLE;  Surgeon: Diana Strauss MD;  Location: [facility];  Service: Colorectal    UPPER GASTROINTESTINAL ENDOSCOPY     :    Medications, Allergies:     Current Facility-Administered Medications   Medication Dose Route Frequency    acetaminophen (TYLENOL) tablet 975 mg  975 mg Oral Q6H [address]    bupivacaine liposomal (EXPAREL) 1.3 % injection 10 mL  10 mL Infiltration Once    Diclofenac Sodium (VOLTAREN) 1 % topical gel 2 g  2 g Topical 4x Daily PRN    diphenhydrAMINE (BENADRYL) injection 25 mg  25 mg Intravenous Q6H PRN    enoxaparin (LOVENOX) subcutaneous injection 40 mg  40 mg Subcutaneous Daily    gabapentin (NEURONTIN) capsule 100 mg  100 mg Oral TID    labetalol (NORMODYNE) injection 10 mg  10 mg Intravenous Q4H PRN    methocarbamol (ROBAXIN) tablet 500 mg  500 mg Oral Q8H PATRICIA    ondansetron (ZOFRAN) injection 4 mg  4 mg Intravenous Q6H PRN    oxyCODONE (ROXICODONE) immediate release tablet 10 mg  10 mg Oral Q4H PRN    oxyCODONE (ROXICODONE) IR tablet 5 mg  5 mg Oral Q4H PRN    potassium phosphates 30 mmol in sodium chloride 0.9 % 250 mL infusion  30 mmol Intravenous Once       Allergies:  No Known Allergies:    Social and Family History:   Social History:   Social History     Tobacco Use    Smoking status: Former     Packs/day: 1.00     Years: 20.00     Total pack years: 20.00     Types: Cigarettes     Quit date: 1/1/2020     Years since quitting: 3.8    Smokeless tobacco: Never   Vaping Use    Vaping Use: Every day    Substances: Nicotine, Flavoring   Substance Use Topics    Alcohol use: Not Currently     Alcohol/week: 2.0 standard drinks of alcohol     Types: 2 Cans of beer per week     Comment: 1-2 daily.  previously up to 1 case/day (reduced alcohol intake 7 years ago)    Drug use: Not Currently     Types: Marijuana   . Social History     Tobacco Use   Smoking Status Former    Packs/day: 1.00    Years: 20.00    Total pack years: 20.00    Types: Cigarettes    Quit date: 2020    Years since quitting: 3.8   Smokeless Tobacco Never       Family History:  Family History   Problem Relation Age of Onset    Lung cancer Mother     Cancer Mother         Lung    Lung cancer Father     Cancer Father         Mouth throat    Colon cancer Neg Hx     Colon polyps Neg Hx    :     Review of Systems:   Review of Systems - History obtained from chart review and the patient  General ROS: negative for - chills or fever  Respiratory ROS: no cough, shortness of breath, or wheezing  Cardiovascular ROS: no chest pain or dyspnea on exertion  Gastrointestinal ROS: positive for - abdominal pain and post surgical  Genito-Urinary ROS: no dysuria, trouble voiding, or hematuria  Neurological ROS: no TIA or stroke symptoms    All other systems queried were negative. Physical Exam:   General: Patient is pleasant and in NAD. Awake and alert  /63   Pulse 69   Temp 97.5 °F (36.4 °C)   Resp 18   Ht 6' 3" (1.905 m)   Wt 83 kg (183 lb)   SpO2 96%   BMI 22.87 kg/m² Temp (24hrs), Av.7 °F (36.5 °C), Min:97.5 °F (36.4 °C), Max:98 °F (36.7 °C)  current; Temperature: 97.5 °F (36.4 °C)  I/O last 24 hours: In: 3103.7 [P.O.:592;  I.V.:2511.7]  Out: 3510 [Urine:3100; Stool:410]    General appearance: alert and oriented, in no acute distress, appears stated age, cooperative, and no distress  Head: Normocephalic, without obvious abnormality, atraumatic  Neck: no JVD and supple, symmetrical, trachea midline  Heart: regular rate and rhythm, S1, S2 normal, no murmur, click, rub or gallop  Abdomen: soft, non-tender; bowel sounds normal; no masses,  no organomegaly  Extremities: extremities normal, warm and well-perfused; no cyanosis, clubbing, or edema  Pulses: 2+ and symmetric  Neurologic: Grossly normal  Campoverde [id number]--74 [place] silicone--clear yellow urine    Labs:     Lab Results   Component Value Date    HGB 8.3 (L) 10/19/2023    HCT 24.5 (L) 10/19/2023    WBC 5.30 10/19/2023    PLT 81 (L) 10/19/2023   ]    Lab Results   Component Value Date    K 3.8 10/19/2023     10/19/2023    CO2 24 10/19/2023    BUN 13 10/19/2023    CREATININE 0.58 (L) 10/19/2023    CALCIUM 8.3 (L) 10/19/2023    GLUCOSE 164 (H) 10/17/2023   ]    Imaging:   I personally reviewed the images and report of the following studies, and reviewed them with the patient:    none          Thank you for allowing me to participate in this patients’ care. Please do not hesitate to call with any additional questions.   ISAAC Montes

## 2023-10-20 LAB
ALBUMIN SERPL BCP-MCNC: 3.1 G/DL (ref 3.5–5)
ALP SERPL-CCNC: 37 U/L (ref 34–104)
ALT SERPL W P-5'-P-CCNC: 16 U/L (ref 7–52)
ANION GAP SERPL CALCULATED.3IONS-SCNC: 6 MMOL/L
AST SERPL W P-5'-P-CCNC: 28 U/L (ref 13–39)
BASOPHILS # BLD AUTO: 0.01 THOUSANDS/ÂΜL (ref 0–0.1)
BASOPHILS NFR BLD AUTO: 0 % (ref 0–1)
BILIRUB SERPL-MCNC: 1 MG/DL (ref 0.2–1)
BUN SERPL-MCNC: 10 MG/DL (ref 5–25)
CALCIUM ALBUM COR SERPL-MCNC: 8.8 MG/DL (ref 8.3–10.1)
CALCIUM SERPL-MCNC: 8.1 MG/DL (ref 8.4–10.2)
CHLORIDE SERPL-SCNC: 106 MMOL/L (ref 96–108)
CO2 SERPL-SCNC: 24 MMOL/L (ref 21–32)
CREAT SERPL-MCNC: 0.64 MG/DL (ref 0.6–1.3)
EOSINOPHIL # BLD AUTO: 0.08 THOUSAND/ÂΜL (ref 0–0.61)
EOSINOPHIL NFR BLD AUTO: 3 % (ref 0–6)
ERYTHROCYTE [DISTWIDTH] IN BLOOD BY AUTOMATED COUNT: 13.1 % (ref 11.6–15.1)
GFR SERPL CREATININE-BSD FRML MDRD: 119 ML/MIN/1.73SQ M
GLUCOSE SERPL-MCNC: 108 MG/DL (ref 65–140)
HCT VFR BLD AUTO: 22.1 % (ref 36.5–49.3)
HGB BLD-MCNC: 7.8 G/DL (ref 12–17)
IMM GRANULOCYTES # BLD AUTO: 0.02 THOUSAND/UL (ref 0–0.2)
IMM GRANULOCYTES NFR BLD AUTO: 1 % (ref 0–2)
LYMPHOCYTES # BLD AUTO: 0.44 THOUSANDS/ÂΜL (ref 0.6–4.47)
LYMPHOCYTES NFR BLD AUTO: 16 % (ref 14–44)
MCH RBC QN AUTO: 32.6 PG (ref 26.8–34.3)
MCHC RBC AUTO-ENTMCNC: 35.3 G/DL (ref 31.4–37.4)
MCV RBC AUTO: 93 FL (ref 82–98)
MONOCYTES # BLD AUTO: 0.43 THOUSAND/ÂΜL (ref 0.17–1.22)
MONOCYTES NFR BLD AUTO: 16 % (ref 4–12)
NEUTROPHILS # BLD AUTO: 1.71 THOUSANDS/ÂΜL (ref 1.85–7.62)
NEUTS SEG NFR BLD AUTO: 64 % (ref 43–75)
NRBC BLD AUTO-RTO: 0 /100 WBCS
PLATELET # BLD AUTO: 65 THOUSANDS/UL (ref 149–390)
PMV BLD AUTO: 9.6 FL (ref 8.9–12.7)
POTASSIUM SERPL-SCNC: 3.5 MMOL/L (ref 3.5–5.3)
PROT SERPL-MCNC: 5.4 G/DL (ref 6.4–8.4)
RBC # BLD AUTO: 2.39 MILLION/UL (ref 3.88–5.62)
SODIUM SERPL-SCNC: 136 MMOL/L (ref 135–147)
WBC # BLD AUTO: 2.69 THOUSAND/UL (ref 4.31–10.16)

## 2023-10-20 PROCEDURE — 99024 POSTOP FOLLOW-UP VISIT: CPT | Performed by: COLON & RECTAL SURGERY

## 2023-10-20 PROCEDURE — 85025 COMPLETE CBC W/AUTO DIFF WBC: CPT

## 2023-10-20 PROCEDURE — 80053 COMPREHEN METABOLIC PANEL: CPT | Performed by: PHYSICIAN ASSISTANT

## 2023-10-20 RX ORDER — POTASSIUM CHLORIDE 20 MEQ/1
20 TABLET, EXTENDED RELEASE ORAL
Status: DISPENSED | OUTPATIENT
Start: 2023-10-20 | End: 2023-10-21

## 2023-10-20 RX ADMIN — METHOCARBAMOL 500 MG: 500 TABLET ORAL at 05:16

## 2023-10-20 RX ADMIN — ACETAMINOPHEN 975 MG: 325 TABLET, FILM COATED ORAL at 00:05

## 2023-10-20 RX ADMIN — POTASSIUM CHLORIDE 20 MEQ: 1500 TABLET, EXTENDED RELEASE ORAL at 09:48

## 2023-10-20 RX ADMIN — ACETAMINOPHEN 975 MG: 325 TABLET, FILM COATED ORAL at 23:25

## 2023-10-20 NOTE — RESTORATIVE TECHNICIAN NOTE
Restorative Technician Note      Patient Name: Cruz Dawson     Restorative Tech Visit Date: 10/20/23  Note Type: Mobility  Patient Position Upon Consult: Supine  Activity Performed: Ambulated  Assistive Device: Other (Comment) (None)  Patient Position at End of Consult: Supine    Cullen Knapp, Restorative Technician

## 2023-10-20 NOTE — PLAN OF CARE
Problem: PAIN - ADULT  Goal: Verbalizes/displays adequate comfort level or baseline comfort level  Description: Interventions:  - Encourage patient to monitor pain and request assistance  - Assess pain using appropriate pain scale  - Administer analgesics based on type and severity of pain and evaluate response  - Implement non-pharmacological measures as appropriate and evaluate response  - Consider cultural and social influences on pain and pain management  - Notify physician/advanced practitioner if interventions unsuccessful or patient reports new pain  Outcome: Progressing     Problem: INFECTION - ADULT  Goal: Absence or prevention of progression during hospitalization  Description: INTERVENTIONS:  - Assess and monitor for signs and symptoms of infection  - Monitor lab/diagnostic results  - Monitor all insertion sites, i.e. indwelling lines, tubes, and drains  - Monitor endotracheal if appropriate and nasal secretions for changes in amount and color  - [place] appropriate cooling/warming therapies per order  - Administer medications as ordered  - Instruct and encourage patient and family to use good hand hygiene technique  - Identify and instruct in appropriate isolation precautions for identified infection/condition  Outcome: Progressing  Goal: Absence of fever/infection during neutropenic period  Description: INTERVENTIONS:  - Monitor WBC    Outcome: Progressing     Problem: SAFETY ADULT  Goal: Patient will remain free of falls  Description: INTERVENTIONS:  - Educate patient/family on patient safety including physical limitations  - Instruct patient to call for assistance with activity   - Consult OT/PT to assist with strengthening/mobility   - Keep Call bell within reach  - Keep bed low and locked with side rails adjusted as appropriate  - Keep care items and personal belongings within reach  - Initiate and maintain comfort rounds  - Make Fall Risk Sign visible to staff  - Apply yellow socks and bracelet for high fall risk patients  - Consider moving patient to room near nurses station  Outcome: Progressing  Goal: Maintain or return to baseline ADL function  Description: INTERVENTIONS:  -  Assess patient's ability to carry out ADLs; assess patient's baseline for ADL function and identify physical deficits which impact ability to perform ADLs (bathing, care of mouth/teeth, toileting, grooming, dressing, etc.)  - Assess/evaluate cause of self-care deficits   - Assess range of motion  - Assess patient's mobility; develop plan if impaired  - Assess patient's need for assistive devices and provide as appropriate  - Encourage maximum independence but intervene and supervise when necessary  - Involve family in performance of ADLs  - Assess for home care needs following discharge   - Consider OT consult to assist with ADL evaluation and planning for discharge  - Provide patient education as appropriate  Outcome: Progressing  Goal: Maintains/Returns to pre admission functional level  Description: INTERVENTIONS:  - Perform BMAT or MOVE assessment daily.   - Set and communicate daily mobility goal to care team and patient/family/caregiver.    - Collaborate with rehabilitation services on mobility goals if consulted  - Record patient progress and toleration of activity level   Outcome: Progressing     Problem: DISCHARGE PLANNING  Goal: Discharge to home or other facility with appropriate resources  Description: INTERVENTIONS:  - Identify barriers to discharge w/patient and caregiver  - Arrange for needed discharge resources and transportation as appropriate  - Identify discharge learning needs (meds, wound care, etc.)  - Arrange for interpretive services to assist at discharge as needed  - Refer to Case Management Department for coordinating discharge planning if the patient needs post-hospital services based on physician/advanced practitioner order or complex needs related to functional status, cognitive ability, or social support system  Outcome: Progressing     Problem: Knowledge Deficit  Goal: Patient/family/caregiver demonstrates understanding of disease process, treatment plan, medications, and discharge instructions  Description: Complete learning assessment and assess knowledge base.   Interventions:  - Provide teaching at level of understanding  - Provide teaching via preferred learning methods  Outcome: Progressing

## 2023-10-20 NOTE — PLAN OF CARE
Problem: PAIN - ADULT  Goal: Verbalizes/displays adequate comfort level or baseline comfort level  Description: Interventions:  - Encourage patient to monitor pain and request assistance  - Assess pain using appropriate pain scale  - Administer analgesics based on type and severity of pain and evaluate response  - Implement non-pharmacological measures as appropriate and evaluate response  - Consider cultural and social influences on pain and pain management  - Notify physician/advanced practitioner if interventions unsuccessful or patient reports new pain  Outcome: Progressing     Problem: INFECTION - ADULT  Goal: Absence or prevention of progression during hospitalization  Description: INTERVENTIONS:  - Assess and monitor for signs and symptoms of infection  - Monitor lab/diagnostic results  - Monitor all insertion sites, i.e. indwelling lines, tubes, and drains  - Monitor endotracheal if appropriate and nasal secretions for changes in amount and color  - [place] appropriate cooling/warming therapies per order  - Administer medications as ordered  - Instruct and encourage patient and family to use good hand hygiene technique  - Identify and instruct in appropriate isolation precautions for identified infection/condition  Outcome: Progressing  Goal: Absence of fever/infection during neutropenic period  Description: INTERVENTIONS:  - Monitor WBC    Outcome: Progressing     Problem: SAFETY ADULT  Goal: Patient will remain free of falls  Description: INTERVENTIONS:  - Educate patient/family on patient safety including physical limitations  - Instruct patient to call for assistance with activity   - Consult OT/PT to assist with strengthening/mobility   - Keep Call bell within reach  - Keep bed low and locked with side rails adjusted as appropriate  - Keep care items and personal belongings within reach  - Initiate and maintain comfort rounds  - Make Fall Risk Sign visible to staff  - Offer Toileting every  Hours, in advance of need  - Initiate/Maintain alarm  - Obtain necessary fall risk management equipment:   - Apply yellow socks and bracelet for high fall risk patients  - Consider moving patient to room near nurses station  Outcome: Progressing  Goal: Maintain or return to baseline ADL function  Description: INTERVENTIONS:  -  Assess patient's ability to carry out ADLs; assess patient's baseline for ADL function and identify physical deficits which impact ability to perform ADLs (bathing, care of mouth/teeth, toileting, grooming, dressing, etc.)  - Assess/evaluate cause of self-care deficits   - Assess range of motion  - Assess patient's mobility; develop plan if impaired  - Assess patient's need for assistive devices and provide as appropriate  - Encourage maximum independence but intervene and supervise when necessary  - Involve family in performance of ADLs  - Assess for home care needs following discharge   - Consider OT consult to assist with ADL evaluation and planning for discharge  - Provide patient education as appropriate  Outcome: Progressing  Goal: Maintains/Returns to pre admission functional level  Description: INTERVENTIONS:  - Perform BMAT or MOVE assessment daily.   - Set and communicate daily mobility goal to care team and patient/family/caregiver. - Collaborate with rehabilitation services on mobility goals if consulted  - Perform Range of Motion  times a day. - Reposition patient every  hours.   - Dangle patient  times a day  - Stand patient  times a day  - Ambulate patient  times a day  - Out of bed to chair  times a day   - Out of bed for meals  times a day  - Out of bed for toileting  - Record patient progress and toleration of activity level   Outcome: Progressing     Problem: DISCHARGE PLANNING  Goal: Discharge to home or other facility with appropriate resources  Description: INTERVENTIONS:  - Identify barriers to discharge w/patient and caregiver  - Arrange for needed discharge resources and transportation as appropriate  - Identify discharge learning needs (meds, wound care, etc.)  - Arrange for interpretive services to assist at discharge as needed  - Refer to Case Management Department for coordinating discharge planning if the patient needs post-hospital services based on physician/advanced practitioner order or complex needs related to functional status, cognitive ability, or social support system  Outcome: Progressing     Problem: Knowledge Deficit  Goal: Patient/family/caregiver demonstrates understanding of disease process, treatment plan, medications, and discharge instructions  Description: Complete learning assessment and assess knowledge base.   Interventions:  - Provide teaching at level of understanding  - Provide teaching via preferred learning methods  Outcome: Progressing

## 2023-10-20 NOTE — PROGRESS NOTES
Progress Note - Colorectal Surgery   Brianna Zaragoza 39 y.o. male MRN: [id number]  Unit/Bed#: [facility] 920-01 Encounter: [id number]    Assessment:  67-year-old male s/p laparoscopic hand-assisted proctectomy with coloanal anastomosis and loop ileostomy creation on 10/17 for rectal ca. Afebrile, VSS, RA  A.m. labs pending  UOP 725cc  Ileostomy: 1.8 L    Plan:  Low res diet  Monitor ileostomy output; goal of <1.2L prior to d/c  Pain and nausea control as needed  Antiemetic as needed  Lovenox  Encourage OOB and I-S use  PT/OT    Subjective/Objective   Subjective: Patient seen and examined. NAEO. States that pain is well controlled. Having ileostomy fx. Ambulating at baseline. Tolerating diet w/o N/V. Objective:     Blood pressure 111/63, pulse 74, temperature 97.8 °F (36.6 °C), resp. rate 16, height 6' 3" (1.905 m), weight 83 kg (183 lb), SpO2 95 %. ,Body mass index is 22.87 kg/m². Intake/Output Summary (Last 24 hours) at 10/20/2023 0827  Last data filed at 10/20/2023 0130  Gross per 24 hour   Intake 1481 ml   Output 2550 ml   Net -1069 ml       Invasive Devices       Central Venous Catheter Line  Duration             Port A Cath 02/28/23 Right Subclavian 233 days              Peripheral Intravenous Line  Duration             Peripheral IV 10/17/23 Dorsal (posterior); Left Hand 3 days    Peripheral IV 10/17/23 Right Hand 2 days              Drain  Duration             Ileostomy RLQ 2 days                    Physical Exam:   General - no acute distress, responsive and cooperative  CV - warm, regular rate  Pulm - normal work of breathing, no respiratory distress  Abd - soft, appropriately tender, mildly distended. Ostomy in place with with stool and gas in the bag.  Ostomy pink patent and viable  Neuro - m/s grossly intact, cn grossly intact  Ext - moving all extremities

## 2023-10-21 VITALS
BODY MASS INDEX: 22.75 KG/M2 | DIASTOLIC BLOOD PRESSURE: 71 MMHG | RESPIRATION RATE: 18 BRPM | SYSTOLIC BLOOD PRESSURE: 122 MMHG | TEMPERATURE: 97.8 F | HEART RATE: 69 BPM | WEIGHT: 183 LBS | HEIGHT: 75 IN | OXYGEN SATURATION: 96 %

## 2023-10-21 LAB
ANION GAP SERPL CALCULATED.3IONS-SCNC: 7 MMOL/L
BASOPHILS # BLD AUTO: 0.01 THOUSANDS/ÂΜL (ref 0–0.1)
BASOPHILS NFR BLD AUTO: 0 % (ref 0–1)
BUN SERPL-MCNC: 8 MG/DL (ref 5–25)
CALCIUM SERPL-MCNC: 8.4 MG/DL (ref 8.4–10.2)
CHLORIDE SERPL-SCNC: 103 MMOL/L (ref 96–108)
CO2 SERPL-SCNC: 25 MMOL/L (ref 21–32)
CREAT SERPL-MCNC: 0.66 MG/DL (ref 0.6–1.3)
EOSINOPHIL # BLD AUTO: 0.13 THOUSAND/ÂΜL (ref 0–0.61)
EOSINOPHIL NFR BLD AUTO: 4 % (ref 0–6)
ERYTHROCYTE [DISTWIDTH] IN BLOOD BY AUTOMATED COUNT: 13.1 % (ref 11.6–15.1)
GFR SERPL CREATININE-BSD FRML MDRD: 117 ML/MIN/1.73SQ M
GLUCOSE SERPL-MCNC: 95 MG/DL (ref 65–140)
HCT VFR BLD AUTO: 26 % (ref 36.5–49.3)
HGB BLD-MCNC: 9 G/DL (ref 12–17)
IMM GRANULOCYTES # BLD AUTO: 0.06 THOUSAND/UL (ref 0–0.2)
IMM GRANULOCYTES NFR BLD AUTO: 2 % (ref 0–2)
LYMPHOCYTES # BLD AUTO: 0.59 THOUSANDS/ÂΜL (ref 0.6–4.47)
LYMPHOCYTES NFR BLD AUTO: 20 % (ref 14–44)
MCH RBC QN AUTO: 32.1 PG (ref 26.8–34.3)
MCHC RBC AUTO-ENTMCNC: 34.6 G/DL (ref 31.4–37.4)
MCV RBC AUTO: 93 FL (ref 82–98)
MONOCYTES # BLD AUTO: 0.45 THOUSAND/ÂΜL (ref 0.17–1.22)
MONOCYTES NFR BLD AUTO: 15 % (ref 4–12)
NEUTROPHILS # BLD AUTO: 1.79 THOUSANDS/ÂΜL (ref 1.85–7.62)
NEUTS SEG NFR BLD AUTO: 59 % (ref 43–75)
NRBC BLD AUTO-RTO: 0 /100 WBCS
PLATELET # BLD AUTO: 90 THOUSANDS/UL (ref 149–390)
PMV BLD AUTO: 9.1 FL (ref 8.9–12.7)
POTASSIUM SERPL-SCNC: 3.8 MMOL/L (ref 3.5–5.3)
RBC # BLD AUTO: 2.8 MILLION/UL (ref 3.88–5.62)
SODIUM SERPL-SCNC: 135 MMOL/L (ref 135–147)
WBC # BLD AUTO: 3.03 THOUSAND/UL (ref 4.31–10.16)

## 2023-10-21 PROCEDURE — 80048 BASIC METABOLIC PNL TOTAL CA: CPT | Performed by: PHYSICIAN ASSISTANT

## 2023-10-21 PROCEDURE — 99024 POSTOP FOLLOW-UP VISIT: CPT | Performed by: COLON & RECTAL SURGERY

## 2023-10-21 PROCEDURE — 85025 COMPLETE CBC W/AUTO DIFF WBC: CPT | Performed by: PHYSICIAN ASSISTANT

## 2023-10-21 RX ORDER — NEOMYCIN SULFATE 500 MG/1
TABLET ORAL
Qty: 4 TABLET | Refills: 0 | Status: SHIPPED | OUTPATIENT
Start: 2023-10-21 | End: 2023-10-22

## 2023-10-21 RX ORDER — ENOXAPARIN SODIUM 100 MG/ML
40 INJECTION SUBCUTANEOUS DAILY
Qty: 11.2 ML | Refills: 0 | Status: SHIPPED | OUTPATIENT
Start: 2023-10-22 | End: 2023-11-19

## 2023-10-21 RX ORDER — METRONIDAZOLE 500 MG/1
TABLET ORAL
Qty: 2 TABLET | Refills: 0 | Status: SHIPPED | OUTPATIENT
Start: 2023-10-21 | End: 2023-10-22

## 2023-10-21 RX ADMIN — ACETAMINOPHEN 975 MG: 325 TABLET, FILM COATED ORAL at 05:13

## 2023-10-21 RX ADMIN — ENOXAPARIN SODIUM 40 MG: 40 INJECTION SUBCUTANEOUS at 08:28

## 2023-10-21 NOTE — DISCHARGE SUMMARY
Discharge Summary - Colorectal Surgery   Serena Ascencio 39 y.o. male MRN: [id number]  Unit/Bed#: [facility] 920-01 Encounter: [id number]    Admission Date:   Admission Orders (From admission, onward)       Ordered        10/17/23 1532  Inpatient Admission  Once                             Discharge Date: 10/21/2023    Admitting Diagnosis: Rectal cancer ([address]) [C20]    Discharge Diagnosis: Rectal Cancer    Medical Problems       Resolved Problems  Date Reviewed: 10/21/2023   None         Attending: ***    Consulting Physician(s): ***    Procedures Performed: No orders of the defined types were placed in this encounter. Pathology: [facility] Course: Patient is a 40 yo male with a PMH significant for cirrhosis, colon cancer, and fatty liver who presented to the hospital on 10/17 for low anterior resection of rectal cancer and ileostomy. One month prior the patient had chemoradiation which was tolerated well. This was preceded by a 3 month course of chemotherapy. CT scan showed reduction of lymphadenopathy and tumor size, with some retroperitoneal adenopathy in the iliac vs high mesentery. Retroperitoneal lymph nodes were not going to be removed due to not showing an enhancement in survival. Surgical risks were discussed, and patient risks of steatohepatitis and splenomegaly were noted. Laparoscopic hand-assisted proctectomy with coloanal anastomosis and loop ileostomy were completed on 10/17. The patient had no complaints following the procedure and instructed on incentive spirometry use. He was encouraged to ambulate as tolerated. The patient continued to have pain on 10/18 with no return of bowel function, but tolerating of ice chips. PT recommended increased ambulation and discharge home with no anticipated needs. Patient was educated on proper ostomy care. Diet was advanced to low res on 10/19.  Urology was consulted on 10/19 for a retained Campoverde despite balloon deflation, which was successfully removed by urology with no trauma noted. Ileostomy output remained high on 10/20 at 1.8 L. Condition at Discharge: {condition:61014}     Discharge instructions/Information to patient and family:   See after visit summary for information provided to patient and family. Provisions for Follow-Up Care:  See after visit summary for information related to follow-up care and any pertinent home health orders. Disposition: {Discharge Disposition:06823}          Planned Readmission: {EXAM; YES/NO:01652}    Discharge Statement   I spent *** minutes discharging the patient. This time was spent on the day of discharge. I had direct contact with the patient on the day of discharge. Additional documentation is required if more than 30 minutes were spent on discharge. Discharge Medications:  See after visit summary for reconciled discharge medications provided to patient and family.

## 2023-10-21 NOTE — NURSING NOTE
Patient showed how to inject the lovenox. Patient voiced ability to do perform lovenox shots at home.

## 2023-10-21 NOTE — DISCHARGE INSTR - AVS FIRST PAGE
Surgery Discharge Instructions    Please follow-up as instructed or on an as needed basis. If you need a follow-up appointment, please call the office when you leave to schedule an appointment. Activity:  - You may resume activity as tolerated. Return to work:    - You are clear to return to work on discharge. Diet:    - You may resume your normal diet. Medications:  - You should continue your current medication regimenafter discharge unless otherwise instructed. Please refer to your discharge medication list for further details. - Please take the pain medications as directed. - You are encouraged to use non-narcotic pain medications first and whenever possible. Reserve the use of narcotic pain medication for moderate to severe pain not controlled by non-narcotic medications.  - No driving while taking narcotic pain medications. - You may become constipated, especially if taking pain medications. You may take any over the counter stool softeners or laxatives as needed. Examples: Milk of Magnesia, Colace, Senna. Additional Instructions:  - May shower daily and/or bathe normally. - If you have any questions or concerns after discharge please call the office.  - Call office or return to ER if fever greater than 101, chills, persistent nausea/vomiting, and/or worsening/uncontrollable pain. - If ileostomy output more than 1.2L, take imodium.

## 2023-10-21 NOTE — PROGRESS NOTES
Progress Note - Colorectal Surgery   Keshav Curry 39 y.o. male MRN: [id number]  Unit/Bed#: [facility] 920-01 Encounter: [id number]    Assessment:  48-year-old male s/p laparoscopic hand-assisted proctectomy with coloanal anastomosis and loop ileostomy creation on 10/17 for rectal cancer    Plan:  - Continue low residue diet  - Continue to monitor ileostomy output   - Pain and nausea control PRN  - Encourage IS, ambulation   - PT/OT: no rehab needs   - DVT ppx   - Anticipate discharge    Subjective:  No acute events overnight. Patient denies pain, nausea, vomiting, fever, chills, shortness of breath, chest pain. Tolerating diet, ambulating. Voiding. Objective:    Vitals:   Afebrile, Stable VS on room air. Temp:  [98.3 °F (36.8 °C)-99.6 °F (37.6 °C)] 98.7 °F (37.1 °C)  HR:  [91-95] 91  Resp:  [15-23] 15  BP: (109-120)/(66-72) 120/72  Body mass index is 22.87 kg/m². Physical Exam:   Gen: NAD, Resting in bed  Neuro: A&O, No focal deficits  Head: Normal Cephalic, Atraumatic  Eye: EOMI, No scleral icterus  CV: Regular rate  Pulm: Normal work of breathing, No respiratory distress on RA  Abd: Soft, Mildly Distended, Non-Tender. Incision cdi.  Ileostomy in place with liquid stool   Ext: No edema bilateral lower extremities, Non-tender  Skin: Warm, Dry, Intact    I/O:  U.O: 325 cc +3x unmeasured  Ostomy output: 325 cc first shift + 1x unmeasured overnight    Intake/Output Summary (Last 24 hours) at 10/21/2023 0805  Last data filed at 10/21/2023 0720  Gross per 24 hour   Intake 380 ml   Output 800 ml   Net -420 ml       Lab Results:  10/21/23   Recent Labs     10/19/23  0516 10/20/23  0528 10/21/23  0526   WBC 5.30 2.69* 3.03*   HGB 8.3* 7.8* 9.0*   PLT 81* 65* 90*   SODIUM 136 136 135   K 3.8 3.5 3.8    106 103   CO2 24 24 25   BUN 13 10 8   CREATININE 0.58* 0.64 0.66   GLUC 107 108 95   CALCIUM 8.3* 8.1* 8.4   MG 1.9  --   --    PHOS 1.9*  --   --    AST 30 28  --    ALT 16 16  --    ALKPHOS 34 37  --    TBILI 1.39* 1.00  --        ---    Charlett Ahumada, MD  General Surgery PGY-I

## 2023-10-21 NOTE — PLAN OF CARE
Problem: PAIN - ADULT  Goal: Verbalizes/displays adequate comfort level or baseline comfort level  Description: Interventions:  - Encourage patient to monitor pain and request assistance  - Assess pain using appropriate pain scale  - Administer analgesics based on type and severity of pain and evaluate response  - Implement non-pharmacological measures as appropriate and evaluate response  - Consider cultural and social influences on pain and pain management  - Notify physician/advanced practitioner if interventions unsuccessful or patient reports new pain  Outcome: Progressing     Problem: INFECTION - ADULT  Goal: Absence or prevention of progression during hospitalization  Description: INTERVENTIONS:  - Assess and monitor for signs and symptoms of infection  - Monitor lab/diagnostic results  - Monitor all insertion sites, i.e. indwelling lines, tubes, and drains  - Monitor endotracheal if appropriate and nasal secretions for changes in amount and color  - [place] appropriate cooling/warming therapies per order  - Administer medications as ordered  - Instruct and encourage patient and family to use good hand hygiene technique  - Identify and instruct in appropriate isolation precautions for identified infection/condition  Outcome: Progressing  Goal: Absence of fever/infection during neutropenic period  Description: INTERVENTIONS:  - Monitor WBC    Outcome: Progressing     Problem: SAFETY ADULT  Goal: Patient will remain free of falls  Description: INTERVENTIONS:  - Educate patient/family on patient safety including physical limitations  - Instruct patient to call for assistance with activity   - Consult OT/PT to assist with strengthening/mobility   - Keep Call bell within reach  - Keep bed low and locked with side rails adjusted as appropriate  - Keep care items and personal belongings within reach  - Initiate and maintain comfort rounds  - Make Fall Risk Sign visible to staff  - Apply yellow socks and bracelet for high fall risk patients  - Consider moving patient to room near nurses station  Outcome: Progressing     Problem: DISCHARGE PLANNING  Goal: Discharge to home or other facility with appropriate resources  Description: INTERVENTIONS:  - Identify barriers to discharge w/patient and caregiver  - Arrange for needed discharge resources and transportation as appropriate  - Identify discharge learning needs (meds, wound care, etc.)  - Arrange for interpretive services to assist at discharge as needed  - Refer to Case Management Department for coordinating discharge planning if the patient needs post-hospital services based on physician/advanced practitioner order or complex needs related to functional status, cognitive ability, or social support system  Outcome: Progressing     Problem: Knowledge Deficit  Goal: Patient/family/caregiver demonstrates understanding of disease process, treatment plan, medications, and discharge instructions  Description: Complete learning assessment and assess knowledge base.   Interventions:  - Provide teaching at level of understanding  - Provide teaching via preferred learning methods  Outcome: Progressing

## 2023-10-22 PROCEDURE — 88309 TISSUE EXAM BY PATHOLOGIST: CPT | Performed by: PATHOLOGY

## 2023-10-22 NOTE — DISCHARGE SUMMARY
Discharge Summary - Colorectal Surgery   Inder Mccullough 39 y.o. male MRN: [id number]  [facility] Encounter: [id number]    Admission Date: 10/17/23  Admission Orders (From admission, onward)       Ordered        10/17/23 1532  Inpatient Admission  Once                             Discharge Date: 10/21/2023    Admitting Diagnosis: Rectal cancer ([address]) [C20]    Discharge Diagnosis: Rectal Cancer    Medical Problems       Resolved Problems  Date Reviewed: 10/21/2023   None         Attending: Dr. Imelda Kelley Physician(s): urology, GI    Procedures Performed: No orders of the defined types were placed in this encounter. Pathology: pending    Hospital Course: Patient is a 40 yo male with a PMH significant for cirrhosis, colon cancer, and fatty liver who presented to the hospital on 10/17 for low anterior resection of rectal cancer and ileostomy. One month prior the patient had chemoradiation which was tolerated well. This was preceded by a 3 month course of chemotherapy. CT scan showed reduction of lymphadenopathy and tumor size, with some retroperitoneal adenopathy in the iliac vs high mesentery. Retroperitoneal lymph nodes were not going to be removed due to not showing an enhancement in survival. Surgical risks were discussed, and patient risks of steatohepatitis and splenomegaly were noted. Laparoscopic hand-assisted proctectomy with coloanal anastomosis and loop ileostomy were completed on 10/17. The patient had no complaints following the procedure and instructed on incentive spirometry use. He was encouraged to ambulate as tolerated. The patient continued to have pain on 10/18 with no return of bowel function, but tolerating of ice chips. PT recommended increased ambulation and discharge home with no anticipated needs. Patient was educated on proper ostomy care. Diet was advanced to low res on 10/19.  Urology was consulted on 10/19 for a retained [place] despite balloon deflation, which was successfully removed by urology with no trauma noted. Ileostomy output remained high on 10/20 at 1.8 L. Decreased on 10/21, patient was cleared for discharge. Condition at Discharge: fair     Discharge instructions/Information to patient and family:   See after visit summary for information provided to patient and family. Provisions for Follow-Up Care:  See after visit summary for information related to follow-up care and any pertinent home health orders. Disposition: Home          Planned Readmission: No    Discharge Statement   I spent 20 minutes discharging the patient. This time was spent on the day of discharge. I had direct contact with the patient on the day of discharge. Additional documentation is required if more than 30 minutes were spent on discharge. Discharge Medications:  See after visit summary for reconciled discharge medications provided to patient and family.

## 2023-10-23 ENCOUNTER — HOME CARE VISIT (OUTPATIENT)
Dept: HOME HEALTH SERVICES | Facility: HOME HEALTHCARE | Age: 43
End: 2023-10-23

## 2023-10-23 NOTE — UTILIZATION REVIEW
NOTIFICATION OF ADMISSION DISCHARGE   This is a Notification of Discharge from [facility]. Please be advised that this patient has been discharge from our facility. Below you will find the admission and discharge date and time including the patient’s disposition. UTILIZATION REVIEW CONTACT:  Michael Monet  Utilization   Network Utilization Review Department  Phone: [phone] x carefully listen to the prompts. All voicemails are confidential.  Email: [email or web address]     ADMISSION INFORMATION  PRESENTATION DATE: 10/17/2023  7:35 AM  OBERVATION ADMISSION DATE:   INPATIENT ADMISSION DATE: 10/17/23  3:32 PM   DISCHARGE DATE: 10/21/2023  2:44 PM   DISPOSITION:Home with [address] [place] [address] Utilization Review Department  ATTENTION: Please call with any questions or concerns to [phone] and carefully listen to the prompts so that you are directed to the right person. All voicemails are confidential.   For Discharge needs, contact Care Management DC Support Team at [phone] opt. 2  Send all requests for admission clinical reviews, approved or denied determinations and any other requests to dedicated fax number below belonging to the campus where the patient is receiving treatment.  List of dedicated fax numbers for the Facilities:  [place] DENIALS (Administrative/Medical Necessity) [phone]   DISCHARGE SUPPORT TEAM (Network) [phone]   [address] (Maternity/NICU/Pediatrics) [phone]   [address] [facility] [address] [place] [address] [address]  [facility] [phone]

## 2023-10-25 ENCOUNTER — TELEPHONE (OUTPATIENT)
Dept: GASTROENTEROLOGY | Facility: CLINIC | Age: 43
End: 2023-10-25

## 2023-10-25 ENCOUNTER — TELEPHONE (OUTPATIENT)
Dept: HEMATOLOGY ONCOLOGY | Facility: CLINIC | Age: 43
End: 2023-10-25

## 2023-10-25 NOTE — TELEPHONE ENCOUNTER
Spoke with patient regarding his "[employer]hart message. Patient states that he is very frustrated with Dr. Doni Downey team at the moment because they keep calling to schedule an EGD that was ordered back in April. Patient states that he just got out of the hospital and he wants to make sure this is something that doesn't have to be done asap since he just was operated on. I attempted to look through the patient's chart to see if there were any notes from Dr. Doni Downey team but could not find any. I told the patient that I will reach out to him and see if there is any opinion on when he gets this done. I also told him that we will see him on 11/1, which is 2 weeks out from surgery and this is when Dr. Beau Valencia will go over the pathology. Patient states that his surgery went well and he is actually doing OK with pain management and getting back to his daily routine. Patient asked if he can still use MMJ to help him sleep at night. I told him that he can use this to help him relax/sleep at night. Patient states his feet bother him at night as well and is hoping the [facility] will work for that. I informed him to try it and if it doesn't work, he can call us and we can prescribe him something to help with the foot pain. Patient verbalized understanding and is in agreement with the plan.

## 2023-10-25 NOTE — TELEPHONE ENCOUNTER
----- Message from Mary Bond sent at 10/25/2023  3:03 PM EDT -----  Regarding: RE: EGD for banding  Spoke to patient and we discussed the procedure and he is willing to have this all done. He asked to give him til tomorrow morning.   ----- Message -----  From: Clif Estes  Sent: 10/25/2023   2:14 PM EDT  To: Mary Bond  Subject: FW: EGD for banding                                Pt would like a call from the clinical team to address questions pertaining to the banding procedure. Potential procedure date of 11/28/2023 has yet to be discussed w/ patient. Please return this item to me to schedule. Thank you.     ----- Message -----  From: Clif Estes  Sent: 10/18/2023   5:56 PM EDT  To: Clif Estes  Subject: FW: EGD for banding                                ----- Message -----  From: Heena Wilkins MD  Sent: 10/18/2023   5:22 PM EDT  To: #  Subject: EGD for banding                                  Hey schedule this airam for an outpatient EGD w/ banding. He's overdue.

## 2023-10-26 ENCOUNTER — DOCUMENTATION (OUTPATIENT)
Dept: HEMATOLOGY ONCOLOGY | Facility: CLINIC | Age: 43
End: 2023-10-26

## 2023-10-26 NOTE — PROGRESS NOTES
In-basket message received from Chayo Gonsalves RN to move patient to the [facility] on 11/16/2023 for post op discussion. Patient added to 11/16/2023 as requested, chart reviewed and prep completed at this time.

## 2023-10-27 NOTE — TELEPHONE ENCOUNTER
Spoke w/ pt. He stated as long as the doctor believes it is not necessary for the procedure to be done prior to 11/28, he is fine with that date.   The EGD is scheduled as follows:    Scheduled date of EGD(as of today): 11/28/2023  Physician performing EGD: Dr. Edilma Will   Location of EGD: [facility]  Instructions reviewed with patient by: Matt Martinez mailed 10/30/2023  Clearances: N/A

## 2023-10-27 NOTE — TELEPHONE ENCOUNTER
Pt returned [employer] call. Advised that he was scheduled for 11/28/23 for his EGD. Pt unaware and would like to discuss other possible dates. Tried calling the office and was unable to get a hold of Jay de souza. Please reach out to pt to discuss further.

## 2023-10-30 NOTE — TELEPHONE ENCOUNTER
Spoke with Pt on 10/24/2023. Offered pt 11/2, 11/10, 11/15, 12/22 and 11/28. After having trouble securing transportation and childcare, the pt accepted 11/28. Pt asked that we confirm with Dr. Julián Joshua that he is comfortable with a procedure date of 11/28 or would Dr. Julián Joshua suggest a sooner date. Destiny Cartagena will speak with Dr. Julián Joshua.

## 2023-10-31 NOTE — TELEPHONE ENCOUNTER
Pt returned [employer] call. Transferred call over to the office and Venus Mcdermott took the call.

## 2023-10-31 NOTE — TELEPHONE ENCOUNTER
Patient returned my call, states he will keep procedure on 11/28/23 with . Patient was offered Nov 2nd and Nov 15th. He declined due to . Patient states he isn't having any symptoms. Patient is aware to contact the office with any changes.

## 2023-11-01 ENCOUNTER — OFFICE VISIT (OUTPATIENT)
Age: 43
End: 2023-11-01
Payer: COMMERCIAL

## 2023-11-01 ENCOUNTER — TELEPHONE (OUTPATIENT)
Age: 43
End: 2023-11-01

## 2023-11-01 ENCOUNTER — HOSPITAL ENCOUNTER (OUTPATIENT)
Dept: INFUSION CENTER | Facility: HOSPITAL | Age: 43
Discharge: HOME | End: 2023-11-01
Payer: COMMERCIAL

## 2023-11-01 VITALS
OXYGEN SATURATION: 100 % | RESPIRATION RATE: 18 BRPM | DIASTOLIC BLOOD PRESSURE: 70 MMHG | HEART RATE: 89 BPM | BODY MASS INDEX: 22.38 KG/M2 | HEIGHT: 75 IN | WEIGHT: 180 LBS | TEMPERATURE: 98 F | SYSTOLIC BLOOD PRESSURE: 102 MMHG

## 2023-11-01 DIAGNOSIS — C20 RECTAL CANCER (HCC): Primary | ICD-10-CM

## 2023-11-01 DIAGNOSIS — C20 RECTAL CANCER (HCC): ICD-10-CM

## 2023-11-01 DIAGNOSIS — Z95.828 PORT-A-CATH IN PLACE: Primary | ICD-10-CM

## 2023-11-01 LAB
ALBUMIN SERPL BCP-MCNC: 4 G/DL (ref 3.5–5)
ALP SERPL-CCNC: 44 U/L (ref 34–104)
ALT SERPL W P-5'-P-CCNC: 19 U/L (ref 7–52)
ANION GAP SERPL CALCULATED.3IONS-SCNC: 8 MMOL/L
AST SERPL W P-5'-P-CCNC: 26 U/L (ref 13–39)
BASOPHILS # BLD AUTO: 0.02 THOUSANDS/ÂΜL (ref 0–0.1)
BASOPHILS NFR BLD AUTO: 1 % (ref 0–1)
BILIRUB SERPL-MCNC: 0.73 MG/DL (ref 0.2–1)
BUN SERPL-MCNC: 13 MG/DL (ref 5–25)
CALCIUM SERPL-MCNC: 9 MG/DL (ref 8.4–10.2)
CEA SERPL-MCNC: 0.8 NG/ML (ref 0–3)
CHLORIDE SERPL-SCNC: 106 MMOL/L (ref 96–108)
CO2 SERPL-SCNC: 21 MMOL/L (ref 21–32)
CREAT SERPL-MCNC: 0.59 MG/DL (ref 0.6–1.3)
EOSINOPHIL # BLD AUTO: 0.13 THOUSAND/ÂΜL (ref 0–0.61)
EOSINOPHIL NFR BLD AUTO: 4 % (ref 0–6)
ERYTHROCYTE [DISTWIDTH] IN BLOOD BY AUTOMATED COUNT: 13.5 % (ref 11.6–15.1)
GFR SERPL CREATININE-BSD FRML MDRD: 123 ML/MIN/1.73SQ M
GLUCOSE SERPL-MCNC: 99 MG/DL (ref 65–140)
HCT VFR BLD AUTO: 28.2 % (ref 36.5–49.3)
HGB BLD-MCNC: 9.6 G/DL (ref 12–17)
IMM GRANULOCYTES # BLD AUTO: 0.02 THOUSAND/UL (ref 0–0.2)
IMM GRANULOCYTES NFR BLD AUTO: 1 % (ref 0–2)
LYMPHOCYTES # BLD AUTO: 0.5 THOUSANDS/ÂΜL (ref 0.6–4.47)
LYMPHOCYTES NFR BLD AUTO: 15 % (ref 14–44)
MCH RBC QN AUTO: 31 PG (ref 26.8–34.3)
MCHC RBC AUTO-ENTMCNC: 34 G/DL (ref 31.4–37.4)
MCV RBC AUTO: 91 FL (ref 82–98)
MONOCYTES # BLD AUTO: 0.35 THOUSAND/ÂΜL (ref 0.17–1.22)
MONOCYTES NFR BLD AUTO: 11 % (ref 4–12)
NEUTROPHILS # BLD AUTO: 2.26 THOUSANDS/ÂΜL (ref 1.85–7.62)
NEUTS SEG NFR BLD AUTO: 68 % (ref 43–75)
NRBC BLD AUTO-RTO: 0 /100 WBCS
PLATELET # BLD AUTO: 137 THOUSANDS/UL (ref 149–390)
PMV BLD AUTO: 8.4 FL (ref 8.9–12.7)
POTASSIUM SERPL-SCNC: 3.6 MMOL/L (ref 3.5–5.3)
PROT SERPL-MCNC: 7 G/DL (ref 6.4–8.4)
RBC # BLD AUTO: 3.1 MILLION/UL (ref 3.88–5.62)
SODIUM SERPL-SCNC: 135 MMOL/L (ref 135–147)
WBC # BLD AUTO: 3.28 THOUSAND/UL (ref 4.31–10.16)

## 2023-11-01 PROCEDURE — 82378 CARCINOEMBRYONIC ANTIGEN: CPT

## 2023-11-01 PROCEDURE — 80053 COMPREHEN METABOLIC PANEL: CPT

## 2023-11-01 PROCEDURE — 99214 OFFICE O/P EST MOD 30 MIN: CPT | Performed by: INTERNAL MEDICINE

## 2023-11-01 PROCEDURE — 85025 COMPLETE CBC W/AUTO DIFF WBC: CPT

## 2023-11-01 NOTE — PROGRESS NOTES
Pt's port accessed using sterile technique. Port flushed, blood return noted and port flushed again. Port de-accessed and band-aid applied. Pt ambulated with a steady gait off unit.  Declined AVS

## 2023-11-04 NOTE — PROGRESS NOTES
HEMATOLOGY / [facility] FOLLOW UP NOTE    Primary Care Provider: Tamara Lucio DO  Referring Provider:    MRN: [id number]  : 1980    Reason for Encounter: follow up rectal cancer       Oncology History Overview Note    2023 - [id number] rectal adenocarcinoma     3/9/2023 - start neoadjuvant FOLFOX     2023 - start 5-FU/RT     2023 - 5-FU held due thrombocytopenia    10/17/2023 - LAPAROSCOPIC HAND ASSIST PROCTECTOMY. ABDOMINAL APPROACH. coloanal anastomosis (very low pull through). loop ileostomy    pT3N1b     Rectal cancer ([address])   2023 Biopsy    Final Diagnosis   A. Large Intestine, Descending Colon, polyp:    - Tubular adenoma. - Negative for high grade dysplasia. B. Rectum, mass:   -  Adenocarcinoma. See comment. Comment: Immunohistochemical stains performed with adequate controls demonstrates that the tumor is positive for CDX2, SATB2, CK20 (patchy), and negative for CK7, Synaptophysin, Chromogranin A, and p40. The immunophenotype supports the diagnosis.  Ancillary testing for mismatch repair (MMR) protein deficiency by immunohistochemical panel (MLH1, PMS2, MSH2, MSH6) is undertaken (Block B1); the results will be issued in a supplemental report, to follow shortly     Addendum   RESULTS OF IMMUNOHISTOCHEMICAL ANALYSIS FOR MISMATCH REPAIR PROTEIN LOSS     INTERPRETATION: NO LOSS OF NUCLEAR EXPRESSION OF MMR PROTEINS: LOW PROBABILITY OF MSI-H.     RESULTS:  Antibody            Clone                 Description                                  Results  MLH1                 [id number]          Mismatch repair protein               Intact nuclear expression  MSH2                [id number]        Mismatch repair protein               Intact nuclear expression  MSH6                40                      Mismatch repair protein               Intact nuclear expression  PMS2                 [id number]           Mismatch repair protein               Intact nuclear expression     Comment: A negative control and a positive control for each antibody have been reviewed and accepted        2/27/2023 Initial Diagnosis    Rectal cancer ([address])     3/2/2023 -  Cancer Staged    Staging form: Colon and Rectum, AJCC 8th Edition  - Clinical: cT3, cN2, cM0 - Signed by Richa Soto DO on 3/2/2023       3/9/2023 - 6/2/2023 Chemotherapy    alteplase (CATHFLO), 2 mg, Intracatheter, Every 1 Minute as needed, 6 of 10 cycles  palonosetron (ALOXI), 0.25 mg, Intravenous, Once, 1 of 2 cycles  Administration: 0.25 mg (5/31/2023)  pegfilgrastim (NEULASTA), 6 mg, Subcutaneous, Once, 1 of 1 cycle  Pegfilgrastim-bmez (ZIEXTENZO), 6 mg, Subcutaneous, Once, 4 of 8 cycles  Administration: 6 mg (4/21/2023), 6 mg (5/6/2023), 6 mg (5/22/2023), 6 mg (6/2/2023)  fluorouracil (ADRUCIL), 400 mg/m2 = 915 mg, Intravenous, Once, 6 of 10 cycles  Administration: 915 mg (3/9/2023), 915 mg (3/22/2023), 915 mg (4/19/2023), 915 mg (5/4/2023), 915 mg (5/17/2023), 915 mg (5/31/2023)  leucovorin calcium IVPB, 916 mg, Intravenous, Once, 6 of 10 cycles  Administration: 900 mg (3/9/2023), 900 mg (3/22/2023), 900 mg (4/19/2023), 900 mg (5/4/2023), 900 mg (5/17/2023), 900 mg (5/31/2023)  oxaliplatin (ELOXATIN) chemo infusion, 194.65 mg, Intravenous, Once, 6 of 10 cycles  Administration: 200 mg (3/9/2023), 200 mg (3/22/2023), 200 mg (4/19/2023), 200 mg (5/4/2023), 200 mg (5/17/2023), 200 mg (5/31/2023)  fluorouracil (ADRUCIL) ambulatory infusion Soln, 1,200 mg/m2/day = 5,495 mg, Intravenous, Over 46 hours, 6 of 10 cycles  aprepitant (CINVANTI) in  mL IVPB, 130 mg, Intravenous, Once, 1 of 2 cycles  Administration: 130 mg (5/31/2023)     6/21/2023 - 8/1/2023 Radiation      Plan ID Energy Fractions Dose per Fraction (cGy) Dose Correction (cGy) Total Dose Delivered (cGy) Elapsed Days   CD Rectum 6X 3 / 3 180 0 540 4   Whole Pelvis 6X 25 / 25 180 0 4,500 36      6/26/2023 - 7/28/2023 Chemotherapy    alteplase (CATHFLO), 2 mg, Intracatheter, Every 1 Minute as needed, 4 of 4 cycles  fluorouracil (ADRUCIL) ambulatory infusion Soln, 200 mg/m2/day = 2,190 mg (88.9 % of original dose 225 mg/m2/day), Intravenous, Over 120 hours, 4 of 4 cycles  Dose modification: 200 mg/m2/day (original dose 225 mg/m2/day, Cycle 1, Reason: Anticipated Tolerance)     10/17/2023 Surgery    Laparoscopic hand-assisted proctectomy with coloanal anastomosis and loop ileostomy    Final Diagnosis  A. Rectum, sigmoid colon, and two donuts, low anterior resection:  -   Invasive adenocarcinoma of rectum, moderately differentiated, with treatment effect.  -   Separate segments of colon/rectum (anastomotic donuts), negative for malignancy. -   See synoptic report. -   MMR (MLH1, MSH2, MSH6 and PMS2) studies by [facility] on biopsy specimen [id number] show intact protein expression. Interval History: Patient presents for follow up of rectal cancer. He is status post surgical resection and does have an ostomy bag. He did have residual disease at surgery for a pathologic stage of PT3N1B. Unfortunately after oxaliplatin he has persistent numbness of his fingers to the first knuckle and to the mid part of his foot. He does not feel confident when working. 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 He is adjusting to his ostomy bag well. He denies any pain. No bleeding. REVIEW OF SYSTEMS:  Please note that a 14-point review of systems was performed to include Constitutional, HEENT, Respiratory, CVS, GI, , Musculoskeletal, Integumentary, Neurologic, Rheumatologic, Endocrinologic, Psychiatric, Lymphatic, and Hematologic/Oncologic systems were reviewed and are negative unless otherwise stated in HPI. Positive and negative findings pertinent to this evaluation are incorporated into the history of present illness.       ECOG PS: 0    PROBLEM LIST:  Patient Active Problem List   Diagnosis    Rectal wall thickening Transaminitis/Hepatitic Steatosis    Rectal cancer (HCC)    Chemotherapy-induced neutropenia     Port-A-Cath in place       Assessment / Plan: 43-year-old male with a PT3N1B rectal cancer now status post total neoadjuvant therapy and surgery. 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 I would support this fully because of his difficulties of climbing ladders and using his hands at his job. He is planning on having his ostomy reversed in about 3 months with Dr. Rodrigo Isabel. We are to keep his port in for now. We will flush that every 2 months and I will see him back in follow-up in 3 months with a CT scan, CBC CMP and CEA prior. He knows to call me in the interim with any questions or concerns. I spent 30 minutes on chart review, face to face counseling time, coordination of care and documentation. Past Medical History:   has a past medical history of Cirrhosis ([address]) (3/18/24), Colon cancer ([address]), Dental infection, Fatty liver, and Rectal cancer ([address] PAST SURGICAL HISTORY:   has a past surgical history that includes Appendectomy; Dental surgery; IR port placement (02/28/2023); Colonoscopy; Upper gastrointestinal endoscopy; pr laps proctectomy combined pull-thru w/reservoir (N/A, 10/17/2023); and pr sigmoidoscopy flx dx w/collj spec br/wa if pfrmd (N/A, 10/17/2023). CURRENT MEDICATIONS  Current Outpatient Medications   Medication Sig Dispense Refill    enoxaparin (LOVENOX) 40 mg/0.4 mL Inject 0.4 mL (40 mg total) under the skin daily for 28 days Do not start before October 22, 2023. 11.2 mL 0    ondansetron (ZOFRAN) 8 mg tablet Take 1 tablet (8 mg total) by mouth every 8 (eight) hours as needed for nausea or vomiting 30 tablet 2    pyridoxine (VITAMIN B6) 50 mg tablet Take 50 mg by mouth daily       No current facility-administered medications for this visit. [unfilled]    SOCIAL HISTORY:   reports that he quit smoking about 3 years ago.  His smoking use included cigarettes. He has a 20.00 pack-year smoking history. He has never used smokeless tobacco. He reports that he does not currently use alcohol after a past usage of about 2.0 standard drinks of alcohol per week. He reports that he does not currently use drugs after having used the following drugs: Marijuana. FAMILY HISTORY:  family history includes Cancer in his father and mother; Lung cancer in his father and mother. ALLERGIES:  has No Known Allergies. Physical Exam:  Vital Signs:   Visit Vitals  /70 (BP Location: Left arm, Patient Position: Sitting, Cuff Size: Standard)   Pulse 89   Temp 98 °F (36.7 °C) (Temporal)   Resp 18   Ht 6' 3" (1.905 m)   Wt 81.6 kg (180 lb)   SpO2 100%   BMI 22.50 kg/m²   Smoking Status Former   BSA 2.1 m²     Body mass index is 22.5 kg/m². Body surface area is 2.1 meters squared. GEN: Alert, awake oriented x3, in no acute distress  HEENT- No pallor, icterus, cyanosis, no oral mucosal lesions,   LAD - no palpable cervical, clavicle, axillary, inguinal LAD  Heart- normal S1 S2, regular rate and rhythm, No murmur, rubs.    Lungs- clear breathing sound bilateral.   Abdomen- soft, Non tender, bowel sounds present, ostomy c/d/i  Extremities- No cyanosis, clubbing, edema  Neuro- No focal neurological deficit    Labs:  Lab Results   Component Value Date    WBC 3.28 (L) 11/01/2023    HGB 9.6 (L) 11/01/2023    HCT 28.2 (L) 11/01/2023    MCV 91 11/01/2023     (L) 11/01/2023     Lab Results   Component Value Date    SODIUM 135 11/01/2023    K 3.6 11/01/2023     11/01/2023    CO2 21 11/01/2023    AGAP 8 11/01/2023    BUN 13 11/01/2023    CREATININE 0.59 (L) 11/01/2023    GLUC 99 11/01/2023    GLUF 121 (H) 03/06/2023    CALCIUM 9.0 11/01/2023    AST 26 11/01/2023    ALT 19 11/01/2023    ALKPHOS 44 11/01/2023    TP 7.0 11/01/2023    TBILI 0.73 11/01/2023    EGFR 123 11/01/2023

## 2023-11-08 ENCOUNTER — RADIATION ONCOLOGY FOLLOW-UP (OUTPATIENT)
Facility: HOSPITAL | Age: 43
End: 2023-11-08
Payer: COMMERCIAL

## 2023-11-08 ENCOUNTER — CLINICAL SUPPORT (OUTPATIENT)
Facility: HOSPITAL | Age: 43
End: 2023-11-08
Attending: RADIOLOGY
Payer: COMMERCIAL

## 2023-11-08 VITALS
HEART RATE: 95 BPM | RESPIRATION RATE: 20 BRPM | OXYGEN SATURATION: 99 % | WEIGHT: 179.8 LBS | TEMPERATURE: 98.2 F | BODY MASS INDEX: 22.47 KG/M2 | DIASTOLIC BLOOD PRESSURE: 60 MMHG | SYSTOLIC BLOOD PRESSURE: 100 MMHG

## 2023-11-08 DIAGNOSIS — C20 RECTAL CANCER (HCC): Primary | ICD-10-CM

## 2023-11-08 PROCEDURE — 99211 OFF/OP EST MAY X REQ PHY/QHP: CPT | Performed by: RADIOLOGY

## 2023-11-08 PROCEDURE — 99024 POSTOP FOLLOW-UP VISIT: CPT | Performed by: RADIOLOGY

## 2023-11-08 NOTE — PROGRESS NOTES
Kamryn Stroud 1980 is a 39 y.o. male With stage IIIC [id number] rectal adenocarcinoma. He completed neoadjuvant chemotherapy and elected to pursue neoadjuvant chemoradiation prior to planned surgical resection. This was completed 8/1/23. He returns today for follow-up.        8/21/23 CT C/A/P-  -Interval improvement of asymmetric rectal wall thickening, perirectal and retroperitoneal lymph nodes.  -Interval improvement in the appearance of the liver. However, evidence of portal hypertension persist including varices and splenomegaly.  -Incidental mild wall thickening of the distal ileum. Cannot exclude ileitis in the appropriate clinical situation. 9/11/23 MRI pelvis-  -Again seen is mid rectal cancer, with markedly decreased volume compared to the pretreatment MR, with residual tissues T2 hypointense consistent with scar. Also, there has been significant improvement in mesorectal adenopathy. -SINCE 2/23/2023, POST TREATMENT PRIMARY TUMOR ASSESSMENT: Complete/near complete response. (Please note this imaging appearance does not rule out small volume residual viable tumor.)  -SUSPICIOUS MESORECTAL LYMPH NODES: Yes. Yes there remains a a 9 mm superior rectal node (series 5 image 1) and a 5 mm right posterolateral mesorectal node (series 5 image 22.)  -SUSPICIOUS EXTRAMESORECTAL LYMPH NODES: No.        10/17/23 Laparoscopic hand-assisted proctectomy with coloanal anastomosis and loop ileostomy with Dr. Ailyn Coronel  Final Diagnosis  A. Rectum, sigmoid colon, and two donuts, low anterior resection:  -   Invasive adenocarcinoma of rectum, moderately differentiated, with treatment effect.  -   Separate segments of colon/rectum (anastomotic donuts), negative for malignancy. -   See synoptic report. -   MMR (MLH1, MSH2, MSH6 and PMS2) studies by [facility] on biopsy specimen [id number] show intact protein expression.         10/21/23 d/c from the hospital     11/1/23 Dr. 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 He is planning on ostomy reversal in about 3 months. Keep port for now. F/u in 3 months with imaging and bloodwork        Upcomin/15/23 Dr. Delores Decker  23 EGD with banding  24 Dr. Vernon Solares    Follow up visit     Oncology History   Rectal cancer ([address])   2023 Biopsy    Final Diagnosis   A. Large Intestine, Descending Colon, polyp:    - Tubular adenoma. - Negative for high grade dysplasia. B. Rectum, mass:   -  Adenocarcinoma. See comment. Comment: Immunohistochemical stains performed with adequate controls demonstrates that the tumor is positive for CDX2, SATB2, CK20 (patchy), and negative for CK7, Synaptophysin, Chromogranin A, and p40. The immunophenotype supports the diagnosis.  Ancillary testing for mismatch repair (MMR) protein deficiency by immunohistochemical panel (MLH1, PMS2, MSH2, MSH6) is undertaken (Block B1); the results will be issued in a supplemental report, to follow shortly     Addendum   RESULTS OF IMMUNOHISTOCHEMICAL ANALYSIS FOR MISMATCH REPAIR PROTEIN LOSS     INTERPRETATION: NO LOSS OF NUCLEAR EXPRESSION OF MMR PROTEINS: LOW PROBABILITY OF MSI-H.     RESULTS:  Antibody            Clone                 Description                                  Results  MLH1                 [id number]          Mismatch repair protein               Intact nuclear expression  MSH2                [id number]        Mismatch repair protein               Intact nuclear expression  MSH6                40                      Mismatch repair protein               Intact nuclear expression  PMS2                 [id number]           Mismatch repair protein               Intact nuclear expression     Comment:   A negative control and a positive control for each antibody have been reviewed and accepted        2023 Initial Diagnosis    Rectal cancer ([address])     3/2/2023 -  Cancer Staged    Staging form: Colon and Rectum, AJCC 8th Edition  - Clinical: cT3, cN2, cM0 - Signed by Sonja Cid DO on 3/2/2023       3/9/2023 - 6/2/2023 Chemotherapy    alteplase (CATHFLO), 2 mg, Intracatheter, Every 1 Minute as needed, 6 of 10 cycles  palonosetron (ALOXI), 0.25 mg, Intravenous, Once, 1 of 2 cycles  Administration: 0.25 mg (5/31/2023)  pegfilgrastim (NEULASTA), 6 mg, Subcutaneous, Once, 1 of 1 cycle  Pegfilgrastim-bmez (ZIEXTENZO), 6 mg, Subcutaneous, Once, 4 of 8 cycles  Administration: 6 mg (4/21/2023), 6 mg (5/6/2023), 6 mg (5/22/2023), 6 mg (6/2/2023)  fluorouracil (ADRUCIL), 400 mg/m2 = 915 mg, Intravenous, Once, 6 of 10 cycles  Administration: 915 mg (3/9/2023), 915 mg (3/22/2023), 915 mg (4/19/2023), 915 mg (5/4/2023), 915 mg (5/17/2023), 915 mg (5/31/2023)  leucovorin calcium IVPB, 916 mg, Intravenous, Once, 6 of 10 cycles  Administration: 900 mg (3/9/2023), 900 mg (3/22/2023), 900 mg (4/19/2023), 900 mg (5/4/2023), 900 mg (5/17/2023), 900 mg (5/31/2023)  oxaliplatin (ELOXATIN) chemo infusion, 194.65 mg, Intravenous, Once, 6 of 10 cycles  Administration: 200 mg (3/9/2023), 200 mg (3/22/2023), 200 mg (4/19/2023), 200 mg (5/4/2023), 200 mg (5/17/2023), 200 mg (5/31/2023)  fluorouracil (ADRUCIL) ambulatory infusion Soln, 1,200 mg/m2/day = 5,495 mg, Intravenous, Over 46 hours, 6 of 10 cycles  aprepitant (CINVANTI) in  mL IVPB, 130 mg, Intravenous, Once, 1 of 2 cycles  Administration: 130 mg (5/31/2023)     6/21/2023 - 8/1/2023 Radiation      Plan ID Energy Fractions Dose per Fraction (cGy) Dose Correction (cGy) Total Dose Delivered (cGy) Elapsed Days   CD Rectum 6X 3 / 3 180 0 540 4   Whole Pelvis 6X 25 / 25 180 0 4,500 36      6/26/2023 - 7/28/2023 Chemotherapy    alteplase (CATHFLO), 2 mg, Intracatheter, Every 1 Minute as needed, 4 of 4 cycles  fluorouracil (ADRUCIL) ambulatory infusion Soln, 200 mg/m2/day = 2,190 mg (88.9 % of original dose 225 mg/m2/day), Intravenous, Over 120 hours, 4 of 4 cycles  Dose modification: 200 mg/m2/day (original dose 225 mg/m2/day, Cycle 1, Reason: Anticipated Tolerance)     10/17/2023 Surgery    Laparoscopic hand-assisted proctectomy with coloanal anastomosis and loop ileostomy    Final Diagnosis  A. Rectum, sigmoid colon, and two donuts, low anterior resection:  -   Invasive adenocarcinoma of rectum, moderately differentiated, with treatment effect.  -   Separate segments of colon/rectum (anastomotic donuts), negative for malignancy. -   See synoptic report. -   MMR (MLH1, MSH2, MSH6 and PMS2) studies by [facility] on biopsy specimen [id number] show intact protein expression. Review of Systems:  Review of Systems   Constitutional: Negative. HENT:  Positive for rhinorrhea. Eyes: Negative. Respiratory: Negative. Cardiovascular: Negative. Gastrointestinal: Negative. Endocrine: Positive for cold intolerance and heat intolerance. Genitourinary: Negative. Musculoskeletal:         Neuropathy to hands and feet   Skin: Negative. Allergic/Immunologic: Positive for environmental allergies. Psychiatric/Behavioral: Negative.          Clinical Trial: no          Teaching yes    Health Maintenance   Topic Date Due    COVID-19 Vaccine (1) Never done    Pneumococcal Vaccine: Pediatrics (0 to 5 Years) and At-Risk Patients (6 to 59 Years) (1 - PCV) Never done    HIV Screening  Never done    Annual Physical  Never done    Hepatitis A Vaccine (1 of 2 - Risk 2-dose series) Never done    DTaP,Tdap,and Td Vaccines (1 - Tdap) Never done    Influenza Vaccine (1) Never done    Colorectal Cancer Screening  02/17/2024    Depression Screening  05/24/2024    BMI: Adult  11/01/2024    Hepatitis B Vaccine (1 of 3 - Risk 3-dose series) 06/04/2039    Hepatitis C Screening  Completed    HIB Vaccine  Aged Out    IPV Vaccine  Aged Out    Meningococcal ACWY Vaccine  Aged Out    HPV Vaccine  Aged Out     Patient Active Problem List   Diagnosis    Rectal wall thickening    Transaminitis/Hepatitic Steatosis    Rectal cancer ([address])    Chemotherapy-induced neutropenia     Port-A-Cath in place     Past Medical History:   Diagnosis Date    Cirrhosis ([address] 3/18/24    Colon cancer ([address])     Dental infection     Fatty liver     Rectal cancer [facility])      Past Surgical History:   Procedure Laterality Date    APPENDECTOMY      COLONOSCOPY      DENTAL SURGERY      IR PORT PLACEMENT  02/28/2023    [place] LAPS PROCTECTOMY COMBINED PULL-THRU W/RESERVOIR N/A 10/17/2023    Procedure: LAPAROSCOPIC HAND ASSIST PROCTECTOMY, ABDOMINAL APPROACH, coloanal anastomosis, loop ileostomy;  Surgeon: Concha Atwood MD;  Location: [facility];  Service: Colorectal    NY SIGMOIDOSCOPY FLX DX W/COLLJ SPEC BR/WA IF PFRMD N/A 10/17/2023    Procedure: Salvador Lainez;  Surgeon: Concha Atwood MD;  Location: [facility];  Service: Colorectal    UPPER GASTROINTESTINAL ENDOSCOPY       Family History   Problem Relation Age of Onset    Lung cancer Mother     Cancer Mother         Lung    Lung cancer Father     Cancer Father         Mouth throat    Colon cancer Neg Hx     Colon polyps Neg Hx      Social History     Socioeconomic History    Marital status: Single     Spouse name: Not on file    Number of children: Not on file    Years of education: Not on file    Highest education level: Not on file   Occupational History    Not on file   Tobacco Use    Smoking status: Former     Packs/day: 1.00     Years: 20.00     Total pack years: 20.00     Types: Cigarettes     Quit date: 1/1/2020     Years since quitting: 3.8    Smokeless tobacco: Never   Vaping Use    Vaping Use: Every day    Substances: Nicotine, Flavoring   Substance and Sexual Activity    Alcohol use: Not Currently     Alcohol/week: 2.0 standard drinks of alcohol     Types: 2 Cans of beer per week     Comment: 1-2 daily.  previously up to 1 case/day (reduced alcohol intake 7 years ago)    Drug use: Not Currently     Types: Marijuana    Sexual activity: Not Currently   Other Topics Concern    Not on file   Social History Narrative    Not on file     Social Determinants of Health     Financial Resource Strain: Not on file   Food Insecurity: No Food Insecurity (10/18/2023)    Hunger Vital Sign     Worried About Running Out of Food in the Last Year: Never true     Ran Out of Food in the Last Year: Never true   Transportation Needs: No Transportation Needs (10/18/2023)    PRAPARE - Transportation     Lack of Transportation (Medical): No     Lack of Transportation (Non-Medical):  No   Physical Activity: Not on file   Stress: Not on file   Social Connections: Not on file   Intimate Partner Violence: Not on file   Housing Stability: Low Risk  (10/18/2023)    Housing Stability Vital Sign     Unable to Pay for Housing in the Last Year: No     Number of Places Lived in the Last Year: 1     Unstable Housing in the Last Year: No       Current Outpatient Medications:     enoxaparin (LOVENOX) 40 mg/0.4 mL, Inject 0.4 mL (40 mg total) under the skin daily for 28 days Do not start before October 22, 2023., Disp: 11.2 mL, Rfl: 0    ondansetron (ZOFRAN) 8 mg tablet, Take 1 tablet (8 mg total) by mouth every 8 (eight) hours as needed for nausea or vomiting, Disp: 30 tablet, Rfl: 2    pyridoxine (VITAMIN B6) 50 mg tablet, Take 50 mg by mouth daily, Disp: , Rfl:   No Known Allergies  Vitals:    11/08/23 1408   BP: 100/60   Pulse: 95   Resp: 20   Temp: 98.2 °F (36.8 °C)   SpO2: 99%   Weight: 81.6 kg (179 lb 12.8 oz)

## 2023-11-08 NOTE — PROGRESS NOTES
Follow-up - Radiation Oncology   Husam Pritchett 1980 39 y.o. male [id number]      History of Present Illness   Cancer Staging   Rectal cancer [facility])  Staging form: Colon and Rectum, AJCC 8th Edition  - Clinical: cT3, cN2, cM0 - Signed by Coco López DO on 3/2/2023  - Pathologic stage from 10/17/2023: Stage IIIB (ypT3, pN1b, cM0) - Signed by Jean Kim MD on 11/8/2023  Stage prefix: Post-therapy  Response to neoadjuvant therapy: Partial response      Husam Pritchett is a 39 y.o. male with recently diagnosed stage IIIC [id number] rectal adenocarcinoma. He completed total neoadjuvant treatment with FOLFOX followed by long course chemoradiation. He has now undergone TME and was found to have [id number] disease. CEA has normalized. CT imaging has been ordered by medical oncology. Interval History:  8/21/23 CT C/A/P-  -Interval improvement of asymmetric rectal wall thickening, perirectal and retroperitoneal lymph nodes.  -Interval improvement in the appearance of the liver. However, evidence of portal hypertension persist including varices and splenomegaly.  -Incidental mild wall thickening of the distal ileum. Cannot exclude ileitis in the appropriate clinical situation. 9/11/23 MRI pelvis-  -Again seen is mid rectal cancer, with markedly decreased volume compared to the pretreatment MR, with residual tissues T2 hypointense consistent with scar. Also, there has been significant improvement in mesorectal adenopathy. -SINCE 2/23/2023, POST TREATMENT PRIMARY TUMOR ASSESSMENT: Complete/near complete response. (Please note this imaging appearance does not rule out small volume residual viable tumor.)  -SUSPICIOUS MESORECTAL LYMPH NODES: Yes.  Yes there remains a a 9 mm superior rectal node (series 5 image 1) and a 5 mm right posterolateral mesorectal node (series 5 image 22.)  -SUSPICIOUS EXTRAMESORECTAL LYMPH NODES: No.        10/17/23 Laparoscopic hand-assisted proctectomy with coloanal anastomosis and loop ileostomy with Dr. Rosalind Day  Final Diagnosis  A. Rectum, sigmoid colon, and two donuts, low anterior resection:  -   Invasive adenocarcinoma of rectum, moderately differentiated, with treatment effect.  -   Separate segments of colon/rectum (anastomotic donuts), negative for malignancy. -   See synoptic report. -   MMR (MLH1, MSH2, MSH6 and PMS2) studies by [facility] on biopsy specimen [id number] show intact protein expression. 10/21/23 d/c from the hospital     23 Dr. 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 He is planning on ostomy reversal in about 3 months. Keep port for now. F/u in 3 months with imaging and bloodwork        Upcomin/15/23 Dr. Rosalind Day  23 EGD with banding  24 Dr. Sindy Gao    Upon interview, he feels well. He denies issues or concerns with ostomy. He denies abdominal cramping or pain. He has continued neuropathy. He denies urinary difficulty or irritation. He has improved appetite. He is without additional acute concerns. Historical Information   Oncology History   Rectal cancer ([address])   2023 Biopsy    Final Diagnosis   A. Large Intestine, Descending Colon, polyp:    - Tubular adenoma. - Negative for high grade dysplasia. B. Rectum, mass:   -  Adenocarcinoma. See comment. Comment: Immunohistochemical stains performed with adequate controls demonstrates that the tumor is positive for CDX2, SATB2, CK20 (patchy), and negative for CK7, Synaptophysin, Chromogranin A, and p40. The immunophenotype supports the diagnosis.  Ancillary testing for mismatch repair (MMR) protein deficiency by immunohistochemical panel (MLH1, PMS2, MSH2, MSH6) is undertaken (Block B1); the results will be issued in a supplemental report, to follow shortly     Addendum   RESULTS OF IMMUNOHISTOCHEMICAL ANALYSIS FOR MISMATCH REPAIR PROTEIN LOSS     INTERPRETATION: NO LOSS OF NUCLEAR EXPRESSION OF MMR PROTEINS: LOW PROBABILITY OF MSI-H.     RESULTS:  Antibody            Clone                 Description                                  Results  MLH1                 [id number]          Mismatch repair protein               Intact nuclear expression  MSH2                [id number]        Mismatch repair protein               Intact nuclear expression  MSH6                40                      Mismatch repair protein               Intact nuclear expression  PMS2                 [id number]           Mismatch repair protein               Intact nuclear expression     Comment:   A negative control and a positive control for each antibody have been reviewed and accepted        2/27/2023 Initial Diagnosis    Rectal cancer ([address])     3/2/2023 -  Cancer Staged    Staging form: Colon and Rectum, AJCC 8th Edition  - Clinical: cT3, cN2, cM0 - Signed by Ino Longo DO on 3/2/2023       3/9/2023 - 6/2/2023 Chemotherapy    alteplase (CATHFLO), 2 mg, Intracatheter, Every 1 Minute as needed, 6 of 10 cycles  palonosetron (ALOXI), 0.25 mg, Intravenous, Once, 1 of 2 cycles  Administration: 0.25 mg (5/31/2023)  pegfilgrastim (Surekha Persons), 6 mg, Subcutaneous, Once, 1 of 1 cycle  Pegfilgrastim-bmez (ZIEXTENZO), 6 mg, Subcutaneous, Once, 4 of 8 cycles  Administration: 6 mg (4/21/2023), 6 mg (5/6/2023), 6 mg (5/22/2023), 6 mg (6/2/2023)  fluorouracil (ADRUCIL), 400 mg/m2 = 915 mg, Intravenous, Once, 6 of 10 cycles  Administration: 915 mg (3/9/2023), 915 mg (3/22/2023), 915 mg (4/19/2023), 915 mg (5/4/2023), 915 mg (5/17/2023), 915 mg (5/31/2023)  leucovorin calcium IVPB, 916 mg, Intravenous, Once, 6 of 10 cycles  Administration: 900 mg (3/9/2023), 900 mg (3/22/2023), 900 mg (4/19/2023), 900 mg (5/4/2023), 900 mg (5/17/2023), 900 mg (5/31/2023)  oxaliplatin (ELOXATIN) chemo infusion, 194.65 mg, Intravenous, Once, 6 of 10 cycles  Administration: 200 mg (3/9/2023), 200 mg (3/22/2023), 200 mg (4/19/2023), 200 mg (5/4/2023), 200 mg (5/17/2023), 200 mg (5/31/2023)  fluorouracil (ADRUCIL) ambulatory infusion Soln, 1,200 mg/m2/day = 5,495 mg, Intravenous, Over 46 hours, 6 of 10 cycles  aprepitant (CINVANTI) in  mL IVPB, 130 mg, Intravenous, Once, 1 of 2 cycles  Administration: 130 mg (5/31/2023)     6/21/2023 - 8/1/2023 Radiation      Plan ID Energy Fractions Dose per Fraction (cGy) Dose Correction (cGy) Total Dose Delivered (cGy) Elapsed Days   CD Rectum 6X 3 / 3 180 0 540 4   Whole Pelvis 6X 25 / 25 180 0 4,500 36      6/26/2023 - 7/28/2023 Chemotherapy    alteplase (CATHFLO), 2 mg, Intracatheter, Every 1 Minute as needed, 4 of 4 cycles  fluorouracil (ADRUCIL) ambulatory infusion Soln, 200 mg/m2/day = 2,190 mg (88.9 % of original dose 225 mg/m2/day), Intravenous, Over 120 hours, 4 of 4 cycles  Dose modification: 200 mg/m2/day (original dose 225 mg/m2/day, Cycle 1, Reason: Anticipated Tolerance)     10/17/2023 Surgery    Laparoscopic hand-assisted proctectomy with coloanal anastomosis and loop ileostomy    Final Diagnosis  A. Rectum, sigmoid colon, and two donuts, low anterior resection:  -   Invasive adenocarcinoma of rectum, moderately differentiated, with treatment effect.  -   Separate segments of colon/rectum (anastomotic donuts), negative for malignancy. -   See synoptic report. -   MMR (MLH1, MSH2, MSH6 and PMS2) studies by [facility] on biopsy specimen [id number] show intact protein expression.      10/17/2023 -  Cancer Staged    Staging form: Colon and Rectum, AJCC 8th Edition  - Pathologic stage from 10/17/2023: Stage IIIB (ypT3, pN1b, cM0) - Signed by Nati Sim MD on 11/8/2023  Stage prefix: Post-therapy  Response to neoadjuvant therapy: Partial response           Past Medical History:   Diagnosis Date    Cirrhosis ([address]) 3/18/24    Colon cancer ([address])     Dental infection     Fatty liver     Rectal cancer [facility])      Past Surgical History:   Procedure Laterality Date    APPENDECTOMY      COLONOSCOPY      DENTAL SURGERY IR PORT PLACEMENT  02/28/2023    SC LAPS PROCTECTOMY COMBINED PULL-THRU W/RESERVOIR N/A 10/17/2023    Procedure: LAPAROSCOPIC HAND ASSIST PROCTECTOMY, ABDOMINAL APPROACH, coloanal anastomosis, loop ileostomy;  Surgeon: Christianne Jenkins MD;  Location: [facility];  Service: Colorectal    SC SIGMOIDOSCOPY FLX DX W/COLLJ SPEC BR/WA IF PFRMD N/A 10/17/2023    Procedure: Aleida Lira;  Surgeon: Christianne Jenkins MD;  Location: [facility];  Service: Colorectal    UPPER GASTROINTESTINAL ENDOSCOPY         Social History   Social History     Substance and Sexual Activity   Alcohol Use Not Currently    Alcohol/week: 2.0 standard drinks of alcohol    Types: 2 Cans of beer per week    Comment: 1-2 daily. previously up to 1 case/day (reduced alcohol intake 7 years ago)     Social History     Substance and Sexual Activity   Drug Use Not Currently    Types: Marijuana     Social History     Tobacco Use   Smoking Status Former    Packs/day: 1.00    Years: 20.00    Total pack years: 20.00    Types: Cigarettes    Quit date: 1/1/2020    Years since quitting: 3.8   Smokeless Tobacco Never         Meds/Allergies     Current Outpatient Medications:     enoxaparin (LOVENOX) 40 mg/0.4 mL, Inject 0.4 mL (40 mg total) under the skin daily for 28 days Do not start before October 22, 2023., Disp: 11.2 mL, Rfl: 0    ondansetron (ZOFRAN) 8 mg tablet, Take 1 tablet (8 mg total) by mouth every 8 (eight) hours as needed for nausea or vomiting, Disp: 30 tablet, Rfl: 2    pyridoxine (VITAMIN B6) 50 mg tablet, Take 50 mg by mouth daily, Disp: , Rfl:   No Known Allergies      Review of Systems  Constitutional: Negative. HENT:  Positive for rhinorrhea. Eyes: Negative. Respiratory: Negative. Cardiovascular: Negative. Gastrointestinal: Negative. Endocrine: Positive for cold intolerance and heat intolerance. Genitourinary: Negative. Musculoskeletal:         Neuropathy to hands and feet   Skin: Negative. Allergic/Immunologic: Positive for environmental allergies. Psychiatric/Behavioral: Negative. OBJECTIVE:   /60   Pulse 95   Temp 98.2 °F (36.8 °C)   Resp 20   Wt 81.6 kg (179 lb 12.8 oz)   SpO2 99%   BMI 22.47 kg/m²   Pain Assessment:  0  Karnofsky: 90 - Able to carry on normal activity; minor signs or symptoms of disease     Physical Exam   HENT:      Head: Normocephalic and atraumatic. Eyes:      General: No scleral icterus. Extraocular Movements: Extraocular movements intact. Cardiovascular:      Rate and Rhythm: Normal rate. Pulmonary:      Effort: Pulmonary effort is normal.   Abdominal:      General: Abdomen is flat. There is no distension. Ostomy in place  Genitourinary:     Comments: Deferred  Musculoskeletal:      Cervical back: Normal range of motion. Skin:     General: Skin is warm and dry. Neurological:      General: No focal deficit present. Mental Status: He is alert.    Psychiatric:         Mood and Affect: Mood normal.            RESULTS    Lab Results:   Recent Results (from the past 672 hour(s))   ABORh Recheck - Contact Blood Bank Prior to Collection    Collection Time: 10/17/23  7:57 AM   Result Value Ref Range    ABO Grouping O     Rh Factor Positive    POCT Blood Gas (CG8+)    Collection Time: 10/17/23  1:17 PM   Result Value Ref Range    ph, Carlos ISTAT 7.277 (L) 7.300 - 7.400    pCO2, Carlos i-STAT 43.0 42.0 - 50.0 mm HG    pO2, Carlos i-STAT 159.0 (H) 35.0 - 45.0 mm HG    BE, i-STAT -6 (L) -2 - 3 mmol/L    HCO3, Carlos i-STAT 20.1 (L) 24.0 - 30.0 mmol/L    CO2, i-STAT 21 21 - 32 mmol/L    O2 Sat, i-STAT 99 (H) 60 - 85 %    SODIUM, I-STAT 139 136 - 145 mmol/l    Potassium, i-STAT 3.9 3.5 - 5.3 mmol/L    Calcium, Ionized i-STAT 1.19 1.12 - 1.32 mmol/L    Hct, i-STAT 30 (L) 36.5 - 49.3 %    Hgb, i-STAT 10.2 (L) 12.0 - 17.0 g/dl    Glucose, i-STAT 164 (H) 65 - 140 mg/dl    Specimen Type VENOUS    Tissue Exam    Collection Time: 10/17/23  1:57 PM   Result Value Ref Range    Case Report       Surgical Pathology Report                         Case: [id number]                                   Authorizing Provider:  Joni Barbosa MD       Collected:           10/17/2023 1357              Ordering Location:     [facility]      Received:            10/17/2023 [address] [facility] Operating Room                                                      Pathologist:           Johnathan Purcell MD                                                                    Specimen:    Large Intestine, NOS, rectum, sigmoid colon, and two donuts                                Final Diagnosis       A. Rectum, sigmoid colon, and two donuts, low anterior resection:  -   Invasive adenocarcinoma of rectum, moderately differentiated, with treatment effect.  -   Separate segments of colon/rectum (anastomotic donuts), negative for malignancy. -   See synoptic report. -   MMR (MLH1, MSH2, MSH6 and PMS2) studies by [facility] on biopsy specimen [id number] show intact protein expression. Select slide A5 is reviewed by Dr. Annamaria Reed who concurs with the diagnosis. Interpretation performed at [facility] [address] [facility], [facility]          Note       BEST TUMOR BLOCK(S) FOR FUTURE STUDIES:  A6, A8      Additional Information       All reported additional testing was performed with appropriately reactive controls. These tests were developed and their performance characteristics determined by [facility] or appropriate performing facility, though some tests may be performed on tissues which have not been validated for performance characteristics (such as staining performed on alcohol exposed cell blocks and decalcified tissues). Results should be interpreted with caution and in the context of the patients’ clinical condition. These tests may not be cleared or approved by the U.S.  Food and Drug Administration, though the FDA has determined that such clearance or approval is not necessary. These tests are used for clinical purposes and they should not be regarded as investigational or for research. This laboratory has been approved by [place] 88, designated as a high-complexity laboratory and is qualified to perform these tests. .      Synoptic Checklist       COLON AND RECTUM: Resection, Including Transanal Disk Excision of Rectal Neoplasms   COLON AND RECTUM: RESECTION - All Specimens   8th Edition - Protocol posted: 6/22/2022      SPECIMEN      Procedure:    Low anterior resection       Macroscopic Evaluation of Mesorectum:    Complete       TUMOR      Tumor Site:    Rectum         Rectal Tumor Location:    Entirely below anterior peritoneal reflection       Histologic Type:    Adenocarcinoma       Histologic Grade:    G2, moderately differentiated       Tumor Size:    Greatest dimension (Centimeters): 3.0 cm      Tumor Extent:    Invades through muscularis propria into the pericolonic or perirectal tissue       Macroscopic Tumor Perforation:    Not identified       Lymphovascular Invasion:    Not identified       Perineural Invasion:    Not identified       Tumor Bud Score:    Low (0-4)       Treatment Effect:    Present, with residual cancer showing evident tumor regression, but more than single cells or rare small groups of cancer cells (partial response, score 2)       MARGINS      Margin Status for Invasive Carcinoma:     All margins negative for invasive carcinoma         Closest Margin(s) to Invasive Carcinoma:    Radial (circumferential) or mesenteric         Distance from Invasive Carcinoma to Closest Margin:    0.8 cm        Distance from Invasive Carcinoma to Radial (Circumferential) Margin:    Distance already reported as closest margin       Margin Status for Non-Invasive Tumor:    Not applicable       REGIONAL LYMPH NODES      Regional Lymph Node Status:            :    Tumor present in regional lymph node(s)           Number of Lymph Nodes with Tumor:    2         Number of Lymph Nodes Examined:    13       Tumor Deposits:    Not identified       Regional Lymph Node Comment:    Isolated tumor cells in one lymph node; treatment effect present in multiple lymph nodes       PATHOLOGIC STAGE CLASSIFICATION (pTNM, AJCC 8th Edition)      Reporting of pT, pN, and (when applicable) pM categories is based on information available to the pathologist at the time the report is issued. As per the AJCC (Chapter 1, 8th Ed.) it is the managing physician's responsibility to establish the final pathologic stage based upon all pertinent information, including but potentially not limited to this pathology report. TNM Descriptors:    y (post-treatment)       pT Category:    pT3       pN Category:    pN1b       ADDITIONAL FINDINGS      Additional Findings:    Diverticulosis          Comment(s):    MMR (MLH1, MSH2, MSH6 and PMS2) studies by [facility] on biopsy specimen [id number] show intact protein expression       Gross Description       A. The specimen is received in formalin, labeled with the patient's name and hospital number, and is designated " rectum, sigmoid colon and 2 donuts". It consists of a 29.5 x 2.5 cm low anterior resection specimen with an open proximal margin, stapled distal margin, and up to 7.1 cm of mesenteric fat. The serosa is tan-brown, smooth and glistening. The mesorectal envelope is complete. Opening reveals a 3.0 x 1.9 cm tan-erythematous firm scar in the distal rectum below the anterior peritoneal reflection on the right lateral-anterior wall. The scar measures 1.3 cm to the distal margin and 26.0 cm to the proximal margin. Sectioning reveals the scar extends down into the pale-yellow discolored and firm muscularis propria, measuring 0.5 cm to the radial margin and 20.0 cm to the vascular pedicle margin. The remainder the mucosa is tan-pink folded with an average wall thickness of 0.6 cm. There are multiple intact diverticula. Additionally received in the container is a 2.3 x 1.2 x 0.6 cm tan-brown free-floating annular portion of tissue, and an a 1.8 x 1.8 x 1.5 cm tan-pink annular portion of tissue on an anvil. 17 potential tan-brown, partially pale-yellow gritty lymph nodes are grossly identified ranging from 0.3 x 0.2 x 0.2 cm to 1.0 x 1.0 x 0.5 cm. The anterior soft tissue margin is inked orange, the posterior soft tissue margin is inked black, and the distal margin is inked blue. Digital images are acquired. Representative sections are submitted as follows:    A1: Proximal resection margin, en face  A2: Vascular pedicle margin, en face  A3-A25: Distal margin and scar, each entirely submitted; perpendicular sections from right posterior to left posterior with distal-proximal split thickness sections in A5-A6, A7-A8, A9-A10, A11-A12, and A16-A17; red ink designates reapproximate surfaces, not margin. Scar is completely submitted in cassettes A3, A4, A6, A8, A10, A12-A15, and A17; deepest extent of scar to radial margin is in A14. Distal margin is in A3-A5, A7, A9, A11, A13-A16, and A18-A25. A26: Diverticula  A27: Uninvolved colon wall  A28: Free-floating annular tissue  A29: Annular tissue on anvil   A30: 4 potential lymph nodes, intact  A31: 4 potential lymph nodes, intact  A32: 3 potential lymph nodes, intact  A33: 1 potential lymph node, bisected  A34: 1 potential lymph node, bisected  A35: 1 potential lymph node, bisected   A36: 1 potential lymph node, trisected   A37: 1 potential lymph node, bisected   A38-A39: 1 potential lymph node, trisected    Note: The estimated total formalin fixation time based upon information provided by the submitting clinician and the standard processing schedule is under 72 hours.   ESorrentino       Clinical Information       Per EMR,   2/2023 - [id number] rectal adenocarcinoma     3/9/2023 - start neoadjuvant FOLFOX     6/26/2023 - start 5-FU/RT     7/17/2023 - 5-FU held due thrombocytopenia Fingerstick Glucose (POCT)    Collection Time: 10/17/23  3:27 PM   Result Value Ref Range    POC Glucose 155 (H) 65 - 140 mg/dl   CBC and differential    Collection Time: 10/17/23  3:39 PM   Result Value Ref Range    WBC 6.09 4.31 - 10.16 Thousand/uL    RBC 2.96 (L) 3.88 - 5.62 Million/uL    Hemoglobin 9.7 (L) 12.0 - 17.0 g/dL    Hematocrit 28.3 (L) 36.5 - 49.3 %    MCV 96 82 - 98 fL    MCH 32.8 26.8 - 34.3 pg    MCHC 34.3 31.4 - 37.4 g/dL    RDW 12.9 11.6 - 15.1 %    MPV 8.9 8.9 - 12.7 fL    Platelets 136 (L) 704 - 390 Thousands/uL    nRBC 0 /100 WBCs    Neutrophils Relative 88 (H) 43 - 75 %    Immat GRANS % 1 0 - 2 %    Lymphocytes Relative 5 (L) 14 - 44 %    Monocytes Relative 6 4 - 12 %    Eosinophils Relative 0 0 - 6 %    Basophils Relative 0 0 - 1 %    Neutrophils Absolute 5.33 1.85 - 7.62 Thousands/µL    Immature Grans Absolute 0.03 0.00 - 0.20 Thousand/uL    Lymphocytes Absolute 0.33 (L) 0.60 - 4.47 Thousands/µL    Monocytes Absolute 0.36 0.17 - 1.22 Thousand/µL    Eosinophils Absolute 0.02 0.00 - 0.61 Thousand/µL    Basophils Absolute 0.02 0.00 - 0.10 Thousands/µL   Comprehensive metabolic panel    Collection Time: 10/17/23  3:39 PM   Result Value Ref Range    Sodium 139 135 - 147 mmol/L    Potassium 4.0 3.5 - 5.3 mmol/L    Chloride 111 (H) 96 - 108 mmol/L    CO2 21 21 - 32 mmol/L    ANION GAP 7 mmol/L    BUN 11 5 - 25 mg/dL    Creatinine 0.79 0.60 - 1.30 mg/dL    Glucose 153 (H) 65 - 140 mg/dL    Calcium 7.9 (L) 8.4 - 10.2 mg/dL    Corrected Calcium 8.5 8.3 - 10.1 mg/dL    AST 28 13 - 39 U/L    ALT 19 7 - 52 U/L    Alkaline Phosphatase 35 34 - 104 U/L    Total Protein 5.2 (L) 6.4 - 8.4 g/dL    Albumin 3.2 (L) 3.5 - 5.0 g/dL    Total Bilirubin 1.16 (H) 0.20 - 1.00 mg/dL    eGFR 109 ml/min/1.73sq m   Protime-INR    Collection Time: 10/17/23  3:39 PM   Result Value Ref Range    Protime 17.3 (H) 11.6 - 14.5 seconds    INR 1.44 (H) 0.84 - 1.19   Comprehensive metabolic panel    Collection Time: 10/18/23  5:57 AM   Result Value Ref Range    Sodium 135 135 - 147 mmol/L    Potassium 4.1 3.5 - 5.3 mmol/L    Chloride 106 96 - 108 mmol/L    CO2 22 21 - 32 mmol/L    ANION GAP 7 mmol/L    BUN 15 5 - 25 mg/dL    Creatinine 0.70 0.60 - 1.30 mg/dL    Glucose 126 65 - 140 mg/dL    Calcium 8.7 8.4 - 10.2 mg/dL    AST 31 13 - 39 U/L    ALT 18 7 - 52 U/L    Alkaline Phosphatase 35 34 - 104 U/L    Total Protein 5.8 (L) 6.4 - 8.4 g/dL    Albumin 3.5 3.5 - 5.0 g/dL    Total Bilirubin 1.59 (H) 0.20 - 1.00 mg/dL    eGFR 114 ml/min/1.73sq m   Magnesium    Collection Time: 10/18/23  5:57 AM   Result Value Ref Range    Magnesium 1.5 (L) 1.9 - 2.7 mg/dL   Phosphorus    Collection Time: 10/18/23  5:57 AM   Result Value Ref Range    Phosphorus 4.1 2.7 - 4.5 mg/dL   CBC and differential    Collection Time: 10/18/23  5:57 AM   Result Value Ref Range    WBC 7.41 4.31 - 10.16 Thousand/uL    RBC 2.81 (L) 3.88 - 5.62 Million/uL    Hemoglobin 9.2 (L) 12.0 - 17.0 g/dL    Hematocrit 26.1 (L) 36.5 - 49.3 %    MCV 93 82 - 98 fL    MCH 32.7 26.8 - 34.3 pg    MCHC 35.2 31.4 - 37.4 g/dL    RDW 13.0 11.6 - 15.1 %    MPV 9.3 8.9 - 12.7 fL    Platelets 86 (L) 221 - 390 Thousands/uL    nRBC 0 /100 WBCs    Neutrophils Relative 78 (H) 43 - 75 %    Immat GRANS % 0 0 - 2 %    Lymphocytes Relative 8 (L) 14 - 44 %    Monocytes Relative 14 (H) 4 - 12 %    Eosinophils Relative 0 0 - 6 %    Basophils Relative 0 0 - 1 %    Neutrophils Absolute 5.78 1.85 - 7.62 Thousands/µL    Immature Grans Absolute 0.03 0.00 - 0.20 Thousand/uL    Lymphocytes Absolute 0.58 (L) 0.60 - 4.47 Thousands/µL    Monocytes Absolute 1.01 0.17 - 1.22 Thousand/µL    Eosinophils Absolute 0.00 0.00 - 0.61 Thousand/µL    Basophils Absolute 0.01 0.00 - 0.10 Thousands/µL   AFP tumor marker    Collection Time: 10/18/23 10:16 AM   Result Value Ref Range    AFP TUMOR MARKER 4.79 0.00 - 9.00 ng/mL   Protime-INR    Collection Time: 10/18/23 10:16 AM   Result Value Ref Range    Protime 17.2 (H) 11.6 - 14.5 seconds    INR 1.43 (H) 0.84 - 1.19   Phosphorus    Collection Time: 10/19/23  5:16 AM   Result Value Ref Range    Phosphorus 1.9 (L) 2.7 - 4.5 mg/dL   Magnesium    Collection Time: 10/19/23  5:16 AM   Result Value Ref Range    Magnesium 1.9 1.9 - 2.7 mg/dL   Comprehensive metabolic panel    Collection Time: 10/19/23  5:16 AM   Result Value Ref Range    Sodium 136 135 - 147 mmol/L    Potassium 3.8 3.5 - 5.3 mmol/L    Chloride 108 96 - 108 mmol/L    CO2 24 21 - 32 mmol/L    ANION GAP 4 mmol/L    BUN 13 5 - 25 mg/dL    Creatinine 0.58 (L) 0.60 - 1.30 mg/dL    Glucose 107 65 - 140 mg/dL    Calcium 8.3 (L) 8.4 - 10.2 mg/dL    Corrected Calcium 8.8 8.3 - 10.1 mg/dL    AST 30 13 - 39 U/L    ALT 16 7 - 52 U/L    Alkaline Phosphatase 34 34 - 104 U/L    Total Protein 5.6 (L) 6.4 - 8.4 g/dL    Albumin 3.4 (L) 3.5 - 5.0 g/dL    Total Bilirubin 1.39 (H) 0.20 - 1.00 mg/dL    eGFR 123 ml/min/1.73sq m   CBC and differential    Collection Time: 10/19/23  5:16 AM   Result Value Ref Range    WBC 5.30 4.31 - 10.16 Thousand/uL    RBC 2.56 (L) 3.88 - 5.62 Million/uL    Hemoglobin 8.3 (L) 12.0 - 17.0 g/dL    Hematocrit 24.5 (L) 36.5 - 49.3 %    MCV 96 82 - 98 fL    MCH 32.4 26.8 - 34.3 pg    MCHC 33.9 31.4 - 37.4 g/dL    RDW 13.2 11.6 - 15.1 %    MPV 10.0 8.9 - 12.7 fL    Platelets 81 (L) 820 - 390 Thousands/uL    nRBC 0 /100 WBCs    Neutrophils Relative 72 43 - 75 %    Immat GRANS % 1 0 - 2 %    Lymphocytes Relative 12 (L) 14 - 44 %    Monocytes Relative 14 (H) 4 - 12 %    Eosinophils Relative 1 0 - 6 %    Basophils Relative 0 0 - 1 %    Neutrophils Absolute 3.82 1.85 - 7.62 Thousands/µL    Immature Grans Absolute 0.03 0.00 - 0.20 Thousand/uL    Lymphocytes Absolute 0.63 0.60 - 4.47 Thousands/µL    Monocytes Absolute 0.74 0.17 - 1.22 Thousand/µL    Eosinophils Absolute 0.07 0.00 - 0.61 Thousand/µL    Basophils Absolute 0.01 0.00 - 0.10 Thousands/µL   Comprehensive metabolic panel    Collection Time: 10/20/23  5:28 AM   Result Value Ref Range    Sodium 136 135 - 147 mmol/L    Potassium 3.5 3.5 - 5.3 mmol/L    Chloride 106 96 - 108 mmol/L    CO2 24 21 - 32 mmol/L    ANION GAP 6 mmol/L    BUN 10 5 - 25 mg/dL    Creatinine 0.64 0.60 - 1.30 mg/dL    Glucose 108 65 - 140 mg/dL    Calcium 8.1 (L) 8.4 - 10.2 mg/dL    Corrected Calcium 8.8 8.3 - 10.1 mg/dL    AST 28 13 - 39 U/L    ALT 16 7 - 52 U/L    Alkaline Phosphatase 37 34 - 104 U/L    Total Protein 5.4 (L) 6.4 - 8.4 g/dL    Albumin 3.1 (L) 3.5 - 5.0 g/dL    Total Bilirubin 1.00 0.20 - 1.00 mg/dL    eGFR 119 ml/min/1.73sq m   CBC and differential    Collection Time: 10/20/23  5:28 AM   Result Value Ref Range    WBC 2.69 (L) 4.31 - 10.16 Thousand/uL    RBC 2.39 (L) 3.88 - 5.62 Million/uL    Hemoglobin 7.8 (L) 12.0 - 17.0 g/dL    Hematocrit 22.1 (L) 36.5 - 49.3 %    MCV 93 82 - 98 fL    MCH 32.6 26.8 - 34.3 pg    MCHC 35.3 31.4 - 37.4 g/dL    RDW 13.1 11.6 - 15.1 %    MPV 9.6 8.9 - 12.7 fL    Platelets 65 (L) 779 - 390 Thousands/uL    nRBC 0 /100 WBCs    Neutrophils Relative 64 43 - 75 %    Immat GRANS % 1 0 - 2 %    Lymphocytes Relative 16 14 - 44 %    Monocytes Relative 16 (H) 4 - 12 %    Eosinophils Relative 3 0 - 6 %    Basophils Relative 0 0 - 1 %    Neutrophils Absolute 1.71 (L) 1.85 - 7.62 Thousands/µL    Immature Grans Absolute 0.02 0.00 - 0.20 Thousand/uL    Lymphocytes Absolute 0.44 (L) 0.60 - 4.47 Thousands/µL    Monocytes Absolute 0.43 0.17 - 1.22 Thousand/µL    Eosinophils Absolute 0.08 0.00 - 0.61 Thousand/µL    Basophils Absolute 0.01 0.00 - 0.10 Thousands/µL   CBC and differential    Collection Time: 10/21/23  5:26 AM   Result Value Ref Range    WBC 3.03 (L) 4.31 - 10.16 Thousand/uL    RBC 2.80 (L) 3.88 - 5.62 Million/uL    Hemoglobin 9.0 (L) 12.0 - 17.0 g/dL    Hematocrit 26.0 (L) 36.5 - 49.3 %    MCV 93 82 - 98 fL    MCH 32.1 26.8 - 34.3 pg    MCHC 34.6 31.4 - 37.4 g/dL    RDW 13.1 11.6 - 15.1 %    MPV 9.1 8.9 - 12.7 fL    Platelets 90 (L) 947 - 390 Thousands/uL    nRBC 0 /100 WBCs Neutrophils Relative 59 43 - 75 %    Immat GRANS % 2 0 - 2 %    Lymphocytes Relative 20 14 - 44 %    Monocytes Relative 15 (H) 4 - 12 %    Eosinophils Relative 4 0 - 6 %    Basophils Relative 0 0 - 1 %    Neutrophils Absolute 1.79 (L) 1.85 - 7.62 Thousands/µL    Immature Grans Absolute 0.06 0.00 - 0.20 Thousand/uL    Lymphocytes Absolute 0.59 (L) 0.60 - 4.47 Thousands/µL    Monocytes Absolute 0.45 0.17 - 1.22 Thousand/µL    Eosinophils Absolute 0.13 0.00 - 0.61 Thousand/µL    Basophils Absolute 0.01 0.00 - 0.10 Thousands/µL   Basic metabolic panel    Collection Time: 10/21/23  5:26 AM   Result Value Ref Range    Sodium 135 135 - 147 mmol/L    Potassium 3.8 3.5 - 5.3 mmol/L    Chloride 103 96 - 108 mmol/L    CO2 25 21 - 32 mmol/L    ANION GAP 7 mmol/L    BUN 8 5 - 25 mg/dL    Creatinine 0.66 0.60 - 1.30 mg/dL    Glucose 95 65 - 140 mg/dL    Calcium 8.4 8.4 - 10.2 mg/dL    eGFR 117 ml/min/1.73sq m   CBC and differential    Collection Time: 11/01/23  2:08 PM   Result Value Ref Range    WBC 3.28 (L) 4.31 - 10.16 Thousand/uL    RBC 3.10 (L) 3.88 - 5.62 Million/uL    Hemoglobin 9.6 (L) 12.0 - 17.0 g/dL    Hematocrit 28.2 (L) 36.5 - 49.3 %    MCV 91 82 - 98 fL    MCH 31.0 26.8 - 34.3 pg    MCHC 34.0 31.4 - 37.4 g/dL    RDW 13.5 11.6 - 15.1 %    MPV 8.4 (L) 8.9 - 12.7 fL    Platelets 306 (L) 649 - 390 Thousands/uL    nRBC 0 /100 WBCs    Neutrophils Relative 68 43 - 75 %    Immat GRANS % 1 0 - 2 %    Lymphocytes Relative 15 14 - 44 %    Monocytes Relative 11 4 - 12 %    Eosinophils Relative 4 0 - 6 %    Basophils Relative 1 0 - 1 %    Neutrophils Absolute 2.26 1.85 - 7.62 Thousands/µL    Immature Grans Absolute 0.02 0.00 - 0.20 Thousand/uL    Lymphocytes Absolute 0.50 (L) 0.60 - 4.47 Thousands/µL    Monocytes Absolute 0.35 0.17 - 1.22 Thousand/µL    Eosinophils Absolute 0.13 0.00 - 0.61 Thousand/µL    Basophils Absolute 0.02 0.00 - 0.10 Thousands/µL   Comprehensive metabolic panel    Collection Time: 11/01/23  2:08 PM Result Value Ref Range    Sodium 135 135 - 147 mmol/L    Potassium 3.6 3.5 - 5.3 mmol/L    Chloride 106 96 - 108 mmol/L    CO2 21 21 - 32 mmol/L    ANION GAP 8 mmol/L    BUN 13 5 - 25 mg/dL    Creatinine 0.59 (L) 0.60 - 1.30 mg/dL    Glucose 99 65 - 140 mg/dL    Calcium 9.0 8.4 - 10.2 mg/dL    AST 26 13 - 39 U/L    ALT 19 7 - 52 U/L    Alkaline Phosphatase 44 34 - 104 U/L    Total Protein 7.0 6.4 - 8.4 g/dL    Albumin 4.0 3.5 - 5.0 g/dL    Total Bilirubin 0.73 0.20 - 1.00 mg/dL    eGFR 123 ml/min/1.73sq m   CEA    Collection Time: 11/01/23  2:08 PM   Result Value Ref Range    CEA 0.8 0.0 - 3.0 ng/mL       Imaging Studies:US right upper quadrant    Result Date: 10/19/2023  Narrative: RIGHT UPPER QUADRANT ULTRASOUND INDICATION:     cirrhosis. COMPARISON: Right upper quadrant ultrasound February 16, 2023. Correlation CT abdomen pelvis August 21, 2023 TECHNIQUE:   Real-time ultrasound of the right upper quadrant was performed with a curvilinear transducer with both volumetric sweeps and still imaging techniques. FINDINGS: PANCREAS:  Visualized portions of the pancreas are within normal limits. AORTA AND IVC:  Visualized portions are normal for patient age. LIVER: Size:  The liver measures 16.1 cm in the midclavicular line. Contour:  Surface contour is lobulated. Parenchyma: Increased echogenicity compatible with steatosis. No liver mass identified. Limited imaging of the main portal vein shows it to be patent and hepatopetal. BILIARY: No gallstones. Gallbladder luminal distention to at least 6 cm is similar to prior study. Gallbladder wall thickness at the upper limits of normal, measuring 3 mm. No pericholecystic free fluid. Negative sonographic Styles sign. No intrahepatic biliary dilatation. CBD measures 11.0 mm. No choledocholithiasis. KIDNEY: Right kidney measures 11.1 x 7.0 x 4.4 cm. Volume 177.2 mL Kidney within normal limits. ASCITES:   None. Impression: No gallstones.  Findings equivocal for acute cholecystitis. Mild hepatic cirrhosis. Workstation performed: [id number]           Assessment/Plan:  No orders of the defined types were placed in this encounter. Ced Tolliver is a 39 y.o. male ith recently diagnosed stage IIIC [id number] rectal adenocarcinoma. He completed total neoadjuvant treatment with FOLFOX followed by long course chemoradiation. He has now undergone TME and was found to have [id number] disease. CEA has normalized. CT imaging has been ordered by medical oncology. He will continue follow up with colorectal surgery and medical oncology. He will return in 3 months after surveillance imaging. He was encouraged to contact this office with questions or concerns in the interim. Ana Cristina Jacobsen MD  11/8/2023,2:37 PM    Portions of the record may have been created with voice recognition software. Occasional wrong word or "sound a like" substitutions may have occurred due to the inherent limitations of voice recognition software. Read the chart carefully and recognize, using context, where substitutions have occurred.

## 2023-11-10 NOTE — PROGRESS NOTES
Colon and Rectal Surgery   Rhona Vazquez 39 y.o. male MRN [id number]  Encounter: [id number]  11/15/23 3:50 PM            Assessment: Rhona Vazquez is a 39 y.o. male who had rectal cancer. Plan:   Rectal cancer [facility])  The patient is doing very well. His abdomen is benign and the ileostomy as well. The coloanal anastomosis is healing nicely without defects. Closure of his ileostomy can be scheduled anytime after 1 month from now. Risks of surgery, including but not limited to bleeding, need for transfusion of blood products, pain, infection, hernia formation, injury to the ureter or other internal organs requiring surgery specific to these injuries, anastomotic leak, impotence, deep vein thrombosis, renal failure, pulmonary embolism, myocardial infarction or heart failure, stroke, death, need for and enterostomy, or other unspecified complications were discussed. Benefits and alternatives were discussed. Questions were answered. He agrees to stoma closure. Subjective     HPI    Rhona Vazquez is a 39 y.o. male who is here for a post operative evaluation. The patient notes daily notes daily ostomy output and a good appetite; reports itching from the incision sites. The patient is status post laparoscopic hand assisted proctectomy, abdominal approach, coloanal anastomosis (very low through), loop ileostomy on 10/17/2023. Surgical pathology:  A. Rectum, sigmoid colon, and two donuts, low anterior resection:  -   Invasive adenocarcinoma of rectum, moderately differentiated, with treatment effect.  -   Separate segments of colon/rectum (anastomotic donuts), negative for malignancy. -   See synoptic report. -   MMR (MLH1, MSH2, MSH6 and PMS2) studies by [facility] on biopsy specimen [id number] show intact protein expression. Next CT chest abdomen and pelvis scheduled for 2/5/2024.     Lab Results   Component Value Date    CEA 0.8 11/01/2023     Lab Results   Component Value Date    WBC 3.28 (L) 11/01/2023    HGB 9.6 (L) 11/01/2023    HCT 28.2 (L) 11/01/2023    MCV 91 11/01/2023     (L) 11/01/2023     Lab Results   Component Value Date    SODIUM 135 11/01/2023    K 3.6 11/01/2023     11/01/2023    CO2 21 11/01/2023    AGAP 8 11/01/2023    BUN 13 11/01/2023    CREATININE 0.59 (L) 11/01/2023    GLUC 99 11/01/2023    GLUF 121 (H) 03/06/2023    CALCIUM 9.0 11/01/2023    AST 26 11/01/2023    ALT 19 11/01/2023    ALKPHOS 44 11/01/2023    TP 7.0 11/01/2023    TBILI 0.73 11/01/2023    EGFR 123 11/01/2023     Historical Information   Past Medical History:   Diagnosis Date    Cirrhosis ([address]) 3/18/24    Colon cancer ([address])     Dental infection     Fatty liver     Rectal cancer ([address])      Past Surgical History:   Procedure Laterality Date    APPENDECTOMY      COLONOSCOPY      DENTAL SURGERY      IR PORT PLACEMENT  02/28/2023    VT LAPS PROCTECTOMY COMBINED PULL-THRU W/RESERVOIR N/A 10/17/2023    Procedure: LAPAROSCOPIC HAND ASSIST PROCTECTOMY, ABDOMINAL APPROACH, coloanal anastomosis, loop ileostomy;  Surgeon: Diana Strauss MD;  [facility];  Service: Colorectal    VT SIGMOIDOSCOPY FLX DX W/COLLJ SPEC BR/WA IF PFRMD N/A 10/17/2023    Procedure: Rico Edwards;  Surgeon: Diana Strauss MD;  [facility];  Service: Colorectal    UPPER GASTROINTESTINAL ENDOSCOPY         Meds/Allergies       Current Outpatient Medications:     enoxaparin (LOVENOX) 40 mg/0.4 mL, Inject 0.4 mL (40 mg total) under the skin daily for 28 days Do not start before October 22, 2023., Disp: 11.2 mL, Rfl: 0    pyridoxine (VITAMIN B6) 50 mg tablet, Take 50 mg by mouth daily, Disp: , Rfl:     ondansetron (ZOFRAN) 8 mg tablet, Take 1 tablet (8 mg total) by mouth every 8 (eight) hours as needed for nausea or vomiting, Disp: 30 tablet, Rfl: 2  No Known Allergies    Social History   Social History     Substance and Sexual Activity   Drug Use Not Currently    Types: Marijuana     Social History Tobacco Use   Smoking Status Former    Packs/day: 1.00    Years: 20.00    Total pack years: 20.00    Types: Cigarettes    Quit date: 1/1/2020    Years since quitting: 3.8   Smokeless Tobacco Never         Family History   Problem Relation Age of Onset    Lung cancer Mother     Cancer Mother         Lung    Lung cancer Father     Cancer Father         Mouth throat    Colon cancer Neg Hx     Colon polyps Neg Hx          Review of Systems   Constitutional: Negative. Respiratory: Negative. Cardiovascular: Negative. Gastrointestinal: Negative. Objective   Current Vitals:  Vitals:    11/15/23 1532   BP: 106/66   Weight: 82.1 kg (181 lb)   Height: 6' 3" (1.905 m)         Physical Exam  Constitutional:       Appearance: Normal appearance. Cardiovascular:      Rate and Rhythm: Normal rate and regular rhythm. Pulmonary:      Effort: Pulmonary effort is normal.      Breath sounds: Normal breath sounds. Abdominal:      General: Abdomen is flat. Palpations: Abdomen is soft. Genitourinary:     Comments: The anus is completely normal.  Internal tissues are quite supple including at and above the anastomosis which is at approximately 4 cm from the anal verge. No defects in the anastomosis are apparent. Neurological:      General: No focal deficit present. Mental Status: He is alert and oriented to person, place, and time.

## 2023-11-15 ENCOUNTER — OFFICE VISIT (OUTPATIENT)
Age: 43
End: 2023-11-15

## 2023-11-15 VITALS
BODY MASS INDEX: 22.5 KG/M2 | WEIGHT: 181 LBS | SYSTOLIC BLOOD PRESSURE: 106 MMHG | DIASTOLIC BLOOD PRESSURE: 66 MMHG | HEIGHT: 75 IN

## 2023-11-15 DIAGNOSIS — C20 RECTAL CANCER (HCC): Primary | ICD-10-CM

## 2023-11-15 PROCEDURE — 99024 POSTOP FOLLOW-UP VISIT: CPT | Performed by: COLON & RECTAL SURGERY

## 2023-11-15 RX ORDER — NEOMYCIN SULFATE 500 MG/1
1000 TABLET ORAL 3 TIMES DAILY
OUTPATIENT
Start: 2023-11-15 | End: 2023-11-16

## 2023-11-15 RX ORDER — METRONIDAZOLE 250 MG/1
1000 TABLET ORAL 3 TIMES DAILY
OUTPATIENT
Start: 2023-11-15 | End: 2023-11-16

## 2023-11-15 RX ORDER — HEPARIN SODIUM 5000 [USP'U]/ML
5000 INJECTION, SOLUTION INTRAVENOUS; SUBCUTANEOUS ONCE
OUTPATIENT
Start: 2023-11-15 | End: 2023-11-15

## 2023-11-15 NOTE — ASSESSMENT & PLAN NOTE
The patient is doing very well. His abdomen is benign and the ileostomy as well. The coloanal anastomosis is healing nicely without defects. Closure of his ileostomy can be scheduled anytime after 1 month from now. Risks of surgery, including but not limited to bleeding, need for transfusion of blood products, pain, infection, hernia formation, injury to the ureter or other internal organs requiring surgery specific to these injuries, anastomotic leak, impotence, deep vein thrombosis, renal failure, pulmonary embolism, myocardial infarction or heart failure, stroke, death, need for and enterostomy, or other unspecified complications were discussed. Benefits and alternatives were discussed. Questions were answered. He agrees to stoma closure.

## 2023-11-16 ENCOUNTER — DOCUMENTATION (OUTPATIENT)
Dept: HEMATOLOGY ONCOLOGY | Facility: CLINIC | Age: 43
End: 2023-11-16

## 2023-11-16 NOTE — PROGRESS NOTES
POST SURGICAL TREATMENT OUTCOME DISCUSSION SUMMARY    RECTAL CANCER MULTIDISCIPLINARY CASE REVIEW    NAME OF SURGEON: Dr. Sugar Ozuna    PATIENT NAME/: Moi Moore  1980    DATE: 2023        CLINICAL (PRETREATMENT) STAGE ACCORDING TO [employer] (AJCC) -   T3c, N2      PRETREATMENT CARCINOEMBRYONIC ANTIGEN LEVEL (CEA) -   39.2      NEOADJUVANT THERAPY BEFORE SURGERY -   YES      TYPE OF NEOADJUVANT THERAPY -   *Chemotherapy (FOLFOX)   *Concurrent chemo (5FU)/ RT       NEOADJUVANT THERAPY DATE OF COMPLETION -   Completed on 2023      SURGICAL PROCEDURE -  LAPAROSCOPIC HAND ASSIST PROCTECTOMY, ABDOMINAL APPROACH, coloanal anastomosis, loop ileostomy (Abdomen)       DATE OF SURGERY -  10/17/2023    FINAL PATHOLOGICAL STAGE OR POSTTHERAPY Y-PATHOLOGICAL STAGE ACCRODING TO AJCC -   ypT3, ypN1b      TUMOR REGRESSION GRADE -  Grade II      MICROSATELLITE INSTABILITY STATUS -  INTERPRETATION: NO LOSS OF NUCLEAR EXPRESSION OF MMR PROTEINS: LOW PROBABILITY OF MSI-H       CIRCUMFERENTIAL RESECTION MARGIN -   Negative      DISTAL RESECTION MARGIN -  Negative      MESORECTAL GRADE -   Complete      RECOMMENDATION FOR ADJUVANT THERAPY AND, IF APPLICABLE, ADJUVANT THERAPY REGIMEN -  No

## 2023-11-22 DIAGNOSIS — C20 RECTAL CANCER (HCC): Primary | ICD-10-CM

## 2023-11-22 NOTE — LETTER
Jennifer Wasserman  [place] [id number]        11/22/2023    Dear Jennifer Wasserman,    Your surgery is scheduled for: January 16, 2024   The hospital will call the evening prior to your surgery with your expected arrival time. Location:[place] [address]    CHECK LIST PRIOR TO OSTOMY INPATIENT SURGERY   It is your responsibility to obtain any/all referrals needed for your surgery if required by your insurance. Our office will contact you to discuss your insurance coverage for this procedure. Special instructions required if you are taking any blood thinners. Please verify with the prescribing physician. Examples include Coumadin, Plavix, Xarelto, Eliquis, Pradaxa, etc.    Please check with your family physician if you are taking the following medications: Aspirin or any Aspirin containing medication, Gingko biloba, Ginseng, Feverfew, and/or Gage’s Wort. We suggest stopping these for 3 days. The night before and the day of your surgery, wash from your neck to groin with chlorhexidine soap. This soap is available at most retail pharmacies under such brand names as Hibiclens, Endure or Aplicare. Pre-admission testing Required: YES NO x                                          Clear liquids 24 hours prior to surgery. CLEAR LIQUIDS INCLUDE:  Water, Clear Fruit Juice (Apple, Cranberry, Grape), Black Coffee, Tea, Ginger  Ale, Gatorade, Rafael-Aid, Hi-C, plain Jello, Ice Pops, Clear Broth or Bouillon, Crystal Light, Lemonade, Soda (regular or diet). No Milk Products! At 1:00 pm, on the day prior to surgery, take your first dose of antibiotic as prescribed. At 10:00 pm, take your last dose of antibiotic as prescribed. NOTHING TO EAT OR DRINK AFTER MIDNIGHT PRIOR TO SURGERY. Take ONE FLEET ENEMA one hour prior to leaving for the hospital.     Please do not hesitate to call our office with any questions regarding your surgery.

## 2023-11-27 RX ORDER — NEOMYCIN SULFATE 500 MG/1
TABLET ORAL
Qty: 4 TABLET | Refills: 0 | Status: SHIPPED | OUTPATIENT
Start: 2024-01-15 | End: 2024-01-15

## 2023-11-27 RX ORDER — METRONIDAZOLE 500 MG/1
TABLET ORAL
Qty: 2 TABLET | Refills: 0 | Status: SHIPPED | OUTPATIENT
Start: 2024-01-15 | End: 2024-01-15

## 2023-11-28 ENCOUNTER — ANESTHESIA EVENT (OUTPATIENT)
Dept: GASTROENTEROLOGY | Facility: HOSPITAL | Age: 43
End: 2023-11-28

## 2023-11-28 ENCOUNTER — ANESTHESIA (OUTPATIENT)
Dept: GASTROENTEROLOGY | Facility: HOSPITAL | Age: 43
End: 2023-11-28

## 2023-11-28 ENCOUNTER — HOSPITAL ENCOUNTER (OUTPATIENT)
Dept: GASTROENTEROLOGY | Facility: HOSPITAL | Age: 43
Setting detail: OUTPATIENT SURGERY
Discharge: HOME | End: 2023-11-28
Attending: INTERNAL MEDICINE
Payer: COMMERCIAL

## 2023-11-28 VITALS
WEIGHT: 175 LBS | TEMPERATURE: 97.5 F | SYSTOLIC BLOOD PRESSURE: 119 MMHG | HEIGHT: 75 IN | BODY MASS INDEX: 21.76 KG/M2 | DIASTOLIC BLOOD PRESSURE: 75 MMHG | OXYGEN SATURATION: 98 % | HEART RATE: 89 BPM | RESPIRATION RATE: 12 BRPM

## 2023-11-28 DIAGNOSIS — K70.30 ALCOHOLIC CIRRHOSIS OF LIVER WITHOUT ASCITES (HCC): ICD-10-CM

## 2023-11-28 DIAGNOSIS — I85.10 SECONDARY ESOPHAGEAL VARICES WITHOUT BLEEDING (HCC): Primary | ICD-10-CM

## 2023-11-28 DIAGNOSIS — I86.4 GASTRIC VARICES WITHOUT BLEEDING: ICD-10-CM

## 2023-11-28 PROBLEM — K74.60 CIRRHOSIS (HCC): Status: ACTIVE | Noted: 2023-11-28

## 2023-11-28 PROCEDURE — 43239 EGD BIOPSY SINGLE/MULTIPLE: CPT | Performed by: INTERNAL MEDICINE

## 2023-11-28 PROCEDURE — 88305 TISSUE EXAM BY PATHOLOGIST: CPT | Performed by: PATHOLOGY

## 2023-11-28 PROCEDURE — 88342 IMHCHEM/IMCYTCHM 1ST ANTB: CPT | Performed by: PATHOLOGY

## 2023-11-28 PROCEDURE — 88341 IMHCHEM/IMCYTCHM EA ADD ANTB: CPT | Performed by: PATHOLOGY

## 2023-11-28 RX ORDER — PROPOFOL 10 MG/ML
INJECTION, EMULSION INTRAVENOUS AS NEEDED
Status: DISCONTINUED | OUTPATIENT
Start: 2023-11-28 | End: 2023-11-28

## 2023-11-28 RX ORDER — GLYCOPYRROLATE 0.2 MG/ML
INJECTION INTRAMUSCULAR; INTRAVENOUS AS NEEDED
Status: DISCONTINUED | OUTPATIENT
Start: 2023-11-28 | End: 2023-11-28

## 2023-11-28 RX ORDER — CARVEDILOL 3.12 MG/1
3.12 TABLET ORAL 2 TIMES DAILY WITH MEALS
Qty: 60 TABLET | Refills: 11 | Status: SHIPPED | OUTPATIENT
Start: 2023-11-28 | End: 2024-11-27

## 2023-11-28 RX ORDER — LIDOCAINE HYDROCHLORIDE 20 MG/ML
INJECTION, SOLUTION EPIDURAL; INFILTRATION; INTRACAUDAL; PERINEURAL AS NEEDED
Status: DISCONTINUED | OUTPATIENT
Start: 2023-11-28 | End: 2023-11-28

## 2023-11-28 RX ORDER — SODIUM CHLORIDE 9 MG/ML
INJECTION, SOLUTION INTRAVENOUS CONTINUOUS PRN
Status: DISCONTINUED | OUTPATIENT
Start: 2023-11-28 | End: 2023-11-28

## 2023-11-28 RX ADMIN — GLYCOPYRROLATE 0.2 MG: 0.2 INJECTION INTRAMUSCULAR; INTRAVENOUS at 10:31

## 2023-11-28 RX ADMIN — PROPOFOL 100 MG: 10 INJECTION, EMULSION INTRAVENOUS at 10:30

## 2023-11-28 RX ADMIN — PROPOFOL 50 MG: 10 INJECTION, EMULSION INTRAVENOUS at 10:32

## 2023-11-28 RX ADMIN — PROPOFOL 50 MG: 10 INJECTION, EMULSION INTRAVENOUS at 10:35

## 2023-11-28 RX ADMIN — LIDOCAINE HYDROCHLORIDE 100 MG: 20 INJECTION, SOLUTION EPIDURAL; INFILTRATION; INTRACAUDAL; PERINEURAL at 10:29

## 2023-11-28 RX ADMIN — SODIUM CHLORIDE: 0.9 INJECTION, SOLUTION INTRAVENOUS at 10:28

## 2023-11-28 NOTE — H&P
History and Physical - [facility]  Tawana Montilla 39 y.o. male MRN: [id number]                  HPI: Tawana Montilla is a 39y.o. year old male who presents for variceal surveillance. REVIEW OF SYSTEMS: Per the HPI, and otherwise unremarkable. Historical Information   Past Medical History:   Diagnosis Date    Cirrhosis ([address]) 3/18/24    Colon cancer ([address])     Dental infection     Fatty liver     Rectal cancer [facility])      Past Surgical History:   Procedure Laterality Date    APPENDECTOMY      COLONOSCOPY      DENTAL SURGERY      IR PORT PLACEMENT  02/28/2023    CO LAPS PROCTECTOMY COMBINED PULL-THRU W/RESERVOIR N/A 10/17/2023    Procedure: LAPAROSCOPIC HAND ASSIST PROCTECTOMY, ABDOMINAL APPROACH, coloanal anastomosis, loop ileostomy;  Surgeon: Joni Barbosa MD;  [facility];  Service: Colorectal    CO SIGMOIDOSCOPY FLX DX W/COLLJ SPEC BR/WA IF PFRMD N/A 10/17/2023    Procedure: Sage Rodriguez;  Surgeon: Joni Barbosa MD;  [facility];  Service: Colorectal    UPPER GASTROINTESTINAL ENDOSCOPY       Social History   Social History     Substance and Sexual Activity   Alcohol Use Not Currently    Alcohol/week: 2.0 standard drinks of alcohol    Types: 2 Cans of beer per week    Comment: 1-2 daily.  previously up to 1 case/day (reduced alcohol intake 7 years ago)     Social History     Substance and Sexual Activity   Drug Use Not Currently    Types: Marijuana     Social History     Tobacco Use   Smoking Status Former    Packs/day: 1.00    Years: 20.00    Total pack years: 20.00    Types: Cigarettes    Quit date: 1/1/2020    Years since quitting: 3.9   Smokeless Tobacco Never     Family History   Problem Relation Age of Onset    Lung cancer Mother     Cancer Mother         Lung    Lung cancer Father     Cancer Father         Mouth throat    Colon cancer Neg Hx     Colon polyps Neg Hx        Meds/Allergies       Current Outpatient Medications:     enoxaparin (LOVENOX) 40 mg/0.4 mL    [START ON 1/15/2024] metroNIDAZOLE (FLAGYL) 500 mg tablet    [START ON 1/15/2024] neomycin (MYCIFRADIN) 500 mg tablet    ondansetron (ZOFRAN) 8 mg tablet    pyridoxine (VITAMIN B6) 50 mg tablet    No Known Allergies    Objective     /58   Pulse 92   Temp 97.5 °F (36.4 °C) (Oral)   Resp 18   Ht 6' 3" (1.905 m)   Wt 79.4 kg (175 lb)   SpO2 98%   BMI 21.87 kg/m²       PHYSICAL EXAM    Gen: NAD  Head: NCAT  CV: RRR  CHEST: Clear  ABD: soft, NT/ND  EXT: no edema      ASSESSMENT/PLAN:  This is a 39y.o. year old male here for EGD, and he is stable and optimized for his procedure.

## 2023-11-28 NOTE — ANESTHESIA PREPROCEDURE EVALUATION
Procedure:  EGD    Relevant Problems   GI/HEPATIC   (+) Cirrhosis ([address])   (+) Rectal cancer ([address])      Other   (+) Chemotherapy-induced neutropenia    (+) Port-A-Cath in place        Physical Exam    Airway    Mallampati score: II  TM Distance: >3 FB  Neck ROM: full     Dental    upper dentures and lower dentures    Cardiovascular      Pulmonary      Other Findings        Anesthesia Plan  ASA Score- 3     Anesthesia Type- IV sedation with anesthesia with ASA Monitors. Additional Monitors:     Airway Plan:            Plan Factors-    Chart reviewed. Patient summary reviewed. Patient is not a current smoker. Induction- intravenous. Postoperative Plan-     Informed Consent- Anesthetic plan and risks discussed with patient. I personally reviewed this patient with the CRNA. Discussed and agreed on the Anesthesia Plan with the CRNA. Leena Adan

## 2023-11-28 NOTE — ANESTHESIA POSTPROCEDURE EVALUATION
Post-Op Assessment Note    CV Status:  Stable  Pain Score: 0    Pain management: adequate       Mental Status:  Awake   Hydration Status:  Euvolemic   PONV Controlled:  None   Airway Patency:  Patent     Post Op Vitals Reviewed: Yes    No anethesia notable event occurred.     Staff: CRNA               BP   106/66   Temp      Pulse     Resp 16   SpO2 94%

## 2023-12-04 PROCEDURE — 88341 IMHCHEM/IMCYTCHM EA ADD ANTB: CPT | Performed by: PATHOLOGY

## 2023-12-04 PROCEDURE — 88305 TISSUE EXAM BY PATHOLOGIST: CPT | Performed by: PATHOLOGY

## 2023-12-04 PROCEDURE — 88342 IMHCHEM/IMCYTCHM 1ST ANTB: CPT | Performed by: PATHOLOGY

## 2023-12-13 ENCOUNTER — PATIENT OUTREACH (OUTPATIENT)
Dept: HEMATOLOGY ONCOLOGY | Facility: CLINIC | Age: 43
End: 2023-12-13

## 2023-12-13 NOTE — PROGRESS NOTES
Pt has not had any social work issues or concerns. MSW will close the case to follow up services. If the pt has any issues moving forward, another order can be placed at that time.

## 2023-12-27 ENCOUNTER — HOSPITAL ENCOUNTER (OUTPATIENT)
Dept: INFUSION CENTER | Facility: HOSPITAL | Age: 43
Discharge: HOME | End: 2023-12-27
Payer: COMMERCIAL

## 2023-12-27 DIAGNOSIS — C20 RECTAL CANCER (HCC): ICD-10-CM

## 2023-12-27 DIAGNOSIS — Z95.828 PORT-A-CATH IN PLACE: Primary | ICD-10-CM

## 2023-12-27 LAB
ALBUMIN SERPL BCP-MCNC: 4 G/DL (ref 3.5–5)
ALP SERPL-CCNC: 60 U/L (ref 34–104)
ALT SERPL W P-5'-P-CCNC: 25 U/L (ref 7–52)
ANION GAP SERPL CALCULATED.3IONS-SCNC: 7 MMOL/L
AST SERPL W P-5'-P-CCNC: 32 U/L (ref 13–39)
BASOPHILS # BLD AUTO: 0.03 THOUSANDS/ÂΜL (ref 0–0.1)
BASOPHILS NFR BLD AUTO: 1 % (ref 0–1)
BILIRUB SERPL-MCNC: 1.03 MG/DL (ref 0.2–1)
BUN SERPL-MCNC: 8 MG/DL (ref 5–25)
CALCIUM SERPL-MCNC: 9.1 MG/DL (ref 8.4–10.2)
CEA SERPL-MCNC: 1.4 NG/ML (ref 0–3)
CHLORIDE SERPL-SCNC: 105 MMOL/L (ref 96–108)
CO2 SERPL-SCNC: 26 MMOL/L (ref 21–32)
CREAT SERPL-MCNC: 0.73 MG/DL (ref 0.6–1.3)
EOSINOPHIL # BLD AUTO: 0.23 THOUSAND/ÂΜL (ref 0–0.61)
EOSINOPHIL NFR BLD AUTO: 5 % (ref 0–6)
ERYTHROCYTE [DISTWIDTH] IN BLOOD BY AUTOMATED COUNT: 14.3 % (ref 11.6–15.1)
GFR SERPL CREATININE-BSD FRML MDRD: 112 ML/MIN/1.73SQ M
GLUCOSE SERPL-MCNC: 118 MG/DL (ref 65–140)
HCT VFR BLD AUTO: 36.7 % (ref 36.5–49.3)
HGB BLD-MCNC: 12 G/DL (ref 12–17)
IMM GRANULOCYTES # BLD AUTO: 0.03 THOUSAND/UL (ref 0–0.2)
IMM GRANULOCYTES NFR BLD AUTO: 1 % (ref 0–2)
LYMPHOCYTES # BLD AUTO: 0.92 THOUSANDS/ÂΜL (ref 0.6–4.47)
LYMPHOCYTES NFR BLD AUTO: 22 % (ref 14–44)
MCH RBC QN AUTO: 28.6 PG (ref 26.8–34.3)
MCHC RBC AUTO-ENTMCNC: 32.7 G/DL (ref 31.4–37.4)
MCV RBC AUTO: 87 FL (ref 82–98)
MONOCYTES # BLD AUTO: 0.4 THOUSAND/ÂΜL (ref 0.17–1.22)
MONOCYTES NFR BLD AUTO: 9 % (ref 4–12)
NEUTROPHILS # BLD AUTO: 2.64 THOUSANDS/ÂΜL (ref 1.85–7.62)
NEUTS SEG NFR BLD AUTO: 62 % (ref 43–75)
NRBC BLD AUTO-RTO: 0 /100 WBCS
PLATELET # BLD AUTO: 129 THOUSANDS/UL (ref 149–390)
PMV BLD AUTO: 9.6 FL (ref 8.9–12.7)
POTASSIUM SERPL-SCNC: 3.5 MMOL/L (ref 3.5–5.3)
PROT SERPL-MCNC: 7.2 G/DL (ref 6.4–8.4)
RBC # BLD AUTO: 4.2 MILLION/UL (ref 3.88–5.62)
SODIUM SERPL-SCNC: 138 MMOL/L (ref 135–147)
WBC # BLD AUTO: 4.25 THOUSAND/UL (ref 4.31–10.16)

## 2023-12-27 PROCEDURE — 82378 CARCINOEMBRYONIC ANTIGEN: CPT

## 2023-12-27 PROCEDURE — 85025 COMPLETE CBC W/AUTO DIFF WBC: CPT

## 2023-12-27 PROCEDURE — 80053 COMPREHEN METABOLIC PANEL: CPT

## 2023-12-27 NOTE — PROGRESS NOTES
..Alexandro Luo  tolerated treatment well with no complications.  Labs drawn as ordered.    Alexandro Luo is aware of future appt on 2/21 at 0930.     AVS printed and given to Alexandro Luo:  No (Declined by Alexandro Luo)

## 2023-12-27 NOTE — PLAN OF CARE
Problem: Knowledge Deficit  Goal: Patient/family/caregiver demonstrates understanding of disease process, treatment plan, medications, and discharge instructions  Description: Complete learning assessment and assess knowledge base.  Interventions:  - Provide teaching at level of understanding  - Provide teaching via preferred learning methods  Outcome: Progressing     
no

## 2023-12-29 RX ORDER — ACETAMINOPHEN 325 MG/1
1 TABLET ORAL EVERY 6 HOURS PRN
COMMUNITY
End: 2024-01-17

## 2023-12-29 NOTE — PRE-PROCEDURE INSTRUCTIONS
Pre-Surgery Instructions:   Medication Instructions    acetaminophen (TYLENOL) 325 mg tablet Uses PRN- OK to take day of surgery    carvedilol (COREG) 3.125 mg tablet Take day of surgery.    [START ON 1/15/2024] metroNIDAZOLE (FLAGYL) 500 mg tablet Instructions provided by MD    [START ON 1/15/2024] neomycin (MYCIFRADIN) 500 mg tablet Instructions provided by MD    ondansetron (ZOFRAN) 8 mg tablet Uses PRN- OK to take day of surgery    pyridoxine (VITAMIN B6) 50 mg tablet Stop taking 7 days prior to surgery.      Medication instructions for day surgery reviewed. Please use only a sip of water to take your instructed medications. Avoid all over the counter vitamins, supplements and NSAIDS for one week prior to surgery per anesthesia guidelines. Tylenol is ok to take as needed.     You will receive a call one business day prior to surgery with an arrival time and hospital directions. If your surgery is scheduled on a Monday, the hospital will be calling you on the Friday prior to your surgery. If you have not heard from anyone by 8pm, please call the hospital supervisor through the hospital  at [phone]. (Jordin [phone]).    Do not eat or drink anything after midnight the night before your surgery, including candy, mints, lifesavers, or chewing gum. Do not drink alcohol 24hrs before your surgery. Try not to smoke at least 24hrs before your surgery.       Follow the pre surgery showering instructions as listed in the “My Surgical Experience Booklet” or otherwise provided by your surgeon's office. Do not use a blade to shave the surgical area 1 week before surgery. It is okay to use a clean electric clippers up to 24 hours before surgery. Do not apply any lotions, creams, including makeup, cologne, deodorant, or perfumes after showering on the day of your surgery. Do not use dry shampoo, hair spray, hair gel, or any type of hair products.     No contact lenses, eye make-up, or artificial eyelashes.  Remove nail polish, including gel polish, and any artificial, gel, or acrylic nails if possible. Remove all jewelry including rings and body piercing jewelry.     Wear causal clothing that is easy to take on and off. Consider your type of surgery.    Keep any valuables, jewelry, piercings at home. Please bring any specially ordered equipment (sling, braces) if indicated.    Arrange for a responsible person to drive you to and from the hospital on the day of your surgery. Visitor Guidelines discussed.     Call the surgeon's office with any new illnesses, exposures, or additional questions prior to surgery.    Please reference your “My Surgical Experience Booklet” for additional information to prepare for your upcoming surgery.

## 2024-01-16 ENCOUNTER — HOSPITAL ENCOUNTER (INPATIENT)
Facility: HOSPITAL | Age: 44
LOS: 1 days | Discharge: HOME | DRG: 230 | End: 2024-01-17
Attending: COLON & RECTAL SURGERY | Admitting: COLON & RECTAL SURGERY
Payer: COMMERCIAL

## 2024-01-16 ENCOUNTER — ANESTHESIA EVENT (OUTPATIENT)
Dept: PERIOP | Facility: HOSPITAL | Age: 44
DRG: 230 | End: 2024-01-16
Payer: COMMERCIAL

## 2024-01-16 ENCOUNTER — ANESTHESIA (OUTPATIENT)
Dept: PERIOP | Facility: HOSPITAL | Age: 44
DRG: 230 | End: 2024-01-16
Payer: COMMERCIAL

## 2024-01-16 DIAGNOSIS — C20 RECTAL CANCER (HCC): Primary | ICD-10-CM

## 2024-01-16 PROCEDURE — 86901 BLOOD TYPING SEROLOGIC RH(D): CPT | Performed by: COLON & RECTAL SURGERY

## 2024-01-16 PROCEDURE — NC001 PR NO CHARGE: Performed by: COLON & RECTAL SURGERY

## 2024-01-16 PROCEDURE — 86850 RBC ANTIBODY SCREEN: CPT | Performed by: COLON & RECTAL SURGERY

## 2024-01-16 PROCEDURE — 88304 TISSUE EXAM BY PATHOLOGIST: CPT | Performed by: PATHOLOGY

## 2024-01-16 PROCEDURE — 44625 REPAIR BOWEL OPENING: CPT | Performed by: COLON & RECTAL SURGERY

## 2024-01-16 PROCEDURE — 0DBB0ZZ EXCISION OF ILEUM, OPEN APPROACH: ICD-10-PCS | Performed by: COLON & RECTAL SURGERY

## 2024-01-16 PROCEDURE — 86900 BLOOD TYPING SEROLOGIC ABO: CPT | Performed by: COLON & RECTAL SURGERY

## 2024-01-16 RX ORDER — PROPOFOL 10 MG/ML
INJECTION, EMULSION INTRAVENOUS AS NEEDED
Status: DISCONTINUED | OUTPATIENT
Start: 2024-01-16 | End: 2024-01-16

## 2024-01-16 RX ORDER — LIDOCAINE HYDROCHLORIDE 20 MG/ML
INJECTION, SOLUTION EPIDURAL; INFILTRATION; INTRACAUDAL; PERINEURAL AS NEEDED
Status: DISCONTINUED | OUTPATIENT
Start: 2024-01-16 | End: 2024-01-16

## 2024-01-16 RX ORDER — METOCLOPRAMIDE HYDROCHLORIDE 5 MG/ML
5 INJECTION INTRAMUSCULAR; INTRAVENOUS ONCE
Status: COMPLETED | OUTPATIENT
Start: 2024-01-16 | End: 2024-01-16

## 2024-01-16 RX ORDER — DEXAMETHASONE SODIUM PHOSPHATE 10 MG/ML
INJECTION, SOLUTION INTRAMUSCULAR; INTRAVENOUS AS NEEDED
Status: DISCONTINUED | OUTPATIENT
Start: 2024-01-16 | End: 2024-01-16

## 2024-01-16 RX ORDER — HYDROMORPHONE HCL/PF 1 MG/ML
0.5 SYRINGE (ML) INJECTION
Status: DISCONTINUED | OUTPATIENT
Start: 2024-01-16 | End: 2024-01-17 | Stop reason: HOSPADM

## 2024-01-16 RX ORDER — SODIUM CHLORIDE, SODIUM GLUCONATE, SODIUM ACETATE, POTASSIUM CHLORIDE, MAGNESIUM CHLORIDE, SODIUM PHOSPHATE, DIBASIC, AND POTASSIUM PHOSPHATE .53; .5; .37; .037; .03; .012; .00082 G/100ML; G/100ML; G/100ML; G/100ML; G/100ML; G/100ML; G/100ML
75 INJECTION, SOLUTION INTRAVENOUS CONTINUOUS
Status: DISCONTINUED | OUTPATIENT
Start: 2024-01-16 | End: 2024-01-17

## 2024-01-16 RX ORDER — ONDANSETRON 2 MG/ML
4 INJECTION INTRAMUSCULAR; INTRAVENOUS ONCE AS NEEDED
Status: COMPLETED | OUTPATIENT
Start: 2024-01-16 | End: 2024-01-16

## 2024-01-16 RX ORDER — CEFAZOLIN SODIUM 2 G/50ML
2000 SOLUTION INTRAVENOUS ONCE
Status: COMPLETED | OUTPATIENT
Start: 2024-01-16 | End: 2024-01-16

## 2024-01-16 RX ORDER — MAGNESIUM HYDROXIDE 1200 MG/15ML
LIQUID ORAL AS NEEDED
Status: DISCONTINUED | OUTPATIENT
Start: 2024-01-16 | End: 2024-01-16 | Stop reason: HOSPADM

## 2024-01-16 RX ORDER — FENTANYL CITRATE 50 UG/ML
INJECTION, SOLUTION INTRAMUSCULAR; INTRAVENOUS AS NEEDED
Status: DISCONTINUED | OUTPATIENT
Start: 2024-01-16 | End: 2024-01-16

## 2024-01-16 RX ORDER — OXYCODONE HYDROCHLORIDE 5 MG/1
5 TABLET ORAL EVERY 4 HOURS PRN
Status: DISCONTINUED | OUTPATIENT
Start: 2024-01-16 | End: 2024-01-17 | Stop reason: HOSPADM

## 2024-01-16 RX ORDER — ACETAMINOPHEN 325 MG/1
650 TABLET ORAL EVERY 6 HOURS PRN
Status: DISCONTINUED | OUTPATIENT
Start: 2024-01-16 | End: 2024-01-17 | Stop reason: HOSPADM

## 2024-01-16 RX ORDER — ENOXAPARIN SODIUM 100 MG/ML
40 INJECTION SUBCUTANEOUS DAILY
Status: DISCONTINUED | OUTPATIENT
Start: 2024-01-17 | End: 2024-01-17 | Stop reason: HOSPADM

## 2024-01-16 RX ORDER — CARVEDILOL 3.12 MG/1
3.12 TABLET ORAL 2 TIMES DAILY WITH MEALS
Status: DISCONTINUED | OUTPATIENT
Start: 2024-01-17 | End: 2024-01-17 | Stop reason: HOSPADM

## 2024-01-16 RX ORDER — GABAPENTIN 100 MG/1
100 CAPSULE ORAL 3 TIMES DAILY
Status: DISCONTINUED | OUTPATIENT
Start: 2024-01-16 | End: 2024-01-17 | Stop reason: HOSPADM

## 2024-01-16 RX ORDER — SODIUM CHLORIDE, SODIUM LACTATE, POTASSIUM CHLORIDE, CALCIUM CHLORIDE 600; 310; 30; 20 MG/100ML; MG/100ML; MG/100ML; MG/100ML
100 INJECTION, SOLUTION INTRAVENOUS CONTINUOUS
Status: DISCONTINUED | OUTPATIENT
Start: 2024-01-16 | End: 2024-01-16

## 2024-01-16 RX ORDER — ONDANSETRON 2 MG/ML
INJECTION INTRAMUSCULAR; INTRAVENOUS AS NEEDED
Status: DISCONTINUED | OUTPATIENT
Start: 2024-01-16 | End: 2024-01-16

## 2024-01-16 RX ORDER — HYDROMORPHONE HCL/PF 1 MG/ML
SYRINGE (ML) INJECTION AS NEEDED
Status: DISCONTINUED | OUTPATIENT
Start: 2024-01-16 | End: 2024-01-16

## 2024-01-16 RX ORDER — ROCURONIUM BROMIDE 10 MG/ML
INJECTION, SOLUTION INTRAVENOUS AS NEEDED
Status: DISCONTINUED | OUTPATIENT
Start: 2024-01-16 | End: 2024-01-16

## 2024-01-16 RX ORDER — FENTANYL CITRATE/PF 50 MCG/ML
25 SYRINGE (ML) INJECTION
Status: DISCONTINUED | OUTPATIENT
Start: 2024-01-16 | End: 2024-01-16 | Stop reason: HOSPADM

## 2024-01-16 RX ORDER — PYRIDOXINE HCL (VITAMIN B6) 50 MG
50 TABLET ORAL DAILY
Status: DISCONTINUED | OUTPATIENT
Start: 2024-01-17 | End: 2024-01-17 | Stop reason: HOSPADM

## 2024-01-16 RX ORDER — OXYCODONE HYDROCHLORIDE 10 MG/1
10 TABLET ORAL EVERY 4 HOURS PRN
Status: DISCONTINUED | OUTPATIENT
Start: 2024-01-16 | End: 2024-01-17 | Stop reason: HOSPADM

## 2024-01-16 RX ORDER — METRONIDAZOLE 500 MG/1
1000 TABLET ORAL 3 TIMES DAILY
Status: DISCONTINUED | OUTPATIENT
Start: 2024-01-16 | End: 2024-01-16 | Stop reason: ALTCHOICE

## 2024-01-16 RX ORDER — NEOMYCIN SULFATE 500 MG/1
1000 TABLET ORAL 3 TIMES DAILY
Status: DISCONTINUED | OUTPATIENT
Start: 2024-01-16 | End: 2024-01-16 | Stop reason: ALTCHOICE

## 2024-01-16 RX ORDER — MIDAZOLAM HYDROCHLORIDE 2 MG/2ML
INJECTION, SOLUTION INTRAMUSCULAR; INTRAVENOUS AS NEEDED
Status: DISCONTINUED | OUTPATIENT
Start: 2024-01-16 | End: 2024-01-16

## 2024-01-16 RX ORDER — HEPARIN SODIUM 5000 [USP'U]/ML
5000 INJECTION, SOLUTION INTRAVENOUS; SUBCUTANEOUS ONCE
Status: COMPLETED | OUTPATIENT
Start: 2024-01-16 | End: 2024-01-16

## 2024-01-16 RX ORDER — METHOCARBAMOL 500 MG/1
500 TABLET, FILM COATED ORAL EVERY 6 HOURS PRN
Status: DISCONTINUED | OUTPATIENT
Start: 2024-01-16 | End: 2024-01-17 | Stop reason: HOSPADM

## 2024-01-16 RX ORDER — METRONIDAZOLE 500 MG/100ML
500 INJECTION, SOLUTION INTRAVENOUS ONCE
Status: COMPLETED | OUTPATIENT
Start: 2024-01-16 | End: 2024-01-16

## 2024-01-16 RX ADMIN — MIDAZOLAM 2 MG: 1 INJECTION INTRAMUSCULAR; INTRAVENOUS at 18:27

## 2024-01-16 RX ADMIN — ROCURONIUM BROMIDE 50 MG: 10 INJECTION, SOLUTION INTRAVENOUS at 18:37

## 2024-01-16 RX ADMIN — SODIUM CHLORIDE, SODIUM LACTATE, POTASSIUM CHLORIDE, AND CALCIUM CHLORIDE: .6; .31; .03; .02 INJECTION, SOLUTION INTRAVENOUS at 20:09

## 2024-01-16 RX ADMIN — PHENYLEPHRINE HYDROCHLORIDE 100 MCG: 10 INJECTION INTRAVENOUS at 18:41

## 2024-01-16 RX ADMIN — ONDANSETRON 4 MG: 2 INJECTION INTRAMUSCULAR; INTRAVENOUS at 19:50

## 2024-01-16 RX ADMIN — ROCURONIUM BROMIDE 30 MG: 10 INJECTION, SOLUTION INTRAVENOUS at 19:14

## 2024-01-16 RX ADMIN — GABAPENTIN 100 MG: 100 CAPSULE ORAL at 22:58

## 2024-01-16 RX ADMIN — SODIUM CHLORIDE, SODIUM LACTATE, POTASSIUM CHLORIDE, AND CALCIUM CHLORIDE 100 ML/HR: .6; .31; .03; .02 INJECTION, SOLUTION INTRAVENOUS at 20:14

## 2024-01-16 RX ADMIN — SUGAMMADEX 200 MG: 100 INJECTION, SOLUTION INTRAVENOUS at 20:02

## 2024-01-16 RX ADMIN — METOCLOPRAMIDE 5 MG: 5 INJECTION, SOLUTION INTRAMUSCULAR; INTRAVENOUS at 20:36

## 2024-01-16 RX ADMIN — ONDANSETRON 4 MG: 2 INJECTION INTRAMUSCULAR; INTRAVENOUS at 20:14

## 2024-01-16 RX ADMIN — LIDOCAINE HYDROCHLORIDE 100 MG: 20 INJECTION, SOLUTION EPIDURAL; INFILTRATION; INTRACAUDAL; PERINEURAL at 18:36

## 2024-01-16 RX ADMIN — PROPOFOL 200 MG: 10 INJECTION, EMULSION INTRAVENOUS at 18:36

## 2024-01-16 RX ADMIN — FENTANYL CITRATE 100 MCG: 50 INJECTION INTRAMUSCULAR; INTRAVENOUS at 18:33

## 2024-01-16 RX ADMIN — HYDROMORPHONE HYDROCHLORIDE 0.5 MG: 1 INJECTION, SOLUTION INTRAMUSCULAR; INTRAVENOUS; SUBCUTANEOUS at 19:38

## 2024-01-16 RX ADMIN — METRONIDAZOLE: 500 SOLUTION INTRAVENOUS at 18:47

## 2024-01-16 RX ADMIN — SODIUM CHLORIDE, SODIUM GLUCONATE, SODIUM ACETATE, POTASSIUM CHLORIDE, MAGNESIUM CHLORIDE, SODIUM PHOSPHATE, DIBASIC, AND POTASSIUM PHOSPHATE 75 ML/HR: .53; .5; .37; .037; .03; .012; .00082 INJECTION, SOLUTION INTRAVENOUS at 21:26

## 2024-01-16 RX ADMIN — HEPARIN SODIUM 5000 UNITS: 5000 INJECTION INTRAVENOUS; SUBCUTANEOUS at 15:24

## 2024-01-16 RX ADMIN — DEXAMETHASONE SODIUM PHOSPHATE 10 MG: 10 INJECTION, SOLUTION INTRAMUSCULAR; INTRAVENOUS at 18:37

## 2024-01-16 RX ADMIN — OXYCODONE HYDROCHLORIDE 10 MG: 10 TABLET ORAL at 21:06

## 2024-01-16 RX ADMIN — HYDROMORPHONE HYDROCHLORIDE 0.5 MG: 1 INJECTION, SOLUTION INTRAMUSCULAR; INTRAVENOUS; SUBCUTANEOUS at 18:57

## 2024-01-16 RX ADMIN — CEFAZOLIN SODIUM 2000 MG: 2 SOLUTION INTRAVENOUS at 18:48

## 2024-01-16 RX ADMIN — SODIUM CHLORIDE, SODIUM LACTATE, POTASSIUM CHLORIDE, AND CALCIUM CHLORIDE 100 ML/HR: .6; .31; .03; .02 INJECTION, SOLUTION INTRAVENOUS at 15:28

## 2024-01-16 NOTE — ANESTHESIA PREPROCEDURE EVALUATION
Procedure:  CLOSURE ILEOSTOMY (Abdomen)    S/p LAR 10/2023  Bowel preop completed    Relevant Problems   GI/HEPATIC   (+) Cirrhosis (HCC)   (+) Rectal cancer (HCC)      Other   (+) Port-A-Cath in place      Marijuana use  Former cigarette smoker  1-2 drinks ETOH daily      Physical Exam    Airway    Mallampati score: II  TM Distance: >3 FB  Neck ROM: full     Dental        Cardiovascular      Pulmonary      Other Findings        Anesthesia Plan  ASA Score- 3     Anesthesia Type- general with ASA Monitors.         Additional Monitors:     Airway Plan: ETT.    Comment: Recent labs personally reviewed:  Lab Results       Component                Value               Date                       WBC                      4.25 (L)            12/27/2023                 HGB                      12.0                12/27/2023                 PLT                      129 (L)             12/27/2023            Lab Results       Component                Value               Date                       K                        3.5                 12/27/2023                 BUN                      8                   12/27/2023                 CREATININE               0.73                12/27/2023                 GLUCOSE                  164 (H)             10/17/2023            Lab Results       Component                Value               Date                       PTT                      32                  02/27/2023             Lab Results       Component                Value               Date                       INR                      1.43 (H)            10/18/2023              Blood type O      I, Ruth Guzman MD, have personally seen and evaluated the patient prior to anesthetic care.  I have reviewed the pre-anesthetic record, medical history, allergies, medications and any other medical records if appropriate to the anesthetic care.  If a CRNA is involved in the case, I have reviewed the CRNA assessment, if present,  and agree. I consented the patient for general anesthesia with appropriate airway support as indicated. We reviewed the risks associated including PONV, sore throat, allergic reaction to anesthetics and management plan to address these issues. We discussed the indication and risks associated with any invasive monitors that would be placed. We discussed post op pain control and expectations. We discussed rare complications including hypoxia, perioperative cardiac and neurologic events, and death based on the patient's baseline risk. All questions and concerns were addressed.   .       Plan Factors-Exercise tolerance (METS): >4 METS.    Chart reviewed. EKG reviewed. Imaging results reviewed. Existing labs reviewed. Patient summary reviewed.    Patient is a current smoker.  Patient instructed to abstain from smoking on day of procedure. Patient did not smoke on day of surgery.    Obstructive sleep apnea risk education given perioperatively.        Induction- intravenous.    Postoperative Plan-     Informed Consent- Anesthetic plan and risks discussed with patient.  I personally reviewed this patient with the CRNA. Discussed and agreed on the Anesthesia Plan with the CRNA..

## 2024-01-16 NOTE — H&P
History and Physical   Colon and Rectal Surgery   Alexandro Luo 43 y.o. male MRN: [id number]  Unit/Bed#: OR POOL Encounter: [id number]  01/16/24   6:10 PM      CC:  Rectal cancer    History of Present Illness   HPI:  Alexandro Luo is a 43 y.o. male for ileostomy closure.  Historical Information   Past Medical History:   Diagnosis Date    Cirrhosis (HCC) 3/18/24    Colon cancer (HCC)     Dental infection     Fatty liver     Rectal cancer (HCC)      Past Surgical History:   Procedure Laterality Date    APPENDECTOMY      COLONOSCOPY      DENTAL SURGERY      IR PORT PLACEMENT  02/28/2023    IA LAPS PROCTECTOMY COMBINED PULL-THRU W/RESERVOIR N/A 10/17/2023    Procedure: LAPAROSCOPIC HAND ASSIST PROCTECTOMY, ABDOMINAL APPROACH, coloanal anastomosis, loop ileostomy;  Surgeon: CLEMENTE Lugo MD;  Location: [facility];  Service: Colorectal    IA SIGMOIDOSCOPY FLX DX W/COLLJ SPEC BR/WA IF PFRMD N/A 10/17/2023    Procedure: SIGMOIDOSCOPY FLEXIBLE;  Surgeon: CLEMENTE Lugo MD;  Location: [facility];  Service: Colorectal    UPPER GASTROINTESTINAL ENDOSCOPY         Meds/Allergies     Medications Prior to Admission   Medication    acetaminophen (TYLENOL) 325 mg tablet    carvedilol (COREG) 3.125 mg tablet    metroNIDAZOLE (FLAGYL) 500 mg tablet    neomycin (MYCIFRADIN) 500 mg tablet    ondansetron (ZOFRAN) 8 mg tablet    pyridoxine (VITAMIN B6) 50 mg tablet         Current Facility-Administered Medications:     metroNIDAZOLE (FLAGYL) IVPB (premix) 500 mg 100 mL, 500 mg, Intravenous, Once **AND** ceFAZolin (ANCEF) IVPB (premix in dextrose) 2,000 mg 50 mL, 2,000 mg, Intravenous, Once, CLEMENTE Lugo MD    lactated ringers infusion, 100 mL/hr, Intravenous, Continuous, W Alexandre Lugo MD, Last Rate: 100 mL/hr at 01/16/24 1528, 100 mL/hr at 01/16/24 1528    No Known Allergies      Social History   Social History     Substance and Sexual Activity   Alcohol Use Not Currently    Alcohol/week: 2.0 standard drinks of  "alcohol    Types: 2 Cans of beer per week    Comment: 1-2 daily. previously up to 1 case/day (reduced alcohol intake 7 years ago)     Social History     Substance and Sexual Activity   Drug Use Not Currently    Frequency: 5.0 times per week    Types: Marijuana    Comment: medical marijuana     Social History     Tobacco Use   Smoking Status Former    Current packs/day: 0.00    Average packs/day: 1 pack/day for 20.0 years (20.0 ttl pk-yrs)    Types: Cigarettes    Start date: 2000    Quit date: 2020    Years since quittin.0   Smokeless Tobacco Never         Family History:   Family History   Problem Relation Age of Onset    Lung cancer Mother     Cancer Mother         Lung    Lung cancer Father     Cancer Father         Mouth throat    Colon cancer Neg Hx     Colon polyps Neg Hx          Objective     Current Vitals:   Blood Pressure: 112/72 (24)  Pulse: 75 (24)  Temperature: 97.6 °F (36.4 °C) (24)  Temp Source: Temporal (24)  Respirations: 18 (24)  Height: 6' 3\" (190.5 cm) (24)  Weight - Scale: 83.6 kg (184 lb 6.4 oz) (24)  SpO2: 98 % (24)  No intake or output data in the 24 hours ending 24    Physical Exam:  General:  Well nourished, no distress.  Neuro: Alert and oriented  Eyes:Sclera anicteric, conjunctiva pink.  Pulm: Clear to auscultation bilaterally. No respiratory Distress.   CV:  Regular rate and rhythm. No murmurs.  Abdomen:  Soft, flat, non-tender, without masses or hepatosplenomegaly. Stoma well.  Head and all 4s ok    Lab Results:       ASSESSMENT:  Alexandro Luo is a 43 y.o. male for stoma closure.  PLAN:  Risks of surgery, including but not limited to bleeding, need for transfusion of blood products, pain, infection, hernia formation, injury to the ureter or other internal organs requiring surgery specific to these injuries, anastomotic leak, impotence, deep vein thrombosis, renal failure, " pulmonary embolism, myocardial infarction or heart failure, stroke, death, need for and enterostomy, or other unspecified complications were discussed. Benefits and alternatives were discussed. Questions were answered.    RANDALL Lugo MD

## 2024-01-17 VITALS
HEART RATE: 85 BPM | WEIGHT: 184 LBS | DIASTOLIC BLOOD PRESSURE: 59 MMHG | TEMPERATURE: 98.4 F | HEIGHT: 75 IN | RESPIRATION RATE: 16 BRPM | SYSTOLIC BLOOD PRESSURE: 104 MMHG | OXYGEN SATURATION: 91 % | BODY MASS INDEX: 22.88 KG/M2

## 2024-01-17 LAB
ALBUMIN SERPL BCP-MCNC: 3.6 G/DL (ref 3.5–5)
ALP SERPL-CCNC: 46 U/L (ref 34–104)
ALT SERPL W P-5'-P-CCNC: 18 U/L (ref 7–52)
ANION GAP SERPL CALCULATED.3IONS-SCNC: 7 MMOL/L
AST SERPL W P-5'-P-CCNC: 28 U/L (ref 13–39)
BASOPHILS # BLD AUTO: 0.01 THOUSANDS/ÂΜL (ref 0–0.1)
BASOPHILS NFR BLD AUTO: 0 % (ref 0–1)
BILIRUB SERPL-MCNC: 1.25 MG/DL (ref 0.2–1)
BUN SERPL-MCNC: 9 MG/DL (ref 5–25)
CALCIUM SERPL-MCNC: 8.8 MG/DL (ref 8.4–10.2)
CHLORIDE SERPL-SCNC: 105 MMOL/L (ref 96–108)
CO2 SERPL-SCNC: 22 MMOL/L (ref 21–32)
CREAT SERPL-MCNC: 0.85 MG/DL (ref 0.6–1.3)
EOSINOPHIL # BLD AUTO: 0.01 THOUSAND/ÂΜL (ref 0–0.61)
EOSINOPHIL NFR BLD AUTO: 0 % (ref 0–6)
ERYTHROCYTE [DISTWIDTH] IN BLOOD BY AUTOMATED COUNT: 14.7 % (ref 11.6–15.1)
GFR SERPL CREATININE-BSD FRML MDRD: 105 ML/MIN/1.73SQ M
GLUCOSE SERPL-MCNC: 143 MG/DL (ref 65–140)
HCT VFR BLD AUTO: 36.1 % (ref 36.5–49.3)
HGB BLD-MCNC: 11.9 G/DL (ref 12–17)
IMM GRANULOCYTES # BLD AUTO: 0.02 THOUSAND/UL (ref 0–0.2)
IMM GRANULOCYTES NFR BLD AUTO: 1 % (ref 0–2)
LYMPHOCYTES # BLD AUTO: 0.55 THOUSANDS/ÂΜL (ref 0.6–4.47)
LYMPHOCYTES NFR BLD AUTO: 13 % (ref 14–44)
MAGNESIUM SERPL-MCNC: 1.6 MG/DL (ref 1.9–2.7)
MCH RBC QN AUTO: 28.8 PG (ref 26.8–34.3)
MCHC RBC AUTO-ENTMCNC: 33 G/DL (ref 31.4–37.4)
MCV RBC AUTO: 87 FL (ref 82–98)
MONOCYTES # BLD AUTO: 0.16 THOUSAND/ÂΜL (ref 0.17–1.22)
MONOCYTES NFR BLD AUTO: 4 % (ref 4–12)
NEUTROPHILS # BLD AUTO: 3.62 THOUSANDS/ÂΜL (ref 1.85–7.62)
NEUTS SEG NFR BLD AUTO: 82 % (ref 43–75)
NRBC BLD AUTO-RTO: 0 /100 WBCS
PHOSPHATE SERPL-MCNC: 3 MG/DL (ref 2.7–4.5)
PLATELET # BLD AUTO: 120 THOUSANDS/UL (ref 149–390)
PMV BLD AUTO: 10.4 FL (ref 8.9–12.7)
POTASSIUM SERPL-SCNC: 4.1 MMOL/L (ref 3.5–5.3)
PROT SERPL-MCNC: 5.8 G/DL (ref 6.4–8.4)
RBC # BLD AUTO: 4.13 MILLION/UL (ref 3.88–5.62)
SODIUM SERPL-SCNC: 134 MMOL/L (ref 135–147)
WBC # BLD AUTO: 4.37 THOUSAND/UL (ref 4.31–10.16)

## 2024-01-17 PROCEDURE — 99024 POSTOP FOLLOW-UP VISIT: CPT | Performed by: COLON & RECTAL SURGERY

## 2024-01-17 PROCEDURE — 85025 COMPLETE CBC W/AUTO DIFF WBC: CPT

## 2024-01-17 PROCEDURE — 84100 ASSAY OF PHOSPHORUS: CPT

## 2024-01-17 PROCEDURE — 83735 ASSAY OF MAGNESIUM: CPT

## 2024-01-17 PROCEDURE — NC001 PR NO CHARGE: Performed by: COLON & RECTAL SURGERY

## 2024-01-17 PROCEDURE — 80053 COMPREHEN METABOLIC PANEL: CPT

## 2024-01-17 RX ORDER — MAGNESIUM SULFATE HEPTAHYDRATE 40 MG/ML
2 INJECTION, SOLUTION INTRAVENOUS ONCE
Qty: 50 ML | Refills: 0 | Status: DISCONTINUED | OUTPATIENT
Start: 2024-01-17 | End: 2024-01-17

## 2024-01-17 RX ORDER — GABAPENTIN 100 MG/1
100 CAPSULE ORAL 3 TIMES DAILY
Qty: 21 CAPSULE | Refills: 0 | Status: SHIPPED | OUTPATIENT
Start: 2024-01-17 | End: 2024-01-24

## 2024-01-17 RX ORDER — OXYCODONE HYDROCHLORIDE 5 MG/1
5 TABLET ORAL EVERY 6 HOURS PRN
Qty: 16 TABLET | Refills: 0 | Status: SHIPPED | OUTPATIENT
Start: 2024-01-17 | End: 2024-01-21

## 2024-01-17 RX ORDER — METHOCARBAMOL 500 MG/1
500 TABLET, FILM COATED ORAL EVERY 8 HOURS SCHEDULED
Qty: 21 TABLET | Refills: 0 | Status: SHIPPED | OUTPATIENT
Start: 2024-01-17 | End: 2024-01-24

## 2024-01-17 RX ORDER — ACETAMINOPHEN 325 MG/1
975 TABLET ORAL EVERY 8 HOURS SCHEDULED
Qty: 63 TABLET | Refills: 0 | Status: SHIPPED | OUTPATIENT
Start: 2024-01-17 | End: 2024-01-24

## 2024-01-17 RX ORDER — MAGNESIUM SULFATE HEPTAHYDRATE 40 MG/ML
4 INJECTION, SOLUTION INTRAVENOUS ONCE
Status: COMPLETED | OUTPATIENT
Start: 2024-01-17 | End: 2024-01-17

## 2024-01-17 RX ADMIN — GABAPENTIN 100 MG: 100 CAPSULE ORAL at 09:04

## 2024-01-17 RX ADMIN — OXYCODONE HYDROCHLORIDE 5 MG: 5 TABLET ORAL at 02:30

## 2024-01-17 RX ADMIN — ENOXAPARIN SODIUM 40 MG: 40 INJECTION SUBCUTANEOUS at 09:04

## 2024-01-17 RX ADMIN — MAGNESIUM SULFATE 4 G: 4 INJECTION INTRAVENOUS at 09:07

## 2024-01-17 NOTE — OP NOTE
OPERATIVE REPORT  PATIENT NAME: Alexandro Luo    :  1980  MRN: [id number]  Pt Location: [facility] ROOM 10    SURGERY DATE: 2024    Surgeons and Role:     * CLEMENTE Lugo MD - Primary     * Paramjit Bentley MD - Assisting    Preop Diagnosis:  Rectal cancer (HCC) [C20]    Post-Op Diagnosis Codes:     * Rectal cancer (HCC) [C20]    Procedure(s):  CLOSURE ILEOSTOMY    Specimen(s):  ID Type Source Tests Collected by Time Destination   1 : ILEOSTOMY Tissue Colon TISSUE EXAM CLEMENTE Lugo MD 2024 193        Estimated Blood Loss:   Minimal    Drains:  Ileostomy RLQ (Active)   Number of days: 91       Anesthesia Type:   General    Operative Indications:  Rectal cancer (HCC) [C20]    Operative Findings:  N/A    Complications:   None    Procedure and Technique:  The patient was placed in a supine position with the arms on boards.  The abdomen was prepped widely using ChloraPrep and allowed to dry completely.  The area was draped in a sterile manner.  A timeout was done.    A horizontally oriented elliptical incision was created around the ileostomy.  The subcutaneous fat was preserved as possible.  The serosa was identified and dissection was carried down to the level of the fascia.  The serosa was extremely densely adhesed to the subcutaneous tissues that were firm.  Some deserosalization of the bowel in the subcutaneous layer recurred.  A portion of the rectus muscle on the medial side of the ileostomy was opened to allow for access and visualization for dissection.  The bowel was  from the fascial edges and was easily delivered both proximally and distally into the wound.  This allowed for a straightforward double stapled anastomosis.    The double stapled anastomosis was undertaken using a 75 mm ALLISON stapler.  An apical 4-0 PDS suture was placed.  A box stitch was placed at the area of the 4 staple lines which were not overlying each other but were close.  The anterior staple line was intact  and unremarkable.  I could not harriet the staple line to examine the posterior layer.  The staple line had been examined from the opening that was created by the initial staple run.  No defects were identified.  Bleeding was absent at the time of creation of the second run of the staple line.  The bowel was well vascularized and supple on both sides of the staple line.  The anastomosis was created in the antimesenteric bowel.    The bowel was cleansed and dried and placed within the abdomen.  Gloves were changed  The peritoneum was closed using a running 0 Vicryl suture.  The muscular layer was inspected for any bleeding and was found to be dry.  The fascia was then closed using a running 1 PDS suture.  The skin was closed using interrupted subcuticular 4-0 Monocryl sutures.    Sponge needle and instrument counts were correct.  Wand technique revealed no retained gauze.       I was present for the entire procedure.    Patient Disposition:  PACU     This procedure was not performed to treat colon cancer through resection      SIGNATURE: RANDALL Lugo MD  DATE: January 16, 2024  TIME: 7:55 PM

## 2024-01-17 NOTE — UTILIZATION REVIEW
"Initial Clinical Review    Elective Inpatient surgical procedure  Age/Sex: 43 y.o. male  Surgery Date: 1/16/24  Procedure: CLOSURE ILEOSTOMY   Anesthesia: genearl  Operative Findings: NA    POD#1 Progress Note:  One episode of emesis immediately post-op. No further nausea, no drainage or BM. Abdoomen soft, nondistended, incision c/d/I with minimal drainage, appropriately tender. Diet as tolerated, OOB, strict IO, encourage inc spirometry.     Admission Orders: Date/Time/Statement:   Admission Orders (From admission, onward)       Ordered        01/16/24 1821  Inpatient Admission  Once                          Orders Placed This Encounter   Procedures    Inpatient Admission     Standing Status:   Standing     Number of Occurrences:   1     Order Specific Question:   Level of Care     Answer:   Med Surg [16]     Order Specific Question:   Estimated length of stay     Answer:   More than 2 Midnights     Order Specific Question:   Certification     Answer:   I certify that inpatient services are medically necessary for this patient for a duration of greater than two midnights. See H&P and MD Progress Notes for additional information about the patient's course of treatment.     Vital Signs: /58   Pulse 76   Temp 98.3 °F (36.8 °C)   Resp 16   Ht 6' 3\" (1.905 m)   Wt 83.5 kg (184 lb)   SpO2 94%   BMI 23.00 kg/m²     Pertinent Labs/Diagnostic Test Results:       Results from last 7 days   Lab Units 01/17/24  0427   WBC Thousand/uL 4.37   HEMOGLOBIN g/dL 11.9*   HEMATOCRIT % 36.1*   PLATELETS Thousands/uL 120*   NEUTROS ABS Thousands/µL 3.62         Results from last 7 days   Lab Units 01/17/24  0427   SODIUM mmol/L 134*   POTASSIUM mmol/L 4.1   CHLORIDE mmol/L 105   CO2 mmol/L 22   ANION GAP mmol/L 7   BUN mg/dL 9   CREATININE mg/dL 0.85   EGFR ml/min/1.73sq m 105   CALCIUM mg/dL 8.8   MAGNESIUM mg/dL 1.6*   PHOSPHORUS mg/dL 3.0     Results from last 7 days   Lab Units 01/17/24  0427   AST U/L 28   ALT U/L 18 "   ALK PHOS U/L 46   TOTAL PROTEIN g/dL 5.8*   ALBUMIN g/dL 3.6   TOTAL BILIRUBIN mg/dL 1.25*         Results from last 7 days   Lab Units 01/17/24  0427   GLUCOSE RANDOM mg/dL 143*       Diet: as tolerated  Mobility: OOB and ambulatory  DVT Prophylaxis: lovenox, scd, ambulation    Medications/Pain Control:   Scheduled Medications:  carvedilol, 3.125 mg, Oral, BID With Meals  enoxaparin, 40 mg, Subcutaneous, Daily  gabapentin, 100 mg, Oral, TID  magnesium sulfate, 4 g, Intravenous, Once  pyridoxine, 50 mg, Oral, Daily      Continuous IV Infusions: none     PRN Meds:  acetaminophen, 650 mg, Oral, Q6H PRN  HYDROmorphone, 0.5 mg, Intravenous, Q3H PRN  methocarbamol, 500 mg, Oral, Q6H PRN  oxyCODONE, 10 mg, Oral, Q4H PRN  oxyCODONE, 5 mg, Oral, Q4H PRN        Network Utilization Review Department  ATTENTION: Please call with any questions or concerns to [phone] and carefully listen to the prompts so that you are directed to the right person. All voicemails are confidential.   For Discharge needs, contact Care Management DC Support Team at [phone] opt. 2  Send all requests for admission clinical reviews, approved or denied determinations and any other requests to dedicated fax number below belonging to the campus where the patient is receiving treatment. List of dedicated fax numbers for the Facilities:  FACILITY NAME UR FAX NUMBER   ADMISSION DENIALS (Administrative/Medical Necessity) [phone]   DISCHARGE SUPPORT TEAM (NETWORK) [phone]   PARENT CHILD HEALTH (Maternity/NICU/Pediatrics) [phone]   [facility] [phone]   [facility] [phone]   [facility] [phone]   [facility] [phone]   [facility] [phone]   [facility] [phone]   [facility] [phone]   [facility]  [place] [phone]   [facility] [phone]   [facility] [phone]   [facility] [phone]

## 2024-01-17 NOTE — ANESTHESIA POSTPROCEDURE EVALUATION
Post-Op Assessment Note    CV Status:  Stable  Pain Score: 0    Pain management: adequate       Mental Status:  Awake   Hydration Status:  Stable   PONV Controlled:  Controlled   Airway Patency:  Patent  Two or more mitigation strategies used for obstructive sleep apnea   Post Op Vitals Reviewed: Yes      Staff: Anesthesiologist, CRNA               BP   146/80   Temp 98.4   Pulse 71   Resp 18   SpO2 100

## 2024-01-17 NOTE — PLAN OF CARE
Problem: GASTROINTESTINAL - ADULT  Goal: Maintains or returns to baseline bowel function  Description: INTERVENTIONS:  - Assess bowel function  - Encourage oral fluids to ensure adequate hydration  - Administer IV fluids if ordered to ensure adequate hydration  - Administer ordered medications as needed  - Encourage mobilization and activity  - Consider nutritional services referral to assist patient with adequate nutrition and appropriate food choices  Outcome: Progressing     Problem: SKIN/TISSUE INTEGRITY - ADULT  Goal: Incision(s), wounds(s) or drain site(s) healing without S/S of infection  Description: INTERVENTIONS  - Assess and document dressing, incision, wound bed, drain sites and surrounding tissue  - Provide patient and family education  - Perform skin care/dressing changes every shift  Outcome: Progressing     Problem: MUSCULOSKELETAL - ADULT  Goal: Maintain or return mobility to safest level of function  Description: INTERVENTIONS:  - Assess patient's ability to carry out ADLs; assess patient's baseline for ADL function and identify physical deficits which impact ability to perform ADLs (bathing, care of mouth/teeth, toileting, grooming, dressing, etc.)  - Assess/evaluate cause of self-care deficits   - Assess range of motion  - Assess patient's mobility  - Assess patient's need for assistive devices and provide as appropriate  - Encourage maximum independence but intervene and supervise when necessary  - Involve family in performance of ADLs  - Assess for home care needs following discharge   - Consider OT consult to assist with ADL evaluation and planning for discharge  - Provide patient education as appropriate  Outcome: Progressing     Problem: PAIN - ADULT  Goal: Verbalizes/displays adequate comfort level or baseline comfort level  Description: Interventions:  - Encourage patient to monitor pain and request assistance  - Assess pain using appropriate pain scale  - Administer analgesics based on  type and severity of pain and evaluate response  - Implement non-pharmacological measures as appropriate and evaluate response  - Consider cultural and social influences on pain and pain management  - Notify physician/advanced practitioner if interventions unsuccessful or patient reports new pain  Outcome: Progressing

## 2024-01-17 NOTE — PROGRESS NOTES
"Progress Note - Alexandro Luo 43 y.o. male MRN: [id number]    Unit/Bed#: [facility] 829-01 Encounter: [id number]      Assessment:  Alexandro Luo is a 43 y.o. male with PMH rectal cancer s/p ileostomy reversal 1/16    VSS  UOP 500cc  Am labs pending    Plan:  Diet as tolerated  OOB   Strict I/Os  Q1hr IS use      Subjective:   Patient seen and examined bedside.  No overnight events.   One episode of emesis immediately post-op.  No further nausea.  No drainage or BM    Objective:     Vitals: Blood pressure 129/77, pulse 76, temperature 97.8 °F (36.6 °C), resp. rate 18, height 6' 3\" (1.905 m), weight 83.5 kg (184 lb), SpO2 93%.,Body mass index is 23 kg/m².      Intake/Output Summary (Last 24 hours) at 1/17/2024 0635  Last data filed at 1/17/2024 0608  Gross per 24 hour   Intake 2140.83 ml   Output 550 ml   Net 1590.83 ml       Physical Exam:   General - no acute distress, responsive and cooperative  CV - warm, regular rate  Pulm - normal work of breathing, no respiratory distress  Abd - soft, nondistended, incision c/d/I with minimal drainage, appropriately tender  Neuro - m/s grossly intact, cn grossly intact  Ext - moving all extremities       Invasive Devices       Central Venous Catheter Line  Duration             Port A Cath 02/28/23 Right Subclavian 322 days              Peripheral Intravenous Line  Duration             Peripheral IV 01/16/24 Dorsal (posterior);Left Hand 1 day                    Lab, Imaging and other studies: I have personally reviewed pertinent reports.    VTE Pharmacologic Prophylaxis: Enoxaparin (Lovenox)  VTE Mechanical Prophylaxis: sequential compression device   "

## 2024-01-17 NOTE — PLAN OF CARE
Problem: GASTROINTESTINAL - ADULT  Goal: Maintains or returns to baseline bowel function  Description: INTERVENTIONS:  - Assess bowel function  - Encourage oral fluids to ensure adequate hydration  - Administer IV fluids if ordered to ensure adequate hydration  - Administer ordered medications as needed  - Encourage mobilization and activity  - Consider nutritional services referral to assist patient with adequate nutrition and appropriate food choices  Outcome: Progressing     Problem: PAIN - ADULT  Goal: Verbalizes/displays adequate comfort level or baseline comfort level  Description: Interventions:  - Encourage patient to monitor pain and request assistance  - Assess pain using appropriate pain scale  - Administer analgesics based on type and severity of pain and evaluate response  - Implement non-pharmacological measures as appropriate and evaluate response  - Consider cultural and social influences on pain and pain management  - Notify physician/advanced practitioner if interventions unsuccessful or patient reports new pain  Outcome: Progressing     Problem: MUSCULOSKELETAL - ADULT  Goal: Maintain or return mobility to safest level of function  Description: INTERVENTIONS:  - Assess patient's ability to carry out ADLs; assess patient's baseline for ADL function and identify physical deficits which impact ability to perform ADLs (bathing, care of mouth/teeth, toileting, grooming, dressing, etc.)  - Assess/evaluate cause of self-care deficits   - Assess range of motion  - Assess patient's mobility  - Assess patient's need for assistive devices and provide as appropriate  - Encourage maximum independence but intervene and supervise when necessary  - Involve family in performance of ADLs  - Assess for home care needs following discharge   - Consider OT consult to assist with ADL evaluation and planning for discharge  - Provide patient education as appropriate  Outcome: Progressing

## 2024-01-17 NOTE — DISCHARGE SUMMARY
[facility]   Alexandro Luo 43 y.o. male MRN: [id number]  [facility] Encounter: [id number]    Admission Date: 1/16/2024     Discharge Date: 1/17/2024    Admitting Diagnosis: Rectal cancer (HCC) [C20]    Discharge Diagnosis: Rectal Cancer s/p ileostomy closure with Dr. Pitts on 1/16/2024.     Attending and Service: Dr. Lugo, Colorectal Surgical Services.    Consulting Physician(s): None    Imaging and Procedures Performed: 1/16/2024 Ileostomy closure with Dr. Lugo     [facility] Course: Alexandro Luo is a 43-year-old male with a PMH of cirrhosis, fatty liver, and rectal cancer s/p ileostomy reversal 1/16/2024. Patient is known to the colorectal surgery team. He was last seen in the office on 11/15/2023 with Dr. Lugo where surgical intervention with a ileostomy reversal was recommended.     On 1/16/2024 patient was electively admitted to [facility] for Ileostomy closure with Dr. Lugo.     Post operatively, patient was maintained in the PACU then transferred to medical surgical floor where the rest of his care took place.     On day of discharge, patient was stable. Patients incision was clean, dry, and intact. Patient was ambulating. Patient had bowel function. Patient was tolerating a diet. Patient's pain was well controlled on Tylenol, Robaxin, Gabapentin, and oxycodone. Patient was deemed appropriate for discharge and was discharged into the care of his family on 1/17/2024.     On discharge, the patient is instructed to follow-up with the patient's primary care provider within the next 2 weeks to review the events of the recent hospitalization. The patient is instructed to follow-up with Dr. Lugo as scheduled on 2/19/2024 at 1:20PM. The patient is instructed to follow the provided discharge instructions.     Condition at Discharge: stable     Discharge instructions/Information to patient and family:   See after visit summary for information  provided to patient and family.      Provisions for Follow-Up Care:  See after visit summary for information related to follow-up care and any pertinent home health orders.      Disposition: Home    Planned Readmission: No    Discharge Statement   I spent 30 minutes discharging the patient. This time was spent on the day of discharge. I had direct contact with the patient on the day of discharge. Additional documentation is required if more than 30 minutes were spent on discharge.     Discharge Medications:  See after visit summary for reconciled discharge medications provided to patient and family.    Gemma Sultana PA-C  1/17/2024  2:18 PM

## 2024-01-17 NOTE — UTILIZATION REVIEW
NOTIFICATION OF INPATIENT ADMISSION      AUTHORIZATION REQUEST   SERVICING FACILITY:   [facility]  Address: [address] [place] [address]  Tax ID: [id number]  NPI: [id number] ATTENDING PROVIDER:  Attending Name and NPI#: CLEMENTE Lugo Md [[id number]]  Address: [address] [place] [address]  Phone: [phone]   ADMISSION INFORMATION:  Place of Service: Inpatient [facility]  Place of Service Code: 21  Inpatient Admission Date/Time: 1/16/24  6:21 PM  Discharge Date/Time: No discharge date for patient encounter.  Admitting Diagnosis Code/Description:  Rectal cancer (HCC) [C20]     UTILIZATION REVIEW CONTACT:  Hannah Pulido, Utilization   Network Utilization Review Department  Phone: [phone]  Fax: [phone]  Email: [email or web address]  Contact for approvals/pending authorizations, clinical reviews, and discharge.     PHYSICIAN ADVISORY SERVICES:  Medical Necessity Denial & Hvcq-yp-Gyiz Review  Phone: [phone]  Fax: [phone]  Email: [email or web address]     DISCHARGE SUPPORT TEAM:  For Patients Discharge Needs & Updates  Phone: [phone] opt. 2 Fax: [phone]  Email: [email or web address]

## 2024-01-18 NOTE — UTILIZATION REVIEW
NOTIFICATION OF ADMISSION DISCHARGE   This is a Notification of Discharge from [facility]. Please be advised that this patient has been discharge from our facility. Below you will find the admission and discharge date and time including the patient’s disposition.   UTILIZATION REVIEW CONTACT:  Hannah Pulido  Utilization   Network Utilization Review Department  Phone: [phone] x carefully listen to the prompts. All voicemails are confidential.  Email: [email or web address]     ADMISSION INFORMATION  PRESENTATION DATE: 1/16/2024  2:37 PM  OBERVATION ADMISSION DATE:   INPATIENT ADMISSION DATE: 1/16/24  6:21 PM   DISCHARGE DATE: 1/17/2024  3:46 PM   DISPOSITION:Home/Self Care    Network Utilization Review Department  ATTENTION: Please call with any questions or concerns to [phone] and carefully listen to the prompts so that you are directed to the right person. All voicemails are confidential.   For Discharge needs, contact Care Management DC Support Team at [phone] opt. 2  Send all requests for admission clinical reviews, approved or denied determinations and any other requests to dedicated fax number below belonging to the campus where the patient is receiving treatment. List of dedicated fax numbers for the Facilities:  FACILITY NAME UR FAX NUMBER   ADMISSION DENIALS (Administrative/Medical Necessity) [phone]   DISCHARGE SUPPORT TEAM ([facility]) [phone]   PARENT CHILD HEALTH (Maternity/NICU/Pediatrics) [phone]   [facility] [phone]   [facility] [phone]   [facility] [phone]   [facility] [phone]   [facility] [phone]   [facility] [phone]   [facility] [phone]   [facility] [phone]    [facility] [phone]   Critical access hospital [phone]   [facility] [phone]

## 2024-01-19 PROCEDURE — 88304 TISSUE EXAM BY PATHOLOGIST: CPT | Performed by: PATHOLOGY

## 2024-02-05 ENCOUNTER — HOSPITAL ENCOUNTER (OUTPATIENT)
Dept: CT IMAGING | Facility: HOSPITAL | Age: 44
Discharge: HOME | End: 2024-02-05
Attending: INTERNAL MEDICINE
Payer: COMMERCIAL

## 2024-02-05 DIAGNOSIS — C20 RECTAL CANCER (HCC): ICD-10-CM

## 2024-02-05 PROCEDURE — G1004 CDSM NDSC: HCPCS

## 2024-02-05 PROCEDURE — 71260 CT THORAX DX C+: CPT

## 2024-02-05 PROCEDURE — 74177 CT ABD & PELVIS W/CONTRAST: CPT

## 2024-02-05 RX ADMIN — IOHEXOL 85 ML: 350 INJECTION, SOLUTION INTRAVENOUS at 10:03

## 2024-02-09 ENCOUNTER — TELEPHONE (OUTPATIENT)
Dept: HEMATOLOGY ONCOLOGY | Facility: CLINIC | Age: 44
End: 2024-02-09

## 2024-02-12 ENCOUNTER — TELEPHONE (OUTPATIENT)
Age: 44
End: 2024-02-12

## 2024-02-12 ENCOUNTER — OFFICE VISIT (OUTPATIENT)
Age: 44
End: 2024-02-12
Payer: COMMERCIAL

## 2024-02-12 VITALS
RESPIRATION RATE: 16 BRPM | BODY MASS INDEX: 22.88 KG/M2 | DIASTOLIC BLOOD PRESSURE: 67 MMHG | WEIGHT: 184 LBS | HEART RATE: 76 BPM | OXYGEN SATURATION: 98 % | SYSTOLIC BLOOD PRESSURE: 100 MMHG | TEMPERATURE: 98.5 F | HEIGHT: 75 IN

## 2024-02-12 DIAGNOSIS — C20 RECTAL CANCER (HCC): Primary | ICD-10-CM

## 2024-02-12 PROCEDURE — 99213 OFFICE O/P EST LOW 20 MIN: CPT | Performed by: INTERNAL MEDICINE

## 2024-02-14 NOTE — PROGRESS NOTES
Colon and Rectal Surgery   Alexandro Luo 43 y.o. male MRN [id number]  Encounter: [id number]  02/19/24 2:05 PM            Assessment: Alexandro Luo is a 43 y.o. male who had rectal cancer.      Plan:   Rectal cancer (HCC)  The patient returns for follow-up after closure of his ileostomy.  He is tolerating food, free of nausea, free of abdominal pain for the most part, and has good control of his stool.  He has 6 or fewer bowel movements daily on most days.    His exam is benign.  There is 1 area in the right suprapubic or groin area that he complains of some discomfort at times.  There is no physical finding today.  However, I think it best that, once he passes 6 weeks from his stoma closure, he begins exercises that are symmetric in nature and include stretching in a symmetrical manner.  I explained this to him.  He understands.    He continues to follow with oncology and radiation oncology.  I defer to their surveillance plan.    I will see him in 5 months for reevaluation.      Subjective     HPI    Alexandro Luo is a 43 y.o. male who is here today for a post operative evaluation.     The patient notes no complains from surgery sites, but reports a variable appetite and 5-8 soft and formed bowel movements a day.     The patient is status post ileostomy closure on 1/16/2024.    Surgical pathology:  A. Ileostomy stoma, ileostomy reversal:  -   Enterocutaneous stoma, consistent with ileostomy site.  -   Negative for dysplasia and malignancy.      CT chest abdomen and pelvis on 2/5/2024: Chest:  1.  No metastases.  2.  Emphysema.  Abdomen/pelvis:  1.  Nothing to indicate neoplastic progression.  2.  Cirrhotic liver morphology with stigmata of portal hypertension.    Lab Results   Component Value Date    CEA 1.4 12/27/2023     Historical Information   Past Medical History:   Diagnosis Date    Cirrhosis (HCC) 3/18/24    Colon cancer (HCC)     Dental infection     Fatty liver     Rectal cancer (HCC)      Past  Surgical History:   Procedure Laterality Date    APPENDECTOMY      COLONOSCOPY      DENTAL SURGERY      IR PORT PLACEMENT  02/28/2023    TX CLOSURE ENTEROSTOMY LG/SMALL INTESTINE N/A 1/16/2024    Procedure: CLOSURE ILEOSTOMY;  Surgeon: CLEMENTE Lugo MD;  Location: [facility];  Service: Colorectal    TX LAPS PROCTECTOMY COMBINED PULL-THRU W/RESERVOIR N/A 10/17/2023    Procedure: LAPAROSCOPIC HAND ASSIST PROCTECTOMY, ABDOMINAL APPROACH, coloanal anastomosis, loop ileostomy;  Surgeon: CLEMENTE Lugo MD;  Location: [facility];  Service: Colorectal    TX SIGMOIDOSCOPY FLX DX W/COLLJ SPEC BR/WA IF PFRMD N/A 10/17/2023    Procedure: SIGMOIDOSCOPY FLEXIBLE;  Surgeon: CLEMENTE Lugo MD;  Location: [facility];  Service: Colorectal    UPPER GASTROINTESTINAL ENDOSCOPY         Meds/Allergies       Current Outpatient Medications:     carvedilol (COREG) 3.125 mg tablet, Take 1 tablet (3.125 mg total) by mouth 2 (two) times a day with meals, Disp: 60 tablet, Rfl: 11    gabapentin (NEURONTIN) 100 mg capsule, Take 1 capsule (100 mg total) by mouth 3 (three) times a day for 7 days (Patient not taking: Reported on 2/19/2024), Disp: 21 capsule, Rfl: 0    methocarbamol (ROBAXIN) 500 mg tablet, Take 1 tablet (500 mg total) by mouth every 8 (eight) hours for 7 days (Patient not taking: Reported on 2/19/2024), Disp: 21 tablet, Rfl: 0    ondansetron (ZOFRAN) 8 mg tablet, Take 1 tablet (8 mg total) by mouth every 8 (eight) hours as needed for nausea or vomiting (Patient not taking: Reported on 2/19/2024), Disp: 30 tablet, Rfl: 2    pyridoxine (VITAMIN B6) 50 mg tablet, Take 50 mg by mouth daily (Patient not taking: Reported on 2/19/2024), Disp: , Rfl:   No Known Allergies    Social History   Social History     Substance and Sexual Activity   Drug Use Not Currently    Frequency: 5.0 times per week    Types: Marijuana    Comment: medical marijuana     Social History     Tobacco Use   Smoking Status Former    Current packs/day: 0.00     "Average packs/day: 1 pack/day for 20.0 years (20.0 ttl pk-yrs)    Types: Cigarettes    Start date: 2000    Quit date: 2020    Years since quittin.1   Smokeless Tobacco Never         Family History   Problem Relation Age of Onset    Lung cancer Mother     Cancer Mother         Lung    Lung cancer Father     Cancer Father         Mouth throat    Colon cancer Neg Hx     Colon polyps Neg Hx          Review of Systems   Gastrointestinal:  Positive for diarrhea.       Objective   Current Vitals:  Vitals:    24 1327   BP: 118/62   Weight: 86.2 kg (190 lb)   Height: 6' 3\" (1.905 m)         Physical Exam  Constitutional:       Appearance: Normal appearance.   Abdominal:      General: Abdomen is flat.      Palpations: Abdomen is soft. There is no mass.      Tenderness: There is no abdominal tenderness. There is no guarding.      Hernia: No hernia is present.      Comments: Wounds are well.   Neurological:      Mental Status: He is alert.                 "

## 2024-02-15 NOTE — PROGRESS NOTES
HEMATOLOGY / ONCOLOGY CLINIC FOLLOW UP NOTE    Primary Care Provider: Ana M Cruz DO  Referring Provider:    MRN: [id number]  : 1980    Reason for Encounter: follow up rectal cancer       Oncology History Overview Note    2023 - nY2D5T4 rectal adenocarcinoma     3/9/2023 - start neoadjuvant FOLFOX     2023 - start 5-FU/RT     2023 - 5-FU held due thrombocytopenia     10/17/2023 - LAPAROSCOPIC HAND ASSIST PROCTECTOMY. ABDOMINAL APPROACH. coloanal anastomosis (very low pull through). loop ileostomy     pT3N1b     Rectal cancer (HCC)   2023 Biopsy    Final Diagnosis   A. Large Intestine, Descending Colon, polyp:    - Tubular adenoma.     - Negative for high grade dysplasia.      B. Rectum, mass:   -  Adenocarcinoma. See comment.     Comment: Immunohistochemical stains performed with adequate controls demonstrates that the tumor is positive for CDX2, SATB2, CK20 (patchy), and negative for CK7, Synaptophysin, Chromogranin A, and p40. The immunophenotype supports the diagnosis. Ancillary testing for mismatch repair (MMR) protein deficiency by immunohistochemical panel (MLH1, PMS2, MSH2, MSH6) is undertaken (Block B1); the results will be issued in a supplemental report, to follow shortly     Addendum   RESULTS OF IMMUNOHISTOCHEMICAL ANALYSIS FOR MISMATCH REPAIR PROTEIN LOSS     INTERPRETATION: NO LOSS OF NUCLEAR EXPRESSION OF MMR PROTEINS: LOW PROBABILITY OF MSI-H.     RESULTS:  Antibody            Clone                 Description                                  Results  MLH1                 [id number]          Mismatch repair protein               Intact nuclear expression  MSH2                [id number]        Mismatch repair protein               Intact nuclear expression  MSH6                44                      Mismatch repair protein               Intact nuclear expression  PMS2                 [id number]           Mismatch repair protein               Intact nuclear expression      Comment:   A negative control and a positive control for each antibody have been reviewed and accepted        2/27/2023 Initial Diagnosis    Rectal cancer (HCC)     3/2/2023 -  Cancer Staged    Staging form: Colon and Rectum, AJCC 8th Edition  - Clinical: cT3, cN2, cM0 - Signed by Sarina Rai DO on 3/2/2023       3/9/2023 - 6/2/2023 Chemotherapy    alteplase (CATHFLO), 2 mg, Intracatheter, Every 1 Minute as needed, 6 of 10 cycles  palonosetron (ALOXI), 0.25 mg, Intravenous, Once, 1 of 2 cycles  Administration: 0.25 mg (5/31/2023)  pegfilgrastim (NEULASTA), 6 mg, Subcutaneous, Once, 1 of 1 cycle  Pegfilgrastim-bmez (ZIEXTENZO), 6 mg, Subcutaneous, Once, 4 of 8 cycles  Administration: 6 mg (4/21/2023), 6 mg (5/6/2023), 6 mg (5/22/2023), 6 mg (6/2/2023)  fluorouracil (ADRUCIL), 400 mg/m2 = 915 mg, Intravenous, Once, 6 of 10 cycles  Administration: 915 mg (3/9/2023), 915 mg (3/22/2023), 915 mg (4/19/2023), 915 mg (5/4/2023), 915 mg (5/17/2023), 915 mg (5/31/2023)  leucovorin calcium IVPB, 916 mg, Intravenous, Once, 6 of 10 cycles  Administration: 900 mg (3/9/2023), 900 mg (3/22/2023), 900 mg (4/19/2023), 900 mg (5/4/2023), 900 mg (5/17/2023), 900 mg (5/31/2023)  oxaliplatin (ELOXATIN) chemo infusion, 194.65 mg, Intravenous, Once, 6 of 10 cycles  Administration: 200 mg (3/9/2023), 200 mg (3/22/2023), 200 mg (4/19/2023), 200 mg (5/4/2023), 200 mg (5/17/2023), 200 mg (5/31/2023)  fluorouracil (ADRUCIL) ambulatory infusion Soln, 1,200 mg/m2/day = 5,495 mg, Intravenous, Over 46 hours, 6 of 10 cycles  aprepitant (CINVANTI) in  mL IVPB, 130 mg, Intravenous, Once, 1 of 2 cycles  Administration: 130 mg (5/31/2023)     6/21/2023 - 8/1/2023 Radiation      Plan ID Energy Fractions Dose per Fraction (cGy) Dose Correction (cGy) Total Dose Delivered (cGy) Elapsed Days   CD Rectum 6X 3 / 3 180 0 540 4   Whole Pelvis 6X 25 / 25 180 0 4,500 36        6/26/2023 - 7/28/2023 Chemotherapy    alteplase (CATHFLO), 2 mg,  Intracatheter, Every 1 Minute as needed, 4 of 4 cycles  fluorouracil (ADRUCIL) ambulatory infusion Soln, 200 mg/m2/day = 2,190 mg (88.9 % of original dose 225 mg/m2/day), Intravenous, Over 120 hours, 4 of 4 cycles  Dose modification: 200 mg/m2/day (original dose 225 mg/m2/day, Cycle 1, Reason: Anticipated Tolerance)     10/17/2023 Surgery    Laparoscopic hand-assisted proctectomy with coloanal anastomosis and loop ileostomy    Final Diagnosis  A. Rectum, sigmoid colon, and two donuts, low anterior resection:  -   Invasive adenocarcinoma of rectum, moderately differentiated, with treatment effect.  -   Separate segments of colon/rectum (anastomotic donuts), negative for malignancy.  -   See synoptic report.  -   MMR (MLH1, MSH2, MSH6 and PMS2) studies by IHC on biopsy specimen [id number] show intact protein expression.     10/17/2023 -  Cancer Staged    Staging form: Colon and Rectum, AJCC 8th Edition  - Pathologic stage from 10/17/2023: Stage IIIB (ypT3, pN1b, cM0) - Signed by Joe Barboza MD on 11/8/2023  Stage prefix: Post-therapy  Response to neoadjuvant therapy: Partial response           Interval History: Patient presents for follow up of his rectal cancer.  He had his ostomy reversed last month.  He has quit smoking but he still does vape occasionally.  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  He is pursuing disability.  He denies any bright red blood per rectum.  He has had alternating diarrhea and constipation since the ostomy reversal.  He had a CT scan prior to this visit that shows remission of his disease.         REVIEW OF SYSTEMS:  Please note that a 14-point review of systems was performed to include Constitutional, HEENT, Respiratory, CVS, GI, , Musculoskeletal, Integumentary, Neurologic, Rheumatologic, Endocrinologic, Psychiatric, Lymphatic, and Hematologic/Oncologic systems were reviewed and are negative unless otherwise stated in HPI. Positive  and negative findings pertinent to this evaluation are incorporated into the history of present illness.      ECOG PS: 0    PROBLEM LIST:  Patient Active Problem List   Diagnosis    Rectal wall thickening    Transaminitis/Hepatitic Steatosis    Rectal cancer (HCC)    Chemotherapy-induced neutropenia     Port-A-Cath in place    Cirrhosis (HCC)       Assessment / Plan: 43-year-old male with rectal cancer status post total neoadjuvant therapy and surgical resection, now with ostomy reversal.  He is doing very well.  CT scan shows remission.  I will see him back in 6 months with another CT scan and if it is negative we will consider port removal at that time.  We will also monitor CBC CMP and CEA and continue to flush his port every 2 months.  I have told him that I do support his pursuit of disability as the neuropathy sustained from treatment makes it unsafe to do his prior work as a .  I will be happy to provide any documentation of this that I need to.       I spent 20 minutes on chart review, face to face counseling time, coordination of care and documentation.    Past Medical History:   has a past medical history of Cirrhosis (HCC) (3/18/24), Colon cancer (HCC), Dental infection, Fatty liver, and Rectal cancer (HCC).    PAST SURGICAL HISTORY:   has a past surgical history that includes Appendectomy; Dental surgery; IR port placement (02/28/2023); Colonoscopy; Upper gastrointestinal endoscopy; pr laps proctectomy combined pull-thru w/reservoir (N/A, 10/17/2023); pr sigmoidoscopy flx dx w/collj spec br/wa if pfrmd (N/A, 10/17/2023); and pr closure enterostomy lg/small intestine (N/A, 1/16/2024).    CURRENT MEDICATIONS  Current Outpatient Medications   Medication Sig Dispense Refill    carvedilol (COREG) 3.125 mg tablet Take 1 tablet (3.125 mg total) by mouth 2 (two) times a day with meals 60 tablet 11    ondansetron (ZOFRAN) 8 mg tablet Take 1 tablet (8 mg total) by mouth every 8 (eight) hours as needed for  "nausea or vomiting 30 tablet 2    pyridoxine (VITAMIN B6) 50 mg tablet Take 50 mg by mouth daily      gabapentin (NEURONTIN) 100 mg capsule Take 1 capsule (100 mg total) by mouth 3 (three) times a day for 7 days 21 capsule 0    methocarbamol (ROBAXIN) 500 mg tablet Take 1 tablet (500 mg total) by mouth every 8 (eight) hours for 7 days 21 tablet 0     No current facility-administered medications for this visit.     [unfilled]    SOCIAL HISTORY:   reports that he quit smoking about 4 years ago. His smoking use included cigarettes. He started smoking about 24 years ago. He has a 20 pack-year smoking history. He has never used smokeless tobacco. He reports that he does not currently use alcohol after a past usage of about 2.0 standard drinks of alcohol per week. He reports that he does not currently use drugs after having used the following drugs: Marijuana. Frequency: 5.00 times per week.     FAMILY HISTORY:  family history includes Cancer in his father and mother; Lung cancer in his father and mother.     ALLERGIES:  has No Known Allergies.      Physical Exam:  Vital Signs:   Visit Vitals  /67 (BP Location: Left arm, Patient Position: Sitting, Cuff Size: Standard)   Pulse 76   Temp 98.5 °F (36.9 °C) (Temporal)   Resp 16   Ht 6' 3\" (1.905 m)   Wt 83.5 kg (184 lb)   SpO2 98%   BMI 23.00 kg/m²   Smoking Status Former   BSA 2.12 m²     Body mass index is 23 kg/m².  Body surface area is 2.12 meters squared.    GEN: Alert, awake oriented x3, in no acute distress  HEENT- No pallor, icterus, cyanosis, no oral mucosal lesions,   LAD - no palpable cervical, clavicle, axillary, inguinal LAD  Heart- normal S1 S2, regular rate and rhythm, No murmur, rubs.   Lungs- clear breathing sound bilateral.   Abdomen- soft, Non tender, bowel sounds present  Extremities- No cyanosis, clubbing, edema  Neuro- No focal neurological deficit    Labs:  Lab Results   Component Value Date    WBC 4.37 01/17/2024    HGB 11.9 (L) 01/17/2024    HCT " 36.1 (L) 01/17/2024    MCV 87 01/17/2024     (L) 01/17/2024     Lab Results   Component Value Date    SODIUM 134 (L) 01/17/2024    K 4.1 01/17/2024     01/17/2024    CO2 22 01/17/2024    AGAP 7 01/17/2024    BUN 9 01/17/2024    CREATININE 0.85 01/17/2024    GLUC 143 (H) 01/17/2024    GLUF 121 (H) 03/06/2023    CALCIUM 8.8 01/17/2024    AST 28 01/17/2024    ALT 18 01/17/2024    ALKPHOS 46 01/17/2024    TP 5.8 (L) 01/17/2024    TBILI 1.25 (H) 01/17/2024    EGFR 105 01/17/2024

## 2024-02-19 ENCOUNTER — OFFICE VISIT (OUTPATIENT)
Age: 44
End: 2024-02-19

## 2024-02-19 VITALS
BODY MASS INDEX: 23.62 KG/M2 | DIASTOLIC BLOOD PRESSURE: 62 MMHG | SYSTOLIC BLOOD PRESSURE: 118 MMHG | WEIGHT: 190 LBS | HEIGHT: 75 IN

## 2024-02-19 DIAGNOSIS — C20 RECTAL CANCER (HCC): Primary | ICD-10-CM

## 2024-02-19 PROCEDURE — 99024 POSTOP FOLLOW-UP VISIT: CPT | Performed by: COLON & RECTAL SURGERY

## 2024-02-19 NOTE — ASSESSMENT & PLAN NOTE
The patient returns for follow-up after closure of his ileostomy.  He is tolerating food, free of nausea, free of abdominal pain for the most part, and has good control of his stool.  He has 6 or fewer bowel movements daily on most days.    His exam is benign.  There is 1 area in the right suprapubic or groin area that he complains of some discomfort at times.  There is no physical finding today.  However, I think it best that, once he passes 6 weeks from his stoma closure, he begins exercises that are symmetric in nature and include stretching in a symmetrical manner.  I explained this to him.  He understands.    He continues to follow with oncology and radiation oncology.  I defer to their surveillance plan.    I will see him in 5 months for reevaluation.

## 2024-02-26 ENCOUNTER — HOSPITAL ENCOUNTER (OUTPATIENT)
Dept: INFUSION CENTER | Facility: HOSPITAL | Age: 44
Discharge: HOME | End: 2024-02-26
Payer: COMMERCIAL

## 2024-02-26 ENCOUNTER — RADIATION ONCOLOGY FOLLOW-UP (OUTPATIENT)
Facility: HOSPITAL | Age: 44
End: 2024-02-26
Attending: RADIOLOGY
Payer: COMMERCIAL

## 2024-02-26 VITALS
BODY MASS INDEX: 23.65 KG/M2 | RESPIRATION RATE: 18 BRPM | DIASTOLIC BLOOD PRESSURE: 58 MMHG | SYSTOLIC BLOOD PRESSURE: 102 MMHG | OXYGEN SATURATION: 98 % | WEIGHT: 190.2 LBS | HEART RATE: 71 BPM | TEMPERATURE: 97.6 F | HEIGHT: 75 IN

## 2024-02-26 DIAGNOSIS — C20 RECTAL CANCER (HCC): Primary | ICD-10-CM

## 2024-02-26 DIAGNOSIS — Z95.828 PORT-A-CATH IN PLACE: Primary | ICD-10-CM

## 2024-02-26 DIAGNOSIS — C20 RECTAL CANCER (HCC): ICD-10-CM

## 2024-02-26 LAB
ALBUMIN SERPL BCP-MCNC: 3.9 G/DL (ref 3.5–5)
ALP SERPL-CCNC: 58 U/L (ref 34–104)
ALT SERPL W P-5'-P-CCNC: 14 U/L (ref 7–52)
ANION GAP SERPL CALCULATED.3IONS-SCNC: 5 MMOL/L
AST SERPL W P-5'-P-CCNC: 25 U/L (ref 13–39)
BASOPHILS # BLD AUTO: 0.03 THOUSANDS/ÂΜL (ref 0–0.1)
BASOPHILS NFR BLD AUTO: 1 % (ref 0–1)
BILIRUB SERPL-MCNC: 0.87 MG/DL (ref 0.2–1)
BUN SERPL-MCNC: 6 MG/DL (ref 5–25)
CALCIUM SERPL-MCNC: 8.9 MG/DL (ref 8.4–10.2)
CEA SERPL-MCNC: 0.8 NG/ML (ref 0–3)
CHLORIDE SERPL-SCNC: 108 MMOL/L (ref 96–108)
CO2 SERPL-SCNC: 24 MMOL/L (ref 21–32)
CREAT SERPL-MCNC: 0.66 MG/DL (ref 0.6–1.3)
EOSINOPHIL # BLD AUTO: 0.21 THOUSAND/ÂΜL (ref 0–0.61)
EOSINOPHIL NFR BLD AUTO: 6 % (ref 0–6)
ERYTHROCYTE [DISTWIDTH] IN BLOOD BY AUTOMATED COUNT: 15.2 % (ref 11.6–15.1)
GFR SERPL CREATININE-BSD FRML MDRD: 117 ML/MIN/1.73SQ M
GLUCOSE SERPL-MCNC: 99 MG/DL (ref 65–140)
HCT VFR BLD AUTO: 35.7 % (ref 36.5–49.3)
HGB BLD-MCNC: 11.9 G/DL (ref 12–17)
IMM GRANULOCYTES # BLD AUTO: 0.01 THOUSAND/UL (ref 0–0.2)
IMM GRANULOCYTES NFR BLD AUTO: 0 % (ref 0–2)
LYMPHOCYTES # BLD AUTO: 0.92 THOUSANDS/ÂΜL (ref 0.6–4.47)
LYMPHOCYTES NFR BLD AUTO: 27 % (ref 14–44)
MCH RBC QN AUTO: 29 PG (ref 26.8–34.3)
MCHC RBC AUTO-ENTMCNC: 33.3 G/DL (ref 31.4–37.4)
MCV RBC AUTO: 87 FL (ref 82–98)
MONOCYTES # BLD AUTO: 0.38 THOUSAND/ÂΜL (ref 0.17–1.22)
MONOCYTES NFR BLD AUTO: 11 % (ref 4–12)
NEUTROPHILS # BLD AUTO: 1.88 THOUSANDS/ÂΜL (ref 1.85–7.62)
NEUTS SEG NFR BLD AUTO: 55 % (ref 43–75)
NRBC BLD AUTO-RTO: 0 /100 WBCS
PLATELET # BLD AUTO: 113 THOUSANDS/UL (ref 149–390)
PMV BLD AUTO: 9.7 FL (ref 8.9–12.7)
POTASSIUM SERPL-SCNC: 3.4 MMOL/L (ref 3.5–5.3)
PROT SERPL-MCNC: 6.7 G/DL (ref 6.4–8.4)
RBC # BLD AUTO: 4.1 MILLION/UL (ref 3.88–5.62)
SODIUM SERPL-SCNC: 137 MMOL/L (ref 135–147)
WBC # BLD AUTO: 3.43 THOUSAND/UL (ref 4.31–10.16)

## 2024-02-26 PROCEDURE — 99213 OFFICE O/P EST LOW 20 MIN: CPT | Performed by: RADIOLOGY

## 2024-02-26 PROCEDURE — 80053 COMPREHEN METABOLIC PANEL: CPT

## 2024-02-26 PROCEDURE — 85025 COMPLETE CBC W/AUTO DIFF WBC: CPT

## 2024-02-26 PROCEDURE — 82378 CARCINOEMBRYONIC ANTIGEN: CPT

## 2024-02-26 NOTE — PROGRESS NOTES
..Alexandro Luo  tolerated treatment well with no complications.      Alexandro Luo is aware of future appt on 4/17 at 1100.     AVS printed and given to Alexandro Luo:   No (Declined by Alexandro Luo)

## 2024-02-26 NOTE — PROGRESS NOTES
Follow-up - Radiation Oncology   Alexandro Luo 1980 43 y.o. male [id number]      History of Present Illness   Cancer Staging   Rectal cancer (HCC)  Staging form: Colon and Rectum, AJCC 8th Edition  - Clinical: cT3, cN2, cM0 - Signed by Sarina Rai DO on 3/2/2023  - Pathologic stage from 10/17/2023: Stage IIIB (ypT3, pN1b, cM0) - Signed by Joe Barboza MD on 11/8/2023  Stage prefix: Post-therapy  Response to neoadjuvant therapy: Partial response      Alexandro Luo is a 43 y.o. male with recently diagnosed stage IIIC [id number] rectal adenocarcinoma.  He completed total neoadjuvant treatment with FOLFOX followed by long course chemoradiation.  He has now undergone TME and was found to have [id number] disease.  CEA has normalized.       Interval History:  11/15/23 Dr Lugo  Plan closure of ileostomy after 1 month from now     11/16/23 RECTAL/GI MULTIDISCIPLINARY CASE REVIEW   PHYSICIAN RECOMMENDED PLAN:   -Surveillance. Appointment for CT CAP and follow up with medical oncology are scheduled.      11/28/23 EGD  IMPRESSION:  Single large grade IV varix in the middle third of the esophagus and lower third of the esophagus  Multiple large and extensive type 1 gastroesophageal varices in the fundus of the stomach  Edematous mucosa in the pylorus; performed cold forceps biopsies  The duodenum appeared normal.        1/16/24 Closure ileostomy  A. Ileostomy stoma, ileostomy reversal:  - Enterocutaneous stoma, consistent with ileostomy site.  - Negative for dysplasia and malignancy        2/5/24 CT CAP  IMPRESSION:   Chest:  1.  No metastases.  2.  Emphysema.     Abdomen/pelvis:  1.  Nothing to indicate neoplastic progression.  2.  Cirrhotic liver morphology with stigmata of portal hypertension.        2/12/24 Hem Onc, Dr Rai  Doing very well.   Persistent neuropathy in his hands and feet that makes it hard for him to work. He is pursuing disability ().   CT scan shows remission  RTC in 6 months         Upcomin24 Dr Lugo  24 CT CAP  24 Dr Rai    Upon interview, he endorses the above history.  He has ongoing neuropathy which interferes with sleep.  He has smaller volume more frequent bowel movements.  He denies diarrhea.  He denies abdominal pain/cramping.  He denies dysuria.  He denies lower extremity edema.  He is without additional acute concerns.    Historical Information   Oncology History   Rectal cancer (HCC)   2023 Initial Diagnosis    Rectal cancer (HCC)     2023 Biopsy    Final Diagnosis   A. Large Intestine, Descending Colon, polyp:    - Tubular adenoma.     - Negative for high grade dysplasia.      B. Rectum, mass:   -  Adenocarcinoma. See comment.     Comment: Immunohistochemical stains performed with adequate controls demonstrates that the tumor is positive for CDX2, SATB2, CK20 (patchy), and negative for CK7, Synaptophysin, Chromogranin A, and p40. The immunophenotype supports the diagnosis. Ancillary testing for mismatch repair (MMR) protein deficiency by immunohistochemical panel (MLH1, PMS2, MSH2, MSH6) is undertaken (Block B1); the results will be issued in a supplemental report, to follow shortly     Addendum   RESULTS OF IMMUNOHISTOCHEMICAL ANALYSIS FOR MISMATCH REPAIR PROTEIN LOSS     INTERPRETATION: NO LOSS OF NUCLEAR EXPRESSION OF MMR PROTEINS: LOW PROBABILITY OF MSI-H.     RESULTS:  Antibody            Clone                 Description                                  Results  MLH1                 [id number]          Mismatch repair protein               Intact nuclear expression  MSH2                [id number]        Mismatch repair protein               Intact nuclear expression  MSH6                44                      Mismatch repair protein               Intact nuclear expression  PMS2                 [id number]           Mismatch repair protein               Intact nuclear expression     Comment:   A negative control and a positive control for each  antibody have been reviewed and accepted        3/2/2023 -  Cancer Staged    Staging form: Colon and Rectum, AJCC 8th Edition  - Clinical: cT3, cN2, cM0 - Signed by Sarina Rai DO on 3/2/2023       3/9/2023 - 6/2/2023 Chemotherapy    alteplase (CATHFLO), 2 mg, Intracatheter, Every 1 Minute as needed, 6 of 10 cycles  palonosetron (ALOXI), 0.25 mg, Intravenous, Once, 1 of 2 cycles  Administration: 0.25 mg (5/31/2023)  pegfilgrastim (NEULASTA), 6 mg, Subcutaneous, Once, 1 of 1 cycle  Pegfilgrastim-bmez (ZIEXTENZO), 6 mg, Subcutaneous, Once, 4 of 8 cycles  Administration: 6 mg (4/21/2023), 6 mg (5/6/2023), 6 mg (5/22/2023), 6 mg (6/2/2023)  fluorouracil (ADRUCIL), 400 mg/m2 = 915 mg, Intravenous, Once, 6 of 10 cycles  Administration: 915 mg (3/9/2023), 915 mg (3/22/2023), 915 mg (4/19/2023), 915 mg (5/4/2023), 915 mg (5/17/2023), 915 mg (5/31/2023)  leucovorin calcium IVPB, 916 mg, Intravenous, Once, 6 of 10 cycles  Administration: 900 mg (3/9/2023), 900 mg (3/22/2023), 900 mg (4/19/2023), 900 mg (5/4/2023), 900 mg (5/17/2023), 900 mg (5/31/2023)  oxaliplatin (ELOXATIN) chemo infusion, 194.65 mg, Intravenous, Once, 6 of 10 cycles  Administration: 200 mg (3/9/2023), 200 mg (3/22/2023), 200 mg (4/19/2023), 200 mg (5/4/2023), 200 mg (5/17/2023), 200 mg (5/31/2023)  fluorouracil (ADRUCIL) ambulatory infusion Soln, 1,200 mg/m2/day = 5,495 mg, Intravenous, Over 46 hours, 6 of 10 cycles  aprepitant (CINVANTI) in  mL IVPB, 130 mg, Intravenous, Once, 1 of 2 cycles  Administration: 130 mg (5/31/2023)     6/21/2023 - 8/1/2023 Radiation      Plan ID Energy Fractions Dose per Fraction (cGy) Dose Correction (cGy) Total Dose Delivered (cGy) Elapsed Days   CD Rectum 6X 3 / 3 180 0 540 4   Whole Pelvis 6X 25 / 25 180 0 4,500 36        6/26/2023 - 7/28/2023 Chemotherapy    alteplase (CATHFLO), 2 mg, Intracatheter, Every 1 Minute as needed, 4 of 4 cycles  fluorouracil (ADRUCIL) ambulatory infusion Soln, 200 mg/m2/day = 2,190 mg  (88.9 % of original dose 225 mg/m2/day), Intravenous, Over 120 hours, 4 of 4 cycles  Dose modification: 200 mg/m2/day (original dose 225 mg/m2/day, Cycle 1, Reason: Anticipated Tolerance)     10/17/2023 Surgery    Laparoscopic hand-assisted proctectomy with coloanal anastomosis and loop ileostomy    Final Diagnosis  A. Rectum, sigmoid colon, and two donuts, low anterior resection:  -   Invasive adenocarcinoma of rectum, moderately differentiated, with treatment effect.  -   Separate segments of colon/rectum (anastomotic donuts), negative for malignancy.  -   See synoptic report.  -   MMR (MLH1, MSH2, MSH6 and PMS2) studies by IHC on biopsy specimen [id number] show intact protein expression.     10/17/2023 -  Cancer Staged    Staging form: Colon and Rectum, AJCC 8th Edition  - Pathologic stage from 10/17/2023: Stage IIIB (ypT3, pN1b, cM0) - Signed by Joe Barboza MD on 11/8/2023  Stage prefix: Post-therapy  Response to neoadjuvant therapy: Partial response       1/16/2024 Surgery    1/16/24 Closure ileostomy  A. Ileostomy stoma, ileostomy reversal:  - Enterocutaneous stoma, consistent with ileostomy site.  - Negative for dysplasia and malignancy         Past Medical History:   Diagnosis Date    Cirrhosis (HCC) 3/18/24    Colon cancer (HCC)     Dental infection     Fatty liver     Rectal cancer (HCC)      Past Surgical History:   Procedure Laterality Date    APPENDECTOMY      COLONOSCOPY      DENTAL SURGERY      IR PORT PLACEMENT  02/28/2023    NY CLOSURE ENTEROSTOMY LG/SMALL INTESTINE N/A 1/16/2024    Procedure: CLOSURE ILEOSTOMY;  Surgeon: CLEMENTE Lugo MD;  Location: [facility];  Service: Colorectal    NY LAPS PROCTECTOMY COMBINED PULL-THRU W/RESERVOIR N/A 10/17/2023    Procedure: LAPAROSCOPIC HAND ASSIST PROCTECTOMY, ABDOMINAL APPROACH, coloanal anastomosis, loop ileostomy;  Surgeon: CLEMENTE Lugo MD;  Location: [facility];  Service: Colorectal    [place] SIGMOIDOSCOPY FLX DX W/COLLJ SPEC BR/WA IF PFRMD  N/A 10/17/2023    Procedure: SIGMOIDOSCOPY FLEXIBLE;  Surgeon: CLEMENTE Lugo MD;  Location: [facility];  Service: Colorectal    UPPER GASTROINTESTINAL ENDOSCOPY         Social History   Social History     Substance and Sexual Activity   Alcohol Use Not Currently    Alcohol/week: 2.0 standard drinks of alcohol    Types: 2 Cans of beer per week    Comment: 1-2 daily. previously up to 1 case/day (reduced alcohol intake 7 years ago)     Social History     Substance and Sexual Activity   Drug Use Not Currently    Frequency: 5.0 times per week    Types: Marijuana    Comment: medical marijuana     Social History     Tobacco Use   Smoking Status Former    Current packs/day: 0.00    Average packs/day: 1 pack/day for 20.0 years (20.0 ttl pk-yrs)    Types: Cigarettes    Start date: 2000    Quit date: 2020    Years since quittin.1   Smokeless Tobacco Never         Meds/Allergies     Current Outpatient Medications:     carvedilol (COREG) 3.125 mg tablet, Take 1 tablet (3.125 mg total) by mouth 2 (two) times a day with meals, Disp: 60 tablet, Rfl: 11    gabapentin (NEURONTIN) 100 mg capsule, Take 1 capsule (100 mg total) by mouth 3 (three) times a day for 7 days (Patient not taking: Reported on 2024), Disp: 21 capsule, Rfl: 0    methocarbamol (ROBAXIN) 500 mg tablet, Take 1 tablet (500 mg total) by mouth every 8 (eight) hours for 7 days (Patient not taking: Reported on 2024), Disp: 21 tablet, Rfl: 0    ondansetron (ZOFRAN) 8 mg tablet, Take 1 tablet (8 mg total) by mouth every 8 (eight) hours as needed for nausea or vomiting (Patient not taking: Reported on 2024), Disp: 30 tablet, Rfl: 2    pyridoxine (VITAMIN B6) 50 mg tablet, Take 50 mg by mouth daily (Patient not taking: Reported on 2024), Disp: , Rfl:   No Known Allergies      Review of Systems  Constitutional:  Positive for appetite change (varies, mostly good) and fatigue (improving). Negative for unexpected weight change.   HENT:  "Negative.     Eyes: Negative.    Respiratory: Negative.     Cardiovascular: Negative.    Gastrointestinal:  Positive for diarrhea (rarely) and nausea (intermittent, r/t postnasal drip). Negative for blood in stool and vomiting.        Multiple BM's daily, usually formed. Occasional urgency w/BM's. No incontinence.   Endocrine: Positive for cold intolerance and heat intolerance.   Genitourinary: Negative.  Negative for difficulty urinating.   Musculoskeletal: Negative.    Skin: Negative.    Allergic/Immunologic: Positive for environmental allergies.   Neurological:         Peripheral neuropathy in finger tips and feet   Hematological: Negative.    Psychiatric/Behavioral:  Positive for sleep disturbance (r/t neuropathy pain in feet). Negative for dysphoric mood.           OBJECTIVE:   /58 (BP Location: Left arm, Patient Position: Sitting)   Pulse 71   Temp 97.6 °F (36.4 °C) (Temporal)   Resp 18   Ht 6' 3\" (1.905 m)   Wt 86.3 kg (190 lb 3.2 oz)   SpO2 98%   BMI 23.77 kg/m²   Pain Assessment:  0  Karnofsky: 90 - Able to carry on normal activity; minor signs or symptoms of disease     Physical Exam  HENT:      Head: Normocephalic and atraumatic.   Eyes:      General: No scleral icterus.     Extraocular Movements: Extraocular movements intact.   Cardiovascular:      Rate and Rhythm: Normal rate.      Pulses: Normal pulses.   Pulmonary:      Effort: Pulmonary effort is normal.   Abdominal:      General: Abdomen is flat. There is no distension.   Musculoskeletal:         General: Normal range of motion.      Cervical back: Normal range of motion.   Skin:     General: Skin is warm and dry.   Neurological:      General: No focal deficit present.      Mental Status: He is alert.   Psychiatric:         Mood and Affect: Mood normal.        RESULTS    Lab Results: No results found for this or any previous visit (from the past 672 hour(s)).    Imaging Studies:CT chest abdomen pelvis w contrast    Result Date: " 2/9/2024  Narrative: CT CHEST, ABDOMEN AND PELVIS WITH IV CONTRAST INDICATION: C20: Malignant neoplasm of rectum. History of rectal adenocarcinoma, status post neoadjuvant chemotherapy and radiation with subsequent proctectomy with coloanal anastomosis and loop ileostomy. Recent ileostomy closure. COMPARISON: MRI, dated 9/11/2023. CT, dated 8/21/2023. CT, dated 2/17/2023. TECHNIQUE: CT examination of the chest, abdomen and pelvis was performed. Dual energy CT scan technique (DECT) was employed. Multiplanar 2D reformatted images were created from the source data. This examination, like all CT scans performed in the [facility], was performed utilizing techniques to minimize radiation dose exposure, including the use of iterative reconstruction and automated exposure control. Radiation dose length product (DLP) for this visit: 1030.5 mGy-cm IV Contrast: 85 mL of iohexol (OMNIPAQUE) Enteric Contrast: Administered. FINDINGS: CHEST LUNGS: No new or enlarging pulmonary nodule. Redemonstrated apical predominant paraseptal emphysema. Mild bibasilar atelectasis. Airways are normal. PLEURA: Unremarkable. HEART/GREAT VESSELS: Heart is unremarkable for patient's age. No thoracic aortic aneurysm. MEDIASTINUM AND ALLEN: Unremarkable. CHEST WALL AND LOWER NECK: Right internal jugular venous port terminates in the right atrium. No enlarged lymph nodes. ABDOMEN LIVER/BILIARY TREE: Redemonstrated cirrhotic liver morphology. No suspicious mass. Portal vein is normal. Redemonstrated stigmata of portal hypertension, including esophageal and gastric varices as well as splenic enlargement. GALLBLADDER: No calcified gallstones. No pericholecystic inflammatory change. SPLEEN: Enlarged. PANCREAS: Unremarkable. ADRENAL GLANDS: Unremarkable. KIDNEYS/URETERS: Unremarkable. No hydronephrosis. STOMACH AND BOWEL: Postsurgical changes related to low anterior resection. Coloanal anastomosis is normal. Infiltration extending through  "the upper presacral space and mesorectal fascia likely relates to radiation therapy. Remainder of colon is normal. Right lower quadrant surgical anastomosis is normal. Small bowel is normal. Stomach is normal. APPENDIX: No findings to suggest appendicitis. ABDOMINOPELVIC CAVITY: No ascites. No pneumoperitoneum. No lymphadenopathy. VESSELS: Unremarkable for patient's age. PELVIS REPRODUCTIVE ORGANS: Unremarkable for patient's age. URINARY BLADDER: Unremarkable. ABDOMINAL WALL/INGUINAL REGIONS: Unremarkable. BONES: No acute fracture or suspicious osseous lesion. Spinal degenerative changes.     Impression: Chest: 1.  No metastases. 2.  Emphysema. Abdomen/pelvis: 1.  Nothing to indicate neoplastic progression. 2.  Cirrhotic liver morphology with stigmata of portal hypertension. Workstation performed: [id number]           Assessment/Plan:  No orders of the defined types were placed in this encounter.       Alexandro Luo is a 43 y.o. male with stage IIIC [id number] rectal adenocarcinoma.  He completed total neoadjuvant treatment with FOLFOX followed by long course chemoradiation.  He has now undergone TME and was found to have [id number] disease.  He is status post closure of ileostomy.      He remains without significant radiation-related toxicity.  He is without evidence of residual or recurrent disease.  He will continue follow up with medical oncology and colorectal surgery.  He was encouraged to follow up with this office on an as-needed basis and call with any questions/concerns.      Joe Barboza MD  2/26/2024,3:07 PM    Portions of the record may have been created with voice recognition software.  Occasional wrong word or \"sound a like\" substitutions may have occurred due to the inherent limitations of voice recognition software.  Read the chart carefully and recognize, using context, where substitutions have occurred.        "

## 2024-02-26 NOTE — PROGRESS NOTES
Alexandro Luo 1980 is a 43 y.o. male  With stage IIIC [id number] rectal adenocarcinoma.  He completed neoadjuvant chemotherapy and elected to pursue neoadjuvant chemoradiation prior to planned surgical resection. This was completed 23. He was last seen 23. He returns today for follow-up.     Follow up visit     11/15/23 Dr Lugo  Plan closure of ileostomy after 1 month from now    23 RECTAL/GI MULTIDISCIPLINARY CASE REVIEW   PHYSICIAN RECOMMENDED PLAN:   -Surveillance. Appointment for CT CAP and follow up with medical oncology are scheduled.      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       24 Closure ileostomy  A. Ileostomy stoma, ileostomy reversal:  - Enterocutaneous stoma, consistent with ileostomy site.  - Negative for dysplasia and malignancy      24 CT CAP  IMPRESSION:   Chest:  1.  No metastases.  2.  Emphysema.     Abdomen/pelvis:  1.  Nothing to indicate neoplastic progression.  2.  Cirrhotic liver morphology with stigmata of portal hypertension.      24 Hem Onc, Dr Rai  Doing very well.   Persistent neuropathy in his hands and feet that makes it hard for him to work. He is pursuing disability ().   CT scan shows remission  RTC in 6 months      Upcomin24 Dr Lugo  24 CT CAP  24 Dr Rai            Oncology History   Rectal cancer (HCC)   2023 Initial Diagnosis    Rectal cancer (HCC)     2023 Biopsy    Final Diagnosis   A. Large Intestine, Descending Colon, polyp:    - Tubular adenoma.     - Negative for high grade dysplasia.      B. Rectum, mass:   -  Adenocarcinoma. See comment.     Comment: Immunohistochemical stains performed with adequate controls  demonstrates that the tumor is positive for CDX2, SATB2, CK20 (patchy), and negative for CK7, Synaptophysin, Chromogranin A, and p40. The immunophenotype supports the diagnosis. Ancillary testing for mismatch repair (MMR) protein deficiency by immunohistochemical panel (MLH1, PMS2, MSH2, MSH6) is undertaken (Block B1); the results will be issued in a supplemental report, to follow shortly     Addendum   RESULTS OF IMMUNOHISTOCHEMICAL ANALYSIS FOR MISMATCH REPAIR PROTEIN LOSS     INTERPRETATION: NO LOSS OF NUCLEAR EXPRESSION OF MMR PROTEINS: LOW PROBABILITY OF MSI-H.     RESULTS:  Antibody            Clone                 Description                                  Results  MLH1                 [id number]          Mismatch repair protein               Intact nuclear expression  MSH2                [id number]        Mismatch repair protein               Intact nuclear expression  MSH6                44                      Mismatch repair protein               Intact nuclear expression  PMS2                 [id number]           Mismatch repair protein               Intact nuclear expression     Comment:   A negative control and a positive control for each antibody have been reviewed and accepted        3/2/2023 -  Cancer Staged    Staging form: Colon and Rectum, AJCC 8th Edition  - Clinical: cT3, cN2, cM0 - Signed by Sarina Rai DO on 3/2/2023       3/9/2023 - 6/2/2023 Chemotherapy    alteplase (CATHFLO), 2 mg, Intracatheter, Every 1 Minute as needed, 6 of 10 cycles  palonosetron (ALOXI), 0.25 mg, Intravenous, Once, 1 of 2 cycles  Administration: 0.25 mg (5/31/2023)  pegfilgrastim (NEULASTA), 6 mg, Subcutaneous, Once, 1 of 1 cycle  Pegfilgrastim-bmez (ZIEXTENZO), 6 mg, Subcutaneous, Once, 4 of 8 cycles  Administration: 6 mg (4/21/2023), 6 mg (5/6/2023), 6 mg (5/22/2023), 6 mg (6/2/2023)  fluorouracil (ADRUCIL), 400 mg/m2 = 915 mg, Intravenous, Once, 6 of 10 cycles  Administration: 915 mg (3/9/2023), 915 mg  (3/22/2023), 915 mg (4/19/2023), 915 mg (5/4/2023), 915 mg (5/17/2023), 915 mg (5/31/2023)  leucovorin calcium IVPB, 916 mg, Intravenous, Once, 6 of 10 cycles  Administration: 900 mg (3/9/2023), 900 mg (3/22/2023), 900 mg (4/19/2023), 900 mg (5/4/2023), 900 mg (5/17/2023), 900 mg (5/31/2023)  oxaliplatin (ELOXATIN) chemo infusion, 194.65 mg, Intravenous, Once, 6 of 10 cycles  Administration: 200 mg (3/9/2023), 200 mg (3/22/2023), 200 mg (4/19/2023), 200 mg (5/4/2023), 200 mg (5/17/2023), 200 mg (5/31/2023)  fluorouracil (ADRUCIL) ambulatory infusion Soln, 1,200 mg/m2/day = 5,495 mg, Intravenous, Over 46 hours, 6 of 10 cycles  aprepitant (CINVANTI) in  mL IVPB, 130 mg, Intravenous, Once, 1 of 2 cycles  Administration: 130 mg (5/31/2023)     6/21/2023 - 8/1/2023 Radiation      Plan ID Energy Fractions Dose per Fraction (cGy) Dose Correction (cGy) Total Dose Delivered (cGy) Elapsed Days   CD Rectum 6X 3 / 3 180 0 540 4   Whole Pelvis 6X 25 / 25 180 0 4,500 36        6/26/2023 - 7/28/2023 Chemotherapy    alteplase (CATHFLO), 2 mg, Intracatheter, Every 1 Minute as needed, 4 of 4 cycles  fluorouracil (ADRUCIL) ambulatory infusion Soln, 200 mg/m2/day = 2,190 mg (88.9 % of original dose 225 mg/m2/day), Intravenous, Over 120 hours, 4 of 4 cycles  Dose modification: 200 mg/m2/day (original dose 225 mg/m2/day, Cycle 1, Reason: Anticipated Tolerance)     10/17/2023 Surgery    Laparoscopic hand-assisted proctectomy with coloanal anastomosis and loop ileostomy    Final Diagnosis  A. Rectum, sigmoid colon, and two donuts, low anterior resection:  -   Invasive adenocarcinoma of rectum, moderately differentiated, with treatment effect.  -   Separate segments of colon/rectum (anastomotic donuts), negative for malignancy.  -   See synoptic report.  -   MMR (MLH1, MSH2, MSH6 and PMS2) studies by IHC on biopsy specimen [id number] show intact protein expression.     10/17/2023 -  Cancer Staged    Staging form: Colon and Rectum, AJCC  8th Edition  - Pathologic stage from 10/17/2023: Stage IIIB (ypT3, pN1b, cM0) - Signed by Joe Barboza MD on 11/8/2023  Stage prefix: Post-therapy  Response to neoadjuvant therapy: Partial response       1/16/2024 Surgery    1/16/24 Closure ileostomy  A. Ileostomy stoma, ileostomy reversal:  - Enterocutaneous stoma, consistent with ileostomy site.  - Negative for dysplasia and malignancy         Review of Systems:  Review of Systems   Constitutional:  Positive for appetite change (varies, mostly good) and fatigue (improving). Negative for unexpected weight change.   HENT: Negative.     Eyes: Negative.    Respiratory: Negative.     Cardiovascular: Negative.    Gastrointestinal:  Positive for diarrhea (rarely) and nausea (intermittent, r/t postnasal drip). Negative for blood in stool and vomiting.        Multiple BM's daily, usually formed. Occasional urgency w/BM's. No incontinence.   Endocrine: Positive for cold intolerance and heat intolerance.   Genitourinary: Negative.  Negative for difficulty urinating.   Musculoskeletal: Negative.    Skin: Negative.    Allergic/Immunologic: Positive for environmental allergies.   Neurological:         Peripheral neuropathy in finger tips and feet   Hematological: Negative.    Psychiatric/Behavioral:  Positive for sleep disturbance (r/t neuropathy pain in feet). Negative for dysphoric mood.        Clinical Trial: no    Teaching:    Health Maintenance   Topic Date Due    COVID-19 Vaccine (1) Never done    Pneumococcal Vaccine: Pediatrics (0 to 5 Years) and At-Risk Patients (6 to 64 Years) (1 of 2 - PCV) Never done    HIV Screening  Never done    Annual Physical  Never done    Zoster Vaccine (1 of 2) Never done    Hepatitis A Vaccine (1 of 2 - Risk 2-dose series) Never done    DTaP,Tdap,and Td Vaccines (1 - Tdap) Never done    Influenza Vaccine (1) Never done    Colorectal Cancer Screening  02/17/2024    Depression Screening  05/24/2024    BMI: Adult  02/19/2025    Hepatitis  C Screening  Completed    HIB Vaccine  Aged Out    IPV Vaccine  Aged Out    Meningococcal ACWY Vaccine  Aged Out    HPV Vaccine  Aged Out     Patient Active Problem List   Diagnosis    Rectal wall thickening    Transaminitis/Hepatitic Steatosis    Rectal cancer (HCC)    Chemotherapy-induced neutropenia     Port-A-Cath in place    Cirrhosis (HCC)     Past Medical History:   Diagnosis Date    Cirrhosis (HCC) 3/18/24    Colon cancer (HCC)     Dental infection     Fatty liver     Rectal cancer (HCC)      Past Surgical History:   Procedure Laterality Date    APPENDECTOMY      COLONOSCOPY      DENTAL SURGERY      IR PORT PLACEMENT  02/28/2023    AZ CLOSURE ENTEROSTOMY LG/SMALL INTESTINE N/A 1/16/2024    Procedure: CLOSURE ILEOSTOMY;  Surgeon: CLEMENTE Lugo MD;  Location: [facility];  Service: Colorectal    AZ LAPS PROCTECTOMY COMBINED PULL-THRU W/RESERVOIR N/A 10/17/2023    Procedure: LAPAROSCOPIC HAND ASSIST PROCTECTOMY, ABDOMINAL APPROACH, coloanal anastomosis, loop ileostomy;  Surgeon: CLEMENTE Lugo MD;  Location: [facility];  Service: Colorectal    AZ SIGMOIDOSCOPY FLX DX W/COLLJ SPEC BR/WA IF PFRMD N/A 10/17/2023    Procedure: SIGMOIDOSCOPY FLEXIBLE;  Surgeon: CLEMENTE Lugo MD;  Location: [facility];  Service: Colorectal    UPPER GASTROINTESTINAL ENDOSCOPY       Family History   Problem Relation Age of Onset    Lung cancer Mother     Cancer Mother         Lung    Lung cancer Father     Cancer Father         Mouth throat    Colon cancer Neg Hx     Colon polyps Neg Hx      Social History     Socioeconomic History    Marital status: Single     Spouse name: Not on file    Number of children: Not on file    Years of education: Not on file    Highest education level: Not on file   Occupational History    Not on file   Tobacco Use    Smoking status: Former     Current packs/day: 0.00     Average packs/day: 1 pack/day for 20.0 years (20.0 ttl pk-yrs)     Types: Cigarettes     Start date: 1/1/2000     Quit date:  2020     Years since quittin.1    Smokeless tobacco: Never   Vaping Use    Vaping status: Some Days    Substances: Nicotine, Flavoring   Substance and Sexual Activity    Alcohol use: Not Currently     Alcohol/week: 2.0 standard drinks of alcohol     Types: 2 Cans of beer per week     Comment: 1-2 daily. previously up to 1 case/day (reduced alcohol intake 7 years ago)    Drug use: Not Currently     Frequency: 5.0 times per week     Types: Marijuana     Comment: medical marijuana    Sexual activity: Not Currently   Other Topics Concern    Not on file   Social History Narrative    Not on file     Social Determinants of Health     Financial Resource Strain: Not on file   Food Insecurity: Food Insecurity Present (2024)    Received from [facility]    Hunger Vital Sign     Worried About Running Out of Food in the Last Year: Often true     Ran Out of Food in the Last Year: Often true   Transportation Needs: No Transportation Needs (2024)    PRAPARE - Transportation     Lack of Transportation (Medical): No     Lack of Transportation (Non-Medical): No   Physical Activity: Not on file   Stress: Not on file   Social Connections: Not on file   Intimate Partner Violence: Not on file   Housing Stability: Low Risk  (2024)    Housing Stability Vital Sign     Unable to Pay for Housing in the Last Year: No     Number of Places Lived in the Last Year: 1     Unstable Housing in the Last Year: No       Current Outpatient Medications:     carvedilol (COREG) 3.125 mg tablet, Take 1 tablet (3.125 mg total) by mouth 2 (two) times a day with meals, Disp: 60 tablet, Rfl: 11    gabapentin (NEURONTIN) 100 mg capsule, Take 1 capsule (100 mg total) by mouth 3 (three) times a day for 7 days (Patient not taking: Reported on 2024), Disp: 21 capsule, Rfl: 0    methocarbamol (ROBAXIN) 500 mg tablet, Take 1 tablet (500 mg total) by mouth every 8 (eight) hours for 7 days (Patient not taking: Reported on 2024), Disp: 21  "tablet, Rfl: 0    ondansetron (ZOFRAN) 8 mg tablet, Take 1 tablet (8 mg total) by mouth every 8 (eight) hours as needed for nausea or vomiting (Patient not taking: Reported on 2/19/2024), Disp: 30 tablet, Rfl: 2    pyridoxine (VITAMIN B6) 50 mg tablet, Take 50 mg by mouth daily (Patient not taking: Reported on 2/19/2024), Disp: , Rfl:   No Known Allergies  Vitals:    02/26/24 1401   Height: 6' 3\" (1.905 m)         "

## 2024-02-27 ENCOUNTER — HOSPITAL ENCOUNTER (OUTPATIENT)
Dept: INFUSION CENTER | Facility: HOSPITAL | Age: 44
Discharge: HOME | End: 2024-02-27

## 2024-02-28 ENCOUNTER — TELEPHONE (OUTPATIENT)
Dept: GASTROENTEROLOGY | Facility: CLINIC | Age: 44
End: 2024-02-28

## 2024-03-20 ENCOUNTER — HOSPITAL ENCOUNTER (EMERGENCY)
Facility: HOSPITAL | Age: 44
Discharge: HOME | End: 2024-03-21
Attending: EMERGENCY MEDICINE
Payer: COMMERCIAL

## 2024-03-20 DIAGNOSIS — C20 RECTAL CANCER (HCC): ICD-10-CM

## 2024-03-20 DIAGNOSIS — K52.9 ACUTE GASTROENTERITIS: Primary | ICD-10-CM

## 2024-03-20 DIAGNOSIS — E80.6 HYPERBILIRUBINEMIA: ICD-10-CM

## 2024-03-20 LAB
ALBUMIN SERPL BCP-MCNC: 4.7 G/DL (ref 3.5–5)
ALP SERPL-CCNC: 62 U/L (ref 34–104)
ALT SERPL W P-5'-P-CCNC: 20 U/L (ref 7–52)
ANION GAP SERPL CALCULATED.3IONS-SCNC: 9 MMOL/L (ref 4–13)
AST SERPL W P-5'-P-CCNC: 29 U/L (ref 13–39)
BASOPHILS # BLD AUTO: 0.03 THOUSANDS/ÂΜL (ref 0–0.1)
BASOPHILS NFR BLD AUTO: 0 % (ref 0–1)
BILIRUB SERPL-MCNC: 2.53 MG/DL (ref 0.2–1)
BUN SERPL-MCNC: 13 MG/DL (ref 5–25)
CALCIUM SERPL-MCNC: 9.6 MG/DL (ref 8.4–10.2)
CHLORIDE SERPL-SCNC: 107 MMOL/L (ref 96–108)
CO2 SERPL-SCNC: 22 MMOL/L (ref 21–32)
CREAT SERPL-MCNC: 0.73 MG/DL (ref 0.6–1.3)
EOSINOPHIL # BLD AUTO: 0.16 THOUSAND/ÂΜL (ref 0–0.61)
EOSINOPHIL NFR BLD AUTO: 2 % (ref 0–6)
ERYTHROCYTE [DISTWIDTH] IN BLOOD BY AUTOMATED COUNT: 14.6 % (ref 11.6–15.1)
FLUAV RNA RESP QL NAA+PROBE: NEGATIVE
FLUBV RNA RESP QL NAA+PROBE: NEGATIVE
GFR SERPL CREATININE-BSD FRML MDRD: 112 ML/MIN/1.73SQ M
GLUCOSE SERPL-MCNC: 148 MG/DL (ref 65–140)
HCT VFR BLD AUTO: 42.5 % (ref 36.5–49.3)
HGB BLD-MCNC: 14.5 G/DL (ref 12–17)
HOLD SPECIMEN: NORMAL
IMM GRANULOCYTES # BLD AUTO: 0.03 THOUSAND/UL (ref 0–0.2)
IMM GRANULOCYTES NFR BLD AUTO: 0 % (ref 0–2)
LIPASE SERPL-CCNC: 16 U/L (ref 11–82)
LYMPHOCYTES # BLD AUTO: 0.21 THOUSANDS/ÂΜL (ref 0.6–4.47)
LYMPHOCYTES NFR BLD AUTO: 3 % (ref 14–44)
MCH RBC QN AUTO: 29.8 PG (ref 26.8–34.3)
MCHC RBC AUTO-ENTMCNC: 34.1 G/DL (ref 31.4–37.4)
MCV RBC AUTO: 87 FL (ref 82–98)
MONOCYTES # BLD AUTO: 0.53 THOUSAND/ÂΜL (ref 0.17–1.22)
MONOCYTES NFR BLD AUTO: 6 % (ref 4–12)
NEUTROPHILS # BLD AUTO: 7.48 THOUSANDS/ÂΜL (ref 1.85–7.62)
NEUTS SEG NFR BLD AUTO: 89 % (ref 43–75)
NRBC BLD AUTO-RTO: 0 /100 WBCS
PLATELET # BLD AUTO: 119 THOUSANDS/UL (ref 149–390)
PMV BLD AUTO: 9.4 FL (ref 8.9–12.7)
POTASSIUM SERPL-SCNC: 4.2 MMOL/L (ref 3.5–5.3)
PROT SERPL-MCNC: 7.8 G/DL (ref 6.4–8.4)
RBC # BLD AUTO: 4.86 MILLION/UL (ref 3.88–5.62)
RSV RNA RESP QL NAA+PROBE: NEGATIVE
SARS-COV-2 RNA RESP QL NAA+PROBE: NEGATIVE
SODIUM SERPL-SCNC: 138 MMOL/L (ref 135–147)
WBC # BLD AUTO: 8.44 THOUSAND/UL (ref 4.31–10.16)

## 2024-03-20 PROCEDURE — 36415 COLL VENOUS BLD VENIPUNCTURE: CPT

## 2024-03-20 PROCEDURE — 82248 BILIRUBIN DIRECT: CPT | Performed by: EMERGENCY MEDICINE

## 2024-03-20 PROCEDURE — 99284 EMERGENCY DEPT VISIT MOD MDM: CPT

## 2024-03-20 PROCEDURE — 85025 COMPLETE CBC W/AUTO DIFF WBC: CPT | Performed by: EMERGENCY MEDICINE

## 2024-03-20 PROCEDURE — 83690 ASSAY OF LIPASE: CPT | Performed by: EMERGENCY MEDICINE

## 2024-03-20 PROCEDURE — 0241U HB NFCT DS VIR RESP RNA 4 TRGT: CPT | Performed by: EMERGENCY MEDICINE

## 2024-03-20 PROCEDURE — 96374 THER/PROPH/DIAG INJ IV PUSH: CPT

## 2024-03-20 PROCEDURE — 96361 HYDRATE IV INFUSION ADD-ON: CPT

## 2024-03-20 PROCEDURE — 99284 EMERGENCY DEPT VISIT MOD MDM: CPT | Performed by: EMERGENCY MEDICINE

## 2024-03-20 PROCEDURE — 80053 COMPREHEN METABOLIC PANEL: CPT | Performed by: EMERGENCY MEDICINE

## 2024-03-20 RX ORDER — ONDANSETRON 2 MG/ML
4 INJECTION INTRAMUSCULAR; INTRAVENOUS ONCE
Status: COMPLETED | OUTPATIENT
Start: 2024-03-20 | End: 2024-03-20

## 2024-03-20 RX ADMIN — SODIUM CHLORIDE 1000 ML: 0.9 INJECTION, SOLUTION INTRAVENOUS at 23:16

## 2024-03-20 RX ADMIN — ONDANSETRON 4 MG: 2 INJECTION INTRAMUSCULAR; INTRAVENOUS at 23:16

## 2024-03-21 VITALS
SYSTOLIC BLOOD PRESSURE: 120 MMHG | DIASTOLIC BLOOD PRESSURE: 74 MMHG | HEART RATE: 72 BPM | TEMPERATURE: 98.7 F | OXYGEN SATURATION: 96 % | HEIGHT: 75 IN | RESPIRATION RATE: 18 BRPM | WEIGHT: 183 LBS | BODY MASS INDEX: 22.75 KG/M2

## 2024-03-21 LAB — BILIRUB DIRECT SERPL-MCNC: 0.55 MG/DL (ref 0–0.2)

## 2024-03-21 PROCEDURE — 96372 THER/PROPH/DIAG INJ SC/IM: CPT

## 2024-03-21 PROCEDURE — 96376 TX/PRO/DX INJ SAME DRUG ADON: CPT

## 2024-03-21 RX ORDER — ONDANSETRON HYDROCHLORIDE 8 MG/1
8 TABLET, FILM COATED ORAL EVERY 8 HOURS PRN
Qty: 30 TABLET | Refills: 0 | Status: SHIPPED | OUTPATIENT
Start: 2024-03-21

## 2024-03-21 RX ORDER — ONDANSETRON 2 MG/ML
4 INJECTION INTRAMUSCULAR; INTRAVENOUS ONCE
Status: COMPLETED | OUTPATIENT
Start: 2024-03-21 | End: 2024-03-21

## 2024-03-21 RX ADMIN — ONDANSETRON 4 MG: 2 INJECTION INTRAMUSCULAR; INTRAVENOUS at 02:45

## 2024-03-21 RX ADMIN — TRIMETHOBENZAMIDE HYDROCHLORIDE 200 MG: 100 INJECTION INTRAMUSCULAR at 05:29

## 2024-03-21 NOTE — DISCHARGE INSTRUCTIONS
Rest.  Keep well-hydrated.  Eat  frequent small meals.  Avoid fried and fatty foods, chocolate, caffeine and foods that upset your stomach.  Take Zofran if needed.    Please contact your PCP and or oncologist today.  Let them know how you are doing.  Let them know your bilirubin levels were elevated.  They may want to repeat liver enzymes in a week or 2.

## 2024-03-21 NOTE — ED PROVIDER NOTES
History  Chief Complaint   Patient presents with    GI Problem     Pt c/o vomiting and diarrhea that started today. Pt c/o generalized abd pain.       43-year-old male with history of rectal carcinoma status post resection with ileostomy, chemotherapy, radiation therapy.  The ileostomy was reversed and patient was told he is in remission since last month.  Patient also history of cirrhosis with portal hypertension and esophageal and gastric varices.  CT of February 5, 2024 was reviewed. He states he was feeling well until about 3 or 4 PM today.  Since then has had intermittent mild abdominal cramping, multiple episodes of nonbloody emesis and diarrhea.  He denies fever, intake of spoiled food, recent travel and exposure to others with similar Sx's recently.        Prior to Admission Medications   Prescriptions Last Dose Informant Patient Reported? Taking?   carvedilol (COREG) 3.125 mg tablet   No No   Sig: Take 1 tablet (3.125 mg total) by mouth 2 (two) times a day with meals   gabapentin (NEURONTIN) 100 mg capsule   No No   Sig: Take 1 capsule (100 mg total) by mouth 3 (three) times a day for 7 days   Patient not taking: Reported on 2/19/2024   methocarbamol (ROBAXIN) 500 mg tablet   No No   Sig: Take 1 tablet (500 mg total) by mouth every 8 (eight) hours for 7 days   Patient not taking: Reported on 2/19/2024   ondansetron (ZOFRAN) 8 mg tablet  Self No No   Sig: Take 1 tablet (8 mg total) by mouth every 8 (eight) hours as needed for nausea or vomiting   Patient not taking: Reported on 2/19/2024   ondansetron (ZOFRAN) 8 mg tablet   No Yes   Sig: Take 1 tablet (8 mg total) by mouth every 8 (eight) hours as needed for nausea or vomiting   pyridoxine (VITAMIN B6) 50 mg tablet  Self Yes No   Sig: Take 50 mg by mouth daily   Patient not taking: Reported on 2/19/2024      Facility-Administered Medications: None       Past Medical History:   Diagnosis Date    Cirrhosis (HCC) 3/18/24    Colon cancer (HCC)     Dental  infection     Fatty liver     Rectal cancer (HCC)        Past Surgical History:   Procedure Laterality Date    APPENDECTOMY      COLONOSCOPY      DENTAL SURGERY      IR PORT PLACEMENT  2023    NE CLOSURE ENTEROSTOMY LG/SMALL INTESTINE N/A 2024    Procedure: CLOSURE ILEOSTOMY;  Surgeon: CLEMENTE Lugo MD;  [facility];  Service: Colorectal    NE LAPS PROCTECTOMY COMBINED PULL-THRU W/RESERVOIR N/A 10/17/2023    Procedure: LAPAROSCOPIC HAND ASSIST PROCTECTOMY, ABDOMINAL APPROACH, coloanal anastomosis, loop ileostomy;  Surgeon: CLEMENTE Lugo MD;  [facility];  Service: Colorectal    NE SIGMOIDOSCOPY FLX DX W/COLLJ SPEC BR/WA IF PFRMD N/A 10/17/2023    Procedure: SIGMOIDOSCOPY FLEXIBLE;  Surgeon: CLEMENTE Lugo MD;  [facility];  Service: Colorectal    UPPER GASTROINTESTINAL ENDOSCOPY         Family History   Problem Relation Age of Onset    Lung cancer Mother     Cancer Mother         Lung    Lung cancer Father     Cancer Father         Mouth throat    Colon cancer Neg Hx     Colon polyps Neg Hx      I have reviewed and agree with the history as documented.    E-Cigarette/Vaping    E-Cigarette Use Former User     Comments tobacco      E-Cigarette/Vaping Substances    Nicotine Yes     Flavoring Yes      Social History     Tobacco Use    Smoking status: Former     Current packs/day: 0.00     Average packs/day: 1 pack/day for 20.0 years (20.0 ttl pk-yrs)     Types: Cigarettes     Start date: 2000     Quit date: 2020     Years since quittin.2    Smokeless tobacco: Never   Vaping Use    Vaping status: Former    Substances: Nicotine, Flavoring   Substance Use Topics    Alcohol use: Not Currently     Alcohol/week: 2.0 standard drinks of alcohol     Types: 2 Cans of beer per week     Comment: 1-2 daily. previously up to 1 case/day (reduced alcohol intake 7 years ago)    Drug use: Not Currently     Frequency: 5.0 times per week     Types: Marijuana     Comment: medical  marijuana       Review of Systems   Constitutional:  Negative for fatigue and fever.   HENT:  Negative for congestion and sore throat.    Respiratory:  Negative for cough and shortness of breath.    Cardiovascular:  Negative for chest pain and palpitations.   Gastrointestinal:  Positive for abdominal pain, diarrhea, nausea and vomiting. Negative for blood in stool and rectal pain.   Genitourinary:  Negative for dysuria and flank pain.   Neurological:  Negative for syncope.       Physical Exam  Physical Exam  Vitals and nursing note reviewed.   Constitutional:       General: He is not in acute distress.     Appearance: He is well-developed and normal weight. He is ill-appearing. He is not diaphoretic.   HENT:      Head: Normocephalic and atraumatic.      Right Ear: External ear normal.      Left Ear: External ear normal.      Nose: Nose normal.      Mouth/Throat:      Mouth: Mucous membranes are dry.   Eyes:      General: No scleral icterus.     Conjunctiva/sclera: Conjunctivae normal.   Neck:      Vascular: No carotid bruit or JVD.   Cardiovascular:      Rate and Rhythm: Normal rate and regular rhythm.      Pulses: Normal pulses.      Heart sounds: Normal heart sounds.   Pulmonary:      Effort: Pulmonary effort is normal. No respiratory distress.      Breath sounds: Normal breath sounds.   Abdominal:      Palpations: Abdomen is soft. There is no mass.      Tenderness: There is no abdominal tenderness. There is no right CVA tenderness or left CVA tenderness.   Musculoskeletal:         General: No tenderness. Normal range of motion.      Cervical back: Neck supple.      Right lower leg: No edema.      Left lower leg: No edema.   Skin:     General: Skin is warm and dry.      Capillary Refill: Capillary refill takes less than 2 seconds.      Coloration: Skin is not jaundiced.      Findings: No rash.   Neurological:      General: No focal deficit present.      Mental Status: He is alert and oriented to person, place, and  time. Mental status is at baseline.      Cranial Nerves: No cranial nerve deficit.      Sensory: No sensory deficit.      Motor: No weakness.      Coordination: Coordination normal.      Deep Tendon Reflexes: Reflexes are normal and symmetric.   Psychiatric:         Mood and Affect: Mood normal.         Behavior: Behavior normal.         Vital Signs  ED Triage Vitals   Temperature Pulse Respirations Blood Pressure SpO2   03/20/24 2135 03/20/24 2135 03/20/24 2135 03/20/24 2135 03/20/24 2135   98.7 °F (37.1 °C) 80 19 133/63 98 %      Temp Source Heart Rate Source Patient Position - Orthostatic VS BP Location FiO2 (%)   03/20/24 2135 03/20/24 2135 03/21/24 0414 03/21/24 0414 --   Temporal Monitor Lying - Orthostatic VS Right arm       Pain Score       03/20/24 2135       9           Vitals:    03/21/24 0417 03/21/24 0420 03/21/24 0430 03/21/24 0600   BP: 111/70 112/72 120/74    Pulse: 86 88 83 72   Patient Position - Orthostatic VS: Standing - Orthostatic VS Standing for 3 minutes - Orthostatic VS           Visual Acuity      ED Medications  Medications   trimethobenzamide (TIGAN) IM injection 200 mg (200 mg Intramuscular Given 3/21/24 0529)   ondansetron (ZOFRAN) injection 4 mg (4 mg Intravenous Given 3/20/24 2316)   sodium chloride 0.9 % bolus 1,000 mL (0 mL Intravenous Stopped 3/21/24 0016)   ondansetron (ZOFRAN) injection 4 mg (4 mg Intravenous Given 3/21/24 0245)       Diagnostic Studies  Results Reviewed       Procedure Component Value Units Date/Time    Bilirubin, direct [[id number]]  (Abnormal) Collected: 03/20/24 2139    Lab Status: Final result Specimen: Blood from Arm, Right Updated: 03/21/24 0327     Bilirubin, Direct 0.55 mg/dL     FLU/RSV/COVID - if FLU/RSV clinically relevant [[id number]]  (Normal) Collected: 03/20/24 2139    Lab Status: Final result Specimen: Nares from Nose Updated: 03/20/24 2225     SARS-CoV-2 Negative     INFLUENZA A PCR Negative     INFLUENZA B PCR Negative     RSV PCR Negative     Narrative:      FOR PEDIATRIC PATIENTS - copy/paste COVID Guidelines URL to browser: [email or web address]    SARS-CoV-2 assay is a Nucleic Acid Amplification assay intended for the  qualitative detection of nucleic acid from SARS-CoV-2 in nasopharyngeal  swabs. Results are for the presumptive identification of SARS-CoV-2 RNA.    Positive results are indicative of infection with SARS-CoV-2, the virus  causing COVID-19, but do not rule out bacterial infection or co-infection  with other viruses. Laboratories within the [place] and its  territories are required to report all positive results to the appropriate  public health authorities. Negative results do not preclude SARS-CoV-2  infection and should not be used as the sole basis for treatment or other  patient management decisions. Negative results must be combined with  clinical observations, patient history, and epidemiological information.  This test has not been FDA cleared or approved.    This test has been authorized by FDA under an Emergency Use Authorization  (EUA). This test is only authorized for the duration of time the  declaration that circumstances exist justifying the authorization of the  emergency use of an in vitro diagnostic tests for detection of SARS-CoV-2  virus and/or diagnosis of COVID-19 infection under section 564(b)(1) of  the Act, 21 U.S.C. 360bbb-3(b)(1), unless the authorization is terminated  or revoked sooner. The test has been validated but independent review by FDA  and CLIA is pending.    Test performed using [employer] GeneXpert: This RT-PCR assay targets N2,  a region unique to SARS-CoV-2. A conserved region in the E-gene was chosen  for pan-Sarbecovirus detection which includes SARS-CoV-2.    According to CMS-2020-01-R, this platform meets the definition of high-throughput technology.    Comprehensive metabolic panel [[id number]]  (Abnormal) Collected: 03/20/24 2139    Lab  Status: Final result Specimen: Blood from Arm, Right Updated: 03/20/24 2206     Sodium 138 mmol/L      Potassium 4.2 mmol/L      Chloride 107 mmol/L      CO2 22 mmol/L      ANION GAP 9 mmol/L      BUN 13 mg/dL      Creatinine 0.73 mg/dL      Glucose 148 mg/dL      Calcium 9.6 mg/dL      AST 29 U/L      ALT 20 U/L      Alkaline Phosphatase 62 U/L      Total Protein 7.8 g/dL      Albumin 4.7 g/dL      Total Bilirubin 2.53 mg/dL      eGFR 112 ml/min/1.73sq m     Narrative:      National Kidney Disease Foundation guidelines for Chronic Kidney Disease (CKD):     Stage 1 with normal or high GFR (GFR > 90 mL/min/1.73 square meters)    Stage 2 Mild CKD (GFR = 60-89 mL/min/1.73 square meters)    Stage 3A Moderate CKD (GFR = 45-59 mL/min/1.73 square meters)    Stage 3B Moderate CKD (GFR = 30-44 mL/min/1.73 square meters)    Stage 4 Severe CKD (GFR = 15-29 mL/min/1.73 square meters)    Stage 5 End Stage CKD (GFR <15 mL/min/1.73 square meters)  Note: GFR calculation is accurate only with a steady state creatinine    Lipase [[id number]]  (Normal) Collected: 03/20/24 2139    Lab Status: Final result Specimen: Blood from Arm, Right Updated: 03/20/24 2206     Lipase 16 u/L     CBC and differential [[id number]]  (Abnormal) Collected: 03/20/24 2139    Lab Status: Final result Specimen: Blood from Arm, Right Updated: 03/20/24 2150     WBC 8.44 Thousand/uL      RBC 4.86 Million/uL      Hemoglobin 14.5 g/dL      Hematocrit 42.5 %      MCV 87 fL      MCH 29.8 pg      MCHC 34.1 g/dL      RDW 14.6 %      MPV 9.4 fL      Platelets 119 Thousands/uL      nRBC 0 /100 WBCs      Neutrophils Relative 89 %      Immature Grans % 0 %      Lymphocytes Relative 3 %      Monocytes Relative 6 %      Eosinophils Relative 2 %      Basophils Relative 0 %      Neutrophils Absolute 7.48 Thousands/µL      Absolute Immature Grans 0.03 Thousand/uL      Absolute Lymphocytes 0.21 Thousands/µL      Absolute Monocytes 0.53 Thousand/µL      Eosinophils Absolute 0.16  Thousand/µL      Basophils Absolute 0.03 Thousands/µL     Hubbell draw [[id number]] Collected: 03/20/24 2139    Lab Status: Final result Specimen: Blood from Arm, Right Updated: 03/20/24 2147    Narrative:      The following orders were created for panel order Hubbell draw.  Procedure                               Abnormality         Status                     ---------                               -----------         ------                     Light Blue Top on hold[[id number]]                           Final result               Gold top on hold[[id number]]                                 Final result               Green / Black tube on hold[[id number]]                       Final result                 Please view results for these tests on the individual orders.                   No orders to display              Procedures  Procedures         ED Course  ED Course as of 03/21/24 0639   u Mar 21, 2024   0523 After 1 L of saline and to 4 mg doses of Zofran patient still occasionally nauseous and has dry heaves.  Seems worse when he gets up and moves around.  He feels this may be due to postnasal drip.  He is hesitant to be admitted.  I will trial Tigan.  Orthostatics at this time are negative.  Patient denies abdominal pain had no diarrhea here.   0634 Patient feels better.  He is able to stand and ambulate around the room.  He would like to go home.  Will prescribe Zofran as needed                               SBIRT 20yo+      Flowsheet Row Most Recent Value   Initial Alcohol Screen: US AUDIT-C     1. How often do you have a drink containing alcohol? 0 Filed at: 03/20/2024 2318   2. How many drinks containing alcohol do you have on a typical day you are drinking?  0 Filed at: 03/20/2024 2318   3a. Male UNDER 65: How often do you have five or more drinks on one occasion? 0 Filed at: 03/20/2024 2318   Audit-C Score 0 Filed at: 03/20/2024 2318   DARIEL: How many times in the past year have you...    Used an illegal  drug or used a prescription medication for non-medical reasons? Never Filed at: 03/20/2024 0549                      Medical Decision Making  Acute onset of nausea vomiting diarrhea.  No peritoneal signs, no fever, foreign travel. Recent CT scan without acute changes. Likely viral infection or food poisoning. Will r/o acute pancreatitis,  dehydration, electrolyte abnormality,  UTI, pneumonia.    Amount and/or Complexity of Data Reviewed  Labs: ordered.    Risk  Prescription drug management.             Disposition  Final diagnoses:   Acute gastroenteritis   Hyperbilirubinemia     Time reflects when diagnosis was documented in both MDM as applicable and the Disposition within this note       Time User Action Codes Description Comment    3/21/2024  6:35 AM Suresh Stringer [K52.9] Acute gastroenteritis     3/21/2024  6:35 AM Suresh Stringer [E80.6] Hyperbilirubinemia     3/21/2024  6:37 AM Suresh Stringer [C20] Rectal cancer (HCC)           ED Disposition       ED Disposition   Discharge    Condition   Stable    Date/Time   Thu Mar 21, 2024 0635    Comment   Alexandro Luo discharge to home/self care.                   Follow-up Information       Follow up With Specialties Details Why Contact Info    Ana M Cruz, DO Family Medicine Call today  [facility] [address] [address] [phone]              Current Discharge Medication List        CONTINUE these medications which have CHANGED    Details   ondansetron (ZOFRAN) 8 mg tablet Take 1 tablet (8 mg total) by mouth every 8 (eight) hours as needed for nausea or vomiting  Qty: 30 tablet, Refills: 0    Associated Diagnoses: Rectal cancer (HCC)           CONTINUE these medications which have NOT CHANGED    Details   carvedilol (COREG) 3.125 mg tablet Take 1 tablet (3.125 mg total) by mouth 2 (two) times a day with meals  Qty: 60 tablet, Refills: 11    Associated Diagnoses: Alcoholic cirrhosis of liver without ascites (HCC); Secondary  esophageal varices without bleeding (HCC); Gastric varices without bleeding      gabapentin (NEURONTIN) 100 mg capsule Take 1 capsule (100 mg total) by mouth 3 (three) times a day for 7 days  Qty: 21 capsule, Refills: 0    Associated Diagnoses: Rectal cancer (HCC)      methocarbamol (ROBAXIN) 500 mg tablet Take 1 tablet (500 mg total) by mouth every 8 (eight) hours for 7 days  Qty: 21 tablet, Refills: 0    Associated Diagnoses: Rectal cancer (HCC)      pyridoxine (VITAMIN B6) 50 mg tablet Take 50 mg by mouth daily             No discharge procedures on file.    PDMP Review       None            ED Provider  Electronically Signed by             Suresh Stringer DO  03/21/24 0640

## 2024-04-08 NOTE — DISCHARGE INSTR - AVS FIRST PAGE
DISCHARGE INSTRUCTIONS    Follow Up: Follow up with Dr. Lugo on 2/19/24 at 1:20 pm.     Diet: Avoid leafy greens, raw vegetables, nuts and seeds for the next 7 days. After 7 days may slowly advance them into your diet.     Pain: Tylenol 975 mg every 8 hours for the next 7 days. Robaxin 500 mg every 8 hours for the next 7 days. Gabapentin 100 mg three times a day for 7 days. Oxycodone 5 mg every 6 hours as needed for 4 days.     Shower: You may shower over the wound, unless there are drain tubes left in place. Do not bathe or use a pool or hot tub until cleared by the physician.    Activity: As tolerated. You may go up and down stairs, walk as much as you are comfortable, but walk at least 3 times each day. Do not lift anything heavier than 15 pounds for at least 2-4 weeks, unless cleared by the doctor.    Driving: Do not drive or make any important decisions while on narcotic pain medication. Generally, you may drive when your off all narcotic pain medications.    Medications: Resume all of your previous medications, unless told otherwise by the doctor. Avoid aspirin or ibuprofen (Advil, Motrin, etc.) products for 2-3 days after the date of surgery. You may, at that time, began to take them again. Tylenol is always fine. You do not need to take the narcotic pain medications unless you are having significant pain and discomfort.    Call the office: If you are experiencing any of the following, fevers above 101.5° or chills, significant nausea or vomiting, increase in abdominal pain, if the wound develops drainage and/or is excessive redness around the wound, or if you have significant diarrhea or other worsening symptoms.    Not on vitamins  Last Vit D 29

## 2024-04-17 ENCOUNTER — DOCUMENTATION (OUTPATIENT)
Age: 44
End: 2024-04-17

## 2024-04-17 ENCOUNTER — HOSPITAL ENCOUNTER (OUTPATIENT)
Dept: INFUSION CENTER | Facility: HOSPITAL | Age: 44
Discharge: HOME | End: 2024-04-17
Payer: COMMERCIAL

## 2024-04-17 DIAGNOSIS — Z95.828 PORT-A-CATH IN PLACE: Primary | ICD-10-CM

## 2024-04-17 DIAGNOSIS — C20 RECTAL CANCER (HCC): ICD-10-CM

## 2024-04-17 LAB
ALBUMIN SERPL BCP-MCNC: 3.9 G/DL (ref 3.5–5)
ALP SERPL-CCNC: 52 U/L (ref 34–104)
ALT SERPL W P-5'-P-CCNC: 17 U/L (ref 7–52)
ANION GAP SERPL CALCULATED.3IONS-SCNC: 5 MMOL/L (ref 4–13)
AST SERPL W P-5'-P-CCNC: 28 U/L (ref 13–39)
BASOPHILS # BLD AUTO: 0.03 THOUSANDS/ÂΜL (ref 0–0.1)
BASOPHILS NFR BLD AUTO: 1 % (ref 0–1)
BILIRUB SERPL-MCNC: 0.62 MG/DL (ref 0.2–1)
BUN SERPL-MCNC: 8 MG/DL (ref 5–25)
CALCIUM SERPL-MCNC: 8.9 MG/DL (ref 8.4–10.2)
CEA SERPL-MCNC: 0.9 NG/ML (ref 0–3)
CHLORIDE SERPL-SCNC: 106 MMOL/L (ref 96–108)
CO2 SERPL-SCNC: 25 MMOL/L (ref 21–32)
CREAT SERPL-MCNC: 0.57 MG/DL (ref 0.6–1.3)
EOSINOPHIL # BLD AUTO: 0.13 THOUSAND/ÂΜL (ref 0–0.61)
EOSINOPHIL NFR BLD AUTO: 3 % (ref 0–6)
ERYTHROCYTE [DISTWIDTH] IN BLOOD BY AUTOMATED COUNT: 13.8 % (ref 11.6–15.1)
GFR SERPL CREATININE-BSD FRML MDRD: 124 ML/MIN/1.73SQ M
GLUCOSE SERPL-MCNC: 104 MG/DL (ref 65–140)
HCT VFR BLD AUTO: 36.1 % (ref 36.5–49.3)
HGB BLD-MCNC: 12 G/DL (ref 12–17)
IMM GRANULOCYTES # BLD AUTO: 0.02 THOUSAND/UL (ref 0–0.2)
IMM GRANULOCYTES NFR BLD AUTO: 1 % (ref 0–2)
LYMPHOCYTES # BLD AUTO: 0.62 THOUSANDS/ÂΜL (ref 0.6–4.47)
LYMPHOCYTES NFR BLD AUTO: 16 % (ref 14–44)
MCH RBC QN AUTO: 29.8 PG (ref 26.8–34.3)
MCHC RBC AUTO-ENTMCNC: 33.2 G/DL (ref 31.4–37.4)
MCV RBC AUTO: 90 FL (ref 82–98)
MONOCYTES # BLD AUTO: 0.4 THOUSAND/ÂΜL (ref 0.17–1.22)
MONOCYTES NFR BLD AUTO: 11 % (ref 4–12)
NEUTROPHILS # BLD AUTO: 2.57 THOUSANDS/ÂΜL (ref 1.85–7.62)
NEUTS SEG NFR BLD AUTO: 68 % (ref 43–75)
NRBC BLD AUTO-RTO: 0 /100 WBCS
PLATELET # BLD AUTO: 120 THOUSANDS/UL (ref 149–390)
PMV BLD AUTO: 9.1 FL (ref 8.9–12.7)
POTASSIUM SERPL-SCNC: 3.8 MMOL/L (ref 3.5–5.3)
PROT SERPL-MCNC: 6.8 G/DL (ref 6.4–8.4)
RBC # BLD AUTO: 4.03 MILLION/UL (ref 3.88–5.62)
SODIUM SERPL-SCNC: 136 MMOL/L (ref 135–147)
WBC # BLD AUTO: 3.77 THOUSAND/UL (ref 4.31–10.16)

## 2024-04-17 PROCEDURE — 80053 COMPREHEN METABOLIC PANEL: CPT

## 2024-04-17 PROCEDURE — 85025 COMPLETE CBC W/AUTO DIFF WBC: CPT

## 2024-04-17 PROCEDURE — 82378 CARCINOEMBRYONIC ANTIGEN: CPT

## 2024-04-17 NOTE — PROGRESS NOTES
Received Paperwork   What type of form Disability   Scanned blank form into patient's Epic chart Yes   Method received form  In Person drop off   Provider responsible for form    Informed patient our office turn around time for completing patient forms is 5-7 business days. Yes, informed patient of turn around time Patient dropped off Disability forms to office today and would like a call when they are completed [phone]     Comments

## 2024-04-17 NOTE — PROGRESS NOTES
Alexandro Luo  tolerated treatment well with no complications.      Alexandro Luo is aware of future appt on 06/13/2024 at 09:30 AM.     AVS printed and given to Alexandro Luo:  No (Declined by Alexandro Luo)

## 2024-06-13 ENCOUNTER — HOSPITAL ENCOUNTER (OUTPATIENT)
Dept: INFUSION CENTER | Facility: HOSPITAL | Age: 44
Discharge: HOME | End: 2024-06-13
Payer: COMMERCIAL

## 2024-06-13 DIAGNOSIS — Z95.828 PORT-A-CATH IN PLACE: Primary | ICD-10-CM

## 2024-06-13 DIAGNOSIS — C20 RECTAL CANCER (HCC): ICD-10-CM

## 2024-06-13 LAB
ALBUMIN SERPL BCP-MCNC: 4.1 G/DL (ref 3.5–5)
ALP SERPL-CCNC: 52 U/L (ref 34–104)
ALT SERPL W P-5'-P-CCNC: 15 U/L (ref 7–52)
ANION GAP SERPL CALCULATED.3IONS-SCNC: 8 MMOL/L (ref 4–13)
AST SERPL W P-5'-P-CCNC: 20 U/L (ref 13–39)
BASOPHILS # BLD AUTO: 0.02 THOUSANDS/ÂΜL (ref 0–0.1)
BASOPHILS NFR BLD AUTO: 1 % (ref 0–1)
BILIRUB SERPL-MCNC: 0.64 MG/DL (ref 0.2–1)
BUN SERPL-MCNC: 10 MG/DL (ref 5–25)
CALCIUM SERPL-MCNC: 8.8 MG/DL (ref 8.4–10.2)
CEA SERPL-MCNC: 1 NG/ML (ref 0–3)
CHLORIDE SERPL-SCNC: 107 MMOL/L (ref 96–108)
CO2 SERPL-SCNC: 23 MMOL/L (ref 21–32)
CREAT SERPL-MCNC: 0.72 MG/DL (ref 0.6–1.3)
EOSINOPHIL # BLD AUTO: 0.08 THOUSAND/ÂΜL (ref 0–0.61)
EOSINOPHIL NFR BLD AUTO: 3 % (ref 0–6)
ERYTHROCYTE [DISTWIDTH] IN BLOOD BY AUTOMATED COUNT: 12.8 % (ref 11.6–15.1)
GFR SERPL CREATININE-BSD FRML MDRD: 112 ML/MIN/1.73SQ M
GLUCOSE SERPL-MCNC: 126 MG/DL (ref 65–140)
HCT VFR BLD AUTO: 35.9 % (ref 36.5–49.3)
HGB BLD-MCNC: 12.3 G/DL (ref 12–17)
IMM GRANULOCYTES # BLD AUTO: 0.01 THOUSAND/UL (ref 0–0.2)
IMM GRANULOCYTES NFR BLD AUTO: 0 % (ref 0–2)
LYMPHOCYTES # BLD AUTO: 0.61 THOUSANDS/ÂΜL (ref 0.6–4.47)
LYMPHOCYTES NFR BLD AUTO: 21 % (ref 14–44)
MCH RBC QN AUTO: 30.6 PG (ref 26.8–34.3)
MCHC RBC AUTO-ENTMCNC: 34.3 G/DL (ref 31.4–37.4)
MCV RBC AUTO: 89 FL (ref 82–98)
MONOCYTES # BLD AUTO: 0.26 THOUSAND/ÂΜL (ref 0.17–1.22)
MONOCYTES NFR BLD AUTO: 9 % (ref 4–12)
NEUTROPHILS # BLD AUTO: 1.88 THOUSANDS/ÂΜL (ref 1.85–7.62)
NEUTS SEG NFR BLD AUTO: 66 % (ref 43–75)
NRBC BLD AUTO-RTO: 0 /100 WBCS
PLATELET # BLD AUTO: 91 THOUSANDS/UL (ref 149–390)
PMV BLD AUTO: 9.4 FL (ref 8.9–12.7)
POTASSIUM SERPL-SCNC: 3.7 MMOL/L (ref 3.5–5.3)
PROT SERPL-MCNC: 6.6 G/DL (ref 6.4–8.4)
RBC # BLD AUTO: 4.02 MILLION/UL (ref 3.88–5.62)
SODIUM SERPL-SCNC: 138 MMOL/L (ref 135–147)
WBC # BLD AUTO: 2.86 THOUSAND/UL (ref 4.31–10.16)

## 2024-06-13 PROCEDURE — 80053 COMPREHEN METABOLIC PANEL: CPT

## 2024-06-13 PROCEDURE — 82378 CARCINOEMBRYONIC ANTIGEN: CPT

## 2024-06-13 PROCEDURE — 85025 COMPLETE CBC W/AUTO DIFF WBC: CPT

## 2024-06-13 NOTE — PROGRESS NOTES
Alexandro Luo  tolerated treatment well with no complications.      Alexandro Luo is aware of future appt on 8/14 at 0930.     AVS printed and given to Alexandro Luo:  No (Declined by Alexandro Luo)

## 2024-06-27 ENCOUNTER — TELEPHONE (OUTPATIENT)
Age: 44
End: 2024-06-27

## 2024-07-18 NOTE — PROGRESS NOTES
Colon and Rectal Surgery   Alexandro Luo 44 y.o. male MRN [id number]  Encounter: [id number]  07/22/24 1:45 PM            Assessment: Alexandro Luo is a 44 y.o. male who had rectal cancer.      Plan:   Rectal cancer (HCC)  The patient is doing very well from a rectal cancer standpoint.  His anastomosis is unremarkable on examination.  The abdomen is benign and without masses.     A CEA is normal on 6/13. Colonoscopy is recommended now, with me or Dr. Flores. I will see him in 6 months.      Subjective     HPI    Alexandro Luo is a 44 y.o. male who is for follow up to rectal cancer/ ileostomy closure. Last office visit was 2/19/24      Last colonoscopy done 2/16/23 with a 1 year recall.    Lab Results   Component Value Date    CEA 1.0 06/13/2024     Lab Results   Component Value Date    WBC 2.86 (L) 06/13/2024    HGB 12.3 06/13/2024    HCT 35.9 (L) 06/13/2024    MCV 89 06/13/2024    PLT 91 (L) 06/13/2024     Lab Results   Component Value Date    SODIUM 138 06/13/2024    K 3.7 06/13/2024     06/13/2024    CO2 23 06/13/2024    AGAP 8 06/13/2024    BUN 10 06/13/2024    CREATININE 0.72 06/13/2024    GLUC 126 06/13/2024    GLUF 121 (H) 03/06/2023    CALCIUM 8.8 06/13/2024    AST 20 06/13/2024    ALT 15 06/13/2024    ALKPHOS 52 06/13/2024    TP 6.6 06/13/2024    TBILI 0.64 06/13/2024    EGFR 112 06/13/2024     Last colonoscopy done 2/16/23 with a 1 year recall.    Historical Information   Past Medical History:   Diagnosis Date    Cirrhosis (HCC) 3/18/24    Colon cancer (HCC)     Dental infection     Fatty liver     Rectal cancer (HCC)      Past Surgical History:   Procedure Laterality Date    APPENDECTOMY      COLONOSCOPY      DENTAL SURGERY      IR PORT PLACEMENT  02/28/2023    AK CLOSURE ENTEROSTOMY LG/SMALL INTESTINE N/A 1/16/2024    Procedure: CLOSURE ILEOSTOMY;  Surgeon: CLEMENTE Lugo MD;  [facility];  Service: Colorectal    AK LAPS PROCTECTOMY COMBINED PULL-THRU W/RESERVOIR N/A 10/17/2023     Procedure: LAPAROSCOPIC HAND ASSIST PROCTECTOMY, ABDOMINAL APPROACH, coloanal anastomosis, loop ileostomy;  Surgeon: CLEMENTE Lugo MD;  [facility];  Service: Colorectal    CO SIGMOIDOSCOPY FLX DX W/COLLJ SPEC BR/WA IF PFRMD N/A 10/17/2023    Procedure: SIGMOIDOSCOPY FLEXIBLE;  Surgeon: CLEMENTE Lugo MD;  [facility];  Service: Colorectal    UPPER GASTROINTESTINAL ENDOSCOPY         Meds/Allergies       Current Outpatient Medications:     carvedilol (COREG) 3.125 mg tablet, Take 1 tablet (3.125 mg total) by mouth 2 (two) times a day with meals (Patient not taking: Reported on 2024), Disp: 60 tablet, Rfl: 11    gabapentin (NEURONTIN) 100 mg capsule, Take 1 capsule (100 mg total) by mouth 3 (three) times a day for 7 days (Patient not taking: Reported on 2024), Disp: 21 capsule, Rfl: 0    methocarbamol (ROBAXIN) 500 mg tablet, Take 1 tablet (500 mg total) by mouth every 8 (eight) hours for 7 days (Patient not taking: Reported on 2024), Disp: 21 tablet, Rfl: 0    ondansetron (ZOFRAN) 8 mg tablet, Take 1 tablet (8 mg total) by mouth every 8 (eight) hours as needed for nausea or vomiting, Disp: 30 tablet, Rfl: 0    pyridoxine (VITAMIN B6) 50 mg tablet, Take 50 mg by mouth daily (Patient not taking: Reported on 2024), Disp: , Rfl:   No Known Allergies    Social History   Social History     Substance and Sexual Activity   Drug Use Not Currently    Frequency: 5.0 times per week    Types: Marijuana    Comment: medical marijuana     Social History     Tobacco Use   Smoking Status Former    Current packs/day: 0.00    Average packs/day: 1 pack/day for 20.0 years (20.0 ttl pk-yrs)    Types: Cigarettes    Start date: 2000    Quit date: 2020    Years since quittin.5   Smokeless Tobacco Never         Family History   Problem Relation Age of Onset    Lung cancer Mother     Cancer Mother         Lung    Lung cancer Father     Cancer Father         Mouth throat    Colon cancer  "Neg Hx     Colon polyps Neg Hx          Review of Systems    Objective   Current Vitals:  Vitals:    07/22/24 1329   Weight: 82.6 kg (182 lb)   Height: 6' 3\" (1.905 m)         Physical Exam  Constitutional:       Appearance: Normal appearance.   Cardiovascular:      Rate and Rhythm: Normal rate and regular rhythm.   Pulmonary:      Effort: Pulmonary effort is normal.      Breath sounds: Normal breath sounds.   Abdominal:      General: Abdomen is flat.      Palpations: Abdomen is soft.   Genitourinary:     Comments: Normal anastsomosis at 4 cm from the anal verge. Supple.  Neurological:      General: No focal deficit present.      Mental Status: He is alert and oriented to person, place, and time.   Psychiatric:         Mood and Affect: Mood normal.         Behavior: Behavior normal.               "

## 2024-07-22 ENCOUNTER — OFFICE VISIT (OUTPATIENT)
Age: 44
End: 2024-07-22
Payer: COMMERCIAL

## 2024-07-22 VITALS — WEIGHT: 182 LBS | BODY MASS INDEX: 22.63 KG/M2 | HEIGHT: 75 IN

## 2024-07-22 VITALS
RESPIRATION RATE: 18 BRPM | SYSTOLIC BLOOD PRESSURE: 104 MMHG | HEIGHT: 75 IN | WEIGHT: 183.6 LBS | OXYGEN SATURATION: 97 % | TEMPERATURE: 98.7 F | BODY MASS INDEX: 22.83 KG/M2 | DIASTOLIC BLOOD PRESSURE: 63 MMHG | HEART RATE: 73 BPM

## 2024-07-22 DIAGNOSIS — C20 RECTAL CANCER (HCC): Primary | ICD-10-CM

## 2024-07-22 DIAGNOSIS — R53.83 OTHER FATIGUE: ICD-10-CM

## 2024-07-22 PROCEDURE — 99214 OFFICE O/P EST MOD 30 MIN: CPT | Performed by: INTERNAL MEDICINE

## 2024-07-22 PROCEDURE — 99214 OFFICE O/P EST MOD 30 MIN: CPT | Performed by: COLON & RECTAL SURGERY

## 2024-07-22 NOTE — ASSESSMENT & PLAN NOTE
The patient is doing very well from a rectal cancer standpoint.  His anastomosis is unremarkable on examination.  The abdomen is benign and without masses.     A CEA is normal on 6/13. Colonoscopy is recommended now, with me or Dr. Flores. I will see him in 6 months.

## 2024-07-25 NOTE — PROGRESS NOTES
HEMATOLOGY / ONCOLOGY CLINIC FOLLOW UP NOTE    Primary Care Provider: Ana M Cruz DO  Referring Provider:    MRN: [id number]  : 1980    Reason for Encounter: follow up rectal cancer       Oncology History Overview Note   2023 - [id number] rectal adenocarcinoma     3/9/2023 - start neoadjuvant FOLFOX     2023 - start 5-FU/RT     2023 - 5-FU held due thrombocytopenia     10/17/2023 - LAPAROSCOPIC HAND ASSIST PROCTECTOMY. ABDOMINAL APPROACH. coloanal anastomosis (very low pull through). loop ileostomy     pT3N1b     Rectal cancer (HCC)   2023 Initial Diagnosis    Rectal cancer (HCC)     2023 Biopsy    Final Diagnosis   A. Large Intestine, Descending Colon, polyp:    - Tubular adenoma.     - Negative for high grade dysplasia.      B. Rectum, mass:   -  Adenocarcinoma. See comment.     Comment: Immunohistochemical stains performed with adequate controls demonstrates that the tumor is positive for CDX2, SATB2, CK20 (patchy), and negative for CK7, Synaptophysin, Chromogranin A, and p40. The immunophenotype supports the diagnosis. Ancillary testing for mismatch repair (MMR) protein deficiency by immunohistochemical panel (MLH1, PMS2, MSH2, MSH6) is undertaken (Block B1); the results will be issued in a supplemental report, to follow shortly     Addendum   RESULTS OF IMMUNOHISTOCHEMICAL ANALYSIS FOR MISMATCH REPAIR PROTEIN LOSS     INTERPRETATION: NO LOSS OF NUCLEAR EXPRESSION OF MMR PROTEINS: LOW PROBABILITY OF MSI-H.     RESULTS:  Antibody            Clone                 Description                                  Results  MLH1                 [id number]          Mismatch repair protein               Intact nuclear expression  MSH2                [id number]        Mismatch repair protein               Intact nuclear expression  MSH6                44                      Mismatch repair protein               Intact nuclear expression  PMS2                 [id number]           Mismatch repair  protein               Intact nuclear expression     Comment:   A negative control and a positive control for each antibody have been reviewed and accepted        3/2/2023 -  Cancer Staged    Staging form: Colon and Rectum, AJCC 8th Edition  - Clinical: cT3, cN2, cM0 - Signed by Sarina Rai DO on 3/2/2023       3/9/2023 - 6/2/2023 Chemotherapy    alteplase (CATHFLO), 2 mg, Intracatheter, Every 1 Minute as needed, 6 of 10 cycles  palonosetron (ALOXI), 0.25 mg, Intravenous, Once, 1 of 2 cycles  Administration: 0.25 mg (5/31/2023)  pegfilgrastim (NEULASTA), 6 mg, Subcutaneous, Once, 1 of 1 cycle  Pegfilgrastim-bmez (ZIEXTENZO), 6 mg, Subcutaneous, Once, 4 of 8 cycles  Administration: 6 mg (4/21/2023), 6 mg (5/6/2023), 6 mg (5/22/2023), 6 mg (6/2/2023)  fluorouracil (ADRUCIL), 400 mg/m2 = 915 mg, Intravenous, Once, 6 of 10 cycles  Administration: 915 mg (3/9/2023), 915 mg (3/22/2023), 915 mg (4/19/2023), 915 mg (5/4/2023), 915 mg (5/17/2023), 915 mg (5/31/2023)  leucovorin calcium IVPB, 916 mg, Intravenous, Once, 6 of 10 cycles  Administration: 900 mg (3/9/2023), 900 mg (3/22/2023), 900 mg (4/19/2023), 900 mg (5/4/2023), 900 mg (5/17/2023), 900 mg (5/31/2023)  oxaliplatin (ELOXATIN) chemo infusion, 194.65 mg, Intravenous, Once, 6 of 10 cycles  Administration: 200 mg (3/9/2023), 200 mg (3/22/2023), 200 mg (4/19/2023), 200 mg (5/4/2023), 200 mg (5/17/2023), 200 mg (5/31/2023)  fluorouracil (ADRUCIL) ambulatory infusion Soln, 1,200 mg/m2/day = 5,495 mg, Intravenous, Over 46 hours, 6 of 10 cycles  aprepitant (CINVANTI) in  mL IVPB, 130 mg, Intravenous, Once, 1 of 2 cycles  Administration: 130 mg (5/31/2023)     6/21/2023 - 8/1/2023 Radiation      Plan ID Energy Fractions Dose per Fraction (cGy) Dose Correction (cGy) Total Dose Delivered (cGy) Elapsed Days   CD Rectum 6X 3 / 3 180 0 540 4   Whole Pelvis 6X 25 / 25 180 0 4,500 36        6/26/2023 - 7/28/2023 Chemotherapy    alteplase (CATHFLO), 2 mg, Intracatheter,  Every 1 Minute as needed, 4 of 4 cycles  fluorouracil (ADRUCIL) ambulatory infusion Soln, 200 mg/m2/day = 2,190 mg (88.9 % of original dose 225 mg/m2/day), Intravenous, Over 120 hours, 4 of 4 cycles  Dose modification: 200 mg/m2/day (original dose 225 mg/m2/day, Cycle 1, Reason: Anticipated Tolerance)     10/17/2023 Surgery    Laparoscopic hand-assisted proctectomy with coloanal anastomosis and loop ileostomy    Final Diagnosis  A. Rectum, sigmoid colon, and two donuts, low anterior resection:  -   Invasive adenocarcinoma of rectum, moderately differentiated, with treatment effect.  -   Separate segments of colon/rectum (anastomotic donuts), negative for malignancy.  -   See synoptic report.  -   MMR (MLH1, MSH2, MSH6 and PMS2) studies by IHC on biopsy specimen [id number] show intact protein expression.     10/17/2023 -  Cancer Staged    Staging form: Colon and Rectum, AJCC 8th Edition  - Pathologic stage from 10/17/2023: Stage IIIB (ypT3, pN1b, cM0) - Signed by Joe Barboza MD on 11/8/2023  Stage prefix: Post-therapy  Response to neoadjuvant therapy: Partial response       1/16/2024 Surgery    1/16/24 Closure ileostomy  A. Ileostomy stoma, ileostomy reversal:  - Enterocutaneous stoma, consistent with ileostomy site.  - Negative for dysplasia and malignancy         Interval History: Patient presents for follow up of his rectal cancer.  I brought him in a little earlier because he needed recertification of his medical marijuana.  He uses this for the residual pain, including neuropathic pain, after his cancer treatment.  He has had his ostomy reversed.  He still does have some alternating diarrhea and constipation.  His weight is stable.  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  He also feels lightheaded and dizzy at times.  He is trying to maintain his hydration in the heat.  He is also plagued by some severe fatigue after his cancer treatment.  We have  not removed his port yet. he has a scan planned on August 7.         REVIEW OF SYSTEMS:  Please note that a 14-point review of systems was performed to include Constitutional, HEENT, Respiratory, CVS, GI, , Musculoskeletal, Integumentary, Neurologic, Rheumatologic, Endocrinologic, Psychiatric, Lymphatic, and Hematologic/Oncologic systems were reviewed and are negative unless otherwise stated in HPI. Positive and negative findings pertinent to this evaluation are incorporated into the history of present illness.      ECOG PS: 0    PROBLEM LIST:  Patient Active Problem List   Diagnosis    Rectal wall thickening    Transaminitis/Hepatitic Steatosis    Rectal cancer (HCC)    Chemotherapy-induced neutropenia (HCC)    Port-A-Cath in place    Cirrhosis (HCC)       Assessment / Plan: 44-year-old male status post total neoadjuvant therapy for rectal cancer.  Ostomy has been reversed.  He is applying for state disability which I feel is appropriate.  It is difficult to work because of his bowel issues after surgery and the neuropathic pain in his hands and feet after exposure to oxaliplatin.  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  I am renewing his medical marijuana which he uses for that pain.  I will call him when I get the CAT scan result.  If the CAT scan is negative for relapse we will remove his port.  I also will add a TSH on his next set of labs because of the lightheaded and dizziness.  I will plan on seeing him back in 6 months with another CT scan.  He knows to call me in the interim with any questions or concerns.       I spent 30 minutes on chart review, face to face counseling time, coordination of care and documentation.    Past Medical History:   has a past medical history of Cirrhosis (HCC) (3/18/24), Colon cancer (HCC), Dental infection, Fatty liver, and Rectal cancer (HCC).    PAST SURGICAL HISTORY:   has a past surgical history  that includes Appendectomy; Dental surgery; IR port placement (02/28/2023); Colonoscopy; Upper gastrointestinal endoscopy; pr laps proctectomy combined pull-thru w/reservoir (N/A, 10/17/2023); pr sigmoidoscopy flx dx w/collj spec br/wa if pfrmd (N/A, 10/17/2023); and pr closure enterostomy lg/small intestine (N/A, 1/16/2024).    CURRENT MEDICATIONS  Current Outpatient Medications   Medication Sig Dispense Refill    ondansetron (ZOFRAN) 8 mg tablet Take 1 tablet (8 mg total) by mouth every 8 (eight) hours as needed for nausea or vomiting 30 tablet 0    carvedilol (COREG) 3.125 mg tablet Take 1 tablet (3.125 mg total) by mouth 2 (two) times a day with meals (Patient not taking: Reported on 7/22/2024) 60 tablet 11    gabapentin (NEURONTIN) 100 mg capsule Take 1 capsule (100 mg total) by mouth 3 (three) times a day for 7 days (Patient not taking: Reported on 2/19/2024) 21 capsule 0    methocarbamol (ROBAXIN) 500 mg tablet Take 1 tablet (500 mg total) by mouth every 8 (eight) hours for 7 days (Patient not taking: Reported on 2/19/2024) 21 tablet 0    pyridoxine (VITAMIN B6) 50 mg tablet Take 50 mg by mouth daily (Patient not taking: Reported on 2/19/2024)       No current facility-administered medications for this visit.     [unfilled]    SOCIAL HISTORY:   reports that he quit smoking about 4 years ago. His smoking use included cigarettes. He started smoking about 24 years ago. He has a 20 pack-year smoking history. He has never used smokeless tobacco. He reports that he does not currently use alcohol after a past usage of about 2.0 standard drinks of alcohol per week. He reports that he does not currently use drugs after having used the following drugs: Marijuana. Frequency: 5.00 times per week.     FAMILY HISTORY:  family history includes Cancer in his father and mother; Lung cancer in his father and mother.     ALLERGIES:  has No Known Allergies.      Physical Exam:  Vital Signs:   Visit Vitals  /63 (BP Location:  "Left arm, Patient Position: Sitting, Cuff Size: Standard)   Pulse 73   Temp 98.7 °F (37.1 °C) (Temporal)   Resp 18   Ht 6' 3\" (1.905 m)   Wt 83.3 kg (183 lb 9.6 oz)   SpO2 97%   BMI 22.95 kg/m²   Smoking Status Former   BSA 2.12 m²     Body mass index is 22.95 kg/m².  Body surface area is 2.12 meters squared.    GEN: Alert, awake oriented x3, in no acute distress  HEENT- No pallor, icterus, cyanosis, no oral mucosal lesions,   LAD - no palpable cervical, clavicle, axillary, inguinal LAD  Heart- normal S1 S2, regular rate and rhythm, No murmur, rubs.   Lungs- clear breathing sound bilateral.   Abdomen- soft, Non tender, bowel sounds present  Extremities- No cyanosis, clubbing, edema  Neuro- No focal neurological deficit    Labs:  Lab Results   Component Value Date    WBC 2.86 (L) 06/13/2024    HGB 12.3 06/13/2024    HCT 35.9 (L) 06/13/2024    MCV 89 06/13/2024    PLT 91 (L) 06/13/2024     Lab Results   Component Value Date    SODIUM 138 06/13/2024    K 3.7 06/13/2024     06/13/2024    CO2 23 06/13/2024    AGAP 8 06/13/2024    BUN 10 06/13/2024    CREATININE 0.72 06/13/2024    GLUC 126 06/13/2024    GLUF 121 (H) 03/06/2023    CALCIUM 8.8 06/13/2024    AST 20 06/13/2024    ALT 15 06/13/2024    ALKPHOS 52 06/13/2024    TP 6.6 06/13/2024    TBILI 0.64 06/13/2024    EGFR 112 06/13/2024       "

## 2024-08-07 ENCOUNTER — TELEPHONE (OUTPATIENT)
Age: 44
End: 2024-08-07

## 2024-08-07 ENCOUNTER — HOSPITAL ENCOUNTER (OUTPATIENT)
Dept: CT IMAGING | Facility: HOSPITAL | Age: 44
Discharge: HOME | End: 2024-08-07
Attending: INTERNAL MEDICINE
Payer: COMMERCIAL

## 2024-08-07 DIAGNOSIS — C20 RECTAL CANCER (HCC): ICD-10-CM

## 2024-08-07 PROCEDURE — 71260 CT THORAX DX C+: CPT

## 2024-08-07 PROCEDURE — 74177 CT ABD & PELVIS W/CONTRAST: CPT

## 2024-08-07 PROCEDURE — G1004 CDSM NDSC: HCPCS

## 2024-08-07 RX ADMIN — IOHEXOL 50 ML: 350 INJECTION, SOLUTION INTRAVENOUS at 10:45

## 2024-08-07 NOTE — TELEPHONE ENCOUNTER
Called patient to advise him he only has to come in to office in Jan per Paulina.  Patient questioned why he had a CT done today if he is not coming in for visit.  I explained I would reach out to Paulina and follow up with him.  Spoke to Paulina and she will reach out to him.

## 2024-08-12 ENCOUNTER — TELEPHONE (OUTPATIENT)
Dept: HEMATOLOGY ONCOLOGY | Facility: CLINIC | Age: 44
End: 2024-08-12

## 2024-08-12 NOTE — TELEPHONE ENCOUNTER
Spoke with patient to let him know that Dr. Rai reviewed his CT scan and there is no evidence of cancer recurrence. I informed him that she will see him in January with a CT prior. Patient verbalized understanding,

## 2024-08-12 NOTE — TELEPHONE ENCOUNTER
----- Message from Sarina Rai DO sent at 8/11/2024  6:48 AM EDT -----  Please let patient know that his CT scan looked good.  No evidence of cancer recurrence.  Thanks!  ----- Message -----  From: [facility] Results In  Sent: 8/8/2024   9:28 PM EDT  To: Sarina Rai DO

## 2024-08-14 ENCOUNTER — HOSPITAL ENCOUNTER (OUTPATIENT)
Dept: INFUSION CENTER | Facility: HOSPITAL | Age: 44
Discharge: HOME | End: 2024-08-14
Payer: COMMERCIAL

## 2024-08-14 DIAGNOSIS — Z95.828 PORT-A-CATH IN PLACE: Primary | ICD-10-CM

## 2024-08-14 DIAGNOSIS — C20 RECTAL CANCER (HCC): ICD-10-CM

## 2024-08-14 DIAGNOSIS — R53.83 OTHER FATIGUE: ICD-10-CM

## 2024-08-14 LAB
ALBUMIN SERPL BCG-MCNC: 4.1 G/DL (ref 3.5–5)
ALP SERPL-CCNC: 45 U/L (ref 34–104)
ALT SERPL W P-5'-P-CCNC: 20 U/L (ref 7–52)
ANION GAP SERPL CALCULATED.3IONS-SCNC: 6 MMOL/L (ref 4–13)
AST SERPL W P-5'-P-CCNC: 27 U/L (ref 13–39)
BASOPHILS # BLD AUTO: 0.02 THOUSANDS/ÂΜL (ref 0–0.1)
BASOPHILS NFR BLD AUTO: 1 % (ref 0–1)
BILIRUB SERPL-MCNC: 0.85 MG/DL (ref 0.2–1)
BUN SERPL-MCNC: 14 MG/DL (ref 5–25)
CALCIUM SERPL-MCNC: 8.7 MG/DL (ref 8.4–10.2)
CEA SERPL-MCNC: 1 NG/ML (ref 0–3)
CHLORIDE SERPL-SCNC: 111 MMOL/L (ref 96–108)
CO2 SERPL-SCNC: 23 MMOL/L (ref 21–32)
CREAT SERPL-MCNC: 0.63 MG/DL (ref 0.6–1.3)
EOSINOPHIL # BLD AUTO: 0.14 THOUSAND/ÂΜL (ref 0–0.61)
EOSINOPHIL NFR BLD AUTO: 4 % (ref 0–6)
ERYTHROCYTE [DISTWIDTH] IN BLOOD BY AUTOMATED COUNT: 13.2 % (ref 11.6–15.1)
GFR SERPL CREATININE-BSD FRML MDRD: 119 ML/MIN/1.73SQ M
GLUCOSE SERPL-MCNC: 101 MG/DL (ref 65–140)
HCT VFR BLD AUTO: 40 % (ref 36.5–49.3)
HGB BLD-MCNC: 14 G/DL (ref 12–17)
IMM GRANULOCYTES # BLD AUTO: 0.02 THOUSAND/UL (ref 0–0.2)
IMM GRANULOCYTES NFR BLD AUTO: 1 % (ref 0–2)
LYMPHOCYTES # BLD AUTO: 0.84 THOUSANDS/ÂΜL (ref 0.6–4.47)
LYMPHOCYTES NFR BLD AUTO: 24 % (ref 14–44)
MCH RBC QN AUTO: 31.3 PG (ref 26.8–34.3)
MCHC RBC AUTO-ENTMCNC: 35 G/DL (ref 31.4–37.4)
MCV RBC AUTO: 89 FL (ref 82–98)
MONOCYTES # BLD AUTO: 0.31 THOUSAND/ÂΜL (ref 0.17–1.22)
MONOCYTES NFR BLD AUTO: 9 % (ref 4–12)
NEUTROPHILS # BLD AUTO: 2.14 THOUSANDS/ÂΜL (ref 1.85–7.62)
NEUTS SEG NFR BLD AUTO: 61 % (ref 43–75)
NRBC BLD AUTO-RTO: 0 /100 WBCS
PLATELET # BLD AUTO: 109 THOUSANDS/UL (ref 149–390)
PMV BLD AUTO: 9.5 FL (ref 8.9–12.7)
POTASSIUM SERPL-SCNC: 3.9 MMOL/L (ref 3.5–5.3)
PROT SERPL-MCNC: 6.7 G/DL (ref 6.4–8.4)
RBC # BLD AUTO: 4.48 MILLION/UL (ref 3.88–5.62)
SODIUM SERPL-SCNC: 140 MMOL/L (ref 135–147)
TSH SERPL DL<=0.05 MIU/L-ACNC: 1.46 UIU/ML (ref 0.45–4.5)
WBC # BLD AUTO: 3.47 THOUSAND/UL (ref 4.31–10.16)

## 2024-08-14 PROCEDURE — 80053 COMPREHEN METABOLIC PANEL: CPT

## 2024-08-14 PROCEDURE — 85025 COMPLETE CBC W/AUTO DIFF WBC: CPT

## 2024-08-14 PROCEDURE — 84443 ASSAY THYROID STIM HORMONE: CPT

## 2024-08-14 PROCEDURE — 82378 CARCINOEMBRYONIC ANTIGEN: CPT

## 2024-09-16 ENCOUNTER — TELEPHONE (OUTPATIENT)
Age: 44
End: 2024-09-16

## 2024-09-16 NOTE — TELEPHONE ENCOUNTER
Ana Rosa from [facility] called in to obtain an updated on medical records request. Ana Rosa asked for the mro fax number and telephone and both numbers were provided.

## 2024-10-09 ENCOUNTER — HOSPITAL ENCOUNTER (OUTPATIENT)
Dept: INFUSION CENTER | Facility: HOSPITAL | Age: 44
Discharge: HOME | End: 2024-10-09
Payer: COMMERCIAL

## 2024-10-09 DIAGNOSIS — Z95.828 PORT-A-CATH IN PLACE: Primary | ICD-10-CM

## 2024-10-09 DIAGNOSIS — C20 RECTAL CANCER (HCC): ICD-10-CM

## 2024-10-09 LAB
ALBUMIN SERPL BCG-MCNC: 4.1 G/DL (ref 3.5–5)
ALP SERPL-CCNC: 39 U/L (ref 34–104)
ALT SERPL W P-5'-P-CCNC: 22 U/L (ref 7–52)
ANION GAP SERPL CALCULATED.3IONS-SCNC: 6 MMOL/L (ref 4–13)
AST SERPL W P-5'-P-CCNC: 29 U/L (ref 13–39)
BASOPHILS # BLD AUTO: 0.02 THOUSANDS/ΜL (ref 0–0.1)
BASOPHILS NFR BLD AUTO: 1 % (ref 0–1)
BILIRUB SERPL-MCNC: 0.86 MG/DL (ref 0.2–1)
BUN SERPL-MCNC: 7 MG/DL (ref 5–25)
CALCIUM SERPL-MCNC: 8.6 MG/DL (ref 8.4–10.2)
CEA SERPL-MCNC: 1.1 NG/ML (ref 0–3)
CHLORIDE SERPL-SCNC: 110 MMOL/L (ref 96–108)
CO2 SERPL-SCNC: 26 MMOL/L (ref 21–32)
CREAT SERPL-MCNC: 0.65 MG/DL (ref 0.6–1.3)
EOSINOPHIL # BLD AUTO: 0.13 THOUSAND/ΜL (ref 0–0.61)
EOSINOPHIL NFR BLD AUTO: 4 % (ref 0–6)
ERYTHROCYTE [DISTWIDTH] IN BLOOD BY AUTOMATED COUNT: 13.2 % (ref 11.6–15.1)
GFR SERPL CREATININE-BSD FRML MDRD: 117 ML/MIN/1.73SQ M
GLUCOSE SERPL-MCNC: 95 MG/DL (ref 65–140)
HCT VFR BLD AUTO: 38.1 % (ref 36.5–49.3)
HGB BLD-MCNC: 13.3 G/DL (ref 12–17)
IMM GRANULOCYTES # BLD AUTO: 0.02 THOUSAND/UL (ref 0–0.2)
IMM GRANULOCYTES NFR BLD AUTO: 1 % (ref 0–2)
LYMPHOCYTES # BLD AUTO: 0.56 THOUSANDS/ΜL (ref 0.6–4.47)
LYMPHOCYTES NFR BLD AUTO: 17 % (ref 14–44)
MCH RBC QN AUTO: 31.4 PG (ref 26.8–34.3)
MCHC RBC AUTO-ENTMCNC: 34.9 G/DL (ref 31.4–37.4)
MCV RBC AUTO: 90 FL (ref 82–98)
MONOCYTES # BLD AUTO: 0.29 THOUSAND/ΜL (ref 0.17–1.22)
MONOCYTES NFR BLD AUTO: 9 % (ref 4–12)
NEUTROPHILS # BLD AUTO: 2.34 THOUSANDS/ΜL (ref 1.85–7.62)
NEUTS SEG NFR BLD AUTO: 68 % (ref 43–75)
NRBC BLD AUTO-RTO: 0 /100 WBCS
PLATELET # BLD AUTO: 106 THOUSANDS/UL (ref 149–390)
PMV BLD AUTO: 9.6 FL (ref 8.9–12.7)
POTASSIUM SERPL-SCNC: 3.9 MMOL/L (ref 3.5–5.3)
PROT SERPL-MCNC: 6.6 G/DL (ref 6.4–8.4)
RBC # BLD AUTO: 4.24 MILLION/UL (ref 3.88–5.62)
SODIUM SERPL-SCNC: 142 MMOL/L (ref 135–147)
WBC # BLD AUTO: 3.36 THOUSAND/UL (ref 4.31–10.16)

## 2024-10-09 PROCEDURE — 82378 CARCINOEMBRYONIC ANTIGEN: CPT

## 2024-10-09 PROCEDURE — 80053 COMPREHEN METABOLIC PANEL: CPT

## 2024-10-09 PROCEDURE — 85025 COMPLETE CBC W/AUTO DIFF WBC: CPT

## 2024-10-09 NOTE — PROGRESS NOTES
Alexandro Luo  tolerated treatment well with no complications.      Alexandro Luo has no future port flush appointments. Asked to stop at  to make appointments. No other needs at this time.     AVS printed and given to Alexandro Luo:    No (Declined by Alexandro Luo)

## 2024-10-09 NOTE — PLAN OF CARE
Problem: Knowledge Deficit  Goal: Patient/family/caregiver demonstrates understanding of disease process, treatment plan, medications, and discharge instructions  Description: Complete learning assessment and assess knowledge base.  Interventions:  - Provide teaching at level of understanding  - Provide teaching via preferred learning methods  Outcome: Not Progressing

## 2024-12-04 ENCOUNTER — HOSPITAL ENCOUNTER (OUTPATIENT)
Dept: INFUSION CENTER | Facility: HOSPITAL | Age: 44
Discharge: HOME | End: 2024-12-04
Payer: COMMERCIAL

## 2024-12-04 DIAGNOSIS — Z95.828 PORT-A-CATH IN PLACE: Primary | ICD-10-CM

## 2024-12-04 DIAGNOSIS — C20 RECTAL CANCER (HCC): ICD-10-CM

## 2024-12-04 LAB
ALBUMIN SERPL BCG-MCNC: 4.2 G/DL (ref 3.5–5)
ALP SERPL-CCNC: 44 U/L (ref 34–104)
ALT SERPL W P-5'-P-CCNC: 20 U/L (ref 7–52)
ANION GAP SERPL CALCULATED.3IONS-SCNC: 7 MMOL/L (ref 4–13)
AST SERPL W P-5'-P-CCNC: 23 U/L (ref 13–39)
BASOPHILS # BLD AUTO: 0.02 THOUSANDS/ΜL (ref 0–0.1)
BASOPHILS NFR BLD AUTO: 0 % (ref 0–1)
BILIRUB SERPL-MCNC: 0.64 MG/DL (ref 0.2–1)
BUN SERPL-MCNC: 12 MG/DL (ref 5–25)
CALCIUM SERPL-MCNC: 8.9 MG/DL (ref 8.4–10.2)
CEA SERPL-MCNC: 1.1 NG/ML (ref 0–3)
CHLORIDE SERPL-SCNC: 107 MMOL/L (ref 96–108)
CO2 SERPL-SCNC: 25 MMOL/L (ref 21–32)
CREAT SERPL-MCNC: 0.63 MG/DL (ref 0.6–1.3)
EOSINOPHIL # BLD AUTO: 0.13 THOUSAND/ΜL (ref 0–0.61)
EOSINOPHIL NFR BLD AUTO: 2 % (ref 0–6)
ERYTHROCYTE [DISTWIDTH] IN BLOOD BY AUTOMATED COUNT: 13.2 % (ref 11.6–15.1)
GFR SERPL CREATININE-BSD FRML MDRD: 119 ML/MIN/1.73SQ M
GLUCOSE SERPL-MCNC: 101 MG/DL (ref 65–140)
HCT VFR BLD AUTO: 39.7 % (ref 36.5–49.3)
HGB BLD-MCNC: 13.5 G/DL (ref 12–17)
IMM GRANULOCYTES # BLD AUTO: 0.03 THOUSAND/UL (ref 0–0.2)
IMM GRANULOCYTES NFR BLD AUTO: 1 % (ref 0–2)
LYMPHOCYTES # BLD AUTO: 0.85 THOUSANDS/ΜL (ref 0.6–4.47)
LYMPHOCYTES NFR BLD AUTO: 15 % (ref 14–44)
MCH RBC QN AUTO: 30.8 PG (ref 26.8–34.3)
MCHC RBC AUTO-ENTMCNC: 34 G/DL (ref 31.4–37.4)
MCV RBC AUTO: 91 FL (ref 82–98)
MONOCYTES # BLD AUTO: 0.54 THOUSAND/ΜL (ref 0.17–1.22)
MONOCYTES NFR BLD AUTO: 9 % (ref 4–12)
NEUTROPHILS # BLD AUTO: 4.18 THOUSANDS/ΜL (ref 1.85–7.62)
NEUTS SEG NFR BLD AUTO: 73 % (ref 43–75)
NRBC BLD AUTO-RTO: 0 /100 WBCS
PLATELET # BLD AUTO: 128 THOUSANDS/UL (ref 149–390)
PMV BLD AUTO: 9.7 FL (ref 8.9–12.7)
POTASSIUM SERPL-SCNC: 4.1 MMOL/L (ref 3.5–5.3)
PROT SERPL-MCNC: 6.8 G/DL (ref 6.4–8.4)
RBC # BLD AUTO: 4.38 MILLION/UL (ref 3.88–5.62)
SODIUM SERPL-SCNC: 139 MMOL/L (ref 135–147)
WBC # BLD AUTO: 5.75 THOUSAND/UL (ref 4.31–10.16)

## 2024-12-04 PROCEDURE — 80053 COMPREHEN METABOLIC PANEL: CPT

## 2024-12-04 PROCEDURE — 85025 COMPLETE CBC W/AUTO DIFF WBC: CPT

## 2024-12-04 PROCEDURE — 82378 CARCINOEMBRYONIC ANTIGEN: CPT

## 2024-12-04 NOTE — PROGRESS NOTES
Alexandro Luo  tolerated port flush with labs well with no complications.      Alexandro Luo is aware of future appt on 1/20/25 at 1000.     AVS printed and given to Alexandro Luo:    No (Declined by Alexandro Luo)

## 2025-01-02 ENCOUNTER — TELEPHONE (OUTPATIENT)
Age: 45
End: 2025-01-02

## 2025-01-02 ENCOUNTER — PATIENT MESSAGE (OUTPATIENT)
Dept: HEMATOLOGY ONCOLOGY | Facility: CLINIC | Age: 45
End: 2025-01-02

## 2025-01-02 DIAGNOSIS — C20 RECTAL CANCER (HCC): Primary | ICD-10-CM

## 2025-01-02 DIAGNOSIS — R63.0 NO APPETITE: ICD-10-CM

## 2025-01-02 RX ORDER — MIRTAZAPINE 15 MG/1
15 TABLET, FILM COATED ORAL
Qty: 30 TABLET | Refills: 0 | Status: SHIPPED | OUTPATIENT
Start: 2025-01-02

## 2025-01-02 NOTE — TELEPHONE ENCOUNTER
Spoke with patient and reiterated how important it is to move forward with the CT scan. I also informed him that we can prescribe him Mirtazapine if he would like. Patient would like to think about this and will let me know.

## 2025-01-02 NOTE — TELEPHONE ENCOUNTER
Called patient in response to [employer] message. States he really just wanted to let Dr. Rai know so she is aware for his visit later this month. States he's noticed increasing weight loss over the last 2-3 weeks and is about 160-165lbs which is down from 175-178lbs where he was for a while. Reports his appetite is less, only eating dinner for the most part. He tries to eat breakfast but struggles and just doesn't have the appetite for it. He does report that nausea sometimes is a factor, enough to turn him off from eating but he doesn't like ondansetron, feels worse on it and usually the nausea only lasts a brief time so he doesn't take it. He doesn't think he drinks as much water as he should, but he is trying to increase that. He is urinating normally and his urine is normal clear to yellow. If he sees it get darker, he increases his fluids.     He reports some intermittent pain in lower lower right abdominal area below his old surgery incision, surgery was 1 year ago approximately. Reports this comes and goes and he will feel a sharp pain and then it resolves. Denies distention/swelling. Reports occasional bloating but this doesn't last long.When he has the pain it is 7/10. It has been happening on and off throughout the year but increased in frequency recently. He does occasionally have constipation and sometimes feels this may happen when that occurs but sometimes it happens when he's not constipated. States the constipation was an issue more so in the past but hasn't been a real concern recently. States he just wanted to make Dr. Rai aware and will proceed with his CT C/A/P on 1/6. Requests call back if there is anything she recommends or wants him to get checked out in the meantime.

## 2025-01-02 NOTE — TELEPHONE ENCOUNTER
I am going to order him 15 mg of Mirtazapine.  I think this is a better dose for him.  Should help with appetite, sleep, anxiety.  Please let him know it can take up to 2 weeks to be fully effective and he needs to take it at bedtime because it will make him sleepy.  He should call and let us know if he notices any side effects from the medication but this is typically well-tolerated

## 2025-01-02 NOTE — TELEPHONE ENCOUNTER
We really need to the scan to see what's going on.  Labs done on 12/4/2024 look good.  We could consider mirtazapine for appetite stimulation if he is interested.  But I think it is very important to get imaging first to understand what is going on

## 2025-01-06 ENCOUNTER — HOSPITAL ENCOUNTER (OUTPATIENT)
Dept: CT IMAGING | Facility: HOSPITAL | Age: 45
Discharge: HOME | End: 2025-01-06
Attending: INTERNAL MEDICINE
Payer: COMMERCIAL

## 2025-01-06 DIAGNOSIS — C20 RECTAL CANCER (HCC): ICD-10-CM

## 2025-01-06 PROCEDURE — 74177 CT ABD & PELVIS W/CONTRAST: CPT

## 2025-01-06 PROCEDURE — 71260 CT THORAX DX C+: CPT

## 2025-01-06 RX ADMIN — IOHEXOL 75 ML: 350 INJECTION, SOLUTION INTRAVENOUS at 09:39

## 2025-01-20 ENCOUNTER — HOSPITAL ENCOUNTER (OUTPATIENT)
Dept: INFUSION CENTER | Facility: HOSPITAL | Age: 45
Discharge: HOME | End: 2025-01-20
Payer: COMMERCIAL

## 2025-01-20 ENCOUNTER — OFFICE VISIT (OUTPATIENT)
Age: 45
End: 2025-01-20
Payer: COMMERCIAL

## 2025-01-20 VITALS
BODY MASS INDEX: 22.03 KG/M2 | HEIGHT: 75 IN | DIASTOLIC BLOOD PRESSURE: 72 MMHG | OXYGEN SATURATION: 98 % | RESPIRATION RATE: 16 BRPM | WEIGHT: 177.2 LBS | SYSTOLIC BLOOD PRESSURE: 104 MMHG | HEART RATE: 66 BPM | TEMPERATURE: 98.1 F

## 2025-01-20 DIAGNOSIS — Z95.828 PORT-A-CATH IN PLACE: Primary | ICD-10-CM

## 2025-01-20 DIAGNOSIS — C20 RECTAL CANCER (HCC): ICD-10-CM

## 2025-01-20 DIAGNOSIS — K70.30 ALCOHOLIC CIRRHOSIS OF LIVER WITHOUT ASCITES (HCC): Primary | ICD-10-CM

## 2025-01-20 LAB
ALBUMIN SERPL BCG-MCNC: 3.8 G/DL (ref 3.5–5)
ALP SERPL-CCNC: 38 U/L (ref 34–104)
ALT SERPL W P-5'-P-CCNC: 19 U/L (ref 7–52)
ANION GAP SERPL CALCULATED.3IONS-SCNC: 5 MMOL/L (ref 4–13)
AST SERPL W P-5'-P-CCNC: 21 U/L (ref 13–39)
BASOPHILS # BLD AUTO: 0.01 THOUSANDS/ΜL (ref 0–0.1)
BASOPHILS NFR BLD AUTO: 0 % (ref 0–1)
BILIRUB SERPL-MCNC: 0.41 MG/DL (ref 0.2–1)
BUN SERPL-MCNC: 10 MG/DL (ref 5–25)
CALCIUM SERPL-MCNC: 8.4 MG/DL (ref 8.4–10.2)
CEA SERPL-MCNC: 1.1 NG/ML (ref 0–3)
CHLORIDE SERPL-SCNC: 112 MMOL/L (ref 96–108)
CO2 SERPL-SCNC: 23 MMOL/L (ref 21–32)
CREAT SERPL-MCNC: 0.71 MG/DL (ref 0.6–1.3)
EOSINOPHIL # BLD AUTO: 0.09 THOUSAND/ΜL (ref 0–0.61)
EOSINOPHIL NFR BLD AUTO: 3 % (ref 0–6)
ERYTHROCYTE [DISTWIDTH] IN BLOOD BY AUTOMATED COUNT: 13.5 % (ref 11.6–15.1)
GFR SERPL CREATININE-BSD FRML MDRD: 113 ML/MIN/1.73SQ M
GLUCOSE SERPL-MCNC: 107 MG/DL (ref 65–140)
HCT VFR BLD AUTO: 37.1 % (ref 36.5–49.3)
HGB BLD-MCNC: 12.6 G/DL (ref 12–17)
IMM GRANULOCYTES # BLD AUTO: 0.01 THOUSAND/UL (ref 0–0.2)
IMM GRANULOCYTES NFR BLD AUTO: 0 % (ref 0–2)
LYMPHOCYTES # BLD AUTO: 0.5 THOUSANDS/ΜL (ref 0.6–4.47)
LYMPHOCYTES NFR BLD AUTO: 17 % (ref 14–44)
MCH RBC QN AUTO: 31.5 PG (ref 26.8–34.3)
MCHC RBC AUTO-ENTMCNC: 34 G/DL (ref 31.4–37.4)
MCV RBC AUTO: 93 FL (ref 82–98)
MONOCYTES # BLD AUTO: 0.25 THOUSAND/ΜL (ref 0.17–1.22)
MONOCYTES NFR BLD AUTO: 8 % (ref 4–12)
NEUTROPHILS # BLD AUTO: 2.1 THOUSANDS/ΜL (ref 1.85–7.62)
NEUTS SEG NFR BLD AUTO: 72 % (ref 43–75)
NRBC BLD AUTO-RTO: 0 /100 WBCS
PLATELET # BLD AUTO: 97 THOUSANDS/UL (ref 149–390)
PMV BLD AUTO: 10 FL (ref 8.9–12.7)
POTASSIUM SERPL-SCNC: 3.9 MMOL/L (ref 3.5–5.3)
PROT SERPL-MCNC: 6.2 G/DL (ref 6.4–8.4)
RBC # BLD AUTO: 4 MILLION/UL (ref 3.88–5.62)
SODIUM SERPL-SCNC: 140 MMOL/L (ref 135–147)
WBC # BLD AUTO: 2.96 THOUSAND/UL (ref 4.31–10.16)

## 2025-01-20 PROCEDURE — 85025 COMPLETE CBC W/AUTO DIFF WBC: CPT

## 2025-01-20 PROCEDURE — 80053 COMPREHEN METABOLIC PANEL: CPT

## 2025-01-20 PROCEDURE — 82378 CARCINOEMBRYONIC ANTIGEN: CPT

## 2025-01-20 PROCEDURE — 99213 OFFICE O/P EST LOW 20 MIN: CPT | Performed by: INTERNAL MEDICINE

## 2025-01-20 NOTE — PROGRESS NOTES
Alexandro Luo  tolerated treatment well with no complications.      Alexandro Luo is aware of future appt on 7/21/2025 at 0840 with Dr. Rai. Port removal scheduled.    AVS printed and given to Alexandro Luo:    No (Declined by Alexandro Luo)

## 2025-01-21 RX ORDER — SODIUM CHLORIDE 9 MG/ML
75 INJECTION, SOLUTION INTRAVENOUS CONTINUOUS
OUTPATIENT
Start: 2025-01-21

## 2025-01-21 NOTE — ASSESSMENT & PLAN NOTE
Orders:    CT chest abdomen pelvis w contrast; Future    Ambulatory Referral to Interventional Radiology; Future    CBC and differential; Future    Comprehensive metabolic panel; Future    CEA; Future

## 2025-01-21 NOTE — PROGRESS NOTES
Name: Alexandro Luo      : 1980      MRN: [id number]  Encounter Provider: Sarina Rai DO  Encounter Date: 2025   Encounter department: [facility]ATOLOG[facility]  :  Assessment & Plan  Rectal cancer (HCC)    Orders:    CT chest abdomen pelvis w contrast; Future    Ambulatory Referral to Interventional Radiology; Future    CBC and differential; Future    Comprehensive metabolic panel; Future    CEA; Future    44-year-old male with history of rectal cancer treated with total neoadjuvant therapy.  He remains in remission.  We will schedule port removal.  I will see her back in 6 months with a CT and a CBC CMP and CEA prior.  I will also have my office reach out to hepatology and I put in a new referral.  He needs management for his cirrhosis as well.  He knows to call me in the interim with any questions or concerns.    History of Present Illness   Chief Complaint   Patient presents with    Follow-up     44-year-old male with a history of rectal cancer treated with total neoadjuvant therapy.  CT prior to this visit shows ongoing remission.  Labs are normal.  He does have some occasional bloody stools.  He still has not heard from hepatology about a follow-up visit.  He denies any hemoptysis.  He has lost 6 pounds since last visit.  His appetite is decreased.  He did try mirtazapine for his appetite but it made him  excessively sleepy.    Oncology History   Cancer Staging   Rectal cancer (HCC)  Staging form: Colon and Rectum, AJCC 8th Edition  - Clinical: cT3, cN2, cM0 - Signed by Sarina Rai DO on 3/2/2023  - Pathologic stage from 10/17/2023: Stage IIIB (ypT3, pN1b, cM0) - Signed by Joe Barboza MD on 2023  Stage prefix: Post-therapy  Response to neoadjuvant therapy: Partial response  Oncology History Overview Note   2023 - [id number] rectal adenocarcinoma     3/9/2023 - start neoadjuvant FOLFOX     2023 - start 5-FU/RT     2023 - 5-FU  held due thrombocytopenia     10/17/2023 - LAPAROSCOPIC HAND ASSIST PROCTECTOMY. ABDOMINAL APPROACH. coloanal anastomosis (very low pull through). loop ileostomy     pT3N1b     Rectal cancer (HCC)   2/17/2023 Initial Diagnosis    Rectal cancer (HCC)     2/17/2023 Biopsy    Final Diagnosis   A. Large Intestine, Descending Colon, polyp:    - Tubular adenoma.     - Negative for high grade dysplasia.      B. Rectum, mass:   -  Adenocarcinoma. See comment.     Comment: Immunohistochemical stains performed with adequate controls demonstrates that the tumor is positive for CDX2, SATB2, CK20 (patchy), and negative for CK7, Synaptophysin, Chromogranin A, and p40. The immunophenotype supports the diagnosis. Ancillary testing for mismatch repair (MMR) protein deficiency by immunohistochemical panel (MLH1, PMS2, MSH2, MSH6) is undertaken (Block B1); the results will be issued in a supplemental report, to follow shortly     Addendum   RESULTS OF IMMUNOHISTOCHEMICAL ANALYSIS FOR MISMATCH REPAIR PROTEIN LOSS     INTERPRETATION: NO LOSS OF NUCLEAR EXPRESSION OF MMR PROTEINS: LOW PROBABILITY OF MSI-H.     RESULTS:  Antibody            Clone                 Description                                  Results  MLH1                 [id number]          Mismatch repair protein               Intact nuclear expression  MSH2                [id number]        Mismatch repair protein               Intact nuclear expression  MSH6                44                      Mismatch repair protein               Intact nuclear expression  PMS2                 [id number]           Mismatch repair protein               Intact nuclear expression     Comment:   A negative control and a positive control for each antibody have been reviewed and accepted        3/2/2023 -  Cancer Staged    Staging form: Colon and Rectum, AJCC 8th Edition  - Clinical: cT3, cN2, cM0 - Signed by Sarina Rai DO on 3/2/2023       3/9/2023 - 6/2/2023 Chemotherapy    alteplase  (CATHFLO), 2 mg, Intracatheter, Every 1 Minute as needed, 6 of 10 cycles  palonosetron (ALOXI), 0.25 mg, Intravenous, Once, 1 of 2 cycles  Administration: 0.25 mg (5/31/2023)  pegfilgrastim (NEULASTA), 6 mg, Subcutaneous, Once, 1 of 1 cycle  Pegfilgrastim-bmez (ZIEXTENZO), 6 mg, Subcutaneous, Once, 4 of 8 cycles  Administration: 6 mg (4/21/2023), 6 mg (5/6/2023), 6 mg (5/22/2023), 6 mg (6/2/2023)  fluorouracil (ADRUCIL), 400 mg/m2 = 915 mg, Intravenous, Once, 6 of 10 cycles  Administration: 915 mg (3/9/2023), 915 mg (3/22/2023), 915 mg (4/19/2023), 915 mg (5/4/2023), 915 mg (5/17/2023), 915 mg (5/31/2023)  leucovorin calcium IVPB, 916 mg, Intravenous, Once, 6 of 10 cycles  Administration: 900 mg (3/9/2023), 900 mg (3/22/2023), 900 mg (4/19/2023), 900 mg (5/4/2023), 900 mg (5/17/2023), 900 mg (5/31/2023)  oxaliplatin (ELOXATIN) chemo infusion, 194.65 mg, Intravenous, Once, 6 of 10 cycles  Administration: 200 mg (3/9/2023), 200 mg (3/22/2023), 200 mg (4/19/2023), 200 mg (5/4/2023), 200 mg (5/17/2023), 200 mg (5/31/2023)  fluorouracil (ADRUCIL) ambulatory infusion Soln, 1,200 mg/m2/day = 5,495 mg, Intravenous, Over 46 hours, 6 of 10 cycles  aprepitant (CINVANTI) in  mL IVPB, 130 mg, Intravenous, Once, 1 of 2 cycles  Administration: 130 mg (5/31/2023)     6/21/2023 - 8/1/2023 Radiation      Plan ID Energy Fractions Dose per Fraction (cGy) Dose Correction (cGy) Total Dose Delivered (cGy) Elapsed Days   CD Rectum 6X 3 / 3 180 0 540 4   Whole Pelvis 6X 25 / 25 180 0 4,500 36        6/26/2023 - 7/28/2023 Chemotherapy    alteplase (CATHFLO), 2 mg, Intracatheter, Every 1 Minute as needed, 4 of 4 cycles  fluorouracil (ADRUCIL) ambulatory infusion Soln, 200 mg/m2/day = 2,190 mg (88.9 % of original dose 225 mg/m2/day), Intravenous, Over 120 hours, 4 of 4 cycles  Dose modification: 200 mg/m2/day (original dose 225 mg/m2/day, Cycle 1, Reason: Anticipated Tolerance)     10/17/2023 Surgery    Laparoscopic hand-assisted  "proctectomy with coloanal anastomosis and loop ileostomy    Final Diagnosis  A. Rectum, sigmoid colon, and two donuts, low anterior resection:  -   Invasive adenocarcinoma of rectum, moderately differentiated, with treatment effect.  -   Separate segments of colon/rectum (anastomotic donuts), negative for malignancy.  -   See synoptic report.  -   MMR (MLH1, MSH2, MSH6 and PMS2) studies by IHC on biopsy specimen [id number] show intact protein expression.     10/17/2023 -  Cancer Staged    Staging form: Colon and Rectum, AJCC 8th Edition  - Pathologic stage from 10/17/2023: Stage IIIB (ypT3, pN1b, cM0) - Signed by Joe Barboza MD on 11/8/2023  Stage prefix: Post-therapy  Response to neoadjuvant therapy: Partial response       1/16/2024 Surgery    1/16/24 Closure ileostomy  A. Ileostomy stoma, ileostomy reversal:  - Enterocutaneous stoma, consistent with ileostomy site.  - Negative for dysplasia and malignancy           Review of Systems otherwise neg        Objective   /72 (BP Location: Left arm, Patient Position: Sitting, Cuff Size: Adult)   Pulse 66   Temp 98.1 °F (36.7 °C) (Temporal)   Resp 16   Ht 6' 3\" (1.905 m)   Wt 80.4 kg (177 lb 3.2 oz)   SpO2 98%   BMI 22.15 kg/m²     Pain Screening:  Pain Score: 0-No pain  ECOG   0  Physical Exam    GEN: Alert, awake oriented x3, in no acute distress  HEENT- No pallor, icterus, cyanosis, no oral mucosal lesions,   LAD - no palpable cervical, clavicle, axillary, inguinal LAD  Heart- normal S1 S2, regular rate and rhythm, No murmur, rubs.   Lungs- clear breathing sound bilateral.   Abdomen- soft, Non tender, bowel sounds present  Extremities- No cyanosis, clubbing, edema  Neuro- No focal neurological deficit      Labs: I have reviewed the following labs:  Lab Results   Component Value Date/Time    WBC 2.96 (L) 01/20/2025 11:05 AM    RBC 4.00 01/20/2025 11:05 AM    Hemoglobin 12.6 01/20/2025 11:05 AM    Hematocrit 37.1 01/20/2025 11:05 AM    MCV 93 " 01/20/2025 11:05 AM    MCH 31.5 01/20/2025 11:05 AM    RDW 13.5 01/20/2025 11:05 AM    Platelets 97 (L) 01/20/2025 11:05 AM    Segmented % 72 01/20/2025 11:05 AM    Lymphocytes % 17 01/20/2025 11:05 AM    Monocytes % 8 01/20/2025 11:05 AM    Eosinophils Relative 3 01/20/2025 11:05 AM    Basophils Relative 0 01/20/2025 11:05 AM    Immature Grans % 0 01/20/2025 11:05 AM    Absolute Neutrophils 2.10 01/20/2025 11:05 AM     Lab Results   Component Value Date/Time    Potassium 3.9 01/20/2025 11:05 AM    Chloride 112 (H) 01/20/2025 11:05 AM    CO2 23 01/20/2025 11:05 AM    BUN 10 01/20/2025 11:05 AM    Creatinine 0.71 01/20/2025 11:05 AM    Calcium 8.4 01/20/2025 11:05 AM    AST 21 01/20/2025 11:05 AM    ALT 19 01/20/2025 11:05 AM    Alkaline Phosphatase 38 01/20/2025 11:05 AM    Total Protein 6.2 (L) 01/20/2025 11:05 AM    Albumin 3.8 01/20/2025 11:05 AM    Total Bilirubin 0.41 01/20/2025 11:05 AM    eGFR 113 01/20/2025 11:05 AM         Radiology Results Review: I have reviewed radiology reports from prior to this visit including: CT chest and CT abdomen/pelvis.

## 2025-01-22 NOTE — PROGRESS NOTES
Colon and Rectal Surgery   Alexandro Luo 44 y.o. male MRN [id number]  Encounter: [id number]  01/22/25 2:22 PM            Assessment: Alexandro Luo is a 44 y.o. male who ***      Plan:   No problem-specific Assessment & Plan notes found for this encounter.      Subjective     HPI    Alexandro Luo is a 44 y.o. male who presents for a follow up for rectal cancer, [id number]. He was last seen in the office on 7/22/24.     The patient is status post laparoscopic hand assist proctectomy, abdominal approach, coloanal anastomosis, loop ileostomy on 10/17/23. Ileostomy closure on 1/16/24.    CT chest abdomen pelvis 1/6/25  IMPRESSION:  -- Status post low anterior resection with intact colorectal anastomosis without evidence for local recurrence or metastatic disease.  -- Cirrhosis with portal venous hypertension as characterized by splenomegaly, esophageal varices, recannulated umbilical vein, and splenorenal shunt.  -- Mild paraseptal emphysema.    IR port removal scheduled for 2/13/25.    Last colonoscopy was performed on 02/17/2023 by Dr. Stefano Mejia with a 1 year recall.    Lab Results   Component Value Date    CEA 1.1 01/20/2025     Lab Results   Component Value Date    WBC 2.96 (L) 01/20/2025    HGB 12.6 01/20/2025    HCT 37.1 01/20/2025    MCV 93 01/20/2025    PLT 97 (L) 01/20/2025     Lab Results   Component Value Date    SODIUM 140 01/20/2025    K 3.9 01/20/2025     (H) 01/20/2025    CO2 23 01/20/2025    AGAP 5 01/20/2025    BUN 10 01/20/2025    CREATININE 0.71 01/20/2025    GLUC 107 01/20/2025    CALCIUM 8.4 01/20/2025    AST 21 01/20/2025    ALT 19 01/20/2025    ALKPHOS 38 01/20/2025    TP 6.2 (L) 01/20/2025    TBILI 0.41 01/20/2025    EGFR 113 01/20/2025     Historical Information   Past Medical History:   Diagnosis Date    Cirrhosis (HCC) 3/18/24    Colon cancer (HCC)     Dental infection     Fatty liver     Rectal cancer (HCC)      Past Surgical History:   Procedure Laterality Date    APPENDECTOMY       COLONOSCOPY      DENTAL SURGERY      IR PORT PLACEMENT  02/28/2023    OR CLOSURE ENTEROSTOMY LG/SMALL INTESTINE N/A 1/16/2024    Procedure: CLOSURE ILEOSTOMY;  Surgeon: CLEMENTE Lugo MD;  Location: [facility];  Service: Colorectal    OR LAPS PROCTECTOMY COMBINED PULL-THRU W/RESERVOIR N/A 10/17/2023    Procedure: LAPAROSCOPIC HAND ASSIST PROCTECTOMY, ABDOMINAL APPROACH, coloanal anastomosis, loop ileostomy;  Surgeon: CLEMENTE Lugo MD;  Location: [facility];  Service: Colorectal    OR SIGMOIDOSCOPY FLX DX W/COLLJ SPEC BR/WA IF PFRMD N/A 10/17/2023    Procedure: SIGMOIDOSCOPY FLEXIBLE;  Surgeon: CLEMENTE Lugo MD;  Location: [facility];  Service: Colorectal    UPPER GASTROINTESTINAL ENDOSCOPY         Meds/Allergies       Current Outpatient Medications:     carvedilol (COREG) 3.125 mg tablet, Take 1 tablet (3.125 mg total) by mouth 2 (two) times a day with meals (Patient not taking: Reported on 7/22/2024), Disp: 60 tablet, Rfl: 11    gabapentin (NEURONTIN) 100 mg capsule, Take 1 capsule (100 mg total) by mouth 3 (three) times a day for 7 days (Patient not taking: Reported on 2/19/2024), Disp: 21 capsule, Rfl: 0    methocarbamol (ROBAXIN) 500 mg tablet, Take 1 tablet (500 mg total) by mouth every 8 (eight) hours for 7 days (Patient not taking: Reported on 2/19/2024), Disp: 21 tablet, Rfl: 0    mirtazapine (REMERON) 15 mg tablet, Take 1 tablet (15 mg total) by mouth daily at bedtime, Disp: 30 tablet, Rfl: 0    ondansetron (ZOFRAN) 8 mg tablet, Take 1 tablet (8 mg total) by mouth every 8 (eight) hours as needed for nausea or vomiting, Disp: 30 tablet, Rfl: 0    pyridoxine (VITAMIN B6) 50 mg tablet, Take 50 mg by mouth daily (Patient not taking: Reported on 2/19/2024), Disp: , Rfl:   No current facility-administered medications for this visit.    Facility-Administered Medications Ordered in Other Visits:     alteplase (CATHFLO) injection 2 mg, 2 mg, Intracatheter, Q1MIN PRN, Sarina Agostino, DO  No Known  Allergies    Social History   Social History     Substance and Sexual Activity   Drug Use Not Currently    Frequency: 5.0 times per week    Types: Marijuana    Comment: medical marijuana     Social History     Tobacco Use   Smoking Status Former    Current packs/day: 0.00    Average packs/day: 1 pack/day for 20.0 years (20.0 ttl pk-yrs)    Types: Cigarettes    Start date: 2000    Quit date: 2020    Years since quittin.0   Smokeless Tobacco Never         Family History   Problem Relation Age of Onset    Lung cancer Mother     Cancer Mother         Lung    Lung cancer Father     Cancer Father         Mouth throat    Colon cancer Neg Hx     Colon polyps Neg Hx          Review of Systems    Objective   Current Vitals:  There were no vitals filed for this visit.      Physical Exam

## 2025-01-23 ENCOUNTER — TELEPHONE (OUTPATIENT)
Age: 45
End: 2025-01-23

## 2025-01-23 ENCOUNTER — OFFICE VISIT (OUTPATIENT)
Age: 45
End: 2025-01-23
Payer: COMMERCIAL

## 2025-01-23 VITALS — WEIGHT: 177 LBS | HEIGHT: 75 IN | BODY MASS INDEX: 22.01 KG/M2

## 2025-01-23 DIAGNOSIS — C20 RECTAL CANCER (HCC): Primary | ICD-10-CM

## 2025-01-23 PROCEDURE — 99214 OFFICE O/P EST MOD 30 MIN: CPT | Performed by: COLON & RECTAL SURGERY

## 2025-01-23 NOTE — PROGRESS NOTES
Colon and Rectal Surgery   Alexandro Luo 44 y.o. male MRN [id number]  Encounter: [id number]  01/23/25 11:14 AM            Assessment: Alexandro Luo is a 44 y.o. male who had rectal cancer.      Plan:   Rectal cancer (HCC)  The patient follows for a history of rectal cancer.  He feels well and is strengthening with time.  He is still quite thin relative to his presurgical weight but he is happy with his current weight.  His GI function is quite good and he has no incontinence.    His exam is benign and he has no evidence of cancer recurrence.  His CT scan was negative for cancer recurrence and his CEA level is 1.1.    Colonoscopy is recommended.  I will try to expedite this.  It can be done with Dr. Flores or me.  A digital exam can be done at the time of the procedure.    He can return to us in 7 months for reevaluation of the rectum itself.      Subjective     HPI    Alexandro Luo is a 44 y.o. male who presents for a follow up for rectal cancer, [id number]. He was last seen in the office on 7/22/24.      The patient is status post laparoscopic hand assist proctectomy, abdominal approach, coloanal anastomosis, loop ileostomy on 10/17/23. Ileostomy closure on 1/16/24.     CT chest abdomen pelvis 1/6/25  IMPRESSION:  -- Status post low anterior resection with intact colorectal anastomosis without evidence for local recurrence or metastatic disease.  -- Cirrhosis with portal venous hypertension as characterized by splenomegaly, esophageal varices, recannulated umbilical vein, and splenorenal shunt.  -- Mild paraseptal emphysema.     IR port removal scheduled for 2/13/25.     Last colonoscopy was performed on 02/17/2023 by Dr. Stefano Mejia with a 1 year recall.           Lab Results   Component Value Date     CEA 1.1 01/20/2025            Lab Results   Component Value Date     WBC 2.96 (L) 01/20/2025     HGB 12.6 01/20/2025     HCT 37.1 01/20/2025     MCV 93 01/20/2025     PLT 97 (L) 01/20/2025            Lab  Results   Component Value Date     SODIUM 140 01/20/2025     K 3.9 01/20/2025      (H) 01/20/2025     CO2 23 01/20/2025     AGAP 5 01/20/2025     BUN 10 01/20/2025     CREATININE 0.71 01/20/2025     GLUC 107 01/20/2025     CALCIUM 8.4 01/20/2025     AST 21 01/20/2025     ALT 19 01/20/2025     ALKPHOS 38 01/20/2025     TP 6.2 (L) 01/20/2025     TBILI 0.41 01/20/2025     EGFR 113 01/20/2025     Historical Information   Past Medical History:   Diagnosis Date    Cirrhosis (HCC) 3/18/24    Colon cancer (HCC)     Dental infection     Fatty liver     Rectal cancer (HCC)      Past Surgical History:   Procedure Laterality Date    APPENDECTOMY      COLONOSCOPY      DENTAL SURGERY      IR PORT PLACEMENT  02/28/2023    TX CLOSURE ENTEROSTOMY LG/SMALL INTESTINE N/A 1/16/2024    Procedure: CLOSURE ILEOSTOMY;  Surgeon: CLEMENTE Lugo MD;  Location: [facility];  Service: Colorectal    TX LAPS PROCTECTOMY COMBINED PULL-THRU W/RESERVOIR N/A 10/17/2023    Procedure: LAPAROSCOPIC HAND ASSIST PROCTECTOMY, ABDOMINAL APPROACH, coloanal anastomosis, loop ileostomy;  Surgeon: CLEMENTE Lugo MD;  Location: [facility];  Service: Colorectal    TX SIGMOIDOSCOPY FLX DX W/COLLJ SPEC BR/WA IF PFRMD N/A 10/17/2023    Procedure: SIGMOIDOSCOPY FLEXIBLE;  Surgeon: CLEMENTE Lugo MD;  Location: [facility];  Service: Colorectal    UPPER GASTROINTESTINAL ENDOSCOPY         Meds/Allergies       Current Outpatient Medications:     mirtazapine (REMERON) 15 mg tablet, Take 1 tablet (15 mg total) by mouth daily at bedtime, Disp: 30 tablet, Rfl: 0    carvedilol (COREG) 3.125 mg tablet, Take 1 tablet (3.125 mg total) by mouth 2 (two) times a day with meals (Patient not taking: Reported on 7/22/2024), Disp: 60 tablet, Rfl: 11    gabapentin (NEURONTIN) 100 mg capsule, Take 1 capsule (100 mg total) by mouth 3 (three) times a day for 7 days (Patient not taking: Reported on 2/19/2024), Disp: 21 capsule, Rfl: 0    methocarbamol (ROBAXIN) 500 mg tablet,  "Take 1 tablet (500 mg total) by mouth every 8 (eight) hours for 7 days (Patient not taking: Reported on 2024), Disp: 21 tablet, Rfl: 0    ondansetron (ZOFRAN) 8 mg tablet, Take 1 tablet (8 mg total) by mouth every 8 (eight) hours as needed for nausea or vomiting, Disp: 30 tablet, Rfl: 0    pyridoxine (VITAMIN B6) 50 mg tablet, Take 50 mg by mouth daily (Patient not taking: Reported on 2024), Disp: , Rfl:   No current facility-administered medications for this visit.  No Known Allergies    Social History   Social History     Substance and Sexual Activity   Drug Use Not Currently    Frequency: 5.0 times per week    Types: Marijuana    Comment: medical marijuana     Social History     Tobacco Use   Smoking Status Former    Current packs/day: 0.00    Average packs/day: 1 pack/day for 20.0 years (20.0 ttl pk-yrs)    Types: Cigarettes    Start date: 2000    Quit date: 2020    Years since quittin.0   Smokeless Tobacco Never         Family History   Problem Relation Age of Onset    Lung cancer Mother     Cancer Mother         Lung    Lung cancer Father     Cancer Father         Mouth throat    Colon cancer Neg Hx     Colon polyps Neg Hx          Review of Systems   Constitutional: Negative.    Respiratory: Negative.     Cardiovascular: Negative.    Gastrointestinal: Negative.        Objective   Current Vitals:  Vitals:    25 1051   Weight: 80.3 kg (177 lb)   Height: 6' 3\" (1.905 m)         Physical Exam  Constitutional:       Appearance: Normal appearance.   Cardiovascular:      Rate and Rhythm: Normal rate and regular rhythm.   Pulmonary:      Effort: Pulmonary effort is normal.      Breath sounds: Normal breath sounds.   Abdominal:      General: Abdomen is flat.      Palpations: Abdomen is soft.   Genitourinary:     Comments: Will do at colonoscopy.  Neurological:      General: No focal deficit present.      Mental Status: He is alert and oriented to person, place, and time.                 "

## 2025-01-23 NOTE — ASSESSMENT & PLAN NOTE
The patient follows for a history of rectal cancer.  He feels well and is strengthening with time.  He is still quite thin relative to his presurgical weight but he is happy with his current weight.  His GI function is quite good and he has no incontinence.    His exam is benign and he has no evidence of cancer recurrence.  His CT scan was negative for cancer recurrence and his CEA level is 1.1.    Colonoscopy is recommended.  I will try to expedite this.  It can be done with Dr. Flores or me.  A digital exam can be done at the time of the procedure.    He can return to us in 7 months for reevaluation of the rectum itself.

## 2025-01-23 NOTE — LETTER
Alexandro Luo  [address]  [place] [address]      Location:  [facility] - [address] [place] [id number] - [facility]quita Monsal[facility] - Entrance A - 1st Floor    Performing Physician: RANDALL Lugo MD      DATE OF PROCEDURE: March 3, 2025 TIME OF PROCEDURE:    TBD                             The OR/GI Lab will contact you the evening prior to your procedure with your exact arrival time.    We kindly ask that you immediately notify us of any need to cancel or reschedule your procedure.      WEEK BEFORE THE PROCEDURE:  Do not take Iron tablets for one week  5 days prior to the appointment AVOID vegetables and fruits with skins or seeds, nuts, corn, popcorn, and whole grain breads.  Purchase: One (1) 238-gram container of Miralax (polyethylene glycol 3350), four (4) 5 mg Dulcolax (bisacodyl) tablets, and 64-ounces of Gatorade (sports drink) - no red, orange, or purple. These may be purchased at any pharmacy without a prescription. Generic products are permissible.  Arrange responsible transportation for day of the procedure.      DAY BEFORE THE PROCEDURE:  CLEAR liquids only for entire day prior. Nothing red, orange or purple.  You MAY have:  Soda  Water  Broth Gatorade  Jell-O  Popsicles Coffee/tea without  Milk/creamer     YOU MAY NOT HAVE:  Solid foods  Milk and milk products  Juice with pulp    BOWEL PREPARATION: Includes: One (1) 238-gram container of Miralax (polyethylene glycol 3350), four (4) 5 mg Dulcolax (bisacodyl) tablets, and 64-ounces of Gatorade (sports drink). Preparation may be refrigerated. Entire bowel prep should be completed.    Afternoon before the procedure (2:00 pm - 5:00 pm):  Take two (2) 5 mg Dulcolax laxative tablets.    Evening before the procedure (6:00 pm):  Mix entire container of Miralax with 64-ounces of Gatorade and shake until all medication is dissolved.  Begin drinking solution. Drink an eight (8) ounce cup every 10-15 minutes until you have consumed half (32  ounces) of the solution. Refrigerate remaining solution.    Night before the procedure (8:00 pm):  Take two (2) 5 mg Dulcolax laxative tablets.    Beginning 5 hours before your procedure:  Drink the remaining amount of prepared solution (32 ounces). Drink an eight (8) ounce cup every 10-15 minutes until you have consumed the remaining solution.      Bowel prep should be completed 4 hours prior to procedure time.  NOTHING TO EAT OR DRINK AFTER MIDNIGHT -EXCEPT YOUR BOWEL PREP                                                                                                                                                                                DAY OF THE PROCEDURE:  You may brush your teeth.  Leave all jewelry at home. Take out any facial piercings, if able.  Please arrive for your procedure as indicated by the OR / GI Lab / Endoscopy Unit. The hospital will contact you the day before with your exact arrival time.  Make sure you have arranged ahead of time for a responsible adult (18 or older) to accompany and drive you home after the procedure. Please discuss any transportation concerns with our staff prior to your procedure.  The effects of the anesthesia can persist for 24 hours. After receiving the sedation, you must exercise caution before engaging in any activity that could harm yourself and others (such as driving a car). Do not make any important decisions or do not drink any alcoholic beverages during this time period.  After your procedure, you may have anything you’d like to eat or drink. You will probably want to start with something light. Please include plenty of fluids. Avoid items that cause gas such as sodas and salads.      SPECIAL INSTRUCTIONS:    For patients currently taking blood thinners and/or antiplatelet therapy our office will contact the prescribing provider. Our office will contact you with any required changes to your medication regimen.  Blood thinner (i.e. - Coumadin, Pradaxa,  Lovenox, Xarelto, Eliquis)    Antiplatelet (i.e. - Plavix, Aggrenox, Effient, Brilinta)      Diabetes:  If you are Diabetic, please see separate Diabetic Instruction Sheet.  Prescribed medications:  Do not stop your aspirin, or any of your other medications (unless instructed otherwise).  Take the rest of your prescribed medications with small sips of water at least 2 hours prior to your procedure.      NOTHING TO EAT OR DRINK (INCLUDING CHEWING GUM) AFTER 12 MIDNIGHT PRIOR TO YOUR PROCEDURE EXCEPT YOUR BOWEL PREP.        For any questions or concerns related to your bowel preparation or pre-procedure instructions, please contact our office.  Thank you for choosing [facility]’s Colon & Rectal Surgery!    Revised 1/2023    [phone]

## 2025-01-26 DIAGNOSIS — C20 RECTAL CANCER (HCC): ICD-10-CM

## 2025-01-26 DIAGNOSIS — R63.0 NO APPETITE: ICD-10-CM

## 2025-01-27 ENCOUNTER — TELEPHONE (OUTPATIENT)
Dept: GASTROENTEROLOGY | Facility: CLINIC | Age: 45
End: 2025-01-27

## 2025-01-27 ENCOUNTER — OFFICE VISIT (OUTPATIENT)
Dept: GASTROENTEROLOGY | Facility: CLINIC | Age: 45
End: 2025-01-27
Payer: COMMERCIAL

## 2025-01-27 VITALS
HEIGHT: 75 IN | DIASTOLIC BLOOD PRESSURE: 58 MMHG | BODY MASS INDEX: 21.71 KG/M2 | SYSTOLIC BLOOD PRESSURE: 105 MMHG | WEIGHT: 174.6 LBS

## 2025-01-27 DIAGNOSIS — I86.4 GASTRIC VARICES WITHOUT BLEEDING: ICD-10-CM

## 2025-01-27 DIAGNOSIS — I85.10 SECONDARY ESOPHAGEAL VARICES WITHOUT BLEEDING (HCC): ICD-10-CM

## 2025-01-27 DIAGNOSIS — K70.30 ALCOHOLIC CIRRHOSIS OF LIVER WITHOUT ASCITES (HCC): Primary | ICD-10-CM

## 2025-01-27 DIAGNOSIS — C20 RECTAL CANCER (HCC): ICD-10-CM

## 2025-01-27 PROCEDURE — 99214 OFFICE O/P EST MOD 30 MIN: CPT | Performed by: FAMILY MEDICINE

## 2025-01-27 RX ORDER — SODIUM CHLORIDE, SODIUM LACTATE, POTASSIUM CHLORIDE, CALCIUM CHLORIDE 600; 310; 30; 20 MG/100ML; MG/100ML; MG/100ML; MG/100ML
125 INJECTION, SOLUTION INTRAVENOUS CONTINUOUS
OUTPATIENT
Start: 2025-01-27

## 2025-01-27 RX ORDER — MIRTAZAPINE 15 MG/1
15 TABLET, FILM COATED ORAL
Qty: 30 TABLET | Refills: 0 | Status: SHIPPED | OUTPATIENT
Start: 2025-01-27

## 2025-01-27 NOTE — PROGRESS NOTES
Name: Alexandro Luo      : 1980      MRN: [id number]  Encounter Provider: Ailyn Dubois PA-C  Encounter Date: 2025   Encounter department: [facility]  :  Assessment & Plan  Alcoholic cirrhosis of liver without ascites (HCC)    Summary: Patient is a 44 y.o. male with cirrhosis secondary to EtOH c/b non-bleeding esophageal varices. Current MELD-3.0 (13).    Patient was diagnosed with cirrhosis in 2023 after being found to have cirrhotic morphology and evidence of portal hypertension on imaging for the evaluation of rectal cancer. The etiology was felt to be EtOH given his reports of excessive alcohol use and negative secondary serologic workup. He has been lost to follow-up since 2023 and presents today to reestablish care. Fortunately, he has not required any liver-related hospitalizations or had any decompensating events since his last appointment.  He also reports maintained sobriety.    He is aware of the importance of complete EtOH abstinence to help slow the progression of his liver disease and prevent decompensating events. Congratulated his efforts!     He is overdue for both an EGD for variceal screening and imaging for hepatoma screening. He is also due for updated MELD labs and AFP level now. Additional management as below.    Ascites   - No radiographic or clinical evidence of ascites on exam today.     Esophageal Varices   - EGD (2023) with a single large grade IV esophageal varix as well as large and extensive gastric varices (GOV1/IGV1).   - He is not taking carvedilol as previously prescribed.   - Given his low-normal BP in office today, will plan to repeat an EGD now for variceal screening rather than resume an NSBB.   - Could consider starting carvedilol 3.125 mg twice daily with close monitoring of his BP if found to have persistent varices.     Hepatic Encephalopathy   - No history or evidence of HE  on exam today.   - Patient aware of signs/sxs's of HE and advised to promptly inform provider if experiencing such.    HCC Screening   - CT A/P (1/6) without focal liver lesion although not triphasic; AFP normal.   - Plan for RUQ US now for hepatoma screening.   - AFP level to be drawn with his next set of routine labs.   - Continue imaging with an AFP level q6 months.     Nutritional Status  - Mild sarcopenia noted on exam today.   - Encouraged high-protein diet in addition to a high-protein snack qHS to prevent prolonged fasting.     Vaccines   - Serologies to check hep A and B immunity.     Transplant Candidacy   - Defer given clinically compensated disease.     CRC Screening  - Refer to A/P below.     Orders:  •  Ambulatory Referral to Hepatology  •  CBC and differential; Future  •  Comprehensive metabolic panel; Future  •  Protime-INR; Future  •  AFP tumor marker; Future  •  Hepatitis A antibody, total; Future  •  Hepatitis B surface antibody; Future  •  US right upper quadrant; Future  •  EGD; Future    Secondary esophageal varices without bleeding (HCC)  Refer to A/P above.   Orders:  •  EGD; Future    Gastric varices without bleeding  Refer to A/P above.   Orders:  •  EGD; Future    Rectal cancer (HCC)  Patient with a hx of rectal cancer (T3N2M0) dx 2/2023. He completed neoadjuvant therapy with FOLFOX and 5-FU/RT. He is now s/p laparoscopic hand-assisted protect me, abdominal approach, coloanal anastomosis, loop ileostomy (10/17/2023) with subsequent ileostomy closure (1/16/2024).     He continues to follow with both medical oncology and C&R surgery.   His most recent CT chest, abdomen and pelvis did not show any evidence of local recurrence or metastatic disease.  He is scheduled for a colonoscopy with Dr. Lugo on 3/3/2025.   Continue following with oncology and C&R surgery as advised.          Follow-up in 6 months or sooner if necessary.       History of Present Illness   HPI  Alexandro BRAUN Valerio is a 44  y.o. male with PMH significant for history of rectal cancer, chemotherapy-induced neutropenia and alcohol use disorder in remission who presents today for follow-up regarding cirrhosis secondary to EtOH.    Alexandro is familiar to [facility] hepatology but is not been seen since 6/8/2023. He was diagnosed with cirrhosis in February 2023 after being found to have cirrhotic morphology and portal hypertension as evidenced by splenomegaly, gastroesophageal varices, recannulization of the periumbilical vein and trace perihepatic ascites for workup of rectal cancer. No prior liver biopsy. He had a complete serologic evaluation which was unremarkable for competing causes of liver disease and his cirrhosis was felt to be secondary to EtOH given his reports of excessive alcohol use. Reports complete sobriety since his diagnosis. His most recent EGD (11/28/2023) showed a single large grade 4 varix in the middle and lower third of the esophagus as well as multiple large and extensive type I GE varices in the fundus of the stomach. He was started on carvedilol 3.125 mg twice daily but is not currently taking this.    In regards to his rectal cancer, he is s/p laparoscopic hand-assisted protect me, abdominal approach, coloanal anastomosis, loop ileostomy (10/17/2023) with subsequent ileostomy closure (1/16/2024). He also completed neoadjuvant therapy with FOLFOX and 5-FU/RT.  His most recent CT chest, abdomen and pelvis did not show any evidence of local recurrence or metastatic disease. It did redemonstrate cirrhotic morphology and portal hypertension.    He has been well since his last appointment. No liver related hospitalizations or decompensating events. Denies pruritus, confusion, dark urine, bruising easily, yellowing of the eyes and/or skin, abdominal pain or distension, swelling of the lower extremities, overt GI bleeding or unintentional weight loss.     Computed MELD 3.0 unavailable. One or more values for this score  either were not found within the given timeframe or did not fit some other criterion.  Computed MELD-Na unavailable. One or more values for this score either were not found within the given timeframe or did not fit some other criterion.    History obtained from: patient    Review of Systems   All other systems reviewed and are negative.    Pertinent Medical History     Medical History Reviewed by provider this encounter:     .  Past Medical History   Past Medical History:   Diagnosis Date   • Cirrhosis (HCC) 3/18/24   • Colon cancer (HCC)    • Dental infection    • Fatty liver    • Rectal cancer (HCC)      Past Surgical History:   Procedure Laterality Date   • APPENDECTOMY     • COLONOSCOPY     • DENTAL SURGERY     • IR PORT PLACEMENT  02/28/2023   • AZ CLOSURE ENTEROSTOMY LG/SMALL INTESTINE N/A 1/16/2024    Procedure: CLOSURE ILEOSTOMY;  Surgeon: CLEMENTE Lugo MD;  Location: [facility];  Service: Colorectal   • AZ LAPS PROCTECTOMY COMBINED PULL-THRU W/RESERVOIR N/A 10/17/2023    Procedure: LAPAROSCOPIC HAND ASSIST PROCTECTOMY, ABDOMINAL APPROACH, coloanal anastomosis, loop ileostomy;  Surgeon: CLEMENTE Lugo MD;  Location: [facility];  Service: Colorectal   • AZ SIGMOIDOSCOPY FLX DX W/COLLJ SPEC BR/WA IF PFRMD N/A 10/17/2023    Procedure: SIGMOIDOSCOPY FLEXIBLE;  Surgeon: CLEMENTE Lugo MD;  Location: [facility];  Service: Colorectal   • UPPER GASTROINTESTINAL ENDOSCOPY       Family History   Problem Relation Age of Onset   • Lung cancer Mother    • Cancer Mother         Lung   • Lung cancer Father    • Cancer Father         Mouth throat   • Colon cancer Neg Hx    • Colon polyps Neg Hx       reports that he quit smoking about 5 years ago. His smoking use included cigarettes. He started smoking about 25 years ago. He has a 20 pack-year smoking history. He has never used smokeless tobacco. He reports that he does not currently use alcohol after a past usage of about 2.0 standard drinks of alcohol per week.  He reports that he does not currently use drugs after having used the following drugs: Marijuana. Frequency: 5.00 times per week.  Current Outpatient Medications on File Prior to Visit   Medication Sig Dispense Refill   • [DISCONTINUED] mirtazapine (REMERON) 15 mg tablet Take 1 tablet (15 mg total) by mouth daily at bedtime 30 tablet 0   • carvedilol (COREG) 3.125 mg tablet Take 1 tablet (3.125 mg total) by mouth 2 (two) times a day with meals (Patient not taking: Reported on 7/22/2024) 60 tablet 11   • gabapentin (NEURONTIN) 100 mg capsule Take 1 capsule (100 mg total) by mouth 3 (three) times a day for 7 days (Patient not taking: Reported on 2/19/2024) 21 capsule 0   • methocarbamol (ROBAXIN) 500 mg tablet Take 1 tablet (500 mg total) by mouth every 8 (eight) hours for 7 days (Patient not taking: Reported on 2/19/2024) 21 tablet 0   • mirtazapine (REMERON) 15 mg tablet take 1 tablet by mouth at bedtime 30 tablet 0   • ondansetron (ZOFRAN) 8 mg tablet Take 1 tablet (8 mg total) by mouth every 8 (eight) hours as needed for nausea or vomiting 30 tablet 0   • pyridoxine (VITAMIN B6) 50 mg tablet Take 50 mg by mouth daily (Patient not taking: Reported on 2/19/2024)       No current facility-administered medications on file prior to visit.   No Known Allergies   Current Outpatient Medications on File Prior to Visit   Medication Sig Dispense Refill   • [DISCONTINUED] mirtazapine (REMERON) 15 mg tablet Take 1 tablet (15 mg total) by mouth daily at bedtime 30 tablet 0   • carvedilol (COREG) 3.125 mg tablet Take 1 tablet (3.125 mg total) by mouth 2 (two) times a day with meals (Patient not taking: Reported on 7/22/2024) 60 tablet 11   • gabapentin (NEURONTIN) 100 mg capsule Take 1 capsule (100 mg total) by mouth 3 (three) times a day for 7 days (Patient not taking: Reported on 2/19/2024) 21 capsule 0   • methocarbamol (ROBAXIN) 500 mg tablet Take 1 tablet (500 mg total) by mouth every 8 (eight) hours for 7 days (Patient not  "taking: Reported on 2024) 21 tablet 0   • mirtazapine (REMERON) 15 mg tablet take 1 tablet by mouth at bedtime 30 tablet 0   • ondansetron (ZOFRAN) 8 mg tablet Take 1 tablet (8 mg total) by mouth every 8 (eight) hours as needed for nausea or vomiting 30 tablet 0   • pyridoxine (VITAMIN B6) 50 mg tablet Take 50 mg by mouth daily (Patient not taking: Reported on 2024)       No current facility-administered medications on file prior to visit.      Social History     Tobacco Use   • Smoking status: Former     Current packs/day: 0.00     Average packs/day: 1 pack/day for 20.0 years (20.0 ttl pk-yrs)     Types: Cigarettes     Start date: 2000     Quit date: 2020     Years since quittin.0   • Smokeless tobacco: Never   Vaping Use   • Vaping status: Former   • Substances: Nicotine, Flavoring   Substance and Sexual Activity   • Alcohol use: Not Currently     Alcohol/week: 2.0 standard drinks of alcohol     Types: 2 Cans of beer per week     Comment: 1-2 daily. previously up to 1 case/day (reduced alcohol intake 7 years ago)   • Drug use: Not Currently     Frequency: 5.0 times per week     Types: Marijuana     Comment: medical marijuana   • Sexual activity: Not Currently        Objective   /58   Ht 6' 3\" (1.905 m)   Wt 79.2 kg (174 lb 9.6 oz)   BMI 21.82 kg/m²      Physical Exam  Vitals and nursing note reviewed.   Constitutional:       General: He is not in acute distress.     Appearance: Normal appearance. He is well-developed. He is not ill-appearing or toxic-appearing.      Comments: Mild sarcopenia   HENT:      Head: Normocephalic and atraumatic.   Eyes:      General: No scleral icterus.     Conjunctiva/sclera: Conjunctivae normal.   Pulmonary:      Effort: Pulmonary effort is normal. No respiratory distress.   Abdominal:      General: There is no distension.      Palpations: Abdomen is soft. There is no mass.      Tenderness: There is no abdominal tenderness.   Musculoskeletal:      Right " lower leg: No edema.      Left lower leg: No edema.   Skin:     General: Skin is warm and dry.      Coloration: Skin is not jaundiced.      Findings: No bruising or rash.   Neurological:      Mental Status: He is alert and oriented to person, place, and time. Mental status is at baseline.   Psychiatric:         Mood and Affect: Mood normal.         Behavior: Behavior normal.

## 2025-01-27 NOTE — TELEPHONE ENCOUNTER
Scheduled date of EGD(as of today):02/14/25  Physician performing EGD:Catherine  Location of EGD:[facility]  Instructions reviewed with patient by:verbal and folder given - DS  Clearances: NONE

## 2025-01-27 NOTE — H&P (VIEW-ONLY)
Name: Alexandro Luo      : 1980      MRN: [id number]  Encounter Provider: Ailyn Dubois PA-C  Encounter Date: 2025   Encounter department: [facility]  :  Assessment & Plan  Alcoholic cirrhosis of liver without ascites (HCC)    Summary: Patient is a 44 y.o. male with cirrhosis secondary to EtOH c/b non-bleeding esophageal varices. Current MELD-3.0 (13).    Patient was diagnosed with cirrhosis in 2023 after being found to have cirrhotic morphology and evidence of portal hypertension on imaging for the evaluation of rectal cancer. The etiology was felt to be EtOH given his reports of excessive alcohol use and negative secondary serologic workup. He has been lost to follow-up since 2023 and presents today to reestablish care. Fortunately, he has not required any liver-related hospitalizations or had any decompensating events since his last appointment.  He also reports maintained sobriety.    He is aware of the importance of complete EtOH abstinence to help slow the progression of his liver disease and prevent decompensating events. Congratulated his efforts!     He is overdue for both an EGD for variceal screening and imaging for hepatoma screening. He is also due for updated MELD labs and AFP level now. Additional management as below.    Ascites   - No radiographic or clinical evidence of ascites on exam today.     Esophageal Varices   - EGD (2023) with a single large grade IV esophageal varix as well as large and extensive gastric varices (GOV1/IGV1).   - He is not taking carvedilol as previously prescribed.   - Given his low-normal BP in office today, will plan to repeat an EGD now for variceal screening rather than resume an NSBB.   - Could consider starting carvedilol 3.125 mg twice daily with close monitoring of his BP if found to have persistent varices.     Hepatic Encephalopathy   - No history or evidence of HE  on exam today.   - Patient aware of signs/sxs's of HE and advised to promptly inform provider if experiencing such.    HCC Screening   - CT A/P (1/6) without focal liver lesion although not triphasic; AFP normal.   - Plan for RUQ US now for hepatoma screening.   - AFP level to be drawn with his next set of routine labs.   - Continue imaging with an AFP level q6 months.     Nutritional Status  - Mild sarcopenia noted on exam today.   - Encouraged high-protein diet in addition to a high-protein snack qHS to prevent prolonged fasting.     Vaccines   - Serologies to check hep A and B immunity.     Transplant Candidacy   - Defer given clinically compensated disease.     CRC Screening  - Refer to A/P below.     Orders:  •  Ambulatory Referral to Hepatology  •  CBC and differential; Future  •  Comprehensive metabolic panel; Future  •  Protime-INR; Future  •  AFP tumor marker; Future  •  Hepatitis A antibody, total; Future  •  Hepatitis B surface antibody; Future  •  US right upper quadrant; Future  •  EGD; Future    Secondary esophageal varices without bleeding (HCC)  Refer to A/P above.   Orders:  •  EGD; Future    Gastric varices without bleeding  Refer to A/P above.   Orders:  •  EGD; Future    Rectal cancer (HCC)  Patient with a hx of rectal cancer (T3N2M0) dx 2/2023. He completed neoadjuvant therapy with FOLFOX and 5-FU/RT. He is now s/p laparoscopic hand-assisted protect me, abdominal approach, coloanal anastomosis, loop ileostomy (10/17/2023) with subsequent ileostomy closure (1/16/2024).     He continues to follow with both medical oncology and C&R surgery.   His most recent CT chest, abdomen and pelvis did not show any evidence of local recurrence or metastatic disease.  He is scheduled for a colonoscopy with Dr. Lugo on 3/3/2025.   Continue following with oncology and C&R surgery as advised.          Follow-up in 6 months or sooner if necessary.       History of Present Illness   HPI  Alexandro BRAUN Valerio is a 44  y.o. male with PMH significant for history of rectal cancer, chemotherapy-induced neutropenia and alcohol use disorder in remission who presents today for follow-up regarding cirrhosis secondary to EtOH.    Alexandro is familiar to [facility] hepatology but is not been seen since 6/8/2023. He was diagnosed with cirrhosis in February 2023 after being found to have cirrhotic morphology and portal hypertension as evidenced by splenomegaly, gastroesophageal varices, recannulization of the periumbilical vein and trace perihepatic ascites for workup of rectal cancer. No prior liver biopsy. He had a complete serologic evaluation which was unremarkable for competing causes of liver disease and his cirrhosis was felt to be secondary to EtOH given his reports of excessive alcohol use. Reports complete sobriety since his diagnosis. His most recent EGD (11/28/2023) showed a single large grade 4 varix in the middle and lower third of the esophagus as well as multiple large and extensive type I GE varices in the fundus of the stomach. He was started on carvedilol 3.125 mg twice daily but is not currently taking this.    In regards to his rectal cancer, he is s/p laparoscopic hand-assisted protect me, abdominal approach, coloanal anastomosis, loop ileostomy (10/17/2023) with subsequent ileostomy closure (1/16/2024). He also completed neoadjuvant therapy with FOLFOX and 5-FU/RT.  His most recent CT chest, abdomen and pelvis did not show any evidence of local recurrence or metastatic disease. It did redemonstrate cirrhotic morphology and portal hypertension.    He has been well since his last appointment. No liver related hospitalizations or decompensating events. Denies pruritus, confusion, dark urine, bruising easily, yellowing of the eyes and/or skin, abdominal pain or distension, swelling of the lower extremities, overt GI bleeding or unintentional weight loss.     Computed MELD 3.0 unavailable. One or more values for this score  either were not found within the given timeframe or did not fit some other criterion.  Computed MELD-Na unavailable. One or more values for this score either were not found within the given timeframe or did not fit some other criterion.    History obtained from: patient    Review of Systems   All other systems reviewed and are negative.    Pertinent Medical History     Medical History Reviewed by provider this encounter:     .  Past Medical History   Past Medical History:   Diagnosis Date   • Cirrhosis (HCC) 3/18/24   • Colon cancer (HCC)    • Dental infection    • Fatty liver    • Rectal cancer (HCC)      Past Surgical History:   Procedure Laterality Date   • APPENDECTOMY     • COLONOSCOPY     • DENTAL SURGERY     • IR PORT PLACEMENT  02/28/2023   • NE CLOSURE ENTEROSTOMY LG/SMALL INTESTINE N/A 1/16/2024    Procedure: CLOSURE ILEOSTOMY;  Surgeon: CLEMENTE Lugo MD;  Location: [facility];  Service: Colorectal   • NE LAPS PROCTECTOMY COMBINED PULL-THRU W/RESERVOIR N/A 10/17/2023    Procedure: LAPAROSCOPIC HAND ASSIST PROCTECTOMY, ABDOMINAL APPROACH, coloanal anastomosis, loop ileostomy;  Surgeon: CLEMENTE Lugo MD;  Location: [facility];  Service: Colorectal   • NE SIGMOIDOSCOPY FLX DX W/COLLJ SPEC BR/WA IF PFRMD N/A 10/17/2023    Procedure: SIGMOIDOSCOPY FLEXIBLE;  Surgeon: CLEMENTE Lugo MD;  Location: [facility];  Service: Colorectal   • UPPER GASTROINTESTINAL ENDOSCOPY       Family History   Problem Relation Age of Onset   • Lung cancer Mother    • Cancer Mother         Lung   • Lung cancer Father    • Cancer Father         Mouth throat   • Colon cancer Neg Hx    • Colon polyps Neg Hx       reports that he quit smoking about 5 years ago. His smoking use included cigarettes. He started smoking about 25 years ago. He has a 20 pack-year smoking history. He has never used smokeless tobacco. He reports that he does not currently use alcohol after a past usage of about 2.0 standard drinks of alcohol per week.  He reports that he does not currently use drugs after having used the following drugs: Marijuana. Frequency: 5.00 times per week.  Current Outpatient Medications on File Prior to Visit   Medication Sig Dispense Refill   • [DISCONTINUED] mirtazapine (REMERON) 15 mg tablet Take 1 tablet (15 mg total) by mouth daily at bedtime 30 tablet 0   • carvedilol (COREG) 3.125 mg tablet Take 1 tablet (3.125 mg total) by mouth 2 (two) times a day with meals (Patient not taking: Reported on 7/22/2024) 60 tablet 11   • gabapentin (NEURONTIN) 100 mg capsule Take 1 capsule (100 mg total) by mouth 3 (three) times a day for 7 days (Patient not taking: Reported on 2/19/2024) 21 capsule 0   • methocarbamol (ROBAXIN) 500 mg tablet Take 1 tablet (500 mg total) by mouth every 8 (eight) hours for 7 days (Patient not taking: Reported on 2/19/2024) 21 tablet 0   • mirtazapine (REMERON) 15 mg tablet take 1 tablet by mouth at bedtime 30 tablet 0   • ondansetron (ZOFRAN) 8 mg tablet Take 1 tablet (8 mg total) by mouth every 8 (eight) hours as needed for nausea or vomiting 30 tablet 0   • pyridoxine (VITAMIN B6) 50 mg tablet Take 50 mg by mouth daily (Patient not taking: Reported on 2/19/2024)       No current facility-administered medications on file prior to visit.   No Known Allergies   Current Outpatient Medications on File Prior to Visit   Medication Sig Dispense Refill   • [DISCONTINUED] mirtazapine (REMERON) 15 mg tablet Take 1 tablet (15 mg total) by mouth daily at bedtime 30 tablet 0   • carvedilol (COREG) 3.125 mg tablet Take 1 tablet (3.125 mg total) by mouth 2 (two) times a day with meals (Patient not taking: Reported on 7/22/2024) 60 tablet 11   • gabapentin (NEURONTIN) 100 mg capsule Take 1 capsule (100 mg total) by mouth 3 (three) times a day for 7 days (Patient not taking: Reported on 2/19/2024) 21 capsule 0   • methocarbamol (ROBAXIN) 500 mg tablet Take 1 tablet (500 mg total) by mouth every 8 (eight) hours for 7 days (Patient not  "taking: Reported on 2024) 21 tablet 0   • mirtazapine (REMERON) 15 mg tablet take 1 tablet by mouth at bedtime 30 tablet 0   • ondansetron (ZOFRAN) 8 mg tablet Take 1 tablet (8 mg total) by mouth every 8 (eight) hours as needed for nausea or vomiting 30 tablet 0   • pyridoxine (VITAMIN B6) 50 mg tablet Take 50 mg by mouth daily (Patient not taking: Reported on 2024)       No current facility-administered medications on file prior to visit.      Social History     Tobacco Use   • Smoking status: Former     Current packs/day: 0.00     Average packs/day: 1 pack/day for 20.0 years (20.0 ttl pk-yrs)     Types: Cigarettes     Start date: 2000     Quit date: 2020     Years since quittin.0   • Smokeless tobacco: Never   Vaping Use   • Vaping status: Former   • Substances: Nicotine, Flavoring   Substance and Sexual Activity   • Alcohol use: Not Currently     Alcohol/week: 2.0 standard drinks of alcohol     Types: 2 Cans of beer per week     Comment: 1-2 daily. previously up to 1 case/day (reduced alcohol intake 7 years ago)   • Drug use: Not Currently     Frequency: 5.0 times per week     Types: Marijuana     Comment: medical marijuana   • Sexual activity: Not Currently        Objective   /58   Ht 6' 3\" (1.905 m)   Wt 79.2 kg (174 lb 9.6 oz)   BMI 21.82 kg/m²      Physical Exam  Vitals and nursing note reviewed.   Constitutional:       General: He is not in acute distress.     Appearance: Normal appearance. He is well-developed. He is not ill-appearing or toxic-appearing.      Comments: Mild sarcopenia   HENT:      Head: Normocephalic and atraumatic.   Eyes:      General: No scleral icterus.     Conjunctiva/sclera: Conjunctivae normal.   Pulmonary:      Effort: Pulmonary effort is normal. No respiratory distress.   Abdominal:      General: There is no distension.      Palpations: Abdomen is soft. There is no mass.      Tenderness: There is no abdominal tenderness.   Musculoskeletal:      Right " lower leg: No edema.      Left lower leg: No edema.   Skin:     General: Skin is warm and dry.      Coloration: Skin is not jaundiced.      Findings: No bruising or rash.   Neurological:      Mental Status: He is alert and oriented to person, place, and time. Mental status is at baseline.   Psychiatric:         Mood and Affect: Mood normal.         Behavior: Behavior normal.

## 2025-01-27 NOTE — ASSESSMENT & PLAN NOTE
Summary: Patient is a 44 y.o. male with cirrhosis secondary to EtOH c/b non-bleeding esophageal varices. Current MELD-3.0 (13).    Patient was diagnosed with cirrhosis in February 2023 after being found to have cirrhotic morphology and evidence of portal hypertension on imaging for the evaluation of rectal cancer. The etiology was felt to be EtOH given his reports of excessive alcohol use and negative secondary serologic workup. He has been lost to follow-up since November 2023 and presents today to reestablish care. Fortunately, he has not required any liver-related hospitalizations or had any decompensating events since his last appointment.  He also reports maintained sobriety.    He is aware of the importance of complete EtOH abstinence to help slow the progression of his liver disease and prevent decompensating events. Congratulated his efforts!     He is overdue for both an EGD for variceal screening and imaging for hepatoma screening. He is also due for updated MELD labs and AFP level now. Additional management as below.    Ascites   - No radiographic or clinical evidence of ascites on exam today.     Esophageal Varices   - EGD (11/28/2023) with a single large grade IV esophageal varix as well as large and extensive gastric varices (GOV1/IGV1).   - He is not taking carvedilol as previously prescribed.   - Given his low-normal BP in office today, will plan to repeat an EGD now for variceal screening rather than resume an NSBB.   - Could consider starting carvedilol 3.125 mg twice daily with close monitoring of his BP if found to have persistent varices.     Hepatic Encephalopathy   - No history or evidence of HE on exam today.   - Patient aware of signs/sxs's of HE and advised to promptly inform provider if experiencing such.    HCC Screening   - CT A/P (1/6) without focal liver lesion although not triphasic; AFP normal.   - Plan for RU US now for hepatoma screening.   - AFP level to be drawn with his next  set of routine labs.   - Continue imaging with an AFP level q6 months.     Nutritional Status  - Mild sarcopenia noted on exam today.   - Encouraged high-protein diet in addition to a high-protein snack qHS to prevent prolonged fasting.     Vaccines   - Serologies to check hep A and B immunity.     Transplant Candidacy   - Defer given clinically compensated disease.     CRC Screening  - Refer to A/P below.     Orders:  •  Ambulatory Referral to Hepatology  •  CBC and differential; Future  •  Comprehensive metabolic panel; Future  •  Protime-INR; Future  •  AFP tumor marker; Future  •  Hepatitis A antibody, total; Future  •  Hepatitis B surface antibody; Future  •  US right upper quadrant; Future  •  EGD; Future

## 2025-01-27 NOTE — ASSESSMENT & PLAN NOTE
Patient with a hx of rectal cancer (T3N2M0) dx 2/2023. He completed neoadjuvant therapy with FOLFOX and 5-FU/RT. He is now s/p laparoscopic hand-assisted protect me, abdominal approach, coloanal anastomosis, loop ileostomy (10/17/2023) with subsequent ileostomy closure (1/16/2024).     He continues to follow with both medical oncology and C&R surgery.   His most recent CT chest, abdomen and pelvis did not show any evidence of local recurrence or metastatic disease.  He is scheduled for a colonoscopy with Dr. Lugo on 3/3/2025.   Continue following with oncology and C&R surgery as advised.          Follow-up in 6 months or sooner if necessary.

## 2025-01-29 ENCOUNTER — TELEPHONE (OUTPATIENT)
Dept: HEMATOLOGY ONCOLOGY | Facility: CLINIC | Age: 45
End: 2025-01-29

## 2025-01-29 NOTE — TELEPHONE ENCOUNTER
I spoke with patient today  Explained that labs for Dr. Rai are not needed until prior to follow up in 6 months  He will reach out to GI to see when they would like him to have labs drawn  Patient verbalized understanding

## 2025-02-05 ENCOUNTER — TELEPHONE (OUTPATIENT)
Dept: INTERVENTIONAL RADIOLOGY/VASCULAR | Facility: HOSPITAL | Age: 45
End: 2025-02-05

## 2025-02-13 ENCOUNTER — HOSPITAL ENCOUNTER (OUTPATIENT)
Dept: INTERVENTIONAL RADIOLOGY/VASCULAR | Facility: HOSPITAL | Age: 45
Discharge: HOME | End: 2025-02-13
Attending: INTERNAL MEDICINE
Payer: COMMERCIAL

## 2025-02-13 ENCOUNTER — APPOINTMENT (OUTPATIENT)
Dept: LAB | Facility: HOSPITAL | Age: 45
End: 2025-02-13
Payer: COMMERCIAL

## 2025-02-13 VITALS
TEMPERATURE: 97.8 F | SYSTOLIC BLOOD PRESSURE: 100 MMHG | RESPIRATION RATE: 18 BRPM | DIASTOLIC BLOOD PRESSURE: 69 MMHG | HEART RATE: 59 BPM | OXYGEN SATURATION: 99 %

## 2025-02-13 DIAGNOSIS — C20 RECTAL CANCER (HCC): ICD-10-CM

## 2025-02-13 DIAGNOSIS — K70.30 ALCOHOLIC CIRRHOSIS OF LIVER WITHOUT ASCITES (HCC): ICD-10-CM

## 2025-02-13 LAB
AFP-TM SERPL-MCNC: 4.01 NG/ML (ref 0–9)
ALBUMIN SERPL BCG-MCNC: 4.2 G/DL (ref 3.5–5)
ALP SERPL-CCNC: 40 U/L (ref 34–104)
ALT SERPL W P-5'-P-CCNC: 17 U/L (ref 7–52)
ANION GAP SERPL CALCULATED.3IONS-SCNC: 5 MMOL/L (ref 4–13)
AST SERPL W P-5'-P-CCNC: 21 U/L (ref 13–39)
BASOPHILS # BLD AUTO: 0.01 THOUSANDS/ÂΜL (ref 0–0.1)
BASOPHILS NFR BLD AUTO: 0 % (ref 0–1)
BILIRUB SERPL-MCNC: 0.55 MG/DL (ref 0.2–1)
BUN SERPL-MCNC: 13 MG/DL (ref 5–25)
CALCIUM SERPL-MCNC: 8.8 MG/DL (ref 8.4–10.2)
CHLORIDE SERPL-SCNC: 108 MMOL/L (ref 96–108)
CO2 SERPL-SCNC: 29 MMOL/L (ref 21–32)
CREAT SERPL-MCNC: 0.77 MG/DL (ref 0.6–1.3)
EOSINOPHIL # BLD AUTO: 0.11 THOUSAND/ÂΜL (ref 0–0.61)
EOSINOPHIL NFR BLD AUTO: 2 % (ref 0–6)
ERYTHROCYTE [DISTWIDTH] IN BLOOD BY AUTOMATED COUNT: 12.9 % (ref 11.6–15.1)
GFR SERPL CREATININE-BSD FRML MDRD: 109 ML/MIN/1.73SQ M
GLUCOSE P FAST SERPL-MCNC: 104 MG/DL (ref 65–99)
HCT VFR BLD AUTO: 38 % (ref 36.5–49.3)
HGB BLD-MCNC: 12.8 G/DL (ref 12–17)
IMM GRANULOCYTES # BLD AUTO: 0.04 THOUSAND/UL (ref 0–0.2)
IMM GRANULOCYTES NFR BLD AUTO: 1 % (ref 0–2)
INR PPP: 0.89 (ref 0.85–1.19)
INR PPP: 0.91 (ref 0.85–1.19)
LYMPHOCYTES # BLD AUTO: 0.64 THOUSANDS/ÂΜL (ref 0.6–4.47)
LYMPHOCYTES NFR BLD AUTO: 14 % (ref 14–44)
MCH RBC QN AUTO: 30.8 PG (ref 26.8–34.3)
MCHC RBC AUTO-ENTMCNC: 33.7 G/DL (ref 31.4–37.4)
MCV RBC AUTO: 92 FL (ref 82–98)
MONOCYTES # BLD AUTO: 0.34 THOUSAND/ÂΜL (ref 0.17–1.22)
MONOCYTES NFR BLD AUTO: 8 % (ref 4–12)
NEUTROPHILS # BLD AUTO: 3.37 THOUSANDS/ÂΜL (ref 1.85–7.62)
NEUTS SEG NFR BLD AUTO: 75 % (ref 43–75)
NRBC BLD AUTO-RTO: 0 /100 WBCS
PLATELET # BLD AUTO: 123 THOUSANDS/UL (ref 149–390)
PMV BLD AUTO: 9.4 FL (ref 8.9–12.7)
POTASSIUM SERPL-SCNC: 4 MMOL/L (ref 3.5–5.3)
PROT SERPL-MCNC: 6.5 G/DL (ref 6.4–8.4)
PROTHROMBIN TIME: 12.6 SECONDS (ref 12.3–15)
PROTHROMBIN TIME: 12.7 SECONDS (ref 12.3–15)
RBC # BLD AUTO: 4.15 MILLION/UL (ref 3.88–5.62)
SODIUM SERPL-SCNC: 142 MMOL/L (ref 135–147)
WBC # BLD AUTO: 4.51 THOUSAND/UL (ref 4.31–10.16)

## 2025-02-13 PROCEDURE — 85025 COMPLETE CBC W/AUTO DIFF WBC: CPT

## 2025-02-13 PROCEDURE — 36415 COLL VENOUS BLD VENIPUNCTURE: CPT

## 2025-02-13 PROCEDURE — 36590 REMOVAL TUNNELED CV CATH: CPT

## 2025-02-13 PROCEDURE — 86708 HEPATITIS A ANTIBODY: CPT

## 2025-02-13 PROCEDURE — 85610 PROTHROMBIN TIME: CPT | Performed by: RADIOLOGY

## 2025-02-13 PROCEDURE — 82105 ALPHA-FETOPROTEIN SERUM: CPT

## 2025-02-13 PROCEDURE — 85610 PROTHROMBIN TIME: CPT

## 2025-02-13 PROCEDURE — 80053 COMPREHEN METABOLIC PANEL: CPT

## 2025-02-13 PROCEDURE — 86706 HEP B SURFACE ANTIBODY: CPT

## 2025-02-13 RX ORDER — SODIUM CHLORIDE 9 MG/ML
75 INJECTION, SOLUTION INTRAVENOUS CONTINUOUS
Status: DISCONTINUED | OUTPATIENT
Start: 2025-02-13 | End: 2025-02-14 | Stop reason: HOSPADM

## 2025-02-13 NOTE — DISCHARGE INSTRUCTIONS
Implanted Venous Access Port Removal    WHAT YOU NEED TO KNOW:   An implanted venous access port is a device used to give treatments and take blood. It may also be called a central venous access device (CVAD). The port is a small container that is placed under your skin, usually in your upper chest. A port can also be placed in your arm or abdomen. The port is attached to a catheter that enters a large vein.     DISCHARGE INSTRUCTIONS:   Resume your normal diet. Small sips of flat soda will help with mild nausea.    Prevent an infection:     Wash your hands often.  Use soap and water. Clean your hands before and  after you care for your incision.    Check your skin for infection every day.  Look for redness, swelling, or fluid oozing from the incision site. Dressing may come off in 24 hours. Medical glue will peel off on its own in 5 to 10 days. You may shower 24 hours after procedure.     Follow up with your healthcare provider as directed  Write down your questions so you remember to ask them during your visits.     Activity:  You may return to your daily activities when the area heals.     Contact Interventional Radiology at [phone]  if:     You have a fever.     You have persistent nausea.    Your inciscion site is red, swollen, or draining pus.    You have questions or concerns about your condition or care.    Seek care immediately or call 911 if:  Blood soaks through your bandage.    The skin over or around your incision breaks open.    Your heart is jumping or fluttering.    You have a headache, blurred vision, and feel confused.    You have pain in your arm, neck, shoulder, or chest.    You have trouble breathing that is getting worse over time.

## 2025-02-13 NOTE — BRIEF OP NOTE (RAD/CATH)
INTERVENTIONAL RADIOLOGY PROCEDURE NOTE    Date: 2/13/2025    Procedure:   Procedure Summary       Date: 02/13/25 Room / Location: [facility] Interventional Radiology    Anesthesia Start:  Anesthesia Stop:     Procedure: IR PORT REMOVAL Diagnosis:       Rectal cancer (HCC)      (Completed Chemo)    Scheduled Providers:  Responsible Provider:     Anesthesia Type: Not recorded ASA Status: Not recorded            Preoperative diagnosis:   1. Rectal cancer (HCC)         Postoperative diagnosis: Same.    Surgeon: Rober Larson MD     Assistant: None. No qualified resident was available.    Blood loss: None    Specimens: None     Findings: Uneventful removal right chest wall infusion port.    Complications: None immediate.    Anesthesia: local

## 2025-02-13 NOTE — INTERVAL H&P NOTE
Update: (This section must be completed if the H&P was completed greater than 24 hrs to procedure or admission)    H&P reviewed. After examining the patient, I find no changed to the H&P since it had been written.    Visit Vitals  BP 96/60   Pulse 56   Temp 97.8 °F (36.6 °C) (Temporal)   Resp 17   SpO2 96%   Smoking Status Former         Patient re-evaluated. Accept as history and physical.    Rober Larson MD/February 13, 2025/11:52 AM

## 2025-02-14 ENCOUNTER — HOSPITAL ENCOUNTER (OUTPATIENT)
Dept: GASTROENTEROLOGY | Facility: HOSPITAL | Age: 45
Setting detail: OUTPATIENT SURGERY
Discharge: HOME | End: 2025-02-14
Payer: COMMERCIAL

## 2025-02-14 ENCOUNTER — ANESTHESIA EVENT (OUTPATIENT)
Dept: GASTROENTEROLOGY | Facility: HOSPITAL | Age: 45
End: 2025-02-14
Payer: COMMERCIAL

## 2025-02-14 ENCOUNTER — ANESTHESIA (OUTPATIENT)
Dept: GASTROENTEROLOGY | Facility: HOSPITAL | Age: 45
End: 2025-02-14
Payer: COMMERCIAL

## 2025-02-14 VITALS
SYSTOLIC BLOOD PRESSURE: 102 MMHG | DIASTOLIC BLOOD PRESSURE: 52 MMHG | RESPIRATION RATE: 18 BRPM | HEART RATE: 65 BPM | TEMPERATURE: 99.5 F | OXYGEN SATURATION: 99 %

## 2025-02-14 DIAGNOSIS — I86.4 GASTRIC VARICES WITHOUT BLEEDING: ICD-10-CM

## 2025-02-14 DIAGNOSIS — I85.10 SECONDARY ESOPHAGEAL VARICES WITHOUT BLEEDING (HCC): ICD-10-CM

## 2025-02-14 DIAGNOSIS — K70.30 ALCOHOLIC CIRRHOSIS OF LIVER WITHOUT ASCITES (HCC): ICD-10-CM

## 2025-02-14 LAB
HAV AB SER QL IA: NORMAL
HBV SURFACE AB SER-ACNC: <3 MIU/ML

## 2025-02-14 PROCEDURE — 43235 EGD DIAGNOSTIC BRUSH WASH: CPT | Performed by: INTERNAL MEDICINE

## 2025-02-14 RX ORDER — SODIUM CHLORIDE, SODIUM LACTATE, POTASSIUM CHLORIDE, CALCIUM CHLORIDE 600; 310; 30; 20 MG/100ML; MG/100ML; MG/100ML; MG/100ML
125 INJECTION, SOLUTION INTRAVENOUS CONTINUOUS
Status: DISCONTINUED | OUTPATIENT
Start: 2025-02-14 | End: 2025-02-18 | Stop reason: HOSPADM

## 2025-02-14 RX ORDER — PROPOFOL 10 MG/ML
INJECTION, EMULSION INTRAVENOUS AS NEEDED
Status: DISCONTINUED | OUTPATIENT
Start: 2025-02-14 | End: 2025-02-14

## 2025-02-14 RX ADMIN — PROPOFOL 150 MG: 10 INJECTION, EMULSION INTRAVENOUS at 11:39

## 2025-02-14 RX ADMIN — SODIUM CHLORIDE, SODIUM LACTATE, POTASSIUM CHLORIDE, AND CALCIUM CHLORIDE: .6; .31; .03; .02 INJECTION, SOLUTION INTRAVENOUS at 11:39

## 2025-02-14 RX ADMIN — PROPOFOL 50 MG: 10 INJECTION, EMULSION INTRAVENOUS at 11:43

## 2025-02-14 NOTE — ANESTHESIA POSTPROCEDURE EVALUATION
Post-Op Assessment Note    CV Status:  Stable    Pain management: adequate       Mental Status:  Alert and awake   Hydration Status:  Euvolemic   PONV Controlled:  Controlled   Airway Patency:  Patent     Post Op Vitals Reviewed: Yes    No anethesia notable event occurred.    Staff: Anesthesiologist           Last Filed PACU Vitals:  Vitals Value Taken Time   Temp     Pulse     BP     Resp     SpO2

## 2025-02-14 NOTE — INTERVAL H&P NOTE
H&P reviewed. After examining the patient I find no changes in the patients condition since the H&P had been written.    Vitals:    02/14/25 1059   BP: 109/53   Pulse: 75   Resp: 20   Temp: 99.5 °F (37.5 °C)   SpO2: 99%

## 2025-02-14 NOTE — ANESTHESIA PREPROCEDURE EVALUATION
Procedure:  EGD    Relevant Problems   GI/HEPATIC   (+) Cirrhosis (HCC)   (+) Rectal cancer (HCC)        Physical Exam    Airway    Mallampati score: II  TM Distance: >3 FB  Neck ROM: full     Dental   No notable dental hx     Cardiovascular      Pulmonary      Other Findings        Anesthesia Plan  ASA Score- 2     Anesthesia Type- IV sedation with anesthesia with ASA Monitors.         Additional Monitors:     Airway Plan:            Plan Factors-    Chart reviewed.    Patient summary reviewed.    Patient is not a current smoker.              Induction- intravenous.    Postoperative Plan-         Informed Consent- Anesthetic plan and risks discussed with patient.  I personally reviewed this patient with the CRNA. Discussed and agreed on the Anesthesia Plan with the CRNA..      NPO Status:  Vitals Value Taken Time   Date of last liquid 02/13/25 02/14/25 1055   Time of last liquid 2345 02/14/25 1055   Date of last solid 02/13/25 02/14/25 1055   Time of last solid 2345 02/14/25 1055

## 2025-03-01 ENCOUNTER — ANESTHESIA EVENT (OUTPATIENT)
Dept: ANESTHESIOLOGY | Facility: HOSPITAL | Age: 45
End: 2025-03-01

## 2025-03-01 ENCOUNTER — ANESTHESIA (OUTPATIENT)
Dept: ANESTHESIOLOGY | Facility: HOSPITAL | Age: 45
End: 2025-03-01

## 2025-03-01 NOTE — ANESTHESIA PREPROCEDURE EVALUATION
Procedure:  PRE-OP ONLY    Relevant Problems   GI/HEPATIC   (+) Cirrhosis (HCC)   (+) Rectal cancer (HCC)                  NPO Status:  No vitals data found for the desired time range.

## 2025-03-03 ENCOUNTER — ANESTHESIA (OUTPATIENT)
Dept: GASTROENTEROLOGY | Facility: HOSPITAL | Age: 45
End: 2025-03-03
Payer: COMMERCIAL

## 2025-03-03 ENCOUNTER — HOSPITAL ENCOUNTER (OUTPATIENT)
Dept: GASTROENTEROLOGY | Facility: HOSPITAL | Age: 45
Setting detail: OUTPATIENT SURGERY
Discharge: HOME | End: 2025-03-03
Attending: COLON & RECTAL SURGERY | Admitting: COLON & RECTAL SURGERY
Payer: COMMERCIAL

## 2025-03-03 ENCOUNTER — ANESTHESIA EVENT (OUTPATIENT)
Dept: GASTROENTEROLOGY | Facility: HOSPITAL | Age: 45
End: 2025-03-03
Payer: COMMERCIAL

## 2025-03-03 VITALS
OXYGEN SATURATION: 97 % | BODY MASS INDEX: 21.76 KG/M2 | HEIGHT: 75 IN | HEART RATE: 53 BPM | SYSTOLIC BLOOD PRESSURE: 99 MMHG | WEIGHT: 175 LBS | TEMPERATURE: 98.2 F | DIASTOLIC BLOOD PRESSURE: 64 MMHG | RESPIRATION RATE: 18 BRPM

## 2025-03-03 DIAGNOSIS — C20 RECTAL CANCER (HCC): ICD-10-CM

## 2025-03-03 PROCEDURE — G0105 COLORECTAL SCRN; HI RISK IND: HCPCS | Performed by: COLON & RECTAL SURGERY

## 2025-03-03 RX ORDER — LIDOCAINE HYDROCHLORIDE 10 MG/ML
INJECTION, SOLUTION EPIDURAL; INFILTRATION; INTRACAUDAL; PERINEURAL AS NEEDED
Status: DISCONTINUED | OUTPATIENT
Start: 2025-03-03 | End: 2025-03-03

## 2025-03-03 RX ORDER — PROPOFOL 10 MG/ML
INJECTION, EMULSION INTRAVENOUS AS NEEDED
Status: DISCONTINUED | OUTPATIENT
Start: 2025-03-03 | End: 2025-03-03

## 2025-03-03 RX ORDER — PROPOFOL 10 MG/ML
INJECTION, EMULSION INTRAVENOUS CONTINUOUS PRN
Status: DISCONTINUED | OUTPATIENT
Start: 2025-03-03 | End: 2025-03-03

## 2025-03-03 RX ORDER — SODIUM CHLORIDE 9 MG/ML
INJECTION, SOLUTION INTRAVENOUS CONTINUOUS PRN
Status: DISCONTINUED | OUTPATIENT
Start: 2025-03-03 | End: 2025-03-03

## 2025-03-03 RX ADMIN — LIDOCAINE HYDROCHLORIDE 50 MG: 10 INJECTION, SOLUTION EPIDURAL; INFILTRATION; INTRACAUDAL at 08:06

## 2025-03-03 RX ADMIN — SODIUM CHLORIDE: 0.9 INJECTION, SOLUTION INTRAVENOUS at 08:06

## 2025-03-03 RX ADMIN — PROPOFOL 150 MG: 10 INJECTION, EMULSION INTRAVENOUS at 08:06

## 2025-03-03 RX ADMIN — PROPOFOL 140 MCG/KG/MIN: 10 INJECTION, EMULSION INTRAVENOUS at 08:06

## 2025-03-03 NOTE — ANESTHESIA POSTPROCEDURE EVALUATION
Post-Op Assessment Note    CV Status:  Stable    Pain management: adequate       Mental Status:  Alert and awake   Hydration Status:  Euvolemic   PONV Controlled:  Controlled   Airway Patency:  Patent     Post Op Vitals Reviewed: Yes    No anethesia notable event occurred.    Staff: Anesthesiologist, CRNA           Last Filed PACU Vitals:  Vitals Value Taken Time   Temp 98.2 °F (36.8 °C) 03/03/25 0822   Pulse 53 03/03/25 0837   BP 99/64 03/03/25 0837   Resp 18 03/03/25 0837   SpO2 97 % 03/03/25 0837       Modified Magdalene:     Vitals Value Taken Time   Activity 2 03/03/25 0837   Respiration 2 03/03/25 0837   Circulation 2 03/03/25 0837   Consciousness 2 03/03/25 0837   Oxygen Saturation 2 03/03/25 0837     Modified Magdalene Score: 10

## 2025-03-03 NOTE — ANESTHESIA PREPROCEDURE EVALUATION
Procedure:  COLONOSCOPY    Relevant Problems   ANESTHESIA (within normal limits)      CARDIO (within normal limits)      ENDO (within normal limits)      GI/HEPATIC   (+) Cirrhosis (HCC)   (+) Rectal cancer (HCC)      /RENAL (within normal limits)      GYN (within normal limits)      HEMATOLOGY (within normal limits)      MUSCULOSKELETAL (within normal limits)      NEURO/PSYCH (within normal limits)      PULMONARY (within normal limits)        Physical Exam    Airway    Mallampati score: II  TM Distance: >3 FB  Neck ROM: full     Dental        Cardiovascular      Pulmonary      Other Findings        Anesthesia Plan  ASA Score- 2     Anesthesia Type- IV sedation with anesthesia with ASA Monitors.         Additional Monitors:     Airway Plan:            Plan Factors-Exercise tolerance (METS): >4 METS.    Chart reviewed.    Patient summary reviewed.    Patient is a current smoker (nicotine, marijuana vape).              Induction- intravenous.    Postoperative Plan-         Informed Consent- Anesthetic plan and risks discussed with patient.  I personally reviewed this patient with the CRNA. Discussed and agreed on the Anesthesia Plan with the CRNA..      NPO Status:  Vitals Value Taken Time   Date of last liquid 03/03/25 03/03/25 0734   Time of last liquid 1200 03/03/25 0734   Date of last solid 03/01/25 03/03/25 0734   Time of last solid 2000 03/03/25 0734

## 2025-03-03 NOTE — H&P
History and Physical   Colon and Rectal Surgery   Alexandro Luo 44 y.o. male MRN: [id number]  Unit/Bed#:  Encounter: [id number]  03/03/25   7:57 AM      CC:  History of rectal cancer    History of Present Illness   HPI:  Alexandro Luo is a 44 y.o. male for surveillance.  Historical Information   Past Medical History:   Diagnosis Date    Cirrhosis (HCC) 3/18/24    Colon cancer (HCC)     Dental infection     Fatty liver     Rectal cancer (HCC)      Past Surgical History:   Procedure Laterality Date    APPENDECTOMY      COLONOSCOPY      DENTAL SURGERY      IR PORT PLACEMENT  02/28/2023    IR PORT REMOVAL  2/13/2025    DC CLOSURE ENTEROSTOMY LG/SMALL INTESTINE N/A 1/16/2024    Procedure: CLOSURE ILEOSTOMY;  Surgeon: CLEMENTE Lugo MD;  [facility]: [facility];  Service: Colorectal    DC LAPS PROCTECTOMY COMBINED PULL-THRU W/RESERVOIR N/A 10/17/2023    Procedure: LAPAROSCOPIC HAND ASSIST PROCTECTOMY, ABDOMINAL APPROACH, coloanal anastomosis, loop ileostomy;  Surgeon: CLEMENTE Lugo MD;  Location[facility];  Service: Colorectal    DC SIGMOIDOSCOPY FLX DX W/COLLJ SPEC BR/WA IF PFRMD N/A 10/17/2023    Procedure: SIGMOIDOSCOPY FLEXIBLE;  Surgeon: CLEMENTE Lugo MD;  Location: [facility];  Service: Colorectal    UPPER GASTROINTESTINAL ENDOSCOPY         Meds/Allergies     Not in a hospital admission.      Current Outpatient Medications:     mirtazapine (REMERON) 15 mg tablet, take 1 tablet by mouth at bedtime, Disp: 30 tablet, Rfl: 0    pyridoxine (VITAMIN B6) 50 mg tablet, Take 50 mg by mouth daily (Patient not taking: Reported on 2/19/2024), Disp: , Rfl:     No Known Allergies      Social History   Social History     Substance and Sexual Activity   Alcohol Use Not Currently    Alcohol/week: 2.0 standard drinks of alcohol    Types: 2 Cans of beer per week    Comment: 1-2 daily. previously up to 1 case/day (reduced alcohol intake 7 years ago)     Social History     Substance and Sexual Activity   Drug Use Yes     "Frequency: 5.0 times per week    Types: Marijuana    Comment: medical marijuana     Social History     Tobacco Use   Smoking Status Former    Current packs/day: 0.00    Average packs/day: 1 pack/day for 20.0 years (20.0 ttl pk-yrs)    Types: Cigarettes    Start date: 2000    Quit date: 2020    Years since quittin.1   Smokeless Tobacco Never         Family History:   Family History   Problem Relation Age of Onset    Lung cancer Mother     Cancer Mother         Lung    Lung cancer Father     Cancer Father         Mouth throat    Colon cancer Neg Hx     Colon polyps Neg Hx          Objective     Current Vitals:   Blood Pressure: 124/77 (25)  Pulse: 68 (25)  Temperature: (!) 96.2 °F (35.7 °C) (25)  Respirations: 17 (25)  Height: 6' 3\" (190.5 cm) (25)  Weight - Scale: 79.4 kg (175 lb) (25)  SpO2: 98 % (25)  No intake or output data in the 24 hours ending 25    Physical Exam:  General:  Well nourished, no distress.  Neuro: Alert and oriented  Eyes:Sclera anicteric, conjunctiva pink.  Pulm: Clear to auscultation bilaterally. No respiratory Distress.   CV:  Regular rate and rhythm. No murmurs.  Abdomen:  Soft, flat, non-tender, without masses or hepatosplenomegaly.    Lab Results:       ASSESSMENT:  Alexandro Luo is a 44 y.o. male for surveillance.  PLAN:  Colonoscopy.  Risks , including, but not limited to, bleeding, perforation, missed lesions, and potential need for surgery, were reviewed. Alternatives to colonoscopy were discussed.  RANDALL Lugo MD  "

## 2025-03-03 NOTE — ANESTHESIA POSTPROCEDURE EVALUATION
Post-Op Assessment Note    CV Status:  Stable  Pain Score: 0    Pain management: adequate       Mental Status:  Alert and awake   Hydration Status:  Euvolemic   PONV Controlled:  Controlled   Airway Patency:  Patent     Post Op Vitals Reviewed: Yes    No anethesia notable event occurred.    Staff: CRNA           Last Filed PACU Vitals:  Vitals Value Taken Time   Temp 98.2 °F (36.8 °C) 03/03/25 0822   Pulse 60 03/03/25 0822   BP 87/53 03/03/25 0822   Resp 18 03/03/25 0822   SpO2 100 % 03/03/25 0822       Modified Magdalene:     Vitals Value Taken Time   Activity 2 03/03/25 0822   Respiration 2 03/03/25 0822   Circulation 2 03/03/25 0822   Consciousness 2 03/03/25 0822   Oxygen Saturation 2 03/03/25 0822     Modified Magdalene Score: 10

## 2025-03-10 ENCOUNTER — RESULTS FOLLOW-UP (OUTPATIENT)
Age: 45
End: 2025-03-10

## 2025-05-27 ENCOUNTER — HOSPITAL ENCOUNTER (OUTPATIENT)
Dept: ULTRASOUND IMAGING | Facility: HOSPITAL | Age: 45
Discharge: HOME | End: 2025-05-27
Payer: COMMERCIAL

## 2025-05-27 DIAGNOSIS — K70.30 ALCOHOLIC CIRRHOSIS OF LIVER WITHOUT ASCITES (HCC): ICD-10-CM

## 2025-05-27 PROCEDURE — 76705 ECHO EXAM OF ABDOMEN: CPT

## 2025-05-28 ENCOUNTER — TELEPHONE (OUTPATIENT)
Age: 45
End: 2025-05-28

## 2025-05-28 NOTE — TELEPHONE ENCOUNTER
Called and spoke to patient about having ot reschedule appt for 6/24 . Patient reschedule to 6/25 at 8th e office.

## 2025-06-24 NOTE — PROGRESS NOTES
Name: Alexandro Luo      : 1980      MRN: [id number]  Encounter Provider: Lou Camp PA-C  Encounter Date: 2025   Encounter department: [facility] GA[facility] [address]  :  Assessment & Plan  Alcoholic cirrhosis of liver without ascites (HCC)  Summary: Patient is a 45 y.o. male with cirrhosis secondary to EtOH c/b nonbleeding esophageal varices and mild PHG. Current MELD-3.0 (6). Last visit 2025 with Hepatology.     Mr. Luo was diagnosed with cirrhosis in 2023 after being found to have cirrhotic morphology and evidence of pHTN on imaging for the evaluation of rectal cancer. The etiology was felt to be EtOH given his reports of excessive alcohol use and negative secondary serologic workup. Fortunately, he has not had any liver-related hospitalizations or had any decompensating events since his last appointment.  He also reports maintained sobriety. He feels very well.     He will be due for MELD labs in August.  He has labs ordered by his physician already which I will be CC'd on. He should follow-up in 6 months or sooner if necessary.  He is due for HCC screening in November which we will order now.  Continue following with oncology and C&R surgery as advised.     We discussed natural history, prognosis, and complications of cirrhosis, including decompensation in the form of ascites, variceal bleeding, and hepatic encephalopathy as well as risk for development of HCC. Patient is aware that etoh abstinence is key in helping to slow the progression of disease and prevent decompensating events.      Ascites   - None.    Esophageal Varices   - EGD 2025: Small varices in the esophagus. Flatten with insufflation. PHG. No gastric varices seen  - Carvedilol 3.125 mg BID held 2/2 hypotension. Patient wishes to defer this tx.     Hepatic Encephalopathy   - None.  - Patient aware of signs/sxs's of HE and advised to promptly inform provider if experiencing such.    HCC  Screening   - 5/27/25 US no liver lesions. AFP 4.01.  - Continue imaging with an AFP level q6 months.     Nutritional Status  - Encouraged high-protein diet in addition to a high-protein snack qHS to prevent prolonged fasting.     Vaccines   - Recommended Hep A/B.    Transplant Candidacy   - Defer given low meld and clinical stability.    CRC Screening  - (3/03/2025) diverticulosis. Recall 2 yrs. History of Rectal Cancer.     Orders:    CBC and differential; Future    Protime-INR; Future    MRI abdomen w wo contrast and mrcp; Future    AFP tumor marker; Future      History of Present Illness   HPI  Alexandro Luo is a 45 y.o. male history of rectal cancer, chemotherapy-induced neutropenia and AUD in remission who presents today for follow-up regarding EtOH cirrhosis with nonbleeding esophageal varices.    Alexandro is familiar to [facility] hepatology, last seen 1/2025. At that visit, carvedilol was held due to hypotension. Patient wishes to defer this treatment for now.  He feels very well and denies any liver specific or gastrointestinal complaints.  No jaundice, confusion, ascites, abdominal pain, GI bleeding.  Reports complete sobriety since his diagnosis.  No liver related hospitalizations or decompensating events    Extended History:  He was diagnosed with cirrhosis in February 2023 after being found to have cirrhotic morphology and pHTN as evidenced by splenomegaly, gastroesophageal varices, recannulization of the periumbilical vein and trace perihepatic ascites for workup of rectal cancer. He has not had prior liver biopsy. He had a complete serologic evaluation which was unremarkable for competing causes of liver disease and his cirrhosis was felt to be secondary to EtOH.      He has a history of rectal cancer and is s/p laparoscopic coloanal anastomosis, loop ileostomy (10/17/2023) with subsequent ileostomy closure (1/16/2024). He also completed neoadjuvant therapy with FOLFOX and 5-FU/RT.  His most recent  CT chest, abdomen and pelvis did not show any evidence of local recurrence or metastatic disease.      MELD 3.0: 6 at 2/13/2025 10:26 AM  MELD-Na: 6 at 2/13/2025 10:26 AM  Calculated from:  Serum Creatinine: 0.77 mg/dL (Using min of 1 mg/dL) at 2/13/2025  9:36 AM  Serum Sodium: 142 mmol/L (Using max of 137 mmol/L) at 2/13/2025  9:36 AM  Total Bilirubin: 0.55 mg/dL (Using min of 1 mg/dL) at 2/13/2025  9:36 AM  Serum Albumin: 4.2 g/dL (Using max of 3.5 g/dL) at 2/13/2025  9:36 AM  INR(ratio): 0.91 (Using min of 1) at 2/13/2025 10:26 AM  Age at listing (hypothetical): 44 years  Sex: Male at 2/13/2025 10:26 AM    History obtained from: patient    Review of Systems   All other systems reviewed and are negative.    Pertinent Medical History     .  Past Medical History   Past Medical History[1]  Past Surgical History[2]  Family History[3]   reports that he quit smoking about 5 years ago. His smoking use included cigarettes. He started smoking about 25 years ago. He has a 20 pack-year smoking history. He has never used smokeless tobacco. He reports that he does not currently use alcohol after a past usage of about 2.0 standard drinks of alcohol per week. He reports current drug use. Frequency: 5.00 times per week. Drug: Marijuana.  Current Outpatient Medications   Medication Instructions    mirtazapine (REMERON) 15 mg, Oral, Daily at bedtime    Multiple Vitamin (multivitamin) tablet 1 tablet, Daily    pyridoxine (VITAMIN B6) 50 mg, Daily   Allergies[4]   Medications Ordered Prior to Encounter[5]   Social History[6]     Objective   There were no vitals taken for this visit.     Physical Exam  Constitutional:       General: He is not in acute distress.     Appearance: Normal appearance. He is not ill-appearing or toxic-appearing.     Eyes:      Conjunctiva/sclera: Conjunctivae normal.       Cardiovascular:      Rate and Rhythm: Normal rate and regular rhythm.      Heart sounds: Normal heart sounds.   Pulmonary:      Effort:  Pulmonary effort is normal.      Breath sounds: Normal breath sounds.   Abdominal:      General: Abdomen is flat. There is no distension.      Palpations: Abdomen is soft.      Tenderness: There is no abdominal tenderness. There is no guarding.     Skin:     General: Skin is warm and dry.     Neurological:      Mental Status: He is alert. Mental status is at baseline.     Psychiatric:         Mood and Affect: Mood normal.                  [1]   Past Medical History:  Diagnosis Date    Cirrhosis (HCC) 3/18/24    Colon cancer (HCC)     Dental infection     Fatty liver     Rectal cancer (HCC)    [2]   Past Surgical History:  Procedure Laterality Date    APPENDECTOMY      COLONOSCOPY      DENTAL SURGERY      IR PORT PLACEMENT  02/28/2023    IR PORT REMOVAL  2/13/2025    CT CLOSURE ENTEROSTOMY LG/SMALL INTESTINE N/A 1/16/2024    Procedure: CLOSURE ILEOSTOMY;  Surgeon: CLEMENTE Lugo MD;  [facility];  Service: Colorectal    CT LAPS PROCTECTOMY COMBINED PULL-THRU W/RESERVOIR N/A 10/17/2023    Procedure: LAPAROSCOPIC HAND ASSIST PROCTECTOMY, ABDOMINAL APPROACH, coloanal anastomosis, loop ileostomy;  Surgeon: CLEMENTE Lugo MD;  [facility];  Service: Colorectal    CT SIGMOIDOSCOPY FLX DX W/COLLJ SPEC BR/WA IF PFRMD N/A 10/17/2023    Procedure: SIGMOIDOSCOPY FLEXIBLE;  Surgeon: CLEMENTE Lugo MD;  [facility];  Service: Colorectal    UPPER GASTROINTESTINAL ENDOSCOPY     [3]   Family History  Problem Relation Name Age of Onset    Lung cancer Mother Mom     Cancer Mother Mom         Lung    Lung cancer Father Dad     Cancer Father Dad         Mouth throat    Colon cancer Neg Hx      Colon polyps Neg Hx     [4] No Known Allergies  [5]   Current Outpatient Medications on File Prior to Visit   Medication Sig Dispense Refill    Multiple Vitamin (multivitamin) tablet Take 1 tablet by mouth daily      mirtazapine (REMERON) 15 mg tablet take 1 tablet by mouth at bedtime (Patient not taking:  Reported on 2025) 30 tablet 0    pyridoxine (VITAMIN B6) 50 mg tablet Take 50 mg by mouth daily (Patient not taking: Reported on 2024)       No current facility-administered medications on file prior to visit.   [6]   Social History  Tobacco Use    Smoking status: Former     Current packs/day: 0.00     Average packs/day: 1 pack/day for 20.0 years (20.0 ttl pk-yrs)     Types: Cigarettes     Start date: 2000     Quit date: 2020     Years since quittin.4    Smokeless tobacco: Never   Vaping Use    Vaping status: Former    Substances: Nicotine, Flavoring   Substance and Sexual Activity    Alcohol use: Not Currently     Alcohol/week: 2.0 standard drinks of alcohol     Types: 2 Cans of beer per week     Comment: 1-2 daily. previously up to 1 case/day (reduced alcohol intake 7 years ago)    Drug use: Yes     Frequency: 5.0 times per week     Types: Marijuana     Comment: medical marijuana    Sexual activity: Not Currently

## 2025-06-25 ENCOUNTER — OFFICE VISIT (OUTPATIENT)
Age: 45
End: 2025-06-25
Payer: COMMERCIAL

## 2025-06-25 VITALS
HEART RATE: 68 BPM | HEIGHT: 75 IN | BODY MASS INDEX: 21.16 KG/M2 | SYSTOLIC BLOOD PRESSURE: 116 MMHG | WEIGHT: 170.2 LBS | DIASTOLIC BLOOD PRESSURE: 64 MMHG

## 2025-06-25 DIAGNOSIS — K70.30 ALCOHOLIC CIRRHOSIS OF LIVER WITHOUT ASCITES (HCC): Primary | ICD-10-CM

## 2025-06-25 PROCEDURE — 99214 OFFICE O/P EST MOD 30 MIN: CPT | Performed by: PHYSICIAN ASSISTANT

## 2025-06-25 RX ORDER — MULTIVITAMIN
1 TABLET ORAL DAILY
COMMUNITY

## 2025-06-25 NOTE — ASSESSMENT & PLAN NOTE
Summary: Patient is a 45 y.o. male with cirrhosis secondary to EtOH c/b nonbleeding esophageal varices and mild PHG. Current MELD-3.0 (6). Last visit 1/2025 with Hepatology.     Mr. Luo was diagnosed with cirrhosis in February 2023 after being found to have cirrhotic morphology and evidence of pHTN on imaging for the evaluation of rectal cancer. The etiology was felt to be EtOH given his reports of excessive alcohol use and negative secondary serologic workup. Fortunately, he has not had any liver-related hospitalizations or had any decompensating events since his last appointment.  He also reports maintained sobriety. He feels very well.     He will be due for MELD labs in August.  He has labs ordered by his physician already which I will be CC'd on. He should follow-up in 6 months or sooner if necessary.  He is due for HCC screening in November which we will order now.  Continue following with oncology and C&R surgery as advised.     We discussed natural history, prognosis, and complications of cirrhosis, including decompensation in the form of ascites, variceal bleeding, and hepatic encephalopathy as well as risk for development of HCC. Patient is aware that etoh abstinence is key in helping to slow the progression of disease and prevent decompensating events.      Ascites   - None.    Esophageal Varices   - EGD 2/14/2025: Small varices in the esophagus. Flatten with insufflation. PHG. No gastric varices seen  - Carvedilol 3.125 mg BID held 2/2 hypotension. Patient wishes to defer this tx.     Hepatic Encephalopathy   - None.  - Patient aware of signs/sxs's of HE and advised to promptly inform provider if experiencing such.    HCC Screening   - 5/27/25  no liver lesions. AFP 4.01.  - Continue imaging with an AFP level q6 months.     Nutritional Status  - Encouraged high-protein diet in addition to a high-protein snack qHS to prevent prolonged fasting.     Vaccines   - Recommended Hep A/B.    Transplant  Candidacy   - Defer given low meld and clinical stability.    CRC Screening  - (3/03/2025) diverticulosis. Recall 2 yrs. History of Rectal Cancer.     Orders:    CBC and differential; Future    Protime-INR; Future    MRI abdomen w wo contrast and mrcp; Future    AFP tumor marker; Future

## 2025-06-26 ENCOUNTER — APPOINTMENT (OUTPATIENT)
Dept: LAB | Facility: HOSPITAL | Age: 45
End: 2025-06-26
Payer: COMMERCIAL

## 2025-06-26 DIAGNOSIS — K70.30 ALCOHOLIC CIRRHOSIS OF LIVER WITHOUT ASCITES (HCC): ICD-10-CM

## 2025-06-26 DIAGNOSIS — C20 RECTAL CANCER (HCC): ICD-10-CM

## 2025-06-26 LAB
INR PPP: 0.94 (ref 0.85–1.19)
PROTHROMBIN TIME: 12.9 SECONDS (ref 12.3–15)

## 2025-06-26 PROCEDURE — 36415 COLL VENOUS BLD VENIPUNCTURE: CPT

## 2025-06-26 PROCEDURE — 85610 PROTHROMBIN TIME: CPT

## 2025-07-13 ENCOUNTER — HOSPITAL ENCOUNTER (OUTPATIENT)
Dept: CT IMAGING | Facility: HOSPITAL | Age: 45
Discharge: HOME | End: 2025-07-13
Attending: INTERNAL MEDICINE
Payer: COMMERCIAL

## 2025-07-13 DIAGNOSIS — C20 RECTAL CANCER (HCC): ICD-10-CM

## 2025-07-13 PROCEDURE — 71260 CT THORAX DX C+: CPT

## 2025-07-13 PROCEDURE — 74177 CT ABD & PELVIS W/CONTRAST: CPT

## 2025-07-13 RX ADMIN — IOHEXOL 100 ML: 350 INJECTION, SOLUTION INTRAVENOUS at 08:54

## 2025-07-18 ENCOUNTER — TELEPHONE (OUTPATIENT)
Dept: HEMATOLOGY ONCOLOGY | Facility: CLINIC | Age: 45
End: 2025-07-18

## 2025-07-21 ENCOUNTER — OFFICE VISIT (OUTPATIENT)
Age: 45
End: 2025-07-21
Payer: COMMERCIAL

## 2025-07-21 VITALS
WEIGHT: 178 LBS | OXYGEN SATURATION: 98 % | HEART RATE: 68 BPM | HEIGHT: 75 IN | RESPIRATION RATE: 16 BRPM | TEMPERATURE: 97.9 F | BODY MASS INDEX: 22.13 KG/M2 | DIASTOLIC BLOOD PRESSURE: 74 MMHG | SYSTOLIC BLOOD PRESSURE: 100 MMHG

## 2025-07-21 DIAGNOSIS — C20 RECTAL CANCER (HCC): Primary | ICD-10-CM

## 2025-07-21 DIAGNOSIS — G62.9 NEUROPATHY: ICD-10-CM

## 2025-07-21 PROCEDURE — 99214 OFFICE O/P EST MOD 30 MIN: CPT | Performed by: INTERNAL MEDICINE

## 2025-07-21 RX ORDER — GABAPENTIN 300 MG/1
300 CAPSULE ORAL
Qty: 90 CAPSULE | Refills: 2 | Status: SHIPPED | OUTPATIENT
Start: 2025-07-21

## 2025-07-21 NOTE — ASSESSMENT & PLAN NOTE
Orders:    CT chest abdomen pelvis w contrast; Future    CBC and differential; Future    Comprehensive metabolic panel; Future    CEA; Future

## 2025-07-21 NOTE — PROGRESS NOTES
Name: Alexandro Luo      : 1980      MRN: [id number]  Encounter Provider: Sarina Rai DO  Encounter Date: 2025   Encounter department: [facility]  :  Assessment & Plan  Neuropathy    Orders:    gabapentin (Neurontin) 300 mg capsule; Take 1 capsule (300 mg total) by mouth daily at bedtime    He is elieser start gabapentin 300 mg at night.  He can increase the dose to 600 mg if he needs something a little stronger.  He has trouble sleeping through the night so I am hopeful that this will take the edge off of his neuropathy pain.  He also takes medical marijuana for pain and I will recertify him for that.  Rectal cancer (HCC)    Orders:    CT chest abdomen pelvis w contrast; Future    CBC and differential; Future    Comprehensive metabolic panel; Future    CEA; Future    He will follow-up in 6 months with a CT of the chest abdomen and pelvis prior in addition to CBC CMP and CEA.  He remains in remission and is overall doing well.    Return in about 6 months (around 2026) for 20 minute follow up visit.    History of Present Illness   Chief Complaint   Patient presents with    Follow-up     45-year-old male with a history of rectal cancer status post neoadjuvant chemotherapy and chemo RT.  CT prior to this visit shows no evidence of disease.  Labs prior to this visit are normal.  He has a slightly low white count and platelet count in the setting of cirrhosis but is not having infections or bleeding.  His biggest remaining issue is the neuropathy that has been persistent after oxaliplatin.  It is worse at night and makes it hard to sleep.  His feet ache significantly after a long day of work.  His bowels can be a little unpredictable.  He denies any bright red blood per rectum.  Weight is stable.    Oncology History   Cancer Staging   Rectal cancer (HCC)  Staging form: Colon and Rectum, AJCC 8th Edition  - Clinical: cT3, cN2, cM0 - Signed by  Sarina Rai DO on 3/2/2023  - Pathologic stage from 10/17/2023: Stage IIIB (ypT3, ypN1b, cM0) - Signed by Joe Barboza MD on 11/8/2023  Stage prefix: Post-therapy  Response to neoadjuvant therapy: Partial response  Oncology History Overview Note   2/2023 - iV1O5G5 rectal adenocarcinoma     3/9/2023 - start neoadjuvant FOLFOX     6/26/2023 - start 5-FU/RT     7/17/2023 - 5-FU held due thrombocytopenia     10/17/2023 - LAPAROSCOPIC HAND ASSIST PROCTECTOMY. ABDOMINAL APPROACH. coloanal anastomosis (very low pull through). loop ileostomy     pT3N1b     Rectal cancer (HCC)   2/17/2023 Initial Diagnosis    Rectal cancer (HCC)     2/17/2023 Biopsy    Final Diagnosis   A. Large Intestine, Descending Colon, polyp:    - Tubular adenoma.     - Negative for high grade dysplasia.      B. Rectum, mass:   -  Adenocarcinoma. See comment.     Comment: Immunohistochemical stains performed with adequate controls demonstrates that the tumor is positive for CDX2, SATB2, CK20 (patchy), and negative for CK7, Synaptophysin, Chromogranin A, and p40. The immunophenotype supports the diagnosis. Ancillary testing for mismatch repair (MMR) protein deficiency by immunohistochemical panel (MLH1, PMS2, MSH2, MSH6) is undertaken (Block B1); the results will be issued in a supplemental report, to follow shortly     Addendum   RESULTS OF IMMUNOHISTOCHEMICAL ANALYSIS FOR MISMATCH REPAIR PROTEIN LOSS     INTERPRETATION: NO LOSS OF NUCLEAR EXPRESSION OF MMR PROTEINS: LOW PROBABILITY OF MSI-H.     RESULTS:  Antibody            Clone                 Description                                  Results  MLH1                 [id number]          Mismatch repair protein               Intact nuclear expression  MSH2                [id number]        Mismatch repair protein               Intact nuclear expression  MSH6                44                      Mismatch repair protein               Intact nuclear expression  PMS2                 [id number]            Mismatch repair protein               Intact nuclear expression     Comment:   A negative control and a positive control for each antibody have been reviewed and accepted        3/2/2023 -  Cancer Staged    Staging form: Colon and Rectum, AJCC 8th Edition  - Clinical: cT3, cN2, cM0 - Signed by Sarina Rai DO on 3/2/2023       3/9/2023 - 6/2/2023 Chemotherapy    alteplase (CATHFLO), 2 mg, Intracatheter, Every 1 Minute as needed, 6 of 10 cycles  palonosetron (ALOXI), 0.25 mg, Intravenous, Once, 1 of 2 cycles  Administration: 0.25 mg (5/31/2023)  pegfilgrastim (NEULASTA), 6 mg, Subcutaneous, Once, 1 of 1 cycle  Pegfilgrastim-bmez (ZIEXTENZO), 6 mg, Subcutaneous, Once, 4 of 8 cycles  Administration: 6 mg (4/21/2023), 6 mg (5/6/2023), 6 mg (5/22/2023), 6 mg (6/2/2023)  fluorouracil (ADRUCIL), 400 mg/m2 = 915 mg, Intravenous, Once, 6 of 10 cycles  Administration: 915 mg (3/9/2023), 915 mg (3/22/2023), 915 mg (4/19/2023), 915 mg (5/4/2023), 915 mg (5/17/2023), 915 mg (5/31/2023)  leucovorin calcium IVPB, 916 mg, Intravenous, Once, 6 of 10 cycles  Administration: 900 mg (3/9/2023), 900 mg (3/22/2023), 900 mg (4/19/2023), 900 mg (5/4/2023), 900 mg (5/17/2023), 900 mg (5/31/2023)  oxaliplatin (ELOXATIN) chemo infusion, 194.65 mg, Intravenous, Once, 6 of 10 cycles  Administration: 200 mg (3/9/2023), 200 mg (3/22/2023), 200 mg (4/19/2023), 200 mg (5/4/2023), 200 mg (5/17/2023), 200 mg (5/31/2023)  fluorouracil (ADRUCIL) ambulatory infusion Soln, 1,200 mg/m2/day = 5,495 mg, Intravenous, Over 46 hours, 6 of 10 cycles  aprepitant (CINVANTI) in  mL IVPB, 130 mg, Intravenous, Once, 1 of 2 cycles  Administration: 130 mg (5/31/2023)     6/21/2023 - 8/1/2023 Radiation      Plan ID Energy Fractions Dose per Fraction (cGy) Dose Correction (cGy) Total Dose Delivered (cGy) Elapsed Days   CD Rectum 6X 3 / 3 180 0 540 4   Whole Pelvis 6X 25 / 25 180 0 4,500 36        6/26/2023 - 7/28/2023 Chemotherapy    alteplase (CATHFLO),  "2 mg, Intracatheter, Every 1 Minute as needed, 4 of 4 cycles  fluorouracil (ADRUCIL) ambulatory infusion Soln, 200 mg/m2/day = 2,190 mg (88.9 % of original dose 225 mg/m2/day), Intravenous, Over 120 hours, 4 of 4 cycles  Dose modification: 200 mg/m2/day (original dose 225 mg/m2/day, Cycle 1, Reason: Anticipated Tolerance)     10/17/2023 Surgery    Laparoscopic hand-assisted proctectomy with coloanal anastomosis and loop ileostomy    Final Diagnosis  A. Rectum, sigmoid colon, and two donuts, low anterior resection:  -   Invasive adenocarcinoma of rectum, moderately differentiated, with treatment effect.  -   Separate segments of colon/rectum (anastomotic donuts), negative for malignancy.  -   See synoptic report.  -   MMR (MLH1, MSH2, MSH6 and PMS2) studies by IHC on biopsy specimen [id number] show intact protein expression.     10/17/2023 -  Cancer Staged    Staging form: Colon and Rectum, AJCC 8th Edition  - Pathologic stage from 10/17/2023: Stage IIIB (ypT3, pN1b, cM0) - Signed by Joe Barboza MD on 11/8/2023  Stage prefix: Post-therapy  Response to neoadjuvant therapy: Partial response       1/16/2024 Surgery    1/16/24 Closure ileostomy  A. Ileostomy stoma, ileostomy reversal:  - Enterocutaneous stoma, consistent with ileostomy site.  - Negative for dysplasia and malignancy        Pertinent Medical History         Review of Systems otherwise neg        Objective   /74 (BP Location: Left arm, Patient Position: Sitting, Cuff Size: Adult)   Pulse 68   Temp 97.9 °F (36.6 °C) (Temporal)   Resp 16   Ht 6' 3\" (1.905 m)   Wt 80.7 kg (178 lb)   SpO2 98%   BMI 22.25 kg/m²     Pain Screening:  Pain Score: 0-No pain  ECOG   0  Physical Exam    GEN: Alert, awake oriented x3, in no acute distress  HEENT- No pallor, icterus, cyanosis, no oral mucosal lesions,   LAD - no palpable cervical, clavicle, axillary, inguinal LAD  Heart- normal S1 S2, regular rate and rhythm, No murmur, rubs.   Lungs- clear breathing " sound bilateral.   Abdomen- soft, Non tender, bowel sounds present  Extremities- No cyanosis, clubbing, edema  Neuro- No focal neurological deficit      Labs: I have reviewed the following labs:  Lab Results   Component Value Date/Time    WBC 3.76 (L) 06/26/2025 09:05 AM    RBC 4.41 06/26/2025 09:05 AM    Hemoglobin 13.7 06/26/2025 09:05 AM    Hematocrit 41.4 06/26/2025 09:05 AM    MCV 94 06/26/2025 09:05 AM    MCH 31.1 06/26/2025 09:05 AM    RDW 13.3 06/26/2025 09:05 AM    Platelets 128 (L) 06/26/2025 09:05 AM    Segmented % 65 06/26/2025 09:05 AM    Lymphocytes % 21 06/26/2025 09:05 AM    Monocytes % 8 06/26/2025 09:05 AM    Eosinophils Relative 4 06/26/2025 09:05 AM    Basophils Relative 1 06/26/2025 09:05 AM    Immature Grans % 1 06/26/2025 09:05 AM    Absolute Neutrophils 2.50 06/26/2025 09:05 AM     Lab Results   Component Value Date/Time    Potassium 3.9 06/26/2025 09:05 AM    Chloride 105 06/26/2025 09:05 AM    CO2 24 06/26/2025 09:05 AM    BUN 10 06/26/2025 09:05 AM    Creatinine 0.80 06/26/2025 09:05 AM    Glucose, Fasting 122 (H) 06/26/2025 09:05 AM    Calcium 8.7 06/26/2025 09:05 AM    AST 25 06/26/2025 09:05 AM    ALT 21 06/26/2025 09:05 AM    Alkaline Phosphatase 42 06/26/2025 09:05 AM    Total Protein 6.6 06/26/2025 09:05 AM    Albumin 4.2 06/26/2025 09:05 AM    Total Bilirubin 0.44 06/26/2025 09:05 AM    eGFR 107 06/26/2025 09:05 AM       Radiology Results Review: I have reviewed radiology reports from prior to this visit including: CT chest and CT abdomen/pelvis.

## (undated) DEVICE — ELECTRODE EZ CLEAN BLADE -0012

## (undated) DEVICE — TROCAR: Brand: KII FIOS FIRST ENTRY

## (undated) DEVICE — STERILE PITCHER PACK: Brand: CARDINAL HEALTH

## (undated) DEVICE — ECHELON CONTOUR W/ GREEN RELOAD: Brand: ECHELON

## (undated) DEVICE — TOWEL SET X-RAY

## (undated) DEVICE — METZENBAUM ADTEC SINGLE USE DISSECTING SCISSORS, SHAFT ONLY, MONOPOLAR, CURVED TO LEFT, WORKING LENGTH: 12 1/4", (310 MM), DIAM. 5 MM, INSULATED, DOUBLE ACTION, STERILE, DISPOSABLE, PACKAGE OF 10 PIECES: Brand: AESCULAP

## (undated) DEVICE — ADHESIVE SKIN HIGH VISCOSITY EXOFIN 1ML

## (undated) DEVICE — SUT VICRYL PLUS 0 UR-6 27IN VCP603H

## (undated) DEVICE — GAUZE SPONGES,16 PLY: Brand: CURITY

## (undated) DEVICE — GLOVE INDICATOR PI UNDERGLOVE SZ 8 BLUE

## (undated) DEVICE — 3M™ IOBAN™ 2 ANTIMICROBIAL INCISE DRAPE 6650EZ: Brand: IOBAN™ 2

## (undated) DEVICE — SUT PDS PLUS 1 CTX 36IN PDP371T

## (undated) DEVICE — TROCAR: Brand: KII SLEEVE

## (undated) DEVICE — PROXIMATE RELOADABLE LINEAR CUTTER WITH SAFETY LOCK-OUT, 75MM: Brand: PROXIMATE

## (undated) DEVICE — ANTIBACTERIAL UNDYED BRAIDED (POLYGLACTIN 910), SYNTHETIC ABSORBABLE SUTURE: Brand: COATED VICRYL

## (undated) DEVICE — CHLORAPREP HI-LITE 26ML ORANGE

## (undated) DEVICE — GLOVE SRG BIOGEL 7.5

## (undated) DEVICE — DRAPE SHEET THREE QUARTER

## (undated) DEVICE — Device: Brand: DEFENDO AIR/WATER/SUCTION AND BIOPSY VALVE

## (undated) DEVICE — PROXIMATE LINEAR CUTTER RELOAD, BLUE, 75MM: Brand: PROXIMATE

## (undated) DEVICE — CO2 AND WATER TUBING/CAP SET FOR OLYMPUS® SCOPES & UCR: Brand: ERBE

## (undated) DEVICE — SUT PDS PLUS 4-0 SH 27IN PDP315H

## (undated) DEVICE — SUT PROLENE 2-0 KS 30 IN 8623H

## (undated) DEVICE — PAD GROUNDING ADULT

## (undated) DEVICE — TRAY FOLEY 16FR URIMETER SILICONE SURESTEP

## (undated) DEVICE — HEMORRHAGE OCCLUDER PIN STANDARD - 10 MM HEAD: Brand: HEMORRHAGE OCCLUDER PIN WITH APPLICATOR

## (undated) DEVICE — INSUFLATION TUBING INSUFLOW (LEXION)

## (undated) DEVICE — TRAVELKIT CONTAINS FIRST STEP KIT (200ML EP-4 KIT) AND SOILED SCOPE BAG - 1 KIT: Brand: TRAVELKIT CONTAINS FIRST STEP KIT AND SOILED SCOPE BAG

## (undated) DEVICE — SUT MONOCRYL 4-0 PS-2 18 IN Y496G

## (undated) DEVICE — SUT VICRYL 2-0 REEL 54 IN J286G

## (undated) DEVICE — PACK PBDS STERILE LAP LITHOTOMY RF

## (undated) DEVICE — ECHELON CIRCULAR POWERED STAPLER: Brand: ECHELON CIRCULAR

## (undated) DEVICE — POOLE SUCTION HANDLE: Brand: CARDINAL HEALTH

## (undated) DEVICE — ACCESS PLATFORM FOR MINIMALLY INVASIVE SURGERY.: Brand: GELPORT® LAPAROSCOPIC  SYSTEM

## (undated) DEVICE — UNDER BUTTOCKS DRAPE W/FLUID CONTROL POUCH: Brand: CONVERTORS

## (undated) DEVICE — 40595 XL TRENDELENBURG POSITIONING KIT: Brand: 40595 XL TRENDELENBURG POSITIONING KIT

## (undated) DEVICE — PLUMEPEN PRO 10FT

## (undated) DEVICE — THE ECHELON, ECHELON ENDOPATH™ AND ECHELON FLEX™ FAMILIES OF ENDOSCOPIC LINEAR CUTTERS AND RELOADS ARE STERILE, SINGLE PATIENT USE INSTRUMENTS THAT SIMULTANEOUSLY CUT AND STAPLE TISSUE. THERE ARE SIX STAGGERED ROWS OF STAPLES, THREE ON EITHER SIDE OF THE CUT LINE. THE 45 MM INSTRUMENTS HAVE A STAPLE LINE THATIS APPROXIMATELY 45 MM LONG AND A CUT LINE THAT IS APPROXIMATELY 42 MM LONG. THE SHAFT CAN ROTATE FREELY IN BOTH DIRECTIONS AND AN ARTICULATION MECHANISM ON ARTICULATING INSTRUMENTS ENABLES BENDING THE DISTAL PORTIONOF THE SHAFT TO FACILITATE LATERAL ACCESS OF THE OPERATIVE SITE.THE INSTRUMENTS ARE SHIPPED WITHOUT A RELOAD AND MUST BE LOADED PRIOR TO USE. A STAPLE RETAINING CAP ON THE RELOAD PROTECTS THE STAPLE LEG POINTS DURING SHIPPING AND TRANSPORTATION. THE INSTRUMENTS’ LOCK-OUT FEATURE IS DESIGNED TO PREVENT A USED RELOAD FROM BEING REFIRED.: Brand: ECHELON ENDOPATH

## (undated) DEVICE — THE ECHELON FLEX POWERED PLUS ARTICULATING ENDOSCOPIC LINEAR CUTTERS ARE STERILE, SINGLE PATIENT USE INSTRUMENTS THAT SIMULTANEOUSLYCUT AND STAPLE TISSUE. THERE ARE SIX STAGGERED ROWS OF STAPLES, THREE ON EITHER SIDE OF THE CUT LINE. THE ECHELON FLEX 45 POWERED PLUSINSTRUMENTS HAVE A STAPLE LINE THAT IS APPROXIMATELY 45 MM LONG AND A CUT LINE THAT IS APPROXIMATELY 42 MM LONG. THE SHAFT CAN ROTATE FREELYIN BOTH DIRECTIONS AND AN ARTICULATION MECHANISM ENABLES THE DISTAL PORTION OF THE SHAFT TO PIVOT TO FACILITATE LATERAL ACCESS TO THE OPERATIVESITE.THE INSTRUMENTS ARE PACKAGED WITH A PRIMARY LITHIUM BATTERY PACK THAT MUST BE INSTALLED PRIOR TO USE. THERE ARE SPECIFIC REQUIREMENTS FORDISPOSING OF THE BATTERY PACK. REFER TO THE BATTERY PACK DISPOSAL SECTION.THE INSTRUMENTS ARE PACKAGED WITHOUT A RELOAD AND MUST BE LOADED PRIOR TO USE. A STAPLE RETAINING CAP ON THE RELOAD PROTECTS THE STAPLE LEGPOINTS DURING SHIPPING AND TRANSPORTATION. THE INSTRUMENTS’ LOCK-OUT FEATURE IS DESIGNED TO PREVENT A USED OR IMPROPERLY INSTALLED RELOADFROM BEING REFIRED OR AN INSTRUMENT FROM BEING FIRED WITHOUT A RELOAD.: Brand: ECHELON FLEX

## (undated) DEVICE — VISUALIZATION SYSTEM: Brand: CLEARIFY

## (undated) DEVICE — TUBING SUCTION 5MM X 12 FT

## (undated) DEVICE — SUT VICRYL 3-0 SH 27 IN J416H

## (undated) DEVICE — ENSEAL X1 TISSUE SEALER, CURVED JAW, 37 CM SHAFT LENGTH: Brand: ENSEAL

## (undated) DEVICE — 2, DISPOSABLE SUCTION/IRRIGATOR WITHOUT DISPOSABLE TIP: Brand: STRYKEFLOW

## (undated) DEVICE — SUCTION TIP SUCTION RETRACTOR: Brand: PRO

## (undated) DEVICE — BETHLEHEM MAJOR GENERAL PACK: Brand: CARDINAL HEALTH